# Patient Record
Sex: FEMALE | Race: WHITE | NOT HISPANIC OR LATINO | Employment: OTHER | URBAN - METROPOLITAN AREA
[De-identification: names, ages, dates, MRNs, and addresses within clinical notes are randomized per-mention and may not be internally consistent; named-entity substitution may affect disease eponyms.]

---

## 2017-01-11 ENCOUNTER — COMMUNICATION - HEALTHEAST (OUTPATIENT)
Dept: CARDIOLOGY | Facility: CLINIC | Age: 72
End: 2017-01-11

## 2017-01-11 ENCOUNTER — AMBULATORY - HEALTHEAST (OUTPATIENT)
Dept: CARDIOLOGY | Facility: CLINIC | Age: 72
End: 2017-01-11

## 2017-01-11 DIAGNOSIS — I25.10 CORONARY ARTERY DISEASE INVOLVING NATIVE CORONARY ARTERY OF NATIVE HEART, ANGINA PRESENCE UNSPECIFIED: ICD-10-CM

## 2018-02-05 ENCOUNTER — RECORDS - HEALTHEAST (OUTPATIENT)
Dept: LAB | Facility: CLINIC | Age: 73
End: 2018-02-05

## 2018-02-05 LAB
ANION GAP SERPL CALCULATED.3IONS-SCNC: 13 MMOL/L (ref 5–18)
BUN SERPL-MCNC: 19 MG/DL (ref 8–28)
CALCIUM SERPL-MCNC: 9.4 MG/DL (ref 8.5–10.5)
CHLORIDE BLD-SCNC: 100 MMOL/L (ref 98–107)
CHOLEST SERPL-MCNC: 212 MG/DL
CO2 SERPL-SCNC: 27 MMOL/L (ref 22–31)
CREAT SERPL-MCNC: 0.67 MG/DL (ref 0.6–1.1)
FASTING STATUS PATIENT QL REPORTED: NO
GFR SERPL CREATININE-BSD FRML MDRD: >60 ML/MIN/1.73M2
GLUCOSE BLD-MCNC: 122 MG/DL (ref 70–125)
HDLC SERPL-MCNC: 63 MG/DL
LDLC SERPL CALC-MCNC: 93 MG/DL
LDLC SERPL CALC-MCNC: ABNORMAL MG/DL
POTASSIUM BLD-SCNC: 3.2 MMOL/L (ref 3.5–5)
SODIUM SERPL-SCNC: 140 MMOL/L (ref 136–145)
TRIGL SERPL-MCNC: 412 MG/DL

## 2018-03-28 ENCOUNTER — RECORDS - HEALTHEAST (OUTPATIENT)
Dept: LAB | Facility: HOSPITAL | Age: 73
End: 2018-03-28

## 2018-03-28 LAB
ALBUMIN SERPL-MCNC: 3.7 G/DL (ref 3.5–5)
ALP SERPL-CCNC: 57 U/L (ref 45–120)
ALT SERPL W P-5'-P-CCNC: 26 U/L (ref 0–45)
ANION GAP SERPL CALCULATED.3IONS-SCNC: 13 MMOL/L (ref 5–18)
AST SERPL W P-5'-P-CCNC: 27 U/L (ref 0–40)
BILIRUB SERPL-MCNC: 0.4 MG/DL (ref 0–1)
BUN SERPL-MCNC: 16 MG/DL (ref 8–28)
CALCIUM SERPL-MCNC: 9.1 MG/DL (ref 8.5–10.5)
CHLORIDE BLD-SCNC: 101 MMOL/L (ref 98–107)
CO2 SERPL-SCNC: 26 MMOL/L (ref 22–31)
CREAT SERPL-MCNC: 0.69 MG/DL (ref 0.6–1.1)
GFR SERPL CREATININE-BSD FRML MDRD: >60 ML/MIN/1.73M2
GLUCOSE BLD-MCNC: 121 MG/DL (ref 70–125)
PHENYTOIN SERPL-MCNC: 25.6 UG/ML (ref 10–20)
POTASSIUM BLD-SCNC: 3 MMOL/L (ref 3.5–5)
PROT SERPL-MCNC: 8.1 G/DL (ref 6–8)
SODIUM SERPL-SCNC: 140 MMOL/L (ref 136–145)

## 2018-03-29 LAB
PHENYTOIN (DILATIN) FREE: 2.4 UG/ML (ref 1–2)
TOPIRAMATE SERPL-MCNC: 4.5 UG/ML (ref 5–20)

## 2018-10-16 ENCOUNTER — RECORDS - HEALTHEAST (OUTPATIENT)
Dept: ADMINISTRATIVE | Facility: OTHER | Age: 73
End: 2018-10-16

## 2018-10-29 ENCOUNTER — RECORDS - HEALTHEAST (OUTPATIENT)
Dept: LAB | Facility: CLINIC | Age: 73
End: 2018-10-29

## 2018-10-29 LAB
ALBUMIN SERPL-MCNC: 3.6 G/DL (ref 3.5–5)
ALP SERPL-CCNC: 132 U/L (ref 45–120)
ALT SERPL W P-5'-P-CCNC: 14 U/L (ref 0–45)
ANION GAP SERPL CALCULATED.3IONS-SCNC: 13 MMOL/L (ref 5–18)
AST SERPL W P-5'-P-CCNC: 22 U/L (ref 0–40)
BILIRUB SERPL-MCNC: 0.4 MG/DL (ref 0–1)
BUN SERPL-MCNC: 20 MG/DL (ref 8–28)
CALCIUM SERPL-MCNC: 9.2 MG/DL (ref 8.5–10.5)
CHLORIDE BLD-SCNC: 100 MMOL/L (ref 98–107)
CHOLEST SERPL-MCNC: 181 MG/DL
CO2 SERPL-SCNC: 25 MMOL/L (ref 22–31)
CREAT SERPL-MCNC: 0.67 MG/DL (ref 0.6–1.1)
FASTING STATUS PATIENT QL REPORTED: NO
GFR SERPL CREATININE-BSD FRML MDRD: >60 ML/MIN/1.73M2
GLUCOSE BLD-MCNC: 109 MG/DL (ref 70–125)
HDLC SERPL-MCNC: 58 MG/DL
LDLC SERPL CALC-MCNC: 73 MG/DL
POTASSIUM BLD-SCNC: 2.8 MMOL/L (ref 3.5–5)
PROT SERPL-MCNC: 7.9 G/DL (ref 6–8)
PTH-INTACT SERPL-MCNC: 115 PG/ML (ref 10–86)
SODIUM SERPL-SCNC: 138 MMOL/L (ref 136–145)
TRIGL SERPL-MCNC: 248 MG/DL
TSH SERPL DL<=0.005 MIU/L-ACNC: 2.78 UIU/ML (ref 0.3–5)

## 2018-10-30 LAB — 25(OH)D3 SERPL-MCNC: 28.3 NG/ML (ref 30–80)

## 2018-12-10 ENCOUNTER — RECORDS - HEALTHEAST (OUTPATIENT)
Dept: LAB | Facility: CLINIC | Age: 73
End: 2018-12-10

## 2018-12-10 LAB — POTASSIUM BLD-SCNC: 3.3 MMOL/L (ref 3.5–5)

## 2019-04-01 ENCOUNTER — RECORDS - HEALTHEAST (OUTPATIENT)
Dept: LAB | Facility: CLINIC | Age: 74
End: 2019-04-01

## 2019-04-01 LAB
ANION GAP SERPL CALCULATED.3IONS-SCNC: 12 MMOL/L (ref 5–18)
BUN SERPL-MCNC: 16 MG/DL (ref 8–28)
CALCIUM SERPL-MCNC: 9.4 MG/DL (ref 8.5–10.5)
CHLORIDE BLD-SCNC: 101 MMOL/L (ref 98–107)
CO2 SERPL-SCNC: 24 MMOL/L (ref 22–31)
CREAT SERPL-MCNC: 0.66 MG/DL (ref 0.6–1.1)
GFR SERPL CREATININE-BSD FRML MDRD: >60 ML/MIN/1.73M2
GLUCOSE BLD-MCNC: 103 MG/DL (ref 70–125)
POTASSIUM BLD-SCNC: 3.6 MMOL/L (ref 3.5–5)
SODIUM SERPL-SCNC: 137 MMOL/L (ref 136–145)

## 2019-04-02 LAB — 25(OH)D3 SERPL-MCNC: 35.6 NG/ML (ref 30–80)

## 2019-04-04 ENCOUNTER — HOME CARE/HOSPICE - HEALTHEAST (OUTPATIENT)
Dept: HOME HEALTH SERVICES | Facility: HOME HEALTH | Age: 74
End: 2019-04-04

## 2019-06-24 ENCOUNTER — RECORDS - HEALTHEAST (OUTPATIENT)
Dept: LAB | Facility: CLINIC | Age: 74
End: 2019-06-24

## 2019-06-24 LAB
ANION GAP SERPL CALCULATED.3IONS-SCNC: 10 MMOL/L (ref 5–18)
BUN SERPL-MCNC: 23 MG/DL (ref 8–28)
CALCIUM SERPL-MCNC: 9.3 MG/DL (ref 8.5–10.5)
CHLORIDE BLD-SCNC: 103 MMOL/L (ref 98–107)
CO2 SERPL-SCNC: 24 MMOL/L (ref 22–31)
CREAT SERPL-MCNC: 0.68 MG/DL (ref 0.6–1.1)
GFR SERPL CREATININE-BSD FRML MDRD: >60 ML/MIN/1.73M2
GLUCOSE BLD-MCNC: 94 MG/DL (ref 70–125)
MAGNESIUM SERPL-MCNC: 2.1 MG/DL (ref 1.8–2.6)
POTASSIUM BLD-SCNC: 3.8 MMOL/L (ref 3.5–5)
SODIUM SERPL-SCNC: 137 MMOL/L (ref 136–145)

## 2019-07-08 ENCOUNTER — RECORDS - HEALTHEAST (OUTPATIENT)
Dept: LAB | Facility: CLINIC | Age: 74
End: 2019-07-08

## 2019-07-08 LAB
MAGNESIUM SERPL-MCNC: 1.8 MG/DL (ref 1.8–2.6)
POTASSIUM BLD-SCNC: 3.9 MMOL/L (ref 3.5–5)

## 2019-07-19 ENCOUNTER — HOME CARE/HOSPICE - HEALTHEAST (OUTPATIENT)
Dept: HOME HEALTH SERVICES | Facility: HOME HEALTH | Age: 74
End: 2019-07-19

## 2019-08-01 ENCOUNTER — RECORDS - HEALTHEAST (OUTPATIENT)
Dept: LAB | Facility: CLINIC | Age: 74
End: 2019-08-01

## 2019-08-01 LAB
ALBUMIN SERPL-MCNC: 3 G/DL (ref 3.5–5)
ALP SERPL-CCNC: 82 U/L (ref 45–120)
ALT SERPL W P-5'-P-CCNC: 34 U/L (ref 0–45)
ANION GAP SERPL CALCULATED.3IONS-SCNC: 9 MMOL/L (ref 5–18)
AST SERPL W P-5'-P-CCNC: 30 U/L (ref 0–40)
BILIRUB SERPL-MCNC: 0.2 MG/DL (ref 0–1)
BUN SERPL-MCNC: 16 MG/DL (ref 8–28)
CALCIUM SERPL-MCNC: 8.9 MG/DL (ref 8.5–10.5)
CHLORIDE BLD-SCNC: 101 MMOL/L (ref 98–107)
CO2 SERPL-SCNC: 27 MMOL/L (ref 22–31)
CREAT SERPL-MCNC: 0.66 MG/DL (ref 0.6–1.1)
GFR SERPL CREATININE-BSD FRML MDRD: >60 ML/MIN/1.73M2
GLUCOSE BLD-MCNC: 99 MG/DL (ref 70–125)
PHENYTOIN SERPL-MCNC: 12.8 UG/ML (ref 10–20)
POTASSIUM BLD-SCNC: 3.9 MMOL/L (ref 3.5–5)
PROT SERPL-MCNC: 7.5 G/DL (ref 6–8)
SODIUM SERPL-SCNC: 137 MMOL/L (ref 136–145)

## 2019-08-05 ENCOUNTER — RECORDS - HEALTHEAST (OUTPATIENT)
Dept: LAB | Facility: HOSPITAL | Age: 74
End: 2019-08-05

## 2019-08-06 LAB — TOPIRAMATE SERPL-MCNC: 5 UG/ML (ref 5–20)

## 2019-09-16 ENCOUNTER — RECORDS - HEALTHEAST (OUTPATIENT)
Dept: LAB | Facility: CLINIC | Age: 74
End: 2019-09-16

## 2019-09-16 LAB
ANION GAP SERPL CALCULATED.3IONS-SCNC: 7 MMOL/L (ref 5–18)
BNP SERPL-MCNC: 665 PG/ML (ref 0–133)
BUN SERPL-MCNC: 16 MG/DL (ref 8–28)
CALCIUM SERPL-MCNC: 9.5 MG/DL (ref 8.5–10.5)
CHLORIDE BLD-SCNC: 101 MMOL/L (ref 98–107)
CO2 SERPL-SCNC: 27 MMOL/L (ref 22–31)
CREAT SERPL-MCNC: 0.66 MG/DL (ref 0.6–1.1)
GFR SERPL CREATININE-BSD FRML MDRD: >60 ML/MIN/1.73M2
GLUCOSE BLD-MCNC: 100 MG/DL (ref 70–125)
POTASSIUM BLD-SCNC: 4 MMOL/L (ref 3.5–5)
SODIUM SERPL-SCNC: 135 MMOL/L (ref 136–145)

## 2019-09-17 ENCOUNTER — RECORDS - HEALTHEAST (OUTPATIENT)
Dept: LAB | Facility: CLINIC | Age: 74
End: 2019-09-17

## 2019-09-18 LAB — MAGNESIUM SERPL-MCNC: 1.8 MG/DL (ref 1.8–2.6)

## 2019-10-08 ENCOUNTER — RECORDS - HEALTHEAST (OUTPATIENT)
Dept: ADMINISTRATIVE | Facility: OTHER | Age: 74
End: 2019-10-08

## 2019-10-08 ENCOUNTER — AMBULATORY - HEALTHEAST (OUTPATIENT)
Dept: CARDIOLOGY | Facility: CLINIC | Age: 74
End: 2019-10-08

## 2019-10-11 ENCOUNTER — OFFICE VISIT - HEALTHEAST (OUTPATIENT)
Dept: CARDIOLOGY | Facility: CLINIC | Age: 74
End: 2019-10-11

## 2019-10-11 DIAGNOSIS — I48.0 PAF (PAROXYSMAL ATRIAL FIBRILLATION) (H): ICD-10-CM

## 2019-10-11 DIAGNOSIS — I25.10 CORONARY ARTERY DISEASE INVOLVING NATIVE CORONARY ARTERY OF NATIVE HEART, ANGINA PRESENCE UNSPECIFIED: ICD-10-CM

## 2019-10-11 DIAGNOSIS — I50.32 CHRONIC DIASTOLIC CONGESTIVE HEART FAILURE (H): ICD-10-CM

## 2019-10-11 ASSESSMENT — MIFFLIN-ST. JEOR: SCORE: 1413.95

## 2019-10-16 ENCOUNTER — COMMUNICATION - HEALTHEAST (OUTPATIENT)
Dept: CARDIOLOGY | Facility: CLINIC | Age: 74
End: 2019-10-16

## 2019-10-16 DIAGNOSIS — I48.0 PAF (PAROXYSMAL ATRIAL FIBRILLATION) (H): ICD-10-CM

## 2019-10-16 DIAGNOSIS — I50.32 CHRONIC DIASTOLIC CONGESTIVE HEART FAILURE (H): ICD-10-CM

## 2019-10-31 ENCOUNTER — RECORDS - HEALTHEAST (OUTPATIENT)
Dept: ADMINISTRATIVE | Facility: OTHER | Age: 74
End: 2019-10-31

## 2019-11-08 ENCOUNTER — RECORDS - HEALTHEAST (OUTPATIENT)
Dept: ADMINISTRATIVE | Facility: OTHER | Age: 74
End: 2019-11-08

## 2019-11-08 ENCOUNTER — AMBULATORY - HEALTHEAST (OUTPATIENT)
Dept: CARDIOLOGY | Facility: CLINIC | Age: 74
End: 2019-11-08

## 2019-11-11 ENCOUNTER — AMBULATORY - HEALTHEAST (OUTPATIENT)
Dept: CARDIOLOGY | Facility: CLINIC | Age: 74
End: 2019-11-11

## 2019-11-11 ENCOUNTER — OFFICE VISIT - HEALTHEAST (OUTPATIENT)
Dept: CARDIOLOGY | Facility: CLINIC | Age: 74
End: 2019-11-11

## 2019-11-11 DIAGNOSIS — I25.10 CAD (CORONARY ARTERY DISEASE): ICD-10-CM

## 2019-11-11 DIAGNOSIS — I50.31 ACUTE DIASTOLIC CONGESTIVE HEART FAILURE (H): ICD-10-CM

## 2019-11-11 DIAGNOSIS — I48.0 PAROXYSMAL ATRIAL FIBRILLATION (H): ICD-10-CM

## 2019-11-11 DIAGNOSIS — I50.30 HEART FAILURE WITH PRESERVED EJECTION FRACTION, NYHA CLASS II (H): ICD-10-CM

## 2019-11-11 LAB
ANION GAP SERPL CALCULATED.3IONS-SCNC: 12 MMOL/L (ref 5–18)
BUN SERPL-MCNC: 22 MG/DL (ref 8–28)
CALCIUM SERPL-MCNC: 9.3 MG/DL (ref 8.5–10.5)
CHLORIDE BLD-SCNC: 102 MMOL/L (ref 98–107)
CO2 SERPL-SCNC: 26 MMOL/L (ref 22–31)
CREAT SERPL-MCNC: 0.78 MG/DL (ref 0.6–1.1)
GFR SERPL CREATININE-BSD FRML MDRD: >60 ML/MIN/1.73M2
GLUCOSE BLD-MCNC: 95 MG/DL (ref 70–125)
POTASSIUM BLD-SCNC: 4 MMOL/L (ref 3.5–5)
SODIUM SERPL-SCNC: 140 MMOL/L (ref 136–145)

## 2019-11-11 ASSESSMENT — MIFFLIN-ST. JEOR: SCORE: 1418.48

## 2019-11-12 ENCOUNTER — AMBULATORY - HEALTHEAST (OUTPATIENT)
Dept: CARDIOLOGY | Facility: CLINIC | Age: 74
End: 2019-11-12

## 2019-11-12 DIAGNOSIS — I50.32 CHRONIC DIASTOLIC CONGESTIVE HEART FAILURE (H): ICD-10-CM

## 2019-11-12 DIAGNOSIS — I48.0 PAF (PAROXYSMAL ATRIAL FIBRILLATION) (H): ICD-10-CM

## 2019-11-18 ENCOUNTER — COMMUNICATION - HEALTHEAST (OUTPATIENT)
Dept: CARDIOLOGY | Facility: CLINIC | Age: 74
End: 2019-11-18

## 2019-11-25 ENCOUNTER — RECORDS - HEALTHEAST (OUTPATIENT)
Dept: LAB | Facility: CLINIC | Age: 74
End: 2019-11-25

## 2019-11-25 LAB
ANION GAP SERPL CALCULATED.3IONS-SCNC: 12 MMOL/L (ref 5–18)
BUN SERPL-MCNC: 17 MG/DL (ref 8–28)
CALCIUM SERPL-MCNC: 9.4 MG/DL (ref 8.5–10.5)
CHLORIDE BLD-SCNC: 103 MMOL/L (ref 98–107)
CO2 SERPL-SCNC: 23 MMOL/L (ref 22–31)
CREAT SERPL-MCNC: 0.71 MG/DL (ref 0.6–1.1)
GFR SERPL CREATININE-BSD FRML MDRD: >60 ML/MIN/1.73M2
GLUCOSE BLD-MCNC: 95 MG/DL (ref 70–125)
POTASSIUM BLD-SCNC: 4 MMOL/L (ref 3.5–5)
SODIUM SERPL-SCNC: 138 MMOL/L (ref 136–145)

## 2019-12-13 ENCOUNTER — RECORDS - HEALTHEAST (OUTPATIENT)
Dept: LAB | Facility: CLINIC | Age: 74
End: 2019-12-13

## 2019-12-15 LAB — BACTERIA SPEC CULT: NORMAL

## 2020-01-16 ENCOUNTER — RECORDS - HEALTHEAST (OUTPATIENT)
Dept: ADMINISTRATIVE | Facility: OTHER | Age: 75
End: 2020-01-16

## 2020-01-16 ENCOUNTER — AMBULATORY - HEALTHEAST (OUTPATIENT)
Dept: CARDIOLOGY | Facility: CLINIC | Age: 75
End: 2020-01-16

## 2020-01-20 ENCOUNTER — OFFICE VISIT - HEALTHEAST (OUTPATIENT)
Dept: CARDIOLOGY | Facility: CLINIC | Age: 75
End: 2020-01-20

## 2020-01-20 DIAGNOSIS — I50.32 CHRONIC HEART FAILURE WITH PRESERVED EJECTION FRACTION (H): ICD-10-CM

## 2020-01-20 DIAGNOSIS — I10 ESSENTIAL HYPERTENSION, BENIGN: ICD-10-CM

## 2020-01-20 DIAGNOSIS — I25.10 CORONARY ARTERY DISEASE INVOLVING NATIVE CORONARY ARTERY OF NATIVE HEART WITHOUT ANGINA PECTORIS: ICD-10-CM

## 2020-01-20 LAB
ANION GAP SERPL CALCULATED.3IONS-SCNC: 11 MMOL/L (ref 5–18)
BUN SERPL-MCNC: 17 MG/DL (ref 8–28)
CALCIUM SERPL-MCNC: 8.9 MG/DL (ref 8.5–10.5)
CHLORIDE BLD-SCNC: 100 MMOL/L (ref 98–107)
CO2 SERPL-SCNC: 28 MMOL/L (ref 22–31)
CREAT SERPL-MCNC: 0.74 MG/DL (ref 0.6–1.1)
GFR SERPL CREATININE-BSD FRML MDRD: >60 ML/MIN/1.73M2
GLUCOSE BLD-MCNC: 93 MG/DL (ref 70–125)
POTASSIUM BLD-SCNC: 3.9 MMOL/L (ref 3.5–5)
SODIUM SERPL-SCNC: 139 MMOL/L (ref 136–145)

## 2020-01-20 ASSESSMENT — MIFFLIN-ST. JEOR: SCORE: 1418.48

## 2020-01-27 ENCOUNTER — COMMUNICATION - HEALTHEAST (OUTPATIENT)
Dept: CARDIOLOGY | Facility: CLINIC | Age: 75
End: 2020-01-27

## 2020-01-27 DIAGNOSIS — I25.10 CORONARY ARTERY DISEASE INVOLVING NATIVE CORONARY ARTERY OF NATIVE HEART WITHOUT ANGINA PECTORIS: ICD-10-CM

## 2020-01-27 DIAGNOSIS — I50.32 CHRONIC HEART FAILURE WITH PRESERVED EJECTION FRACTION (H): ICD-10-CM

## 2020-02-17 ENCOUNTER — COMMUNICATION - HEALTHEAST (OUTPATIENT)
Dept: CARDIOLOGY | Facility: CLINIC | Age: 75
End: 2020-02-17

## 2020-02-17 DIAGNOSIS — I50.32 CHRONIC DIASTOLIC CONGESTIVE HEART FAILURE (H): ICD-10-CM

## 2020-02-17 DIAGNOSIS — I25.10 CAD (CORONARY ARTERY DISEASE): ICD-10-CM

## 2020-02-20 ENCOUNTER — RECORDS - HEALTHEAST (OUTPATIENT)
Dept: ADMINISTRATIVE | Facility: OTHER | Age: 75
End: 2020-02-20

## 2020-03-02 ENCOUNTER — RECORDS - HEALTHEAST (OUTPATIENT)
Dept: LAB | Facility: CLINIC | Age: 75
End: 2020-03-02

## 2020-03-02 ENCOUNTER — HOSPITAL ENCOUNTER (OUTPATIENT)
Dept: CT IMAGING | Facility: CLINIC | Age: 75
Discharge: HOME | End: 2020-03-02
Attending: INTERNAL MEDICINE

## 2020-03-02 DIAGNOSIS — K50.00 CROHN'S DISEASE OF SMALL INTESTINE WITHOUT COMPLICATION (H): ICD-10-CM

## 2020-03-02 LAB
BNP SERPL-MCNC: 398 PG/ML (ref 0–133)
CREAT BLD-MCNC: 0.6 MG/DL (ref 0.6–1.1)
GFR SERPL CREATININE-BSD FRML MDRD: >60 ML/MIN/1.73M2

## 2020-03-03 LAB
ANION GAP SERPL CALCULATED.3IONS-SCNC: 13 MMOL/L (ref 5–18)
BUN SERPL-MCNC: 19 MG/DL (ref 8–28)
CALCIUM SERPL-MCNC: 9.2 MG/DL (ref 8.5–10.5)
CHLORIDE BLD-SCNC: 100 MMOL/L (ref 98–107)
CHOLEST SERPL-MCNC: 166 MG/DL
CO2 SERPL-SCNC: 24 MMOL/L (ref 22–31)
CREAT SERPL-MCNC: 0.7 MG/DL (ref 0.6–1.1)
FASTING STATUS PATIENT QL REPORTED: ABNORMAL
GFR SERPL CREATININE-BSD FRML MDRD: >60 ML/MIN/1.73M2
GLUCOSE BLD-MCNC: 94 MG/DL (ref 70–125)
HDLC SERPL-MCNC: 60 MG/DL
LDLC SERPL CALC-MCNC: 69 MG/DL
POTASSIUM BLD-SCNC: 4.3 MMOL/L (ref 3.5–5)
SODIUM SERPL-SCNC: 137 MMOL/L (ref 136–145)
TRIGL SERPL-MCNC: 183 MG/DL

## 2020-03-16 ENCOUNTER — HOSPITAL ENCOUNTER (OUTPATIENT)
Dept: CARDIOLOGY | Facility: CLINIC | Age: 75
Discharge: HOME | End: 2020-03-16
Attending: INTERNAL MEDICINE

## 2020-05-11 ENCOUNTER — RECORDS - HEALTHEAST (OUTPATIENT)
Dept: LAB | Facility: CLINIC | Age: 75
End: 2020-05-11

## 2020-05-11 ENCOUNTER — OFFICE VISIT - HEALTHEAST (OUTPATIENT)
Dept: CARDIOLOGY | Facility: CLINIC | Age: 75
End: 2020-05-11

## 2020-05-11 DIAGNOSIS — I48.0 PAROXYSMAL ATRIAL FIBRILLATION (H): ICD-10-CM

## 2020-05-11 DIAGNOSIS — I25.10 CORONARY ARTERY DISEASE INVOLVING NATIVE CORONARY ARTERY OF NATIVE HEART WITHOUT ANGINA PECTORIS: ICD-10-CM

## 2020-05-11 DIAGNOSIS — I50.33 ACUTE ON CHRONIC DIASTOLIC HEART FAILURE (H): ICD-10-CM

## 2020-05-11 DIAGNOSIS — I10 ESSENTIAL HYPERTENSION: ICD-10-CM

## 2020-05-11 LAB
ANION GAP SERPL CALCULATED.3IONS-SCNC: 9 MMOL/L (ref 5–18)
BNP SERPL-MCNC: 248 PG/ML (ref 0–133)
BUN SERPL-MCNC: 21 MG/DL (ref 8–28)
CALCIUM SERPL-MCNC: 8.9 MG/DL (ref 8.5–10.5)
CHLORIDE BLD-SCNC: 99 MMOL/L (ref 98–107)
CO2 SERPL-SCNC: 29 MMOL/L (ref 22–31)
CREAT SERPL-MCNC: 0.72 MG/DL (ref 0.6–1.1)
GFR SERPL CREATININE-BSD FRML MDRD: >60 ML/MIN/1.73M2
GLUCOSE BLD-MCNC: 92 MG/DL (ref 70–125)
PHENYTOIN SERPL-MCNC: 12.1 UG/ML (ref 10–20)
POTASSIUM BLD-SCNC: 3.9 MMOL/L (ref 3.5–5)
SODIUM SERPL-SCNC: 137 MMOL/L (ref 136–145)

## 2020-05-13 ENCOUNTER — COMMUNICATION - HEALTHEAST (OUTPATIENT)
Dept: CARDIOLOGY | Facility: CLINIC | Age: 75
End: 2020-05-13

## 2020-05-13 ENCOUNTER — AMBULATORY - HEALTHEAST (OUTPATIENT)
Dept: CARDIOLOGY | Facility: CLINIC | Age: 75
End: 2020-05-13

## 2020-05-13 DIAGNOSIS — I50.32 CHRONIC DIASTOLIC CONGESTIVE HEART FAILURE (H): ICD-10-CM

## 2020-05-15 ENCOUNTER — AMBULATORY - HEALTHEAST (OUTPATIENT)
Dept: CARDIOLOGY | Facility: CLINIC | Age: 75
End: 2020-05-15

## 2020-06-04 ENCOUNTER — RECORDS - HEALTHEAST (OUTPATIENT)
Dept: LAB | Facility: CLINIC | Age: 75
End: 2020-06-04

## 2020-06-04 ENCOUNTER — COMMUNICATION - HEALTHEAST (OUTPATIENT)
Dept: CARDIOLOGY | Facility: CLINIC | Age: 75
End: 2020-06-04

## 2020-06-04 LAB
ANION GAP SERPL CALCULATED.3IONS-SCNC: 11 MMOL/L (ref 5–18)
BNP SERPL-MCNC: 313 PG/ML (ref 0–137)
BUN SERPL-MCNC: 14 MG/DL (ref 8–28)
CALCIUM SERPL-MCNC: 8.8 MG/DL (ref 8.5–10.5)
CHLORIDE BLD-SCNC: 100 MMOL/L (ref 98–107)
CO2 SERPL-SCNC: 29 MMOL/L (ref 22–31)
CREAT SERPL-MCNC: 0.74 MG/DL (ref 0.6–1.1)
GFR SERPL CREATININE-BSD FRML MDRD: >60 ML/MIN/1.73M2
GLUCOSE BLD-MCNC: 115 MG/DL (ref 70–125)
POTASSIUM BLD-SCNC: 3.5 MMOL/L (ref 3.5–5)
SODIUM SERPL-SCNC: 140 MMOL/L (ref 136–145)

## 2020-06-05 ENCOUNTER — COMMUNICATION - HEALTHEAST (OUTPATIENT)
Dept: CARDIOLOGY | Facility: CLINIC | Age: 75
End: 2020-06-05

## 2020-06-05 DIAGNOSIS — I50.32 CHRONIC DIASTOLIC CONGESTIVE HEART FAILURE (H): ICD-10-CM

## 2020-06-08 ENCOUNTER — COMMUNICATION - HEALTHEAST (OUTPATIENT)
Dept: CARDIOLOGY | Facility: CLINIC | Age: 75
End: 2020-06-08

## 2020-06-08 ENCOUNTER — HOSPITAL ENCOUNTER (OUTPATIENT)
Dept: NUCLEAR MEDICINE | Facility: CLINIC | Age: 75
Discharge: HOME | End: 2020-06-08
Attending: INTERNAL MEDICINE

## 2020-06-08 ENCOUNTER — HOSPITAL ENCOUNTER (OUTPATIENT)
Dept: CARDIOLOGY | Facility: CLINIC | Age: 75
Discharge: HOME | End: 2020-06-08
Attending: INTERNAL MEDICINE

## 2020-06-08 DIAGNOSIS — I25.10 CORONARY ARTERY DISEASE INVOLVING NATIVE CORONARY ARTERY OF NATIVE HEART WITHOUT ANGINA PECTORIS: ICD-10-CM

## 2020-06-08 DIAGNOSIS — I50.33 ACUTE ON CHRONIC DIASTOLIC HEART FAILURE (H): ICD-10-CM

## 2020-06-08 LAB
CV STRESS CURRENT BP HE: NORMAL
CV STRESS CURRENT HR HE: 100
CV STRESS CURRENT HR HE: 102
CV STRESS CURRENT HR HE: 104
CV STRESS CURRENT HR HE: 105
CV STRESS CURRENT HR HE: 106
CV STRESS CURRENT HR HE: 109
CV STRESS CURRENT HR HE: 110
CV STRESS CURRENT HR HE: 110
CV STRESS CURRENT HR HE: 111
CV STRESS CURRENT HR HE: 112
CV STRESS CURRENT HR HE: 115
CV STRESS CURRENT HR HE: 115
CV STRESS CURRENT HR HE: 89
CV STRESS CURRENT HR HE: 91
CV STRESS CURRENT HR HE: 97
CV STRESS DEVIATION TIME HE: NORMAL
CV STRESS ECHO PERCENT HR HE: NORMAL
CV STRESS EXERCISE STAGE HE: NORMAL
CV STRESS FINAL RESTING BP HE: NORMAL
CV STRESS FINAL RESTING HR HE: 109
CV STRESS MAX HR HE: 118
CV STRESS MAX TREADMILL GRADE HE: 0
CV STRESS MAX TREADMILL SPEED HE: 0
CV STRESS PEAK DIA BP HE: NORMAL
CV STRESS PEAK SYS BP HE: NORMAL
CV STRESS PHASE HE: NORMAL
CV STRESS PROTOCOL HE: NORMAL
CV STRESS RESTING PT POSITION HE: NORMAL
CV STRESS ST DEVIATION AMOUNT HE: NORMAL
CV STRESS ST DEVIATION ELEVATION HE: NORMAL
CV STRESS ST EVELATION AMOUNT HE: NORMAL
CV STRESS TEST TYPE HE: NORMAL
CV STRESS TOTAL STAGE TIME MIN 1 HE: NORMAL
NUC STRESS EJECTION FRACTION: 46 %
RATE PRESSURE PRODUCT: NORMAL
STRESS ECHO BASELINE DIASTOLIC HE: 58
STRESS ECHO BASELINE HR: 91
STRESS ECHO BASELINE SYSTOLIC BP: 114
STRESS ECHO CALCULATED PERCENT HR: 81 %
STRESS ECHO LAST STRESS DIASTOLIC BP: 56
STRESS ECHO LAST STRESS HR: 110
STRESS ECHO LAST STRESS SYSTOLIC BP: 140
STRESS ECHO TARGET HR: 145

## 2020-06-18 ENCOUNTER — COMMUNICATION - HEALTHEAST (OUTPATIENT)
Dept: CARDIOLOGY | Facility: CLINIC | Age: 75
End: 2020-06-18

## 2020-06-18 ENCOUNTER — RECORDS - HEALTHEAST (OUTPATIENT)
Dept: ADMINISTRATIVE | Facility: OTHER | Age: 75
End: 2020-06-18

## 2020-06-22 ENCOUNTER — OFFICE VISIT (OUTPATIENT)
Dept: NEUROLOGY | Facility: CLINIC | Age: 75
End: 2020-06-22
Payer: COMMERCIAL

## 2020-06-22 VITALS — BODY MASS INDEX: 34.49 KG/M2 | WEIGHT: 207 LBS | HEIGHT: 65 IN

## 2020-06-22 DIAGNOSIS — G40.209 PARTIAL SYMPTOMATIC EPILEPSY WITH COMPLEX PARTIAL SEIZURES, NOT INTRACTABLE, WITHOUT STATUS EPILEPTICUS (H): Primary | ICD-10-CM

## 2020-06-22 PROBLEM — I25.10 CORONARY ATHEROSCLEROSIS: Status: ACTIVE | Noted: 2020-06-22

## 2020-06-22 PROBLEM — E78.5 HYPERLIPIDEMIA: Status: ACTIVE | Noted: 2020-06-22

## 2020-06-22 PROBLEM — I10 ESSENTIAL HYPERTENSION: Status: ACTIVE | Noted: 2020-06-22

## 2020-06-22 PROBLEM — F32.A DEPRESSIVE DISORDER: Status: ACTIVE | Noted: 2020-06-22

## 2020-06-22 PROBLEM — E53.8 B12 DEFICIENCY: Status: ACTIVE | Noted: 2020-06-22

## 2020-06-22 PROBLEM — I50.30 (HFPEF) HEART FAILURE WITH PRESERVED EJECTION FRACTION (H): Status: ACTIVE | Noted: 2020-01-20

## 2020-06-22 PROCEDURE — 99214 OFFICE O/P EST MOD 30 MIN: CPT | Performed by: PSYCHIATRY & NEUROLOGY

## 2020-06-22 RX ORDER — PHENYTOIN SODIUM 100 MG/1
500 CAPSULE, EXTENDED RELEASE ORAL AT BEDTIME
Qty: 540 CAPSULE | Refills: 3 | Status: SHIPPED | OUTPATIENT
Start: 2020-06-22 | End: 2021-06-21

## 2020-06-22 RX ORDER — MONTELUKAST SODIUM 4 MG/1
3 TABLET, CHEWABLE ORAL DAILY
COMMUNITY
End: 2021-09-21

## 2020-06-22 RX ORDER — CYANOCOBALAMIN 1000 UG/ML
1000 INJECTION, SOLUTION INTRAMUSCULAR; SUBCUTANEOUS
COMMUNITY

## 2020-06-22 RX ORDER — TOPIRAMATE 100 MG/1
150 TABLET, FILM COATED ORAL 2 TIMES DAILY
Qty: 270 TABLET | Refills: 3 | Status: SHIPPED | OUTPATIENT
Start: 2020-06-22 | End: 2021-06-21

## 2020-06-22 RX ORDER — ACETAMINOPHEN 500 MG
1000 TABLET ORAL 2 TIMES DAILY
COMMUNITY

## 2020-06-22 RX ORDER — FUROSEMIDE 40 MG
60 TABLET ORAL
COMMUNITY
Start: 2019-08-05 | End: 2021-06-21 | Stop reason: ALTCHOICE

## 2020-06-22 RX ORDER — LORATADINE 10 MG/1
10 TABLET ORAL DAILY PRN
COMMUNITY

## 2020-06-22 RX ORDER — ASPIRIN 81 MG/1
81 TABLET ORAL DAILY
COMMUNITY
Start: 2019-06-19

## 2020-06-22 RX ORDER — ALBUTEROL SULFATE 90 UG/1
2 AEROSOL, METERED RESPIRATORY (INHALATION) EVERY 6 HOURS PRN
COMMUNITY

## 2020-06-22 RX ORDER — METOPROLOL TARTRATE 50 MG
50 TABLET ORAL 2 TIMES DAILY
COMMUNITY
Start: 2019-07-22 | End: 2023-09-14

## 2020-06-22 RX ORDER — FLUTICASONE PROPIONATE 110 UG/1
1 AEROSOL, METERED RESPIRATORY (INHALATION) 2 TIMES DAILY
COMMUNITY

## 2020-06-22 RX ORDER — PAROXETINE 40 MG/1
40 TABLET, FILM COATED ORAL DAILY
COMMUNITY
Start: 2019-08-05

## 2020-06-22 RX ORDER — ALENDRONATE SODIUM 70 MG/1
70 TABLET ORAL
COMMUNITY
End: 2024-08-20

## 2020-06-22 RX ORDER — PHENYTOIN SODIUM 100 MG/1
500 CAPSULE, EXTENDED RELEASE ORAL AT BEDTIME
COMMUNITY
Start: 2020-02-18 | End: 2020-06-22

## 2020-06-22 RX ORDER — SIMVASTATIN 40 MG
40 TABLET ORAL AT BEDTIME
COMMUNITY

## 2020-06-22 RX ORDER — TOPIRAMATE 100 MG/1
150 TABLET, FILM COATED ORAL
COMMUNITY
Start: 2020-02-18 | End: 2020-06-22

## 2020-06-22 RX ORDER — NITROGLYCERIN 0.4 MG/1
0.4 TABLET SUBLINGUAL EVERY 5 MIN PRN
COMMUNITY
Start: 2020-02-17 | End: 2023-10-05

## 2020-06-22 RX ORDER — MULTIPLE VITAMINS W/ MINERALS TAB 9MG-400MCG
1 TAB ORAL DAILY
COMMUNITY

## 2020-06-22 RX ORDER — SPIRONOLACTONE 50 MG/1
TABLET, FILM COATED ORAL
COMMUNITY
Start: 2020-06-08 | End: 2021-09-13

## 2020-06-22 SDOH — HEALTH STABILITY: MENTAL HEALTH: HOW OFTEN DO YOU HAVE A DRINK CONTAINING ALCOHOL?: NEVER

## 2020-06-22 ASSESSMENT — MIFFLIN-ST. JEOR: SCORE: 1434.83

## 2020-06-22 NOTE — LETTER
July 10, 2020    Doloresrimadhu Álvaro  [address]  [address]  [facility] [address]      Dear ,    We are writing to inform you of your test results.    Your test results fall within the expected range(s) or remain unchanged from previous results.  Please continue with current treatment plan.    Ref Range & Units  7/6/20 0816 4/26/20 0642       Topiramate  5.0 - 20.0 ug/mL  4.1Low     3.3Low   CM      Ref Range & Units  7/6/20 0816 5/11/20 0812       Phenytoin  10.0 - 20.0 ug/mL  13.9   12.1      If you have any questions or concerns, please call the clinic at the number listed above.       Sincerely,        Guy Marie MD

## 2020-06-22 NOTE — NURSING NOTE
Chief Complaint   Patient presents with     Hospital follow up     Dilantin toxicity- Dilantin level decreased      Video Visit     [facility]- [phone]     Katherine Preston CMA on 6/22/2020 at 12:33 PM

## 2020-06-22 NOTE — LETTER
2020         RE: Tommy Rea  [address]  [address]  [facility] [address]        Dear Colleague,    Thank you for referring your patient, Tommy Rea, to the [facility] [place]. Please see a copy of my visit note below.    NEUROLOGY FOLLOW UP VISIT  NOTE       [facility] [place]  [address] [place] [address]  Tel: [phone]  Fax: [phone]  [email or web address]     Tommy Rea,  1945, MRN [id number]  PCP: Hannah Benton, [phone]  Date: 2020     ASSESSMENT & PLAN     Diagnosis code: Complex partial seizure (Dysrhythmia grade III left)     Complex partial seizures  75-year-old female with history of complex partial seizures, depression with anxiety and paroxysmal atrial fibrillation who was recently admitted to the hospital with increased confusion and was noted to have Dilantin toxicity.  Dilantin dose was adjusted and she seems to be doing well.  Currently she is on Dilantin and Topamax.  I have recommended:    Continue Dilantin 500 mg nightly.  I refilled her prescription, gave her a 90-day supply with 3 refills    Continue Topamax 150 mg twice daily.  I refilled her prescription, gave her a 90-day supply and 3 refills.    Check free Dilantin level and Topamax level    She will continue to take calcium and vitamin D supplements    Follow-up will be in 1 year.    Thank you again for this referral, please feel free to contact me if you have any questions.    Guy Marie MD  [facility]  ([facility], [facility]     HISTORY OF PRESENT ILLNESS     Patient is a 75-year-old female with history of congestive heart failure, depression with anxiety, complex partial seizure, paroxysmal atrial fibrillation and coronary artery disease who returns for follow-up.  She was recently admitted to [facility] with increased confusion.  This was associated with some  speech difficulty.  She was noted to have an elevated Dilantin level.  She had an EEG that showed nonspecific slowing.  MRI of the head and CT were unremarkable.  Subsequently free Dilantin level was monitored and that she was discharged from the level of was 2.0 since then she has not experienced any seizures or any balance issues.    Patient does have history of seizure and was initially evaluated for seizure in March when she presented with a generalized seizure and EEG showed left hemispheric sharp activity. MRI was normal. 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 Patient reports that her seizures are triggered by stress and usually she gets an aura. Currently her symptoms are well controlled on Dilantin and Topamax     PROBLEM LIST   Patient Active Problem List    Diagnosis Date Noted     Hyperlipidemia 06/22/2020     Priority: Medium     Created by Conversion       Depressive disorder 06/22/2020     Priority: Medium     Coronary atherosclerosis 06/22/2020     Priority: Medium     B12 deficiency 06/22/2020     Priority: Medium     (HFpEF) heart failure with preserved ejection fraction (H) 01/20/2020     Priority: Medium     Atrial fibrillation (H) 10/24/2016     Priority: Medium     Regional enteritis (H) 07/19/2016     Priority: Medium     HTN (hypertension) 07/19/2016     Priority: Medium     Created by Conversion       COPD (chronic obstructive pulmonary disease) (H) 07/19/2016     Priority: Medium     Nonintractable epilepsy with complex partial seizures (H) 03/13/2015     Priority: Medium        PAST MEDICAL & SURGICAL HISTORY     Past Medical History:   Patient  has no past medical history on file.    Surgical History:  She  has no past surgical history on file.     SOCIAL HISTORY     Reviewed, and she  reports that she has quit smoking. She has never used smokeless tobacco. She reports that she does not  drink alcohol.     FAMILY HISTORY     Reviewed, and family history includes Arthritis in her father; Colon Cancer in her father; Hypertension in her father and mother.     ALLERGIES     Allergies   Allergen Reactions     Amoxicillin GI Disturbance and Other (See Comments)     COLITIS    Has tolerated cefazolin  GI bleeding, Colitis, has tolerated cefazolin        Sulfa Drugs      Sulfamethoxazole-Trimethoprim Other (See Comments)     Other reaction(s): Vaginal Irritation  VAGINAL BLEEDING  Vaginal irritation, vaginal bleeding            REVIEW OF SYSTEMS     A 12 point review of system was performed and was negative except as outlined in the history of present illness.     HOME MEDICATIONS     Prior to Admission medications    Medication Sig Start Date End Date Taking? Authorizing Provider   acetaminophen (TYLENOL) 500 MG tablet Take 1,000 mg by mouth   Yes Reported, Patient   albuterol (PROAIR HFA/PROVENTIL HFA/VENTOLIN HFA) 108 (90 Base) MCG/ACT inhaler Inhale 2 puffs into the lungs   Yes Reported, Patient   alendronate (FOSAMAX) 70 MG tablet Take 70 mg by mouth   Yes Reported, Patient   apixaban ANTICOAGULANT (ELIQUIS) 5 MG tablet Take 5 mg by mouth 2 times daily  6/18/19  Yes Reported, Patient   aspirin 81 MG EC tablet Take 81 mg by mouth 6/19/19  Yes Reported, Patient   colestipol (COLESTID) 1 g tablet Take 3 g by mouth   Yes Reported, Patient   cyanocobalamin (CYANOCOBALAMIN) 1000 MCG/ML injection Inject 1,000 mcg into the muscle   Yes Reported, Patient   HAYLEY CALCIUM-VITAMIN D PO  8/5/19  Yes Reported, Patient   fluticasone (FLOVENT HFA) 110 MCG/ACT inhaler Inhale 1 puff into the lungs   Yes Reported, Patient   furosemide (LASIX) 40 MG tablet Take 60 mg by mouth 8/5/19  Yes Reported, Patient   loratadine (CLARITIN) 10 MG tablet Take 10 mg by mouth   Yes Reported, Patient   Magnesium Oxide 250 MG TABS Take 250 mg by mouth   Yes Reported, Patient   MAGNESIUM OXIDE PO  8/5/19  Yes Reported, Patient   metoprolol  "tartrate (LOPRESSOR) 50 MG tablet Take 50 mg by mouth 7/22/19  Yes Reported, Patient   multivitamin w/minerals (MULTI-VITAMIN) tablet Take 1 tablet by mouth   Yes Reported, Patient   nitroGLYcerin (NITROSTAT) 0.4 MG sublingual tablet Place 0.4 mg under the tongue 2/17/20  Yes Reported, Patient   PARoxetine (PAXIL) 40 MG tablet Take 40 mg by mouth 8/5/19  Yes Reported, Patient   phenytoin (DILANTIN) 100 MG capsule Take 500 mg by mouth At Bedtime  2/18/20  Yes Reported, Patient   spironolactone (ALDACTONE) 50 MG tablet Take 50mg (1 tab)  in am and 25mg (half tab) in pm. 6/8/20  Yes Reported, Patient   topiramate (TOPAMAX) 100 MG tablet Take 150 mg by mouth 2/18/20  Yes Reported, Patient   simvastatin (ZOCOR) 40 MG tablet Take 40 mg by mouth    Reported, Patient         PHYSICAL EXAM     Vital signs  Ht 1.651 m (5' 5\")   Wt 93.9 kg (207 lb)   BMI 34.45 kg/m      Weight:   207 lbs 0 oz    General Physical Exam: Patient is alert and oriented x 3. Vital signs were reviewed and are documented in EMR. Neck was supple  Neurological Exam:  Patient is alert and oriented x3 speech was normal there was no dysarthria or aphasia.  Cranial nerves are intact.  She does appear hard of hearing.  Face symmetrical tongue midline.  Strength in all extremities 5/5.  Sensation intact.  Gait normal.  No dysmetria noted on finger-nose testing.     DIAGNOSTIC STUDIES     PERTINENT RADIOLOGY  Following imaging studies were reviewed      MRI 4/27/20  HEAD MRI:  1.  No acute intracranial finding. No evidence for recent ischemia, intracranial hemorrhage, or mass.    HEAD MRA:  1.  Negative MR angiogram of the brain. No evidence for aneurysm, proximal vessel occlusion, high-grade intracranial stenosis or high flow vascular lesion.    NECK MRA:  1.  Atherosclerotic narrowing of the proximal left internal carotid artery is not well-characterized due to motion artifact but suspected to be in the 50% range based on NASCET criteria.  2.  No " "hemodynamically significant narrowing within the right carotid artery.   3.  Vertebral arteries are patent. No dissection.    CT HEAD 4/26/20  1.  No CT evidence for acute intracranial process.  2.  Brain atrophy and presumed chronic microvascular ischemic changes as above.  3.  Mucosal thickening is noted involving the left sphenoid sinus. Correlation for sinusitis is recommended.    EEG 4/27/20  This is an abnormal EEG due to diffusely slow background in   theta and delta range that suggests nonspecific generalized cerebral   dysfunction.  EEG background activity is contaminated with muscle   artifact.  Such nonspecific slowing suggests diffuse cerebral dysfunction   but no clear epileptiform activity was noted    CT HEAD 7/17/19  1. No intracranial mass. No hemorrhage or stroke.  2. Mild presumed chronic small vessel ischemic change and age-related change again visualized.  3. Interval development of polypoid soft tissue in the external auditory canals.    MRI of the head done in 2015 was normal. EEG was abnormal suggesting low threshold for seizure  PERTINENT LABS  Following labs were reviewed  Dilantin level 5/11/2020 2012.1  4/28/2020 2016.8  4/26/2020 22.9  Free Dilantin level 426 2022.0   Component Name Value Ref Range   Sodium 140 136 - 145 mmol/L   Potassium 3.5 3.5 - 5 mmol/L   Chloride 100 98 - 107 mmol/L   CO2 29 22 - 31 mmol/L   Anion Gap, Calculation 11 5 - 18 mmol/L   Glucose 115 70 - 125 mg/dL   Calcium 8.8 8.5 - 10.5 mg/dL   BUN 14 8 - 28 mg/dL   Creatinine 0.74 0.6 - 1.1 mg/dL   GFR MDRD Af Amer >60 >60 mL/min/1.73m2   GFR MDRD Non Af Amer >60 >60 mL/min/1.73m2          VIDEO VISIT CONSENT  Patient is being evaluated via a billable video visit. The patient has been notified of following:     \"This video visit will be conducted via a call between you and your physician/provider. We have found that certain health care needs can be provided without the need for an in-person physical exam. This " "service lets us provide the care you need with a video conversation. If a prescription is necessary we can send it directly to your pharmacy. If lab work is needed we can place an order for that and you can then stop by our lab to have the test done at a later time. If during the course of the call the physician/provider feels a video visit is not appropriate, you will not be charged for this service.\"    Physician has received verbal consent for a Video Visit from the patient? YES  Patient would like the video invitation sent by: []E-mail  [x][employer]     VIDEO VISIT DETAILS  Type of service: Video Visit  Video Start Time: 12:49 PM  Video End Time (time video stopped): 12:57 PM  Originating Location (Patient Location): Patient's Home  Distant Location (provider location): [facility] [place]   Mode of Communication: Video Conference via [][employer]I [place] [x]Doximity  Total time spent for face to face visit, reviewing labs/imaging studies, counseling and coordination of care was: 30 Minutes More than 50% of this time was spent on counseling and coordination of care.      This note was dictated using voice recognition software.  Any grammatical or context distortions are unintentional and inherent to the software.               Again, thank you for allowing me to participate in the care of your patient.        Sincerely,        Guy Marie MD    "

## 2020-06-22 NOTE — PATIENT INSTRUCTIONS
Patient Education     Self-Care for Epilepsy  You can do many things to help control your seizures. First, follow your treatment plan. If your healthcare provider has prescribed medicines, take them as directed. Keep your appointments with your primary care nurse, healthcare provider, or neurologist. Also, take the following steps.    Track and stay away from triggers  Triggers are things that seem to cause (provoke) seizures. Keep track of your triggers and try to prevent them or stay away from them. Here are 2 common triggers and ways to cope with them:    Too little sleep. Get enough sleep. If you have trouble sleeping, talk with your healthcare provider.    Alcohol and drugs. Don t drink alcohol. Never take any illegal drugs. If you do have substance abuse issues, talk with your healthcare provider about the safest way to become free of alcohol and drugs.   Keep a healthy lifestyle  A healthy lifestyle can help you feel good and cope better with epilepsy.    Exercise often. Frequent exercise can help keep you healthy. Try to exercise for 30 minutes most days of the week. Yoga is a good choice.    SPAN: Eat well and regularly. Good nutrition can give you energy and make you feel better. Eat lots of fruits, vegetables, and whole grains. Don't skip meals, because seizures are more likely if you have low blood sugar.    SPAN: Control stress. Keeping stress levels low can help you cope better with epilepsy. To manage stress, try an exercise program.    SPAN: Manage illness. Get proper treatment when you re sick. Check with your healthcare provider and pharmacist about the risk of seizures with medicines you take for illnesses. If your healthcare provider has prescribed antiepileptic medicines, take them even when you re ill.  Date Last Reviewed: 11/1/2017 2000-2019 [employer] [address] [place] [address], [place] [address]. All rights reserved. This information is not intended as a substitute for  professional medical care. Always follow your healthcare professional's instructions.

## 2020-06-22 NOTE — PROGRESS NOTES
NEUROLOGY FOLLOW UP VISIT  NOTE       [facility] NEUROLOGY [place]  [address] [place] [address]  Tel: [phone]  Fax: [phone]  [email or web address]     Tommy Rea KIKI 1945, MRN [id number]  PCP: Hannah Benton, [phone]  Date: 2020     ASSESSMENT & PLAN     Diagnosis code: Complex partial seizure (Dysrhythmia grade III left)     Complex partial seizures  75-year-old female with history of complex partial seizures, depression with anxiety and paroxysmal atrial fibrillation who was recently admitted to the hospital with increased confusion and was noted to have Dilantin toxicity.  Dilantin dose was adjusted and she seems to be doing well.  Currently she is on Dilantin and Topamax.  I have recommended:    Continue Dilantin 500 mg nightly.  I refilled her prescription, gave her a 90-day supply with 3 refills    Continue Topamax 150 mg twice daily.  I refilled her prescription, gave her a 90-day supply and 3 refills.    Check free Dilantin level and Topamax level    She will continue to take calcium and vitamin D supplements    Follow-up will be in 1 year.    Thank you again for this referral, please feel free to contact me if you have any questions.    Guy Marie MD  [facility]  (Formerly, [facility]     HISTORY OF PRESENT ILLNESS     Patient is a 75-year-old female with history of congestive heart failure, depression with anxiety, complex partial seizure, paroxysmal atrial fibrillation and coronary artery disease who returns for follow-up.  She was recently admitted to [facility] with increased confusion.  This was associated with some speech difficulty.  She was noted to have an elevated Dilantin level.  She had an EEG that showed nonspecific slowing.  MRI of the head and CT were unremarkable.  Subsequently free Dilantin level was monitored and that she was discharged from the [facility] of was 2.0  since then she has not experienced any seizures or any balance issues.    Patient does have history of seizure and was initially evaluated for seizure in March when she presented with a generalized seizure and EEG showed left hemispheric sharp activity. MRI was normal. 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 Patient reports that her seizures are triggered by stress and usually she gets an aura. Currently her symptoms are well controlled on Dilantin and Topamax     PROBLEM LIST   Patient Active Problem List    Diagnosis Date Noted     Hyperlipidemia 06/22/2020     Priority: Medium     Created by Conversion       Depressive disorder 06/22/2020     Priority: Medium     Coronary atherosclerosis 06/22/2020     Priority: Medium     B12 deficiency 06/22/2020     Priority: Medium     (HFpEF) heart failure with preserved ejection fraction (H) 01/20/2020     Priority: Medium     Atrial fibrillation (H) 10/24/2016     Priority: Medium     Regional enteritis (H) 07/19/2016     Priority: Medium     HTN (hypertension) 07/19/2016     Priority: Medium     Created by Conversion       COPD (chronic obstructive pulmonary disease) (H) 07/19/2016     Priority: Medium     Nonintractable epilepsy with complex partial seizures (H) 03/13/2015     Priority: Medium        PAST MEDICAL & SURGICAL HISTORY     Past Medical History:   Patient  has no past medical history on file.    Surgical History:  She  has no past surgical history on file.     SOCIAL HISTORY     Reviewed, and she  reports that she has quit smoking. She has never used smokeless tobacco. She reports that she does not drink alcohol.     FAMILY HISTORY     Reviewed, and family history includes Arthritis in her father; Colon Cancer in her father; Hypertension in her father and mother.     ALLERGIES     Allergies   Allergen Reactions     Amoxicillin GI Disturbance and Other (See  Comments)     COLITIS    Has tolerated cefazolin  GI bleeding, Colitis, has tolerated cefazolin        Sulfa Drugs      Sulfamethoxazole-Trimethoprim Other (See Comments)     Other reaction(s): Vaginal Irritation  VAGINAL BLEEDING  Vaginal irritation, vaginal bleeding            REVIEW OF SYSTEMS     A 12 point review of system was performed and was negative except as outlined in the history of present illness.     HOME MEDICATIONS     Prior to Admission medications    Medication Sig Start Date End Date Taking? Authorizing Provider   acetaminophen (TYLENOL) 500 MG tablet Take 1,000 mg by mouth   Yes Reported, Patient   albuterol (PROAIR HFA/PROVENTIL HFA/VENTOLIN HFA) 108 (90 Base) MCG/ACT inhaler Inhale 2 puffs into the lungs   Yes Reported, Patient   alendronate (FOSAMAX) 70 MG tablet Take 70 mg by mouth   Yes Reported, Patient   apixaban ANTICOAGULANT (ELIQUIS) 5 MG tablet Take 5 mg by mouth 2 times daily  6/18/19  Yes Reported, Patient   aspirin 81 MG EC tablet Take 81 mg by mouth 6/19/19  Yes Reported, Patient   colestipol (COLESTID) 1 g tablet Take 3 g by mouth   Yes Reported, Patient   cyanocobalamin (CYANOCOBALAMIN) 1000 MCG/ML injection Inject 1,000 mcg into the muscle   Yes Reported, Patient   HAYLEY CALCIUM-VITAMIN D PO  8/5/19  Yes Reported, Patient   fluticasone (FLOVENT HFA) 110 MCG/ACT inhaler Inhale 1 puff into the lungs   Yes Reported, Patient   furosemide (LASIX) 40 MG tablet Take 60 mg by mouth 8/5/19  Yes Reported, Patient   loratadine (CLARITIN) 10 MG tablet Take 10 mg by mouth   Yes Reported, Patient   Magnesium Oxide 250 MG TABS Take 250 mg by mouth   Yes Reported, Patient   MAGNESIUM OXIDE PO  8/5/19  Yes Reported, Patient   metoprolol tartrate (LOPRESSOR) 50 MG tablet Take 50 mg by mouth 7/22/19  Yes Reported, Patient   multivitamin w/minerals (MULTI-VITAMIN) tablet Take 1 tablet by mouth   Yes Reported, Patient   nitroGLYcerin (NITROSTAT) 0.4 MG sublingual tablet Place 0.4 mg under the tongue  "2/17/20  Yes Reported, Patient   PARoxetine (PAXIL) 40 MG tablet Take 40 mg by mouth 8/5/19  Yes Reported, Patient   phenytoin (DILANTIN) 100 MG capsule Take 500 mg by mouth At Bedtime  2/18/20  Yes Reported, Patient   spironolactone (ALDACTONE) 50 MG tablet Take 50mg (1 tab)  in am and 25mg (half tab) in pm. 6/8/20  Yes Reported, Patient   topiramate (TOPAMAX) 100 MG tablet Take 150 mg by mouth 2/18/20  Yes Reported, Patient   simvastatin (ZOCOR) 40 MG tablet Take 40 mg by mouth    Reported, Patient         PHYSICAL EXAM     Vital signs  Ht 1.651 m (5' 5\")   Wt 93.9 kg (207 lb)   BMI 34.45 kg/m      Weight:   207 lbs 0 oz    General Physical Exam: Patient is alert and oriented x 3. Vital signs were reviewed and are documented in EMR. Neck was supple  Neurological Exam:  Patient is alert and oriented x3 speech was normal there was no dysarthria or aphasia.  Cranial nerves are intact.  She does appear hard of hearing.  Face symmetrical tongue midline.  Strength in all extremities 5/5.  Sensation intact.  Gait normal.  No dysmetria noted on finger-nose testing.     DIAGNOSTIC STUDIES     PERTINENT RADIOLOGY  Following imaging studies were reviewed      MRI 4/27/20  HEAD MRI:  1.  No acute intracranial finding. No evidence for recent ischemia, intracranial hemorrhage, or mass.    HEAD MRA:  1.  Negative MR angiogram of the brain. No evidence for aneurysm, proximal vessel occlusion, high-grade intracranial stenosis or high flow vascular lesion.    NECK MRA:  1.  Atherosclerotic narrowing of the proximal left internal carotid artery is not well-characterized due to motion artifact but suspected to be in the 50% range based on NASCET criteria.  2.  No hemodynamically significant narrowing within the right carotid artery.   3.  Vertebral arteries are patent. No dissection.    CT HEAD 4/26/20  1.  No CT evidence for acute intracranial process.  2.  Brain atrophy and presumed chronic microvascular ischemic changes as " "above.  3.  Mucosal thickening is noted involving the left sphenoid sinus. Correlation for sinusitis is recommended.    EEG 4/27/20  This is an abnormal EEG due to diffusely slow background in   theta and delta range that suggests nonspecific generalized cerebral   dysfunction.  EEG background activity is contaminated with muscle   artifact.  Such nonspecific slowing suggests diffuse cerebral dysfunction   but no clear epileptiform activity was noted    CT HEAD 7/17/19  1. No intracranial mass. No hemorrhage or stroke.  2. Mild presumed chronic small vessel ischemic change and age-related change again visualized.  3. Interval development of polypoid soft tissue in the external auditory canals.    MRI of the head done in 2015 was normal. EEG was abnormal suggesting low threshold for seizure  PERTINENT LABS  Following labs were reviewed  Dilantin level 5/11/2020 2012.1  4/28/2020 2016.8  4/26/2020 22.9  Free Dilantin level 426 2022.0   Component Name Value Ref Range   Sodium 140 136 - 145 mmol/L   Potassium 3.5 3.5 - 5 mmol/L   Chloride 100 98 - 107 mmol/L   CO2 29 22 - 31 mmol/L   Anion Gap, Calculation 11 5 - 18 mmol/L   Glucose 115 70 - 125 mg/dL   Calcium 8.8 8.5 - 10.5 mg/dL   BUN 14 8 - 28 mg/dL   Creatinine 0.74 0.6 - 1.1 mg/dL   GFR MDRD Af Amer >60 >60 mL/min/1.73m2   GFR MDRD Non Af Amer >60 >60 mL/min/1.73m2          VIDEO VISIT CONSENT  Patient is being evaluated via a billable video visit. The patient has been notified of following:     \"This video visit will be conducted via a call between you and your physician/provider. We have found that certain health care needs can be provided without the need for an in-person physical exam. This service lets us provide the care you need with a video conversation. If a prescription is necessary we can send it directly to your pharmacy. If lab work is needed we can place an order for that and you can then stop by our lab to have the test done at a later time. If " "during the course of the call the physician/provider feels a video visit is not appropriate, you will not be charged for this service.\"    Physician has received verbal consent for a Video Visit from the patient? YES  Patient would like the video invitation sent by: []E-mail  [x][place]     VIDEO VISIT DETAILS  Type of service: Video Visit  Video Start Time: 12:49 PM  Video End Time (time video stopped): 12:57 PM  Originating Location (Patient Location): Patient's Home  Distant Location (provider location): [facility]   Mode of Communication: Video Conference via []Pin or Peg [x]Doximity  Total time spent for face to face visit, reviewing labs/imaging studies, counseling and coordination of care was: 30 Minutes More than 50% of this time was spent on counseling and coordination of care.      This note was dictated using voice recognition software.  Any grammatical or context distortions are unintentional and inherent to the software.             "

## 2020-07-06 ENCOUNTER — RECORDS - HEALTHEAST (OUTPATIENT)
Dept: LAB | Facility: CLINIC | Age: 75
End: 2020-07-06

## 2020-07-06 LAB
ANION GAP SERPL CALCULATED.3IONS-SCNC: 11 MMOL/L (ref 5–18)
BNP SERPL-MCNC: 448 PG/ML (ref 0–137)
BUN SERPL-MCNC: 19 MG/DL (ref 8–28)
CALCIUM SERPL-MCNC: 8.6 MG/DL (ref 8.5–10.5)
CHLORIDE BLD-SCNC: 101 MMOL/L (ref 98–107)
CO2 SERPL-SCNC: 24 MMOL/L (ref 22–31)
CREAT SERPL-MCNC: 0.71 MG/DL (ref 0.6–1.1)
GFR SERPL CREATININE-BSD FRML MDRD: >60 ML/MIN/1.73M2
GLUCOSE BLD-MCNC: 104 MG/DL (ref 70–125)
PHENYTOIN SERPL-MCNC: 13.9 UG/ML (ref 10–20)
POTASSIUM BLD-SCNC: 4.1 MMOL/L (ref 3.5–5)
SODIUM SERPL-SCNC: 136 MMOL/L (ref 136–145)

## 2020-07-08 LAB — TOPIRAMATE SERPL-MCNC: 4.1 UG/ML (ref 5–20)

## 2020-07-15 ENCOUNTER — AMBULATORY - HEALTHEAST (OUTPATIENT)
Dept: CARDIOLOGY | Facility: CLINIC | Age: 75
End: 2020-07-15

## 2020-07-20 ENCOUNTER — COMMUNICATION - HEALTHEAST (OUTPATIENT)
Dept: CARDIOLOGY | Facility: CLINIC | Age: 75
End: 2020-07-20

## 2020-07-20 ENCOUNTER — OFFICE VISIT - HEALTHEAST (OUTPATIENT)
Dept: CARDIOLOGY | Facility: CLINIC | Age: 75
End: 2020-07-20

## 2020-07-20 DIAGNOSIS — I50.32 CHRONIC DIASTOLIC CONGESTIVE HEART FAILURE (H): ICD-10-CM

## 2020-07-20 DIAGNOSIS — I50.33 ACUTE ON CHRONIC HEART FAILURE WITH PRESERVED EJECTION FRACTION (H): ICD-10-CM

## 2020-07-28 ENCOUNTER — COMMUNICATION - HEALTHEAST (OUTPATIENT)
Dept: CARDIOLOGY | Facility: CLINIC | Age: 75
End: 2020-07-28

## 2020-07-29 ENCOUNTER — COMMUNICATION - HEALTHEAST (OUTPATIENT)
Dept: CARDIOLOGY | Facility: CLINIC | Age: 75
End: 2020-07-29

## 2020-08-04 ENCOUNTER — COMMUNICATION - HEALTHEAST (OUTPATIENT)
Dept: CARDIOLOGY | Facility: CLINIC | Age: 75
End: 2020-08-04

## 2020-08-05 ENCOUNTER — RECORDS - HEALTHEAST (OUTPATIENT)
Dept: ADMINISTRATIVE | Facility: OTHER | Age: 75
End: 2020-08-05

## 2020-08-06 ENCOUNTER — COMMUNICATION - HEALTHEAST (OUTPATIENT)
Dept: CARDIOLOGY | Facility: CLINIC | Age: 75
End: 2020-08-06

## 2020-08-07 ENCOUNTER — OFFICE VISIT - HEALTHEAST (OUTPATIENT)
Dept: CARDIOLOGY | Facility: CLINIC | Age: 75
End: 2020-08-07

## 2020-08-07 ENCOUNTER — COMMUNICATION - HEALTHEAST (OUTPATIENT)
Dept: CARDIOLOGY | Facility: CLINIC | Age: 75
End: 2020-08-07

## 2020-08-07 DIAGNOSIS — I50.33 ACUTE ON CHRONIC HEART FAILURE WITH PRESERVED EJECTION FRACTION (H): ICD-10-CM

## 2020-08-07 DIAGNOSIS — I50.32 CHRONIC DIASTOLIC CONGESTIVE HEART FAILURE (H): ICD-10-CM

## 2020-08-07 LAB
ANION GAP SERPL CALCULATED.3IONS-SCNC: 9 MMOL/L (ref 5–18)
BNP SERPL-MCNC: 730 PG/ML (ref 0–137)
BUN SERPL-MCNC: 23 MG/DL (ref 8–28)
CALCIUM SERPL-MCNC: 9 MG/DL (ref 8.5–10.5)
CHLORIDE BLD-SCNC: 102 MMOL/L (ref 98–107)
CO2 SERPL-SCNC: 28 MMOL/L (ref 22–31)
CREAT SERPL-MCNC: 0.77 MG/DL (ref 0.6–1.1)
GFR SERPL CREATININE-BSD FRML MDRD: >60 ML/MIN/1.73M2
GLUCOSE BLD-MCNC: 112 MG/DL (ref 70–125)
POTASSIUM BLD-SCNC: 3.9 MMOL/L (ref 3.5–5)
SODIUM SERPL-SCNC: 139 MMOL/L (ref 136–145)

## 2020-08-09 ENCOUNTER — COMMUNICATION - HEALTHEAST (OUTPATIENT)
Dept: CARDIOLOGY | Facility: CLINIC | Age: 75
End: 2020-08-09

## 2020-08-09 DIAGNOSIS — I50.32 CHRONIC DIASTOLIC CONGESTIVE HEART FAILURE (H): ICD-10-CM

## 2020-08-13 ENCOUNTER — RECORDS - HEALTHEAST (OUTPATIENT)
Dept: ADMINISTRATIVE | Facility: OTHER | Age: 75
End: 2020-08-13

## 2020-08-13 ENCOUNTER — COMMUNICATION - HEALTHEAST (OUTPATIENT)
Dept: CARDIOLOGY | Facility: CLINIC | Age: 75
End: 2020-08-13

## 2020-08-13 ENCOUNTER — AMBULATORY - HEALTHEAST (OUTPATIENT)
Dept: CARDIOLOGY | Facility: CLINIC | Age: 75
End: 2020-08-13

## 2020-08-14 ENCOUNTER — COMMUNICATION - HEALTHEAST (OUTPATIENT)
Dept: CARDIOLOGY | Facility: CLINIC | Age: 75
End: 2020-08-14

## 2020-08-17 ENCOUNTER — OFFICE VISIT - HEALTHEAST (OUTPATIENT)
Dept: CARDIOLOGY | Facility: CLINIC | Age: 75
End: 2020-08-17

## 2020-08-17 DIAGNOSIS — I10 ESSENTIAL HYPERTENSION, BENIGN: ICD-10-CM

## 2020-08-17 DIAGNOSIS — I50.32 CHRONIC HEART FAILURE WITH PRESERVED EJECTION FRACTION (H): ICD-10-CM

## 2020-08-17 LAB
ANION GAP SERPL CALCULATED.3IONS-SCNC: 12 MMOL/L (ref 5–18)
BUN SERPL-MCNC: 22 MG/DL (ref 8–28)
CALCIUM SERPL-MCNC: 9.3 MG/DL (ref 8.5–10.5)
CHLORIDE BLD-SCNC: 98 MMOL/L (ref 98–107)
CO2 SERPL-SCNC: 30 MMOL/L (ref 22–31)
CREAT SERPL-MCNC: 0.85 MG/DL (ref 0.6–1.1)
GFR SERPL CREATININE-BSD FRML MDRD: >60 ML/MIN/1.73M2
GLUCOSE BLD-MCNC: 118 MG/DL (ref 70–125)
POTASSIUM BLD-SCNC: 3.6 MMOL/L (ref 3.5–5)
SODIUM SERPL-SCNC: 140 MMOL/L (ref 136–145)

## 2020-08-17 ASSESSMENT — MIFFLIN-ST. JEOR: SCORE: 1479.72

## 2020-08-18 LAB — BNP SERPL-MCNC: 247 PG/ML (ref 0–137)

## 2020-08-28 ENCOUNTER — COMMUNICATION - HEALTHEAST (OUTPATIENT)
Dept: CARDIOLOGY | Facility: CLINIC | Age: 75
End: 2020-08-28

## 2020-08-28 DIAGNOSIS — I50.32 CHRONIC DIASTOLIC CONGESTIVE HEART FAILURE (H): ICD-10-CM

## 2020-10-01 ENCOUNTER — TELEPHONE (OUTPATIENT)
Dept: NEUROLOGY | Facility: CLINIC | Age: 75
End: 2020-10-01

## 2020-10-01 DIAGNOSIS — G40.209 PARTIAL SYMPTOMATIC EPILEPSY WITH COMPLEX PARTIAL SEIZURES, NOT INTRACTABLE, WITHOUT STATUS EPILEPTICUS (H): Primary | ICD-10-CM

## 2020-10-01 NOTE — TELEPHONE ENCOUNTER
Check Dilantin and Topamax levels.  See orders.  For the time being, do not further increase the dose and continue on Dilantin 500 mg nightly and Topamax 150 mg twice daily

## 2020-10-01 NOTE — TELEPHONE ENCOUNTER
Daughter lm re issues pt had last gilberto. She stated that she only gave pt a extra 100 mg, as pt has had toxicity problems in the past. Please advise. [phone]

## 2020-10-01 NOTE — TELEPHONE ENCOUNTER
Spoke with Kierra and informed her we will need lab work and to not increase the dose any further. Faxed orders to [facility] per her request  Katherine Preston CMA on 10/1/2020 at 4:19 PM

## 2020-10-01 NOTE — TELEPHONE ENCOUNTER
Pt's daughter, Kierra, had me paged last night.  Tommy had 5 auras yesterday.  Normally on  every day and  bid.  I recommended that she give Merrily an extra 300 mg last night and call clinic in AM.

## 2020-10-02 ENCOUNTER — AMBULATORY - HEALTHEAST (OUTPATIENT)
Dept: LAB | Facility: CLINIC | Age: 75
End: 2020-10-02

## 2020-10-02 DIAGNOSIS — I50.32 CHRONIC DIASTOLIC CONGESTIVE HEART FAILURE (H): ICD-10-CM

## 2020-10-02 LAB
ANION GAP SERPL CALCULATED.3IONS-SCNC: 9 MMOL/L (ref 5–18)
BNP SERPL-MCNC: 564 PG/ML (ref 0–137)
BUN SERPL-MCNC: 17 MG/DL (ref 8–28)
CALCIUM SERPL-MCNC: 8.8 MG/DL (ref 8.5–10.5)
CHLORIDE BLD-SCNC: 101 MMOL/L (ref 98–107)
CO2 SERPL-SCNC: 27 MMOL/L (ref 22–31)
CREAT SERPL-MCNC: 0.82 MG/DL (ref 0.6–1.1)
GFR SERPL CREATININE-BSD FRML MDRD: >60 ML/MIN/1.73M2
GLUCOSE BLD-MCNC: 111 MG/DL (ref 70–125)
POTASSIUM BLD-SCNC: 4.4 MMOL/L (ref 3.5–5)
SODIUM SERPL-SCNC: 137 MMOL/L (ref 136–145)

## 2020-10-06 ENCOUNTER — COMMUNICATION - HEALTHEAST (OUTPATIENT)
Dept: CARDIOLOGY | Facility: CLINIC | Age: 75
End: 2020-10-06

## 2020-10-09 NOTE — TELEPHONE ENCOUNTER
Kierra bush asking for a call back today. She thought that she would have heard back by now on lab results. [phone]

## 2020-10-09 NOTE — TELEPHONE ENCOUNTER
I spoke with Kierra and what happened was she went to [facility] for the labs but they did not draw Dr Vidal labs, only cardiology. I apologized to Kierra and she will take Merrily back to have labs ordered by Dr Frank Preston CMA on 10/9/2020 at 2:21 PM

## 2020-10-09 NOTE — TELEPHONE ENCOUNTER
Resulted here in [facility] result tab dated 10/2/2020. Apologies, not HE   Lisandro Diehl, JACKIE on 10/9/2020 at 1:04 PM

## 2020-10-09 NOTE — TELEPHONE ENCOUNTER
Jonn velez/[facility] lab left message that they have already discarded the specimen, and are not able to add labs on. [phone] opt 5

## 2020-10-12 ENCOUNTER — RECORDS - HEALTHEAST (OUTPATIENT)
Dept: LAB | Facility: HOSPITAL | Age: 75
End: 2020-10-12

## 2020-10-13 LAB — PHENYTOIN (DILATIN) FREE: 0.8 UG/ML (ref 1–2)

## 2020-10-13 NOTE — TELEPHONE ENCOUNTER
We didn't ask the lab to add on. Kierra took Merrily to the lab yesterday and I see the correct labs were drawn  Katherine Preston CMA on 10/13/2020 at 1:40 PM

## 2020-10-14 LAB — TOPIRAMATE SERPL-MCNC: 5.3 UG/ML (ref 5–20)

## 2020-10-16 ENCOUNTER — TELEPHONE (OUTPATIENT)
Dept: NEUROLOGY | Facility: CLINIC | Age: 75
End: 2020-10-16

## 2020-10-16 NOTE — TELEPHONE ENCOUNTER
See lab results from 10/12/20 and advise:    Results  Phenytoin, Free (Dilantin , Free) (Order [id number])  Contains abnormal data Phenytoin, Free (Dilantin , Free)  Order: [id number]  Status:  Final result   Visible to patient:  No (not released) Next appt:  10/26/2020 at 08:30 AM in Cardiology (Maria Elena Belle LaVenture, CNP) Dx:  Localization-related (focal) (partial...    Ref Range & Units 10/12/20 1352 4/26/20 1806 7/20/19 2035    Phenytoin, Free 1.0 - 2.0 ug/mL 0.8Low   2.0  1.4    Resulting Agency  [facility]         Specimen Collected: 10/12/20 13:52 Last Resulted: 10/13/20 12:09 Lab Flowsheet Order Details View Encounter Lab and Collection Details Routing Result History            Other Results from 10/12/2020    Topiramate [Topamax ]  Order: [id number]    Status:  Final result   Visible to patient:  No (not released) Next appt:  10/26/2020 at 08:30 AM in Cardiology (Maria Elena Belle LaVenture, CNP) Dx:  Localization-related (focal) (partial...    Ref Range & Units 10/12/20 1352 7/6/20 0816 4/26/20 0642    Topiramate 5.0 - 20.0 ug/mL 5.3  4.1Low  CM  3.3Low  CM    Comment: INTERPRETIVE INFORMATION: Topiramate     Therapeutic range:  5.0-20.0 ug/mL               Toxic:  Not well established     Pharmacokinetics varies widely, particularly with co-medications,   age, and/or compromised renal function. Adverse effects may   include somnolence, fatigue, and dizziness.   Performed By: [facility]   [address] [place] [id number]   : Karla Gil MD

## 2020-10-16 NOTE — TELEPHONE ENCOUNTER
Relayed results to pt's daughter, Kierra per your result note.    Daughter is wondering if you feel non-alcoholic beer or smells could cause her to have the auras?

## 2020-10-19 NOTE — TELEPHONE ENCOUNTER
Rare types of epilepsies can be triggered by certain smells but is difficult to predict if nonalcoholic beer or smells could be causing patient's auras.

## 2020-10-21 ENCOUNTER — RECORDS - HEALTHEAST (OUTPATIENT)
Dept: ADMINISTRATIVE | Facility: OTHER | Age: 75
End: 2020-10-21

## 2020-10-23 ENCOUNTER — COMMUNICATION - HEALTHEAST (OUTPATIENT)
Dept: CARDIOLOGY | Facility: CLINIC | Age: 75
End: 2020-10-23

## 2020-11-05 ENCOUNTER — RECORDS - HEALTHEAST (OUTPATIENT)
Dept: ADMINISTRATIVE | Facility: OTHER | Age: 75
End: 2020-11-05

## 2020-11-06 ENCOUNTER — COMMUNICATION - HEALTHEAST (OUTPATIENT)
Dept: CARDIOLOGY | Facility: CLINIC | Age: 75
End: 2020-11-06

## 2020-11-09 ENCOUNTER — OFFICE VISIT - HEALTHEAST (OUTPATIENT)
Dept: CARDIOLOGY | Facility: CLINIC | Age: 75
End: 2020-11-09

## 2020-11-09 DIAGNOSIS — I48.0 PAROXYSMAL ATRIAL FIBRILLATION (H): ICD-10-CM

## 2020-11-09 DIAGNOSIS — I50.32 CHRONIC HEART FAILURE WITH PRESERVED EJECTION FRACTION (H): ICD-10-CM

## 2020-11-09 DIAGNOSIS — J44.9 CHRONIC OBSTRUCTIVE PULMONARY DISEASE, UNSPECIFIED COPD TYPE (H): ICD-10-CM

## 2020-11-09 LAB
ANION GAP SERPL CALCULATED.3IONS-SCNC: 11 MMOL/L (ref 5–18)
BNP SERPL-MCNC: 190 PG/ML (ref 0–137)
BUN SERPL-MCNC: 18 MG/DL (ref 8–28)
CALCIUM SERPL-MCNC: 8.4 MG/DL (ref 8.5–10.5)
CHLORIDE BLD-SCNC: 103 MMOL/L (ref 98–107)
CO2 SERPL-SCNC: 22 MMOL/L (ref 22–31)
CREAT SERPL-MCNC: 0.82 MG/DL (ref 0.6–1.1)
GFR SERPL CREATININE-BSD FRML MDRD: >60 ML/MIN/1.73M2
GLUCOSE BLD-MCNC: 115 MG/DL (ref 70–125)
POTASSIUM BLD-SCNC: 3.8 MMOL/L (ref 3.5–5)
SODIUM SERPL-SCNC: 136 MMOL/L (ref 136–145)

## 2020-11-09 ASSESSMENT — MIFFLIN-ST. JEOR: SCORE: 1479.72

## 2020-11-30 ENCOUNTER — COMMUNICATION - HEALTHEAST (OUTPATIENT)
Dept: CARDIOLOGY | Facility: CLINIC | Age: 75
End: 2020-11-30

## 2020-12-01 DIAGNOSIS — Z11.59 ENCOUNTER FOR SCREENING FOR OTHER VIRAL DISEASES: Primary | ICD-10-CM

## 2020-12-09 ENCOUNTER — COMMUNICATION - HEALTHEAST (OUTPATIENT)
Dept: CARDIOLOGY | Facility: CLINIC | Age: 75
End: 2020-12-09

## 2021-01-04 ENCOUNTER — RECORDS - HEALTHEAST (OUTPATIENT)
Dept: LAB | Facility: CLINIC | Age: 76
End: 2021-01-04

## 2021-01-04 LAB
ANION GAP SERPL CALCULATED.3IONS-SCNC: 9 MMOL/L (ref 5–18)
BUN SERPL-MCNC: 16 MG/DL (ref 8–28)
CALCIUM SERPL-MCNC: 8.8 MG/DL (ref 8.5–10.5)
CHLORIDE BLD-SCNC: 99 MMOL/L (ref 98–107)
CO2 SERPL-SCNC: 27 MMOL/L (ref 22–31)
CREAT SERPL-MCNC: 0.85 MG/DL (ref 0.6–1.1)
ERYTHROCYTE [DISTWIDTH] IN BLOOD BY AUTOMATED COUNT: 22.7 % (ref 11–14.5)
GFR SERPL CREATININE-BSD FRML MDRD: >60 ML/MIN/1.73M2
GLUCOSE BLD-MCNC: 100 MG/DL (ref 70–125)
HCT VFR BLD AUTO: 30.7 % (ref 35–47)
HGB BLD-MCNC: 8.4 G/DL (ref 12–16)
MCH RBC QN AUTO: 22.1 PG (ref 27–34)
MCHC RBC AUTO-ENTMCNC: 27.4 G/DL (ref 32–36)
MCV RBC AUTO: 81 FL (ref 80–100)
PLATELET # BLD AUTO: 304 THOU/UL (ref 140–440)
PMV BLD AUTO: 10 FL (ref 8.5–12.5)
POTASSIUM BLD-SCNC: 4.1 MMOL/L (ref 3.5–5)
RBC # BLD AUTO: 3.8 MILL/UL (ref 3.8–5.4)
SODIUM SERPL-SCNC: 135 MMOL/L (ref 136–145)
WBC: 9.3 THOU/UL (ref 4–11)

## 2021-01-14 DIAGNOSIS — Z11.59 ENCOUNTER FOR SCREENING FOR OTHER VIRAL DISEASES: ICD-10-CM

## 2021-01-14 LAB
SARS-COV-2 RNA RESP QL NAA+PROBE: NORMAL
SPECIMEN SOURCE: NORMAL

## 2021-01-14 PROCEDURE — U0005 INFEC AGEN DETEC AMPLI PROBE: HCPCS | Performed by: INTERNAL MEDICINE

## 2021-01-14 PROCEDURE — U0003 INFECTIOUS AGENT DETECTION BY NUCLEIC ACID (DNA OR RNA); SEVERE ACUTE RESPIRATORY SYNDROME CORONAVIRUS 2 (SARS-COV-2) (CORONAVIRUS DISEASE [COVID-19]), AMPLIFIED PROBE TECHNIQUE, MAKING USE OF HIGH THROUGHPUT TECHNOLOGIES AS DESCRIBED BY CMS-2020-01-R: HCPCS | Performed by: INTERNAL MEDICINE

## 2021-01-15 LAB
LABORATORY COMMENT REPORT: NORMAL
SARS-COV-2 RNA RESP QL NAA+PROBE: NEGATIVE
SPECIMEN SOURCE: NORMAL

## 2021-01-17 ENCOUNTER — ANESTHESIA EVENT (OUTPATIENT)
Dept: GASTROENTEROLOGY | Facility: CLINIC | Age: 76
End: 2021-01-17
Payer: COMMERCIAL

## 2021-01-17 ASSESSMENT — LIFESTYLE VARIABLES: TOBACCO_USE: 1

## 2021-01-17 ASSESSMENT — ENCOUNTER SYMPTOMS
DYSRHYTHMIAS: 1
SEIZURES: 1

## 2021-01-17 ASSESSMENT — COPD QUESTIONNAIRES: COPD: 1

## 2021-01-18 ENCOUNTER — HOSPITAL ENCOUNTER (OUTPATIENT)
Facility: CLINIC | Age: 76
Discharge: HOME | End: 2021-01-18
Attending: INTERNAL MEDICINE | Admitting: INTERNAL MEDICINE
Payer: COMMERCIAL

## 2021-01-18 ENCOUNTER — ANESTHESIA (OUTPATIENT)
Dept: GASTROENTEROLOGY | Facility: CLINIC | Age: 76
End: 2021-01-18
Payer: COMMERCIAL

## 2021-01-18 VITALS
OXYGEN SATURATION: 98 % | HEART RATE: 94 BPM | RESPIRATION RATE: 32 BRPM | HEIGHT: 67 IN | TEMPERATURE: 97 F | WEIGHT: 204 LBS | BODY MASS INDEX: 32.02 KG/M2 | DIASTOLIC BLOOD PRESSURE: 60 MMHG | SYSTOLIC BLOOD PRESSURE: 88 MMHG

## 2021-01-18 LAB — COLONOSCOPY: NORMAL

## 2021-01-18 PROCEDURE — 88305 TISSUE EXAM BY PATHOLOGIST: CPT | Mod: 26 | Performed by: PATHOLOGY

## 2021-01-18 PROCEDURE — 999N000010 HC STATISTIC ANES STAT CODE-CRNA PER MINUTE: Performed by: INTERNAL MEDICINE

## 2021-01-18 PROCEDURE — 258N000003 HC RX IP 258 OP 636: Performed by: NURSE ANESTHETIST, CERTIFIED REGISTERED

## 2021-01-18 PROCEDURE — 370N000017 HC ANESTHESIA TECHNICAL FEE, PER MIN: Performed by: INTERNAL MEDICINE

## 2021-01-18 PROCEDURE — 250N000011 HC RX IP 250 OP 636: Performed by: NURSE ANESTHETIST, CERTIFIED REGISTERED

## 2021-01-18 PROCEDURE — 45385 COLONOSCOPY W/LESION REMOVAL: CPT | Performed by: INTERNAL MEDICINE

## 2021-01-18 PROCEDURE — 250N000009 HC RX 250: Performed by: NURSE ANESTHETIST, CERTIFIED REGISTERED

## 2021-01-18 PROCEDURE — 88305 TISSUE EXAM BY PATHOLOGIST: CPT | Mod: TC | Performed by: INTERNAL MEDICINE

## 2021-01-18 RX ORDER — LIDOCAINE 40 MG/G
CREAM TOPICAL
Status: DISCONTINUED | OUTPATIENT
Start: 2021-01-18 | End: 2021-01-18 | Stop reason: HOSPADM

## 2021-01-18 RX ORDER — FLUMAZENIL 0.1 MG/ML
0.2 INJECTION, SOLUTION INTRAVENOUS
Status: DISCONTINUED | OUTPATIENT
Start: 2021-01-18 | End: 2021-01-18 | Stop reason: HOSPADM

## 2021-01-18 RX ORDER — PROPOFOL 10 MG/ML
INJECTION, EMULSION INTRAVENOUS CONTINUOUS PRN
Status: DISCONTINUED | OUTPATIENT
Start: 2021-01-18 | End: 2021-01-18

## 2021-01-18 RX ORDER — LIDOCAINE HYDROCHLORIDE 20 MG/ML
INJECTION, SOLUTION INFILTRATION; PERINEURAL PRN
Status: DISCONTINUED | OUTPATIENT
Start: 2021-01-18 | End: 2021-01-18

## 2021-01-18 RX ORDER — SODIUM CHLORIDE, SODIUM LACTATE, POTASSIUM CHLORIDE, CALCIUM CHLORIDE 600; 310; 30; 20 MG/100ML; MG/100ML; MG/100ML; MG/100ML
INJECTION, SOLUTION INTRAVENOUS CONTINUOUS PRN
Status: DISCONTINUED | OUTPATIENT
Start: 2021-01-18 | End: 2021-01-18

## 2021-01-18 RX ORDER — NALOXONE HYDROCHLORIDE 0.4 MG/ML
0.4 INJECTION, SOLUTION INTRAMUSCULAR; INTRAVENOUS; SUBCUTANEOUS
Status: DISCONTINUED | OUTPATIENT
Start: 2021-01-18 | End: 2021-01-18 | Stop reason: HOSPADM

## 2021-01-18 RX ORDER — NALOXONE HYDROCHLORIDE 0.4 MG/ML
0.2 INJECTION, SOLUTION INTRAMUSCULAR; INTRAVENOUS; SUBCUTANEOUS
Status: DISCONTINUED | OUTPATIENT
Start: 2021-01-18 | End: 2021-01-18 | Stop reason: HOSPADM

## 2021-01-18 RX ORDER — PROCHLORPERAZINE MALEATE 5 MG
5 TABLET ORAL EVERY 6 HOURS PRN
Status: DISCONTINUED | OUTPATIENT
Start: 2021-01-18 | End: 2021-01-18 | Stop reason: HOSPADM

## 2021-01-18 RX ORDER — PROPOFOL 10 MG/ML
INJECTION, EMULSION INTRAVENOUS PRN
Status: DISCONTINUED | OUTPATIENT
Start: 2021-01-18 | End: 2021-01-18

## 2021-01-18 RX ORDER — ONDANSETRON 2 MG/ML
INJECTION INTRAMUSCULAR; INTRAVENOUS PRN
Status: DISCONTINUED | OUTPATIENT
Start: 2021-01-18 | End: 2021-01-18

## 2021-01-18 RX ORDER — ONDANSETRON 4 MG/1
4 TABLET, ORALLY DISINTEGRATING ORAL EVERY 6 HOURS PRN
Status: DISCONTINUED | OUTPATIENT
Start: 2021-01-18 | End: 2021-01-18 | Stop reason: HOSPADM

## 2021-01-18 RX ORDER — ONDANSETRON 2 MG/ML
4 INJECTION INTRAMUSCULAR; INTRAVENOUS EVERY 6 HOURS PRN
Status: DISCONTINUED | OUTPATIENT
Start: 2021-01-18 | End: 2021-01-18 | Stop reason: HOSPADM

## 2021-01-18 RX ORDER — ONDANSETRON 2 MG/ML
4 INJECTION INTRAMUSCULAR; INTRAVENOUS
Status: DISCONTINUED | OUTPATIENT
Start: 2021-01-18 | End: 2021-01-18 | Stop reason: HOSPADM

## 2021-01-18 RX ADMIN — PROPOFOL 30 MG: 10 INJECTION, EMULSION INTRAVENOUS at 08:19

## 2021-01-18 RX ADMIN — PHENYLEPHRINE HYDROCHLORIDE 100 MCG: 10 INJECTION INTRAVENOUS at 08:37

## 2021-01-18 RX ADMIN — SODIUM CHLORIDE, POTASSIUM CHLORIDE, SODIUM LACTATE AND CALCIUM CHLORIDE: 600; 310; 30; 20 INJECTION, SOLUTION INTRAVENOUS at 08:18

## 2021-01-18 RX ADMIN — ONDANSETRON 4 MG: 2 INJECTION INTRAMUSCULAR; INTRAVENOUS at 08:22

## 2021-01-18 RX ADMIN — LIDOCAINE HYDROCHLORIDE 40 MG: 20 INJECTION, SOLUTION INFILTRATION; PERINEURAL at 08:19

## 2021-01-18 RX ADMIN — PHENYLEPHRINE HYDROCHLORIDE 50 MCG: 10 INJECTION INTRAVENOUS at 08:34

## 2021-01-18 RX ADMIN — PROPOFOL 125 MCG/KG/MIN: 10 INJECTION, EMULSION INTRAVENOUS at 08:19

## 2021-01-18 ASSESSMENT — MIFFLIN-ST. JEOR: SCORE: 1452.97

## 2021-01-18 NOTE — ANESTHESIA PREPROCEDURE EVALUATION
Anesthesia Pre-Procedure Evaluation    Patient: Tommy Rea   MRN: [id number] : 1945        Preoperative Diagnosis: Iron deficiency anemia, unspecified [D50.9]  Crohn's disease, small intestine (H) [K50.00]  Vitamin B12 deficiency (non anemic) [E53.8]   Procedure : Procedure(s):  COLONOSCOPY TO EVALUATE THE TERMINAL ILEUM     No past medical history on file.   No past surgical history on file.   Allergies   Allergen Reactions     Amoxicillin GI Disturbance and Other (See Comments)     COLITIS    Has tolerated cefazolin  GI bleeding, Colitis, has tolerated cefazolin        Sulfa Drugs      Sulfamethoxazole-Trimethoprim Other (See Comments)     Other reaction(s): Vaginal Irritation  VAGINAL BLEEDING  Vaginal irritation, vaginal bleeding         Social History     Tobacco Use     Smoking status: Former Smoker     Smokeless tobacco: Never Used   Substance Use Topics     Alcohol use: Never     Frequency: Never      Wt Readings from Last 1 Encounters:   20 93.9 kg (207 lb)        Anesthesia Evaluation   Pt has had prior anesthetic.     No history of anesthetic complications       ROS/MED HX  ENT/Pulmonary:     (+) tobacco use, Past use, COPD,  (-) sleep apnea   Neurologic:     (+) seizures, last seizure: 2 - 3 years back, continues to get seizure aura,     Cardiovascular:     (+) Dyslipidemia hypertension--CAD ---CHF dysrhythmias, a-fib,     METS/Exercise Tolerance:     Hematologic:     (+) anemia,     Musculoskeletal:       GI/Hepatic:     (+) GERD, Asymptomatic on medication, Inflammatory bowel disease,     Renal/Genitourinary:  - neg Renal ROS     Endo: Comment: hyperparathyroidism    (+) thyroid problem, hypothyroidism, Obesity,  (-) Type II DM   Psychiatric/Substance Use:     (+) psychiatric history depression     Infectious Disease:       Malignancy:       Other:            Physical Exam    Airway        Mallampati: III   TM distance: < 3 FB   Neck ROM: limited   Mouth opening: > 3  cm    Respiratory Devices and Support         Dental  no notable dental history         Cardiovascular   cardiovascular exam normal          Pulmonary    Unable to assess               OUTSIDE LABS:  CBC: No results found for: WBC, HGB, HCT, PLT  BMP: No results found for: NA, POTASSIUM, CHLORIDE, CO2, BUN, CR, GLC  COAGS: No results found for: PTT, INR, FIBR  POC: No results found for: BGM, HCG, HCGS  HEPATIC: No results found for: ALBUMIN, PROTTOTAL, ALT, AST, GGT, ALKPHOS, BILITOTAL, BILIDIRECT, BECKY  OTHER: No results found for: PH, LACT, A1C, DIONNA, PHOS, MAG, LIPASE, AMYLASE, TSH, T4, T3, CRP, SED    Anesthesia Plan     History & Physical Review      ASA Status:  3. NPO Status:  NPO Appropriate. .  Plan for MAC Reason for MAC:  Deep or markedly invasive procedure (G8).         PONV prophylaxis:  Ondansetron (or other 5HT-3).       Consents  Anesthesia Plan(s) and associated risks, benefits, and realistic alternatives discussed.    Questions answered and patient/representative(s) expressed understanding.    Discussed with:  Patient.             Postoperative Care  Postoperative pain management: Multi-modal analgesia.           Griffin Adams MD

## 2021-01-18 NOTE — ANESTHESIA CARE TRANSFER NOTE
Patient: Tommy Rea    Procedure(s):  COLONOSCOPY TO EVALUATE THE TERMINAL ILEUM    Diagnosis: Iron deficiency anemia, unspecified [D50.9]  Crohn's disease, small intestine (H) [K50.00]  Vitamin B12 deficiency (non anemic) [E53.8]  Diagnosis Additional Information: No value filed.    Anesthesia Type:   MAC     Note:    Oropharynx: spontaneously breathing  Level of Consciousness: drowsy  Oxygen Supplementation: face mask  Level of Supplemental Oxygen: 6  Independent Airway: airway patency satisfactory and stable  Dentition: dentition unchanged  Vital Signs Stable: post-procedure vital signs reviewed and stable  Report to RN Given: handoff report given  Patient transferred to: Phase II  Comments: After procedure in [facility] [place] under monitored anesthesia care (MAC), patient exhibited spontaneous respirations, oxygen via facemask with oxygen saturation maintained greater than 98%, patient brought to [facility] [place] recovery bay for postprocedure recovery.    Handoff Report: Identifed the Patient, Identified the Reponsible Provider, Reviewed the pertinent medical history, Discussed the surgical course, Reviewed Intra-OP anesthesia mangement and issues during anesthesia, Set expectations for post-procedure period and Allowed opportunity for questions and acknowledgement of understanding      Vitals: (Last set prior to Anesthesia Care Transfer)  CRNA VITALS  1/18/2021 0819 - 1/18/2021 0851      1/18/2021             Resp Rate (set):  10        Electronically Signed By: SERENE Ingram CRNA  January 18, 2021  8:51 AM

## 2021-01-18 NOTE — ANESTHESIA POSTPROCEDURE EVALUATION
Patient: Tommy Rea    Procedure(s):  COLONOSCOPY TO EVALUATE THE TERMINAL ILEUM    Diagnosis:Iron deficiency anemia, unspecified [D50.9]  Crohn's disease, small intestine (H) [K50.00]  Vitamin B12 deficiency (non anemic) [E53.8]  Diagnosis Additional Information: No value filed.    Anesthesia Type:  MAC    Note:  Disposition: Outpatient   Postop Pain Control: Uneventful            Sign Out: Well controlled pain   PONV: No   Neuro/Psych: Uneventful            Sign Out: Acceptable/Baseline neuro status   Airway/Respiratory: Uneventful            Sign Out: Acceptable/Baseline resp. status   CV/Hemodynamics: Uneventful            Sign Out: Acceptable CV status   Other NRE: NONE   DID A NON-ROUTINE EVENT OCCUR? No         Last vitals:  Vitals:    01/18/21 0757   BP: 122/67   Pulse: 91   Resp: 16   Temp: 36.1  C (97  F)   SpO2: 99%       Electronically Signed By: Griffin Adams MD  January 18, 2021  9:33 AM

## 2021-01-19 LAB — COPATH REPORT: NORMAL

## 2021-01-22 NOTE — H&P
Pre-Endoscopy History and Physical     Tommy Rea MRN# [id number]   YOB: 1945 Age: 75 year old     Date of Procedure: 1/18/2021  Primary care provider: Hannah Benton  Type of Endoscopy: colonoscopy  Reason for Procedure: colitis  Type of Anesthesia Anticipated: MAC    HPI:    Tommy is a 75 year old female who will be undergoing the above procedure.      A history and physical has been performed. The patient's medications and allergies have been reviewed. The risks and benefits of the procedure and the sedation options and risks were discussed with the patient.  All questions were answered and informed consent was obtained.      Allergies   Allergen Reactions     Amoxicillin GI Disturbance and Other (See Comments)     COLITIS    Has tolerated cefazolin  GI bleeding, Colitis, has tolerated cefazolin        Sulfa Drugs      Sulfamethoxazole-Trimethoprim Other (See Comments)     Other reaction(s): Vaginal Irritation  VAGINAL BLEEDING  Vaginal irritation, vaginal bleeding           No current facility-administered medications for this encounter.      Current Outpatient Medications   Medication     acetaminophen (TYLENOL) 500 MG tablet     albuterol (PROAIR HFA/PROVENTIL HFA/VENTOLIN HFA) 108 (90 Base) MCG/ACT inhaler     alendronate (FOSAMAX) 70 MG tablet     aspirin 81 MG EC tablet     colestipol (COLESTID) 1 g tablet     cyanocobalamin (CYANOCOBALAMIN) 1000 MCG/ML injection     HAYLEY CALCIUM-VITAMIN D PO     fluticasone (FLOVENT HFA) 110 MCG/ACT inhaler     furosemide (LASIX) 40 MG tablet     loratadine (CLARITIN) 10 MG tablet     Magnesium Oxide 250 MG TABS     MAGNESIUM OXIDE PO     metoprolol tartrate (LOPRESSOR) 50 MG tablet     multivitamin w/minerals (MULTI-VITAMIN) tablet     PARoxetine (PAXIL) 40 MG tablet     phenytoin (DILANTIN) 100 MG capsule     simvastatin (ZOCOR) 40 MG tablet     spironolactone (ALDACTONE) 50 MG tablet     topiramate (TOPAMAX) 100 MG tablet     apixaban  "ANTICOAGULANT (ELIQUIS) 5 MG tablet     nitroGLYcerin (NITROSTAT) 0.4 MG sublingual tablet       Patient Active Problem List   Diagnosis     Regional enteritis (H)     Nonintractable epilepsy with complex partial seizures (H)     Hyperlipidemia     HTN (hypertension)     Depressive disorder     Coronary atherosclerosis     B12 deficiency     Atrial fibrillation (H)     (HFpEF) heart failure with preserved ejection fraction (H)     COPD (chronic obstructive pulmonary disease) (H)        Past Medical History:   Diagnosis Date     Arthritis      COPD (chronic obstructive pulmonary disease) (H)      Crohn's disease (H)      Depressive disorder      Heart disease     a fib     Hypertension      Osteoporosis      Seizures (H)         Past Surgical History:   Procedure Laterality Date     HEMORRHOIDECTOMY       ORTHOPEDIC SURGERY      ORIF       Social History     Tobacco Use     Smoking status: Former Smoker     Smokeless tobacco: Never Used   Substance Use Topics     Alcohol use: Never     Frequency: Never       Family History   Problem Relation Age of Onset     Hypertension Mother      Colon Cancer Father      Arthritis Father      Hypertension Father          PHYSICAL EXAM:   BP (!) 88/60   Pulse 94   Temp 97  F (36.1  C) (Temporal)   Resp (!) 32   Ht 1.702 m (5' 7\")   Wt 92.5 kg (204 lb)   SpO2 98%   BMI 31.95 kg/m   Estimated body mass index is 31.95 kg/m  as calculated from the following:    Height as of this encounter: 1.702 m (5' 7\").    Weight as of this encounter: 92.5 kg (204 lb).   RESP: lungs clear to auscultation - no rales, rhonchi or wheezes  CV: normal S1 S2, no S3 or S4    IMPRESSION   ASA Class 3 - Severe systemic disease, but not incapacitating      Signed Electronically by: Lina Quezada MD  January 22, 2021    .          "

## 2021-02-09 ENCOUNTER — AMBULATORY - HEALTHEAST (OUTPATIENT)
Dept: CARDIOLOGY | Facility: CLINIC | Age: 76
End: 2021-02-09

## 2021-02-09 ENCOUNTER — RECORDS - HEALTHEAST (OUTPATIENT)
Dept: ADMINISTRATIVE | Facility: OTHER | Age: 76
End: 2021-02-09

## 2021-02-12 ENCOUNTER — COMMUNICATION - HEALTHEAST (OUTPATIENT)
Dept: CARDIOLOGY | Facility: CLINIC | Age: 76
End: 2021-02-12

## 2021-03-05 ENCOUNTER — RECORDS - HEALTHEAST (OUTPATIENT)
Dept: ADMINISTRATIVE | Facility: OTHER | Age: 76
End: 2021-03-05

## 2021-03-12 ENCOUNTER — COMMUNICATION - HEALTHEAST (OUTPATIENT)
Dept: CARDIOLOGY | Facility: CLINIC | Age: 76
End: 2021-03-12

## 2021-03-15 ENCOUNTER — OFFICE VISIT - HEALTHEAST (OUTPATIENT)
Dept: CARDIOLOGY | Facility: CLINIC | Age: 76
End: 2021-03-15

## 2021-03-15 DIAGNOSIS — I50.32 CHRONIC HEART FAILURE WITH PRESERVED EJECTION FRACTION (H): ICD-10-CM

## 2021-03-15 ASSESSMENT — MIFFLIN-ST. JEOR: SCORE: 1470.65

## 2021-05-05 ENCOUNTER — COMMUNICATION - HEALTHEAST (OUTPATIENT)
Dept: CARDIOLOGY | Facility: CLINIC | Age: 76
End: 2021-05-05

## 2021-05-05 DIAGNOSIS — I50.32 CHRONIC HEART FAILURE WITH PRESERVED EJECTION FRACTION (H): ICD-10-CM

## 2021-05-06 ENCOUNTER — AMBULATORY - HEALTHEAST (OUTPATIENT)
Dept: LAB | Facility: CLINIC | Age: 76
End: 2021-05-06

## 2021-05-06 DIAGNOSIS — I50.32 CHRONIC HEART FAILURE WITH PRESERVED EJECTION FRACTION (H): ICD-10-CM

## 2021-05-06 LAB
ANION GAP SERPL CALCULATED.3IONS-SCNC: 12 MMOL/L (ref 5–18)
BUN SERPL-MCNC: 17 MG/DL (ref 8–28)
CALCIUM SERPL-MCNC: 9 MG/DL (ref 8.5–10.5)
CHLORIDE BLD-SCNC: 102 MMOL/L (ref 98–107)
CO2 SERPL-SCNC: 26 MMOL/L (ref 22–31)
CREAT SERPL-MCNC: 0.82 MG/DL (ref 0.6–1.1)
GFR SERPL CREATININE-BSD FRML MDRD: >60 ML/MIN/1.73M2
GLUCOSE BLD-MCNC: 111 MG/DL (ref 70–125)
POTASSIUM BLD-SCNC: 3.9 MMOL/L (ref 3.5–5)
SODIUM SERPL-SCNC: 140 MMOL/L (ref 136–145)

## 2021-05-07 ENCOUNTER — COMMUNICATION - HEALTHEAST (OUTPATIENT)
Dept: CARDIOLOGY | Facility: CLINIC | Age: 76
End: 2021-05-07

## 2021-05-10 ENCOUNTER — OFFICE VISIT - HEALTHEAST (OUTPATIENT)
Dept: CARDIOLOGY | Facility: CLINIC | Age: 76
End: 2021-05-10

## 2021-05-10 DIAGNOSIS — I48.0 PAROXYSMAL ATRIAL FIBRILLATION (H): ICD-10-CM

## 2021-05-10 DIAGNOSIS — I25.10 CORONARY ARTERY DISEASE INVOLVING NATIVE CORONARY ARTERY OF NATIVE HEART WITHOUT ANGINA PECTORIS: ICD-10-CM

## 2021-05-10 DIAGNOSIS — I50.32 CHRONIC HEART FAILURE WITH PRESERVED EJECTION FRACTION (H): ICD-10-CM

## 2021-05-10 ASSESSMENT — MIFFLIN-ST. JEOR: SCORE: 1470.65

## 2021-05-27 ENCOUNTER — RECORDS - HEALTHEAST (OUTPATIENT)
Dept: ADMINISTRATIVE | Facility: CLINIC | Age: 76
End: 2021-05-27

## 2021-05-27 ENCOUNTER — COMMUNICATION - HEALTHEAST (OUTPATIENT)
Dept: CARDIOLOGY | Facility: CLINIC | Age: 76
End: 2021-05-27

## 2021-05-27 VITALS
HEART RATE: 67 BPM | BODY MASS INDEX: 32.8 KG/M2 | WEIGHT: 209 LBS | DIASTOLIC BLOOD PRESSURE: 66 MMHG | HEIGHT: 67 IN | RESPIRATION RATE: 16 BRPM | SYSTOLIC BLOOD PRESSURE: 118 MMHG

## 2021-05-27 DIAGNOSIS — I50.32 CHRONIC DIASTOLIC CONGESTIVE HEART FAILURE (H): ICD-10-CM

## 2021-05-28 ENCOUNTER — RECORDS - HEALTHEAST (OUTPATIENT)
Dept: ADMINISTRATIVE | Facility: CLINIC | Age: 76
End: 2021-05-28

## 2021-05-29 ENCOUNTER — RECORDS - HEALTHEAST (OUTPATIENT)
Dept: ADMINISTRATIVE | Facility: CLINIC | Age: 76
End: 2021-05-29

## 2021-05-30 ENCOUNTER — RECORDS - HEALTHEAST (OUTPATIENT)
Dept: ADMINISTRATIVE | Facility: CLINIC | Age: 76
End: 2021-05-30

## 2021-06-01 ENCOUNTER — AMBULATORY - HEALTHEAST (OUTPATIENT)
Dept: CARDIOLOGY | Facility: CLINIC | Age: 76
End: 2021-06-01

## 2021-06-01 ENCOUNTER — RECORDS - HEALTHEAST (OUTPATIENT)
Dept: ADMINISTRATIVE | Facility: CLINIC | Age: 76
End: 2021-06-01

## 2021-06-01 DIAGNOSIS — I50.32 CHRONIC DIASTOLIC CONGESTIVE HEART FAILURE (H): ICD-10-CM

## 2021-06-01 LAB
ANION GAP SERPL CALCULATED.3IONS-SCNC: 12 MMOL/L (ref 5–18)
BUN SERPL-MCNC: 18 MG/DL (ref 8–28)
CALCIUM SERPL-MCNC: 9 MG/DL (ref 8.5–10.5)
CHLORIDE BLD-SCNC: 105 MMOL/L (ref 98–107)
CO2 SERPL-SCNC: 25 MMOL/L (ref 22–31)
CREAT SERPL-MCNC: 0.77 MG/DL (ref 0.6–1.1)
GFR SERPL CREATININE-BSD FRML MDRD: >60 ML/MIN/1.73M2
GLUCOSE BLD-MCNC: 109 MG/DL (ref 70–125)
POTASSIUM BLD-SCNC: 3.7 MMOL/L (ref 3.5–5)
SODIUM SERPL-SCNC: 142 MMOL/L (ref 136–145)

## 2021-06-01 ASSESSMENT — MIFFLIN-ST. JEOR: SCORE: 1470.65

## 2021-06-02 ENCOUNTER — COMMUNICATION - HEALTHEAST (OUTPATIENT)
Dept: CARDIOLOGY | Facility: CLINIC | Age: 76
End: 2021-06-02

## 2021-06-02 NOTE — PATIENT INSTRUCTIONS - HE
1. Continue current medications  2. Add spironolactone 25 mg daily to help with fluid retention  3. Try to maintain around 2000 mg sodium or salt a day  4. Set up nuclear stress test to look for blood flow issues to heart function  5. Follow up with Maria Elena Tovar and nurse educator in 4 weeks

## 2021-06-03 VITALS
WEIGHT: 208 LBS | RESPIRATION RATE: 14 BRPM | BODY MASS INDEX: 35.51 KG/M2 | DIASTOLIC BLOOD PRESSURE: 56 MMHG | HEIGHT: 64 IN | SYSTOLIC BLOOD PRESSURE: 110 MMHG | HEART RATE: 62 BPM

## 2021-06-03 VITALS
RESPIRATION RATE: 16 BRPM | HEIGHT: 64 IN | WEIGHT: 207 LBS | SYSTOLIC BLOOD PRESSURE: 120 MMHG | HEART RATE: 68 BPM | BODY MASS INDEX: 35.34 KG/M2 | DIASTOLIC BLOOD PRESSURE: 58 MMHG

## 2021-06-03 NOTE — PROGRESS NOTES
Patient seen in clinic for HF education s/p recent hospital discharge visit with Dr. Colbert and addition of spironolactone.  Reviewed HF Binder that includes the  HF Sx Awareness & Action plan  handout and  A Stronger Pump  booklet and Weight log booklet highlighting :  __X_patient s type of heart failure _X__Na management in diet  __X_importance of daily wts  _X__Fluid Guidelines, if applicable  __X_medication review and importance of compliance     Instructed patient in signs and sx of heart failure, reiterated when to call clinic - reviewed HF hotline # [phone] and after hours call # [phone].  Majority of time was spent reviewing: low sodium diet. Education material was provided on eating out- she was given a packet on sodium content to popular restaurants. We discussed plan for holidays and staying on track. She will program the HF hotline number into her cell phone.  Patient verbalized understanding of HF discussion.  Plan for f/u with continued HF education reviewed.  Pt will see Maria Elena in 4 weeks. -[facility] [place]

## 2021-06-03 NOTE — PATIENT INSTRUCTIONS - HE
Tommy Rea,    It was a pleasure to see you today at the [facility].     My recommendations after this visit include:  - Continue current medications.    - You will be called with the results of your labs.  - Limit salt in your diet.   - Follow up in 2 months.   - Continue to monitor for signs of retaining fluid (increasing weights, shortness of breath, swelling) and call with any concerns. [phone]      Maria Elena Rivera, CNP

## 2021-06-04 VITALS
BODY MASS INDEX: 35.28 KG/M2 | DIASTOLIC BLOOD PRESSURE: 64 MMHG | WEIGHT: 212 LBS | HEART RATE: 75 BPM | OXYGEN SATURATION: 97 % | SYSTOLIC BLOOD PRESSURE: 100 MMHG

## 2021-06-04 VITALS
WEIGHT: 208 LBS | RESPIRATION RATE: 20 BRPM | BODY MASS INDEX: 35.51 KG/M2 | HEART RATE: 68 BPM | HEIGHT: 64 IN | DIASTOLIC BLOOD PRESSURE: 70 MMHG | SYSTOLIC BLOOD PRESSURE: 106 MMHG

## 2021-06-04 VITALS — BODY MASS INDEX: 32 KG/M2 | SYSTOLIC BLOOD PRESSURE: 122 MMHG | DIASTOLIC BLOOD PRESSURE: 78 MMHG | WEIGHT: 204.3 LBS

## 2021-06-04 VITALS
DIASTOLIC BLOOD PRESSURE: 80 MMHG | SYSTOLIC BLOOD PRESSURE: 128 MMHG | RESPIRATION RATE: 14 BRPM | WEIGHT: 211 LBS | HEIGHT: 67 IN | BODY MASS INDEX: 33.12 KG/M2 | HEART RATE: 77 BPM

## 2021-06-05 VITALS
HEART RATE: 75 BPM | RESPIRATION RATE: 14 BRPM | HEIGHT: 67 IN | BODY MASS INDEX: 33.12 KG/M2 | SYSTOLIC BLOOD PRESSURE: 120 MMHG | WEIGHT: 211 LBS | DIASTOLIC BLOOD PRESSURE: 78 MMHG

## 2021-06-05 VITALS
SYSTOLIC BLOOD PRESSURE: 120 MMHG | WEIGHT: 209 LBS | BODY MASS INDEX: 32.8 KG/M2 | DIASTOLIC BLOOD PRESSURE: 68 MMHG | HEIGHT: 67 IN

## 2021-06-05 NOTE — TELEPHONE ENCOUNTER
"Per Maria Elena HUGHES CNP's 1-20-20 visit note \"Coronary artery disease: Coronary angiogram in 2012 showed moderate to severe disease.  Dr. Colbert previously recommended stress test.  Tommy is not interested in stress test at this time since symptoms have improved.  We discussed getting a stress test if her shortness of breath worsens again\"    Per Dr. Colbert's 10-11-19 visit note \"Arrange outpatient pharmacologic nuclear stress test to evaluate for ischemia\" - appears no order placed at visit.    Order placed per protocol - message sent to sched w/ request to arrange nuc as previously recommended.  mg  "

## 2021-06-05 NOTE — TELEPHONE ENCOUNTER
----- Message from Izzy Torres sent at 2020 12:56 PM CST -----  Caller: Daughter Kierra    Primary cardiologist: Dr. Colbert    Detailed reason for call: Patient's daughter stated that there was an order in there for a stress test and it looks like it . Please advise    Best phone number: [phone]    Best time to contact: today    Ok to leave a detailed message? Yes    Device? No

## 2021-06-05 NOTE — PATIENT INSTRUCTIONS - HE
Tommy Rea,    It was a pleasure to see you today at the [facility].     My recommendations after this visit include:  - No changes to your medications.    - Continue to monitor for signs of retaining fluid (increasing weights, shortness of breath, swelling) and call with any concerns. [phone]  - You will be called with the results of your labs.  - Follow up with Dr. Colbert in April.  Follow up with me in 6 months.     Maria Elena Rivera, CNP

## 2021-06-08 NOTE — TELEPHONE ENCOUNTER
----- Message from Lauren Colbert MD sent at 5/13/2020 10:35 AM CDT -----  Sirisha,    I would like to start Merrily on spironolactone 25 mg, in addition to her current dose of furosemide as her BNP is still elevated although down from last month's level. She will need repeat BMP, BNP in 2 weeks at PMD clinic.    JUDD

## 2021-06-08 NOTE — TELEPHONE ENCOUNTER
"Rec'd return voicemail from patient's daughter Kierra stating patient has reported \"minor\" dizziness with position changes since starting Spironolactone.    Return call to Kierra who stated she forgot to also mention that patient has been drinking plenty of fluids.    Instructed her to have patient's labs drawn this week, if possible and have patient ck BP when symptomatic and call if readings low and/or sx worsen - also informed her that patient should be instructed to change positions cautiously - Kierra confirmed verbalized understanding and agreed with plan.  mg  "

## 2021-06-08 NOTE — TELEPHONE ENCOUNTER
Rec'd voicemail from patient's daughter Kierra questioning if patient's BP meds should be adjusted since Dr. Colbert increased her Spironolactone and patient was experiencing dizziness already.    Left message for Kierra stating that further medication changes could be advised if BP was low during nuc stress test and explained that dizziness could also be associated with HF - reassured her that nuc results would be reviewed by Dr. Colbert once available and she would be contacted with any new recommendations.  mg

## 2021-06-08 NOTE — TELEPHONE ENCOUNTER
Message rec'd 6-7-20 @ 0639:        Can increase spironolactone to 50 mg in the morning and 25 mg in the afternoon/PM. Advise repeat BNP, BMP in 2 weeks.     Lauren Samson MD

## 2021-06-08 NOTE — TELEPHONE ENCOUNTER
Return call to patient's daughter Kierra - informed her of 6-4-20 BNP, BMP results and Dr. Clobert's recommendations - confirmed Lexiscan nuc sched for 0830 this am - instructed Kierra to have patient take increased dose of Spironolactone after nuc completed to help avoid urgency to use BR during testing - reassured her that new lab orders would be faxed to PCP clinic again and requested call back if new Rx needed - Kierra verbalized understanding and agreed with plan to proceed with stress test this am and increase in diuretic.      Orders placed per protocol.  mg

## 2021-06-08 NOTE — TELEPHONE ENCOUNTER
Dr. Colbert,  Please review lab results drawn 6/4 at PCP office.  Patient is taking spironolactone 50 mg daily and furosemide 60 mg twice daily.  Any new orders or recommendations?  Thank you,  Rachel

## 2021-06-08 NOTE — TELEPHONE ENCOUNTER
Rec'd return call from patient who requested new Rx for Spironolactone 50mg daily be sent to [facility] and confirmed understanding that lab appt should be arranged in 2wks at PCP clinic - patient agreed to call back during interim if she has any probs tolerating new dose.  mg

## 2021-06-08 NOTE — TELEPHONE ENCOUNTER
----- Message from Meggan Webb sent at 6/5/2020  9:46 AM CDT -----  General phone call:    Caller: Daughter Kierra  Primary cardiologist: Dr. Colbert  Detailed reason for call:  Patient had labs drawn at PCP in PM.  They are waiting for results and the are needing to know if mom should have her stress test on Monday 6/8   New or active symptoms?   Best phone number: Kierra [phone]  Best time to contact: Anytime  Ok to leave a detailed message? Yes  Device? no    Additional Info:

## 2021-06-08 NOTE — PROGRESS NOTES
Review of Systems - History obtained from daughter  General ROS: negative  Psychological ROS: negative  Ophthalmic ROS: negative  ENT ROS: negative  Allergy and Immunology ROS: negative  Hematological and Lymphatic ROS: negative  Endocrine ROS: negative  Respiratory ROS: positive for - shortness of breath and wheezing  Cardiovascular ROS: positive for - dyspnea on exertion, leg swelling- before the hospital stay   Gastrointestinal ROS: positive for - constipation  Genito-Urinary ROS: negative  Musculoskeletal ROS: negative  Neurological ROS: positive for - confusion- before the hospital the hospital stay  Dermatological ROS: negative  Declines the Video visit today

## 2021-06-08 NOTE — PATIENT INSTRUCTIONS - HE
1.  Continue current medications  2.  Patient and daughter to review foods to see if high amounts of sodium.  Total daily sodium intake should be no more than 2000 mg/day.  3.  Schedule outpatient nuclear stress test in the next several weeks to follow-up on coronary artery disease.

## 2021-06-08 NOTE — TELEPHONE ENCOUNTER
Phone call to patient - informed her of Dr. Colbert's recommendations - patient verbalized understanding that she should increase Spironolactone to 2-25mg tabs daily and agreed to call back with preferred pharmacy for new Rx of 50mg tabs after talking to her daughter.  Patient also agreed to have her daughter arrange lab appt at PCP clinic in 2wks - reassured her that lab orders would be faxed to PCP clinic for appt.    Orders placed per protocol and successfully faxed to Dr. Benton's office ([phone]).   mg

## 2021-06-08 NOTE — TELEPHONE ENCOUNTER
"Return call to patient's daughter Kierra who stated her question was related to whether patient should continue to follow PCP's instructions to increase Lasix if wt up >2-3# in 24hrs since Spironolactone increased by Dr. Colbert.    Informed Kierra that per Dr. Colbert's 5-11-20 visit note patient was to continue \"current dose of furosemide 60 mg twice daily\" - instructed Kierra to contact Dr. Benton to address question and reassured her that phone notes would be forwarded to Dr. Benton with recent med changes.    Kierra verbalized understanding and confirmed f/u labs sched for 6-8-20, same day as nuc.  mg   "

## 2021-06-08 NOTE — TELEPHONE ENCOUNTER
"Per 5-13-20 phone note \"Phone call to patient - informed her of Dr. Colbert's recommendations - patient verbalized understanding that she should increase Spironolactone to 2-25mg tabs daily and agreed to call back with preferred pharmacy for new Rx of 50mg tabs after talking to her daughter.  Patient also agreed to have her daughter arrange lab appt at PCP clinic in 2wks - reassured her that lab orders would be faxed to PCP clinic for appt.     Orders placed per protocol and successfully faxed to Dr. Benton's office ([phone]).   mg\"      "

## 2021-06-08 NOTE — TELEPHONE ENCOUNTER
----- Message from Con Day sent at 6/5/2020  4:53 PM CDT -----  Regarding: JUDD PT / STRESS TEST ON 6/8  General phone call:    Caller: Pt's Kierra small     Primary cardiologist: JUDD     Detailed reason for call: Pt's dtr Kierra states she's calling back because she hasn't heard anything regarding if her mother should still get her stress test done on Monday, 6/8. Pt had labs drawn by PCP yesterday and would like JUDD to review them and let them know what the recommendations are.      Best phone number: [phone]    Best time to contact: Today    Ok to leave a detailed message? Yes     Device? No     Additional Info:

## 2021-06-08 NOTE — TELEPHONE ENCOUNTER
Message recd' 5-13-20 @ 1040:        Antonia, an increase in spironolactone to 50 mg daily.   Lauren Colbert MD

## 2021-06-08 NOTE — TELEPHONE ENCOUNTER
----- Message from Meggan Webb sent at 6/4/2020 11:11 AM CDT -----  General phone call:    Caller:  Lucas Lozada  Primary cardiologist: Dr. Colbert  Detailed reason for call: Daughter called on Thursday 5/21 no one has returned her call.  Dr Colbert increased her Spironolactone.  Are the parameters still same for the Furosemide?    New or active symptoms?   Best phone number: Kierra [phone]  Best time to contact: Anytime  Ok to leave a detailed message? Yes  .  Device? no    Additional Info:

## 2021-06-09 NOTE — TELEPHONE ENCOUNTER
Phone call to home care nurse Hanh - informed her of Dr. Colbert's response/recommendations - understanding verbalized - no further questions/concerns at this time.  mg

## 2021-06-09 NOTE — PATIENT INSTRUCTIONS - HE
Tommy Rea,    My recommendations after this visit include:  - Stop lasix (furosemide)   - Decrease spironolactone to 25 mg daily.   - Start torsemide 20 mg twice daily.   - Continue to monitor for signs of retaining fluid (increasing weights, shortness of breath, swelling) and call with any concerns.   - Please call if you have any concerns.  - Follow up with me in 1-2 weeks in clinic.      Maria Elena Rivera, CNP

## 2021-06-09 NOTE — TELEPHONE ENCOUNTER
----- Message from Meggan Webb sent at 6/18/2020 10:03 AM CDT -----  General phone call:    Caller: [facility]  Primary cardiologist: Dr Colbert  Detailed reason for call: Patient is on Spironolactone was increased last week and she is stating patients blood pressure is very low today   first reading was 85/43   second reading  88/48   with a heart rate of 71  New or active symptoms? Low blood pressure  Best phone number: [phone]  Best time to contact: Anytime  Ok to leave a detailed message? Yes  Device? no    Additional Info:

## 2021-06-09 NOTE — TELEPHONE ENCOUNTER
Return call to HomeCare nurse Hanh who reported low BP today - patient's usual syst BP  - patient denied dizziness, does c/o persistent fatigue - patient reports drinking plenty of po flds - wt was up last wk (209#) and today's wt 205# (dry wt).    Hanh also reported that patient just completed yesterday another 3 day course of increased Lasix (80mg two times a day) per PCP orders - patient's usual Lasix dose 60mg two times a day - Hanh is uncertain as to when patient was last seen by PCP.    Informed Hanh that update would be forwarded to Dr. Colbert after PCP's last visit note obtained for review, then she would be contacted with any new recommendations - understanding verbalized.    Please review patient update and PCP 5-11-20 visit note under media tab - f/u with HF NP sched on 7-20-20 - any new orders?  mg

## 2021-06-09 NOTE — TELEPHONE ENCOUNTER
"I would advise that home care talk to PMD since they advised the three day increase in diuretic. Their \"recent note\" was from just over a month ago.    KML  "

## 2021-06-10 NOTE — TELEPHONE ENCOUNTER
Rec'd call from patient's daughter Kierra requesting Torsemide Rx be refilled through [facility] - confirmed patient was recently seen by HF NP 8-17-20 and current dose is 40mg in am and 20mg in pm.    Informed Kierra that Rx refill request would be addressed - confirmed f/u pending with HF NP 10/2020.  mg

## 2021-06-10 NOTE — TELEPHONE ENCOUNTER
Wellness Screening Tool  Symptom Screening:  Do you have one of the following NEW symptoms:    Fever (subjective or >100.0)?  No    A new cough?  No    Shortness of breath?  No     Chills? No     New loss of taste or smell? No     Generalized body aches? No     New persistent headache? No     New sore throat? No     Nausea, vomiting, or diarrhea?  No    Within the past 3 weeks, have you been exposed to someone with a known positive illness below:    COVID-19 (known or suspected)?  No    Chicken pox?  No    Mealses?  No    Pertussis?  No    Patient notified of visitor policy- They may have one person accompany them to their appointment, but they will need to wear a mask and will be screened upon arrival for symptoms: Yes  Pt informed to wear a mask: Yes  Pt notified if they develop any symptoms listed above, prior to their appointment, they are to call the clinic directly at [phone] for further instructions.  Yes  Patient's appointment status: Patient will be seen in clinic as scheduled on 8/7

## 2021-06-10 NOTE — TELEPHONE ENCOUNTER
"----- Message from Maria Elena Rivera CNP sent at 7/29/2020  2:39 PM CDT -----  Please call patient today or tomorrow. She cancelled her appointment with me today due to \"illness.\"  I adjusted her diuretics last week so please make sure she is not retaining more fluid.  Follow up rescheduled for next week.    Thanks     "

## 2021-06-10 NOTE — TELEPHONE ENCOUNTER
Wellness Screening Tool  Symptom Screening:  Do you have one of the following NEW symptoms:    Fever (subjective or >100.0)?  no        A new cough?  No    Shortness of breath?  No     Chills? No     New loss of taste or smell? No     Generalized body aches? No     New persistent headache? No     New sore throat? No     Nausea, vomiting, or diarrhea?  No    Within the past 3 weeks, have you been exposed to someone with a known positive illness below:    COVID-19 (known or suspected)?  No    Chicken pox?  No    Mealses?  No    Pertussis?  No    Patient notified of visitor policy- They may have one person accompany them to their appointment, but they will need to wear a mask and will be screened upon arrival for symptoms: Yes  Pt informed to wear a mask: Yes  Pt notified if they develop any symptoms listed above, prior to their appointment, they are to call the clinic directly at [phone] for further instructions.  Yes  Patient's appointment status: Patient will be seen in clinic as scheduled on 7/29

## 2021-06-10 NOTE — TELEPHONE ENCOUNTER
Tommy's daughter Kierra is calling today to discuss her concerns regarding her mothers recent b/p readings during a home care visit yesterday.  She notes that her Torsemide was recently increased up to 40mg in the AM and 20mg in the PM, late last week.   Kierra states that the Home Care nurse used a wrist meter to check her b/p and that she obtained 82/58 and 79 HR . This was rechecked and it was consistent.  The patient does NOT have any symptoms of dizziness, lightheadedness or shortness of breath at all.   Concerned that the HC nurse was using a wrist cuff, as they often are not accurate.   Reinforced to Kierra that she should continue medications as currently advised. Monitor for any s/s of dizziness, lightheadedness, palpitations, shortness of breath etc. Make sure she is drinking 50 oz of fluids daily for adequate hydration.     ISELA to Maria Elena Rivera CNP    Any additional recommendations ? sk/RN

## 2021-06-10 NOTE — TELEPHONE ENCOUNTER
I agree with your note.  No further recommendations.  I will see her in person on 8/17 and check manual BP.

## 2021-06-10 NOTE — TELEPHONE ENCOUNTER
Wellness Screening Tool  Symptom Screening:  Do you have one of the following NEW symptoms:    Fever (subjective or >100.0)?  No    A new cough?  No    Shortness of breath?  No     Chills? No     New loss of taste or smell? No     Generalized body aches? No     New persistent headache? No     New sore throat? No     Nausea, vomiting, or diarrhea?  No    Within the past 3 weeks, have you been exposed to someone with a known positive illness below:    COVID-19 (known or suspected)?  No    Chicken pox?  No    Mealses?  No    Pertussis?  No    Patient notified of visitor policy- They may have one person accompany them to their appointment, but they will need to wear a mask and will be screened upon arrival for symptoms: Yes  Pt informed to wear a mask: Yes  Pt notified if they develop any symptoms listed above, prior to their appointment, they are to call the clinic directly at [phone] for further instructions.  Yes  Patient's appointment status: Patient will be seen in clinic as scheduled on 8/17

## 2021-06-10 NOTE — TELEPHONE ENCOUNTER
As long as she is not having any worsening shortness of breath or edema, continue current dose.  She reported at her last visit that normal weights were 207-210 pounds so she is still in this range. I see her in clinic on Friday and can further assess.

## 2021-06-10 NOTE — PATIENT INSTRUCTIONS - HE
Tommy Rea,    It was a pleasure to see you today at the [facility].     My recommendations after this visit include:  - We will call Kierra with lab results and plan follow up at that time.    - If your weight increases by 3 pounds (211 pounds or higher) take extra torsemide 20 mg daily for 3 days.  If symptoms do not improve, please call.  If you do this more than twice/month, please call.   - Please call my nurse Breanne if you have any concerns: [phone]    Maria Elena Rivera, CNP

## 2021-06-10 NOTE — TELEPHONE ENCOUNTER
Rec'd call from patient's daughter Kierra stating Maria Elena HUGHES CNP had made medication changes at recent visit and she forgot to ask what she should do with Torsemide does if patient gains wt.    Informed Kierra that same criteria applies to any diuretic, if patient's wt increases by 2-3#, two consecutive days in a row, a call should be placed to HF NP.    Kierra verbalized understanding and reported patient's wt on 8-2-20 was 206.4#, 8-3-20 - 207.8# and today 209.3#.    Informed Kierra that update would be forwarded to HF NP for recommendations - understanding verbalized.    Please review wt update from patient's daughter - f/u sched on 8-7-20 - any new orders during interim?  mg

## 2021-06-10 NOTE — TELEPHONE ENCOUNTER
----- Message from Maria Elena Rivera CNP sent at 8/7/2020 11:39 AM CDT -----  Please call patient.  BMP stable since changing to torsemide but BNP worse.  Increase torsemide to 40 mg in the morning and 20 mg in the afternoon.  Follow up with me in 2 weeks (in person since I will need to check labs).

## 2021-06-10 NOTE — TELEPHONE ENCOUNTER
Left detailed message for Kierra informing her of Maria Elena's response/recommendations - requested call back with any further questions/concerns.  mg

## 2021-06-10 NOTE — TELEPHONE ENCOUNTER
Reached out to Tommy today and she was doing well. She noted that she has not been retaining excess fluid. Her wt today was 205# she is not noticing swelling in her ankles legs and feet. She is sleeping fine. Her shortness of breath has not progressed to anything she cannot handle, she reports. Dilciamadhu will have a f/u appt with you  Next week. Sk/RN    MEGHANI to JUVENAL Ovalle/ANDREW

## 2021-06-10 NOTE — PATIENT INSTRUCTIONS - HE
Tommy Rea,    It was a pleasure to see you today at the [facility].     My recommendations after this visit include:  - You will be called with the results of your labs later today.  We will discuss if any medication changes need to be made and follow up appointment with me.    - Continue to work on limiting salt in your diet.   - Please call my nurse Breanne if you have any concerns: [phone]    Maria Elena Rivera, CNP

## 2021-06-10 NOTE — TELEPHONE ENCOUNTER
Kierra was contacted to share that there were no additional recommendations as per Maria Elena Rivera CNP today. Reinforced the fluid intake and monitoring of her s/s and that she will be seen on Monday in Clinic by Maria Elena Rivera CNP  At which time she will check into her b/p manually more closely. sk/RN

## 2021-06-10 NOTE — TELEPHONE ENCOUNTER
Tommy was contacted with lab results today and advised to increase her Torsemide to 40mg in AM and 20mg in PM w/ followup appt in two weeks w/ Maria Elena Rivera CNP , transferred to scheduling to arrange. sk/RN

## 2021-06-12 NOTE — PATIENT INSTRUCTIONS - HE
Tommy Rea,    It was a pleasure to see you today at the [facility].     My recommendations after this visit include:  - I will call Kierra tomorrow with lab results and discuss any medication changes and follow up plan.    - Start taking your COPD inhaler twice daily as prescribed.   - Please call my nurse Breanne if you have any concerns: [phone]    Maria Elena Rivera, CNP

## 2021-06-12 NOTE — TELEPHONE ENCOUNTER
----- Message from Maria Elena Mejia CNP sent at 10/5/2020  4:11 PM CDT -----  Regarding: RE: lab results  Please call and assess HF symptoms and if she has made any changes to diuretic on her own.   Thanks   ----- Message -----  From: Sirisha Rider RN  Sent: 10/5/2020   4:04 PM CDT  To: Maria Elena Mejia CNP  Subject: lab results                                      Please address lab results per Dr. Colbert's request.  mg  ----- Message -----  From: Lauren Colbert MD  Sent: 10/5/2020   3:37 PM CDT  To: Sirisha Rider RN    I have not seen Merrily since May so it is not clear how these orders can be under my name.  There appears to be a rise in her BNP from August.  I would forward these results to Maria Elena who has been seeing her over the last several months and has an appointment at the end of October.    L

## 2021-06-12 NOTE — TELEPHONE ENCOUNTER
LM for Kierra the patient's daughter today with the recommendations as per Maria Elena Rivera, CNP to increase the Torsemide to 40mg two times a day, x 2 days then to return to the routine dose of 40mg in AM and 20mg in PM thereafter.   This for the elevated  BNP elevation.up to 564. Encouraged her to call if any questions  Or changes or progression in her symptomssk/RN

## 2021-06-12 NOTE — TELEPHONE ENCOUNTER
Wellness Screening Tool  Symptom Screening:  Do you have one of the following NEW symptoms:    Fever (subjective or >100.0)?  No    A new cough?  No    Shortness of breath?  No     Chills? No     New loss of taste or smell? No     Generalized body aches? No     New persistent headache? No     New sore throat? No     Nausea, vomiting, or diarrhea?  No    Within the past 2 weeks, have you been exposed to someone with a known positive illness below:    COVID-19 (known or suspected)?  No    Chicken pox?  No    Mealses?  No    Pertussis?  No    Patient notified of visitor policy- They may have one person accompany them to their appointment, but they will need to wear a mask and will be screened upon arrival for symptoms: Yes  Pt informed to wear a mask: Yes  Pt notified if they develop any symptoms listed above, prior to their appointment, they are to call the clinic directly at [phone] for further instructions.  Yes  Patient's appointment status: Patient will be seen in clinic as scheduled on 11/9

## 2021-06-12 NOTE — TELEPHONE ENCOUNTER
Wellness Screening Tool  Symptom Screening:  Do you have one of the following NEW symptoms:    Fever (subjective or >100.0)?  No    A new cough?  No    Shortness of breath?  No     Chills? No     New loss of taste or smell? No     Generalized body aches? No     New persistent headache? No     New sore throat? No     Nausea, vomiting, or diarrhea?  No    Within the past 2 weeks, have you been exposed to someone with a known positive illness below:    COVID-19 (known or suspected)?  No    Chicken pox?  No    Mealses?  No    Pertussis?  No    Patient notified of visitor policy- They may have one person accompany them to their appointment, but they will need to wear a mask and will be screened upon arrival for symptoms: Yes  Pt informed to wear a mask: Yes  Pt notified if they develop any symptoms listed above, prior to their appointment, they are to call the clinic directly at [phone] for further instructions.  Yes  Patient's appointment status: Patient will be seen in clinic as scheduled on 10/26

## 2021-06-12 NOTE — TELEPHONE ENCOUNTER
BNP more elevated than previously.  BNP has been helpful in the past.  Symptoms are overall stable.  Increase torsemide to 40 mg twice daily for 2 days and then decrease back to 40 mg in the morning and 20 mg in the afternoon.  She should call with any worsening symptoms.

## 2021-06-13 NOTE — TELEPHONE ENCOUNTER
----- Message from Sirisha Rider RN sent at 11/30/2020  1:56 PM CST -----  This is Maria Elena's pt - KML has not seen her since May.  Thanks  mg  ----- Message -----  From: Izzy Torres  Sent: 11/30/2020   1:39 PM CST  To: Sirisha Rider RN        Caller: Daughter Kierra  Primary cardiologist: Dr. Colbert    Detailed reason for call: Patient's daughter is calling because she stated that she was told to call when she had to give her mom torsemide (DEMADEX) 20 MG tablet. She stated that she had to on 11/25/2020, 11/26/2020, 11/27/2020. Nothing Saturday but then again on 11/29/2020    Best phone number: Kierra- daughter- [phone]  Best time to contact: today  Ok to leave a detailed message? yes  Device? No

## 2021-06-13 NOTE — TELEPHONE ENCOUNTER
Tommy's daughter is calling today to advise us that she has given additional torsemide 20mg doses to her x3 over this past two weeks. She had a wt of 210# at that time. She has been advised to let the clinic know of when she has been giving the extra doses. This is to best track her need for the additional Torsemide on an ongoing basis.     Maria Elena Rivera, CNP any additional recommendations at this time? sk/RN

## 2021-06-13 NOTE — TELEPHONE ENCOUNTER
Tommy's daughter Kierra was calling to share that she gave an additional Torsemide 20mg in the PM on 11/25. 11/26, 11/27, 11/29.  Her normal dose is Torsemide 40mg in AM and 20mg in PM.  It was thought that her wt increases were due to the high sodium content of the Thanksgiving menu. She is down to 208.1# today.       FYI to Maria Elena Rivera, CNP   sk/RN

## 2021-06-13 NOTE — TELEPHONE ENCOUNTER
Thank you for the update.  No further recommendations at this time.  I have given Tmomy and Kierra the ok to increase diuretic on their own with these parameters:  - If your weight increases by 3 pounds (211 pounds or higher) take extra torsemide 20 mg daily for 3 days.  If symptoms do not improve, please call.  If you do this more than twice/month, please call.     They both have a good understanding of when to take extra and when they need to contact the clinic.    I am due to see Tommy again in January and can review parameters again with her.

## 2021-06-15 NOTE — PATIENT INSTRUCTIONS - HE
Tommy Rea,    It was a pleasure to see you today at the [facility].     My recommendations after this visit include:  - No changes to your medications.    - You are due to see Dr. Colbert in May.   - Follow up with me in 6 months.   - If you have any questions or concerns, please call [phone] to talk with my nurse.    Maria Elena Rivera, CNP

## 2021-06-16 ENCOUNTER — RECORDS - HEALTHEAST (OUTPATIENT)
Dept: ADMINISTRATIVE | Facility: OTHER | Age: 76
End: 2021-06-16

## 2021-06-16 ENCOUNTER — RECORDS - HEALTHEAST (OUTPATIENT)
Dept: CARDIOLOGY | Facility: CLINIC | Age: 76
End: 2021-06-16

## 2021-06-17 NOTE — TELEPHONE ENCOUNTER
----- Message from La Nicholas CNP sent at 5/7/2021  8:05 AM CDT -----  With Maria Elena out please inform pt and daughter labs are stable. Looks like Maria Elena stated to go back to original dose of torsemide now that her weight is down. Call if any new or worsening s/s. Thank you

## 2021-06-17 NOTE — PATIENT INSTRUCTIONS - HE
1. Increase spironolactone to twice daily dosing. Keep all other medications the same.  2. Follow up with Maria Elena as planned  3. Begin regular walking program in your hallways

## 2021-06-17 NOTE — TELEPHONE ENCOUNTER
Received a call from Tommy's daughter, Kierra, stating that she had a fall. She did not think she hit her head and denies dizziness/lightheadedness. Tommy is alert and oriented, but is a little sore. She has not eaten today, so Kierra will make sure she eats. Kierra was advised to monitor Tommy and take her to ED for any suspicion of cognitive decline/ headache/ severe pain. She will take her in to get her BMP drawn this afternoon, but understands it may not be resulted until tomorrow.   Tommy reports her weight is back down to 208# and ankle swelling is improved. Will follow-up after BMP resulted. AB/RN   1 pair

## 2021-06-17 NOTE — TELEPHONE ENCOUNTER
Call placed to daughter, Kierra, to review symptoms. Her daughter states Tommy's weight is 210.3# today. Normally she runs about 207#. Her ankles are swollen, but denies shortness of breath, orthopnea, or fatigue. Tommy has been taking an extra dose of torsemide 20 mg daily on top of her 40 mg in the AM, and 20 mg in th PM. Her daughter wonders if she should continue the extra dose, since her weight is not improving. She is scheduled to see Dr. Colbert on 5/10/21. Will follow-up with provider recommendations. AB/RN

## 2021-06-17 NOTE — TELEPHONE ENCOUNTER
Daughter, Kierra, contacted with lab results and recommendation to reduce her torsemide to original dosing of 40 mg in AM and 20 mg in PM given. Tommy has an appt Monday 5/10 with Dr. Colbert.

## 2021-06-17 NOTE — TELEPHONE ENCOUNTER
----- Message from Breanne Toure RN sent at 5/5/2021  9:58 AM CDT -----  Regarding: FW: MANJINDER Patient    ----- Message -----  From: Ozzy Cervantes HUC  Sent: 5/5/2021   9:49 AM CDT  To: Breanne Toure RN  Subject: KLL Patient                                      General phone call:    Caller: Patient   Primary cardiologist: CAROL/MANJINDER  Detailed reason for call: Patient's weight has been up and so patient has been taking more medication. Patient would like to know If she should continue to do so   Best phone number: [phone]  Best time to contact: Any  Ok to leave a detailed message? Yes  Device? No    Additional Info:

## 2021-06-17 NOTE — TELEPHONE ENCOUNTER
She took extra torsemide 4/30-5/3, and then again on 5/5. Will arrange for BMP, ordered and left voicemail for patient to call back. AB/RN

## 2021-06-17 NOTE — TELEPHONE ENCOUNTER
How many days has she been taking the extra diuretic?  If more than 3 days, have her come in for BMP today or tomorrow and I can decide about continued dose.

## 2021-06-19 NOTE — LETTER
Letter by Rachel Diego RN at      Author: Rachel Diego RN Service: -- Author Type: --    Filed:  Encounter Date: 11/18/2019 Status: Signed         Tommy MENDEZ Rea  [address]  [address]  [place] [address]             November 18, 2019         Dear Ms. Rea,    Below are the results from your recent visit:    Resulted Orders   Basic Metabolic Panel   Result Value Ref Range    Sodium 140 136 - 145 mmol/L    Potassium 4.0 3.5 - 5.0 mmol/L    Chloride 102 98 - 107 mmol/L    CO2 26 22 - 31 mmol/L    Anion Gap, Calculation 12 5 - 18 mmol/L    Glucose 95 70 - 125 mg/dL    Calcium 9.3 8.5 - 10.5 mg/dL    BUN 22 8 - 28 mg/dL    Creatinine 0.78 0.60 - 1.10 mg/dL    GFR MDRD Af Amer >60 >60 mL/min/1.73m2    GFR MDRD Non Af Amer >60 >60 mL/min/1.73m2    Narrative    Fasting Glucose reference range is 70-99 mg/dL per  American Diabetes Association (ADA) guidelines.     Merrily,  Your labs are stable.    Please call with questions or contact us using [employer].    Sincerely,        Electronically signed by Rachel Diego RN

## 2021-06-21 ENCOUNTER — AMBULATORY - HEALTHEAST (OUTPATIENT)
Dept: LAB | Facility: HOSPITAL | Age: 76
End: 2021-06-21

## 2021-06-21 ENCOUNTER — RECORDS - HEALTHEAST (OUTPATIENT)
Dept: ADMINISTRATIVE | Facility: OTHER | Age: 76
End: 2021-06-21

## 2021-06-21 ENCOUNTER — COMMUNICATION - HEALTHEAST (OUTPATIENT)
Dept: CARDIOLOGY | Facility: CLINIC | Age: 76
End: 2021-06-21

## 2021-06-21 ENCOUNTER — OFFICE VISIT (OUTPATIENT)
Dept: NEUROLOGY | Facility: CLINIC | Age: 76
End: 2021-06-21
Payer: COMMERCIAL

## 2021-06-21 VITALS
SYSTOLIC BLOOD PRESSURE: 107 MMHG | HEART RATE: 65 BPM | BODY MASS INDEX: 32.96 KG/M2 | DIASTOLIC BLOOD PRESSURE: 62 MMHG | HEIGHT: 67 IN | WEIGHT: 210 LBS

## 2021-06-21 DIAGNOSIS — G40.201 PARTIAL SYMPTOMATIC EPILEPSY WITH COMPLEX PARTIAL SEIZURES, NOT INTRACTABLE, WITH STATUS EPILEPTICUS (H): ICD-10-CM

## 2021-06-21 DIAGNOSIS — G40.209 PARTIAL SYMPTOMATIC EPILEPSY WITH COMPLEX PARTIAL SEIZURES, NOT INTRACTABLE, WITHOUT STATUS EPILEPTICUS (H): Primary | ICD-10-CM

## 2021-06-21 DIAGNOSIS — G40.209 PARTIAL SYMPTOMATIC EPILEPSY WITH COMPLEX PARTIAL SEIZURES, NOT INTRACTABLE, WITHOUT STATUS EPILEPTICUS (H): ICD-10-CM

## 2021-06-21 LAB — PHENYTOIN SERPL-MCNC: 12.9 UG/ML (ref 10–20)

## 2021-06-21 PROCEDURE — 99214 OFFICE O/P EST MOD 30 MIN: CPT | Performed by: PSYCHIATRY & NEUROLOGY

## 2021-06-21 RX ORDER — PHENYTOIN SODIUM 100 MG/1
500 CAPSULE, EXTENDED RELEASE ORAL AT BEDTIME
Qty: 540 CAPSULE | Refills: 3 | Status: SHIPPED | OUTPATIENT
Start: 2021-06-21 | End: 2021-09-01

## 2021-06-21 RX ORDER — TOPIRAMATE 100 MG/1
150 TABLET, FILM COATED ORAL 2 TIMES DAILY
Qty: 270 TABLET | Refills: 3 | Status: SHIPPED | OUTPATIENT
Start: 2021-06-21 | End: 2022-08-23

## 2021-06-21 RX ORDER — SPIRONOLACTONE 25 MG/1
25 TABLET ORAL DAILY
COMMUNITY
End: 2021-09-13

## 2021-06-21 RX ORDER — TORSEMIDE 20 MG/1
TABLET ORAL
COMMUNITY
Start: 2021-05-09 | End: 2021-08-10

## 2021-06-21 ASSESSMENT — MIFFLIN-ST. JEOR: SCORE: 1475.18

## 2021-06-21 NOTE — LETTER
2021         RE: Tommy Rea  [address]  [facility] [address]        Dear Colleague,    Thank you for referring your patient, Tommy Rea, to the [facility] [place]. Please see a copy of my visit note below.    NEUROLOGY FOLLOW UP VISIT  NOTE       [facility] [place]  [address] [place] [address]  Tel: [phone]  Fax: [phone]  [email or web address]     Tommy Rea,  1945, MRN [id number]  PCP: Hannah Benton  Date: 2021      ASSESSMENT & PLAN     Diagnosis code  1.  Partial symptomatic epilepsy with complex partial seizures, not intractable, without status epilepticus (H)     Complex partial seizures  76 years old female with history of complex partial seizures, depression with anxiety, paroxysmal atrial fibrillation who returns for follow-up for seizures.  She continues to have intermittent auras at least once or twice month.  She is on Dilantin and Topamax.  Level was drawn earlier this morning results are still pending.  Comprehensive metabolic panel done recently was normal.  I have recommended checking EEG due to frequent auras and based on her anticonvulsant level I will make adjustment in the dose.  Follow-up will be the day she gets EEG    Thank you again for this referral, please feel free to contact me if you have any questions.    Guy Marie MD  [facility]  ([facility], [facility]     HISTORY OF PRESENT ILLNESS     Patient is 76 years old female with history of CHF, depression with anxiety, complex partial seizures, paroxysmal atrial fibrillation and CAD who returns for yearly follow-up.  She denies any seizures since her last visit.  She is on Dilantin and Topamax and had lab work done earlier today, results are still pending.  Last year she was admitted to the hospital with confusion along with some speech  difficulty and was noted to have Dilantin toxicity.  EEG at that time showed nonspecific slowing.  CT of the head and MRI were unremarkable.  Her dose was adjusted and she was discharge once her free Dilantin level was within therapeutic range.  She continues to have episodes during which she gets an aura and reports that she is getting these episodes at least once a month.  Fortunately these auras have not evolved into full-blown seizures.  Free Dilantin level at that time was 0.8.  Topamax level was low.Daughter had called as she was concerned if patient was having increased auras due to nonalcoholic beer or smells and was told that rare types of epilepsy can be triggered by certain smells    Patient does have history of seizure and was initially evaluated for seizure when she presented with a generalized seizure and EEG showed left hemispheric sharp activity. MRI was normal. 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 Patient reports that her seizures are triggered by stress and usually she gets an aura. Currently her symptoms are well controlled on Dilantin and Topamax     PROBLEM LIST   Patient Active Problem List   Diagnosis Code     Regional enteritis (H) K50.90     Nonintractable epilepsy with complex partial seizures (H) G40.209     Hyperlipidemia E78.5     HTN (hypertension) I10     Depressive disorder F32.9     Coronary atherosclerosis I25.10     B12 deficiency E53.8     Atrial fibrillation (H) I48.91     (HFpEF) heart failure with preserved ejection fraction (H) I50.30     COPD (chronic obstructive pulmonary disease) (H) J44.9         PAST MEDICAL & SURGICAL HISTORY     Past Medical History:   Patient  has a past medical history of Arthritis, COPD (chronic obstructive pulmonary disease) (H), Crohn's disease (H), Depressive disorder, Heart disease, Hypertension, Osteoporosis, and Seizures (H).    Surgical  History:  She  has a past surgical history that includes orthopedic surgery and hemorrhoidectomy.     SOCIAL HISTORY     Reviewed, and she  reports that she has quit smoking. She has never used smokeless tobacco. She reports that she does not drink alcohol or use drugs.     FAMILY HISTORY     Reviewed, and family history includes Arthritis in her father; Colon Cancer in her father; Hypertension in her father and mother.     ALLERGIES     Allergies   Allergen Reactions     Amoxicillin GI Disturbance and Other (See Comments)     COLITIS    Has tolerated cefazolin  GI bleeding, Colitis, has tolerated cefazolin        Sulfa Drugs      Sulfamethoxazole-Trimethoprim Other (See Comments)     Other reaction(s): Vaginal Irritation  VAGINAL BLEEDING  Vaginal irritation, vaginal bleeding            REVIEW OF SYSTEMS     A 12 point review of system was performed and was negative except as outlined in the history of present illness.     HOME MEDICATIONS     Current Outpatient Rx   Medication Sig Dispense Refill     acetaminophen (TYLENOL) 500 MG tablet Take 1,000 mg by mouth       albuterol (PROAIR HFA/PROVENTIL HFA/VENTOLIN HFA) 108 (90 Base) MCG/ACT inhaler Inhale 2 puffs into the lungs       alendronate (FOSAMAX) 70 MG tablet Take 70 mg by mouth       apixaban ANTICOAGULANT (ELIQUIS) 5 MG tablet Take 5 mg by mouth 2 times daily        aspirin 81 MG EC tablet Take 81 mg by mouth       colestipol (COLESTID) 1 g tablet Take 3 g by mouth       cyanocobalamin (CYANOCOBALAMIN) 1000 MCG/ML injection Inject 1,000 mcg into the muscle       HAYLEY CALCIUM-VITAMIN D PO        fluticasone (FLOVENT HFA) 110 MCG/ACT inhaler Inhale 1 puff into the lungs       loratadine (CLARITIN) 10 MG tablet Take 10 mg by mouth       Magnesium Oxide 250 MG TABS Take 250 mg by mouth       metoprolol tartrate (LOPRESSOR) 50 MG tablet Take 50 mg by mouth       multivitamin w/minerals (MULTI-VITAMIN) tablet Take 1 tablet by mouth       nitroGLYcerin (NITROSTAT)  "0.4 MG sublingual tablet Place 0.4 mg under the tongue       PARoxetine (PAXIL) 40 MG tablet Take 40 mg by mouth       phenytoin (DILANTIN) 100 MG capsule Take 5 capsules (500 mg) by mouth At Bedtime 540 capsule 3     simvastatin (ZOCOR) 40 MG tablet Take 40 mg by mouth       spironolactone (ALDACTONE) 25 MG tablet Take 25 mg by mouth daily       spironolactone (ALDACTONE) 50 MG tablet Take 50mg (1 tab)  in am and 25mg (half tab) in pm.       topiramate (TOPAMAX) 100 MG tablet Take 1.5 tablets (150 mg) by mouth 2 times daily 270 tablet 3     torsemide (DEMADEX) 20 MG tablet            PHYSICAL EXAM     Vital signs  /62 (BP Location: Left arm, Patient Position: Sitting)   Pulse 65   Ht 1.702 m (5' 7\")   Wt 95.3 kg (210 lb)   BMI 32.89 kg/m      Weight:   210 lbs 0 oz    General Physical Exam: Patient is alert and oriented x 3. Vital signs were reviewed and are documented in EMR. Neck was supple  Neurological Exam:  Patient is alert and oriented x3 speech was normal there was no dysarthria or aphasia.  Cranial nerves are intact.  She does appear hard of hearing.  Face symmetrical tongue midline.  Strength in all extremities 5/5.  Sensation intact.  Gait normal.  No dysmetria noted on finger-nose testing.     DIAGNOSTIC STUDIES     PERTINENT RADIOLOGY  Following imaging studies were reviewed:     MRI 4/27/20  HEAD MRI:  1.  No acute intracranial finding. No evidence for recent ischemia, intracranial hemorrhage, or mass.    HEAD MRA:  1.  Negative MR angiogram of the brain. No evidence for aneurysm, proximal vessel occlusion, high-grade intracranial stenosis or high flow vascular lesion.    NECK MRA:  1.  Atherosclerotic narrowing of the proximal left internal carotid artery is not well-characterized due to motion artifact but suspected to be in the 50% range based on NASCET criteria.  2.  No hemodynamically significant narrowing within the right carotid artery.   3.  Vertebral arteries are patent. No " dissection.     CT HEAD 4/26/20  1.  No CT evidence for acute intracranial process.  2.  Brain atrophy and presumed chronic microvascular ischemic changes as above.  3.  Mucosal thickening is noted involving the left sphenoid sinus. Correlation for sinusitis is recommended.     EEG 4/27/20  This is an abnormal EEG due to diffusely slow background in   theta and delta range that suggests nonspecific generalized cerebral   dysfunction.  EEG background activity is contaminated with muscle   artifact.  Such nonspecific slowing suggests diffuse cerebral dysfunction   but no clear epileptiform activity was noted     CT HEAD 7/17/19  1. No intracranial mass. No hemorrhage or stroke.  2. Mild presumed chronic small vessel ischemic change and age-related change again visualized.  3. Interval development of polypoid soft tissue in the external auditory canals.    MRI of the head done in 2015 was normal. EEG was abnormal suggesting low threshold for seizure     PERTINENT LABS  Following labs were reviewed:  Ambulatory - [facility] on 06/01/2021   Component Date Value     Sodium 06/01/2021 142      Potassium 06/01/2021 3.7      Chloride 06/01/2021 105      Carbon Dioxide (CO2) 06/01/2021 25      Anion Gap 06/01/2021 12      Glucose 06/01/2021 109      Calcium 06/01/2021 9.0      Urea Nitrogen 06/01/2021 18      Creatinine 06/01/2021 0.77      GFR Estimate If Black 06/01/2021 >60      GFR Estimate 06/01/2021 >60    Ambulatory - [facility] on 05/06/2021   Component Date Value     Sodium 05/06/2021 140      Potassium 05/06/2021 3.9      Chloride 05/06/2021 102      Carbon Dioxide (CO2) 05/06/2021 26      Anion Gap 05/06/2021 12      Glucose 05/06/2021 111      Calcium 05/06/2021 9.0      Urea Nitrogen 05/06/2021 17      Creatinine 05/06/2021 0.82      GFR Estimate If Black 05/06/2021 >60      GFR Estimate 05/06/2021 >60          Total time spent for face to face visit, reviewing labs/imaging studies, counseling and coordination of  care was: 30 Minutes spent on the date of the encounter doing chart review, review of outside records, review of test results, interpretation of tests, patient visit and documentation       This note was dictated using voice recognition software.  Any grammatical or context distortions are unintentional and inherent to the software.    Orders Placed This Encounter   Procedures     Phenytoin level     Topiramate Level     EEG Routine      New Prescriptions    No medications on file     Modified Medications    Modified Medication Previous Medication    PHENYTOIN (DILANTIN) 100 MG CAPSULE phenytoin (DILANTIN) 100 MG capsule       Take 5 capsules (500 mg) by mouth At Bedtime    Take 5 capsules (500 mg) by mouth At Bedtime    TOPIRAMATE (TOPAMAX) 100 MG TABLET topiramate (TOPAMAX) 100 MG tablet       Take 1.5 tablets (150 mg) by mouth 2 times daily    Take 1.5 tablets (150 mg) by mouth 2 times daily                     Again, thank you for allowing me to participate in the care of your patient.        Sincerely,        Guy Marie MD

## 2021-06-21 NOTE — PROGRESS NOTES
NEUROLOGY FOLLOW UP VISIT  NOTE       [facility] NEUROLOGY [place]  [address] [place] [address]  Tel: [phone]  Fax: [phone]  [email or web address]     Tommy Rea,  1945, MRN [id number]  PCP: Hannah Benton  Date: 2021      ASSESSMENT & PLAN     Diagnosis code  1.  Partial symptomatic epilepsy with complex partial seizures, not intractable, without status epilepticus (H)     Complex partial seizures  76 years old female with history of complex partial seizures, depression with anxiety, paroxysmal atrial fibrillation who returns for follow-up for seizures.  She continues to have intermittent auras at least once or twice month.  She is on Dilantin and Topamax.  Level was drawn earlier this morning results are still pending.  Comprehensive metabolic panel done recently was normal.  I have recommended checking EEG due to frequent auras and based on her anticonvulsant level I will make adjustment in the dose.  Follow-up will be the day she gets EEG    Thank you again for this referral, please feel free to contact me if you have any questions.    Guy Marie MD  [facility]  ([facility], [facility]     HISTORY OF PRESENT ILLNESS     Patient is 76 years old female with history of CHF, depression with anxiety, complex partial seizures, paroxysmal atrial fibrillation and CAD who returns for yearly follow-up.  She denies any seizures since her last visit.  She is on Dilantin and Topamax and had lab work done earlier today, results are still pending.  Last year she was admitted to the hospital with confusion along with some speech difficulty and was noted to have Dilantin toxicity.  EEG at that time showed nonspecific slowing.  CT of the head and MRI were unremarkable.  Her dose was adjusted and she was discharge once her free Dilantin level was within therapeutic range.  She continues to have episodes during  which she gets an aura and reports that she is getting these episodes at least once a month.  Fortunately these auras have not evolved into full-blown seizures.  Free Dilantin level at that time was 0.8.  Topamax level was low.Daughter had called as she was concerned if patient was having increased auras due to nonalcoholic beer or smells and was told that rare types of epilepsy can be triggered by certain smells    Patient does have history of seizure and was initially evaluated for seizure when she presented with a generalized seizure and EEG showed left hemispheric sharp activity. MRI was normal. 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 Patient reports that her seizures are triggered by stress and usually she gets an aura. Currently her symptoms are well controlled on Dilantin and Topamax     PROBLEM LIST   Patient Active Problem List   Diagnosis Code     Regional enteritis (H) K50.90     Nonintractable epilepsy with complex partial seizures (H) G40.209     Hyperlipidemia E78.5     HTN (hypertension) I10     Depressive disorder F32.9     Coronary atherosclerosis I25.10     B12 deficiency E53.8     Atrial fibrillation (H) I48.91     (HFpEF) heart failure with preserved ejection fraction (H) I50.30     COPD (chronic obstructive pulmonary disease) (H) J44.9         PAST MEDICAL & SURGICAL HISTORY     Past Medical History:   Patient  has a past medical history of Arthritis, COPD (chronic obstructive pulmonary disease) (H), Crohn's disease (H), Depressive disorder, Heart disease, Hypertension, Osteoporosis, and Seizures (H).    Surgical History:  She  has a past surgical history that includes orthopedic surgery and hemorrhoidectomy.     SOCIAL HISTORY     Reviewed, and she  reports that she has quit smoking. She has never used smokeless tobacco. She reports that she does not drink alcohol or use drugs.     FAMILY  HISTORY     Reviewed, and family history includes Arthritis in her father; Colon Cancer in her father; Hypertension in her father and mother.     ALLERGIES     Allergies   Allergen Reactions     Amoxicillin GI Disturbance and Other (See Comments)     COLITIS    Has tolerated cefazolin  GI bleeding, Colitis, has tolerated cefazolin        Sulfa Drugs      Sulfamethoxazole-Trimethoprim Other (See Comments)     Other reaction(s): Vaginal Irritation  VAGINAL BLEEDING  Vaginal irritation, vaginal bleeding            REVIEW OF SYSTEMS     A 12 point review of system was performed and was negative except as outlined in the history of present illness.     HOME MEDICATIONS     Current Outpatient Rx   Medication Sig Dispense Refill     acetaminophen (TYLENOL) 500 MG tablet Take 1,000 mg by mouth       albuterol (PROAIR HFA/PROVENTIL HFA/VENTOLIN HFA) 108 (90 Base) MCG/ACT inhaler Inhale 2 puffs into the lungs       alendronate (FOSAMAX) 70 MG tablet Take 70 mg by mouth       apixaban ANTICOAGULANT (ELIQUIS) 5 MG tablet Take 5 mg by mouth 2 times daily        aspirin 81 MG EC tablet Take 81 mg by mouth       colestipol (COLESTID) 1 g tablet Take 3 g by mouth       cyanocobalamin (CYANOCOBALAMIN) 1000 MCG/ML injection Inject 1,000 mcg into the muscle       HAYLEY CALCIUM-VITAMIN D PO        fluticasone (FLOVENT HFA) 110 MCG/ACT inhaler Inhale 1 puff into the lungs       loratadine (CLARITIN) 10 MG tablet Take 10 mg by mouth       Magnesium Oxide 250 MG TABS Take 250 mg by mouth       metoprolol tartrate (LOPRESSOR) 50 MG tablet Take 50 mg by mouth       multivitamin w/minerals (MULTI-VITAMIN) tablet Take 1 tablet by mouth       nitroGLYcerin (NITROSTAT) 0.4 MG sublingual tablet Place 0.4 mg under the tongue       PARoxetine (PAXIL) 40 MG tablet Take 40 mg by mouth       phenytoin (DILANTIN) 100 MG capsule Take 5 capsules (500 mg) by mouth At Bedtime 540 capsule 3     simvastatin (ZOCOR) 40 MG tablet Take 40 mg by mouth        "spironolactone (ALDACTONE) 25 MG tablet Take 25 mg by mouth daily       spironolactone (ALDACTONE) 50 MG tablet Take 50mg (1 tab)  in am and 25mg (half tab) in pm.       topiramate (TOPAMAX) 100 MG tablet Take 1.5 tablets (150 mg) by mouth 2 times daily 270 tablet 3     torsemide (DEMADEX) 20 MG tablet            PHYSICAL EXAM     Vital signs  /62 (BP Location: Left arm, Patient Position: Sitting)   Pulse 65   Ht 1.702 m (5' 7\")   Wt 95.3 kg (210 lb)   BMI 32.89 kg/m      Weight:   210 lbs 0 oz    General Physical Exam: Patient is alert and oriented x 3. Vital signs were reviewed and are documented in EMR. Neck was supple  Neurological Exam:  Patient is alert and oriented x3 speech was normal there was no dysarthria or aphasia.  Cranial nerves are intact.  She does appear hard of hearing.  Face symmetrical tongue midline.  Strength in all extremities 5/5.  Sensation intact.  Gait normal.  No dysmetria noted on finger-nose testing.     DIAGNOSTIC STUDIES     PERTINENT RADIOLOGY  Following imaging studies were reviewed:     MRI 4/27/20  HEAD MRI:  1.  No acute intracranial finding. No evidence for recent ischemia, intracranial hemorrhage, or mass.    HEAD MRA:  1.  Negative MR angiogram of the brain. No evidence for aneurysm, proximal vessel occlusion, high-grade intracranial stenosis or high flow vascular lesion.    NECK MRA:  1.  Atherosclerotic narrowing of the proximal left internal carotid artery is not well-characterized due to motion artifact but suspected to be in the 50% range based on NASCET criteria.  2.  No hemodynamically significant narrowing within the right carotid artery.   3.  Vertebral arteries are patent. No dissection.     CT HEAD 4/26/20  1.  No CT evidence for acute intracranial process.  2.  Brain atrophy and presumed chronic microvascular ischemic changes as above.  3.  Mucosal thickening is noted involving the left sphenoid sinus. Correlation for sinusitis is recommended.     EEG " 4/27/20  This is an abnormal EEG due to diffusely slow background in   theta and delta range that suggests nonspecific generalized cerebral   dysfunction.  EEG background activity is contaminated with muscle   artifact.  Such nonspecific slowing suggests diffuse cerebral dysfunction   but no clear epileptiform activity was noted     CT HEAD 7/17/19  1. No intracranial mass. No hemorrhage or stroke.  2. Mild presumed chronic small vessel ischemic change and age-related change again visualized.  3. Interval development of polypoid soft tissue in the external auditory canals.    MRI of the head done in 2015 was normal. EEG was abnormal suggesting low threshold for seizure     PERTINENT LABS  Following labs were reviewed:  [facility]   Component Date Value     Sodium 06/01/2021 142      Potassium 06/01/2021 3.7      Chloride 06/01/2021 105      Carbon Dioxide (CO2) 06/01/2021 25      Anion Gap 06/01/2021 12      Glucose 06/01/2021 109      Calcium 06/01/2021 9.0      Urea Nitrogen 06/01/2021 18      Creatinine 06/01/2021 0.77      GFR Estimate If Black 06/01/2021 >60      GFR Estimate 06/01/2021 >60    Ambulatory - [facility] on 05/06/2021   Component Date Value     Sodium 05/06/2021 140      Potassium 05/06/2021 3.9      Chloride 05/06/2021 102      Carbon Dioxide (CO2) 05/06/2021 26      Anion Gap 05/06/2021 12      Glucose 05/06/2021 111      Calcium 05/06/2021 9.0      Urea Nitrogen 05/06/2021 17      Creatinine 05/06/2021 0.82      GFR Estimate If Black 05/06/2021 >60      GFR Estimate 05/06/2021 >60          Total time spent for face to face visit, reviewing labs/imaging studies, counseling and coordination of care was: 30 Minutes spent on the date of the encounter doing chart review, review of outside records, review of test results, interpretation of tests, patient visit and documentation       This note was dictated using voice recognition software.  Any grammatical or context  distortions are unintentional and inherent to the software.    Orders Placed This Encounter   Procedures     Phenytoin level     Topiramate Level     EEG Routine      New Prescriptions    No medications on file     Modified Medications    Modified Medication Previous Medication    PHENYTOIN (DILANTIN) 100 MG CAPSULE phenytoin (DILANTIN) 100 MG capsule       Take 5 capsules (500 mg) by mouth At Bedtime    Take 5 capsules (500 mg) by mouth At Bedtime    TOPIRAMATE (TOPAMAX) 100 MG TABLET topiramate (TOPAMAX) 100 MG tablet       Take 1.5 tablets (150 mg) by mouth 2 times daily    Take 1.5 tablets (150 mg) by mouth 2 times daily

## 2021-06-22 LAB — TOPIRAMATE SERPL-MCNC: 6.7 UG/ML (ref 5–20)

## 2021-06-25 NOTE — TELEPHONE ENCOUNTER
----- Message from Maria Elena Mejia CNP sent at 6/2/2021  7:33 AM CDT -----  Labs are stable.  Is she having and heart failure symptoms?  Is she back to her normal dose of diuretic?

## 2021-06-25 NOTE — TELEPHONE ENCOUNTER
Results reviewed by Maria Elena Mejia NP and called to patient's daughter, via phone. Kierra will increase Merrily's torsemide to 40 mg two times a day for 3 days and follow a low sodium diet. Transferred to scheduling for 2 week follow-up appt. Will follow-up next week on HF symptoms/weight. Requested her to call if no improvement.  Evelia Hou RN

## 2021-06-25 NOTE — TELEPHONE ENCOUNTER
Weight is back to baseline and edema improving.  If shortness of breath is improving with inhaler, her COPD may be more of the problem.  Does she have a pulmonologist or see her primary for COPD?  She may want to schedule follow up with provider treating COPD.  If weight or edema worsen, she should call.     I can see her sooner if she would prefer.

## 2021-06-25 NOTE — TELEPHONE ENCOUNTER
In clinic yesterday, Tommy still reports her weight is slightly up (209.9#) She has 1-2+ pitting edema in RLE and trace edema in LLE. She is taking he torsemide 40 mg daily and 25 mg spironolactone. She feels like she is still retaining some fluid. AB/RN

## 2021-06-25 NOTE — TELEPHONE ENCOUNTER
Kierra was contacted with recommendation to get BMP/nurse visit  ASAP. She is not able to bring Merrily in until 6/3. She asks if she should increase her torsemide until then, but was advised we need labs first. She was asked to call if further weight gain, as we likely need to get her in sooner. Message sent to scheduling for lab/HF nurse appt and 2 week follow-up for Maria Elena Mejia NP.

## 2021-06-25 NOTE — PROGRESS NOTES
Patient and daughter Kierra seen in clinic for extensive HF education.  Reviewed HF Binder that includes the  HF Sx Awareness & Action plan  handout and  [employer] Pump  booklet and Weight log booklet highlighting :  __X_patient s type of heart failure _X__Na management in diet  __X_importance of daily wts  _X__Fluid Guidelines, if applicable  __X_medication review and importance of compliance     Instructed patient in signs and sx of heart failure, reiterated when to call clinic - reviewed HF hotline # [phone] and after hours call # [phone].  Majority of time was spent reviewing: foods that are high in sodium and what foods to avoid. [employer] meal service application submitted for patient.   Patient verbalized understanding of HF discussion.  Will continue to reinforce HF management education as needed.

## 2021-06-25 NOTE — TELEPHONE ENCOUNTER
Follow-up call made to Kierra to assess Tommy's weight, shortness of breath and edema after 3 days of increased torsemide. Tommy reports her weight has decreased to 208-209# the last few days. Her edema is improved, but she still is having shortness of breath more than her norm, by her daughter's report. She has been using her PRN albuterol inhaler more lately. Kierra will continue to monitor HF sx and follow-up appt made for 6/21. AB/RN

## 2021-06-25 NOTE — TELEPHONE ENCOUNTER
"Kierra (daughter) is calling in today to report that Tommy's weight has not improved. She saw Dr. Colbert on 5/10 and spironolactone was increased to 25 mg two times a day. No change was made to torsemide at that time.   Kierra states that her mom's weight is 210.7# today. She normally runs about 207#. Her clinic weight on 5/10 was 209#. Tommy denies shortness of breath, and has \"a little\" swelling in her ankles. Kierra does admit that Tommy does not eat a low sodium diet. She often eats unsalted pretzels and turkey sausage, and Tommy does not like vegetables or fruit.  Low sodium foods were reviewed and Kierra is interested in [employer] application for her mom. Kierra was advised that even if diuretic is increased, she may continue to retain fluid if Tommy continues to eat too much sodium.   Tommy is due for follow-up in September. Will follow-up with any provider recs.  "

## 2021-06-25 NOTE — TELEPHONE ENCOUNTER
Kierra contacted with recommendation to follow-up with PCP for COPD if albuterol helps with shortness of breath. Earlier appt offered, and Kierra will continue to assess Merrily's symptoms and call back if needed. AB/RN

## 2021-06-25 NOTE — TELEPHONE ENCOUNTER
Increase torsemide to 40 mg twice daily for 3 days and then return to 40 mg in the morning and 20 mg in the afternoon.  I am hoping that she will also improve with following a low sodium diet better. Please have her schedule follow up with me in 2 weeks.

## 2021-06-25 NOTE — TELEPHONE ENCOUNTER
Since her spironolactone was increased by Dr. Colbert, she needs a BMP as soon as possible.  Would you be able to do a nurse visit when she comes in for labs to discuss low sodium diet.  I think she needs to work on diet.  If she continues to retain fluid, then further adjustment of medications may be needed.   Please have her see me in 2 weeks.

## 2021-06-26 NOTE — TELEPHONE ENCOUNTER
----- Message from Maria Elena Mejia CNP sent at 6/21/2021  9:27 AM CDT -----  I put order in for BMP. Please fax to primary care office.  I saw Tommy today and she will call PCP to make an appointment.

## 2021-06-28 NOTE — PROGRESS NOTES
Progress Notes by Lauren Colbert MD at 10/11/2019  4:10 PM     Author: Lauren Colbert MD Service: -- Author Type: Physician    Filed: 10/11/2019  5:38 PM Encounter Date: 10/11/2019 Status: Signed    : Lauren Colbert MD (Physician)           Thank you, Dr. Benton, for asking the [facility] team to see Ms. Tommy Rea to evaluate diastolic congestive heart failure and coronary artery disease.    Assessment/Recommendations   Assessment:    1.  Diastolic congestive heart failure, borderline compensated.  She has been requiring intermittent extra doses of furosemide due to weight gain.  In talking with her, I do not feel she is likely on a sodium restricted diet and we talked about the importance of restricting her sodium to around 2000 mg daily.  In addition, I brought up the option of adding spironolactone to her regimen.  I asked that they continue with daily weights.  2.  Paroxysmal atrial fibrillation, resolved.  Recommend continuing on increased dose of metoprolol which was done during her hospitalization along with Eliquis to minimize risks of thromboembolic events.  3.  Coronary artery disease, moderate to moderately severe based on coronary angiography in 2012.  She does have symptoms of exertional dyspnea which I suspect are multifactorial but suggested a nuclear stress study to rule out any major ischemic component.    Plan:  1.  Continue current medications and add spironolactone 25 mg daily.  Patient scheduled to get labs drawn at her primary physician early next week.  2.  Limit sodium intake to 2000 mg daily  3.  Arrange outpatient pharmacologic nuclear stress test to evaluate for ischemia  4.  Follow-up in 4 weeks in our heart failure clinic with heart failure nurse practitioner and nurse educator       History of Present Illness    Ms. Tommy Rea is a 74 y.o. female with history of moderate to moderately severe two-vessel coronary artery disease documented by angiography  in 2012 and paroxysmal atrial fibrillation who was hospitalized this summer for acute congestive heart failure.  Echocardiogram at that time demonstrated normal LV systolic function without wall motion abnormality suggesting it may have been diastolic in etiology.  She was also noted to be in atrial fibrillation with rapid ventricular response but subsequently converted to sinus rhythm.  Her dose of metoprolol was doubled and she was started on Eliquis to minimize risks of thromboembolic events.  She diuresed well with IV diuretic and was transitioned to a higher dose of diuretic as an outpatient.  Over the last several weeks, she has required intermittent dose increase in her furosemide due to weight gain.  They tell me they have been trying to reduce salt in her diet but they have not been reading labels and order her groceries online.  She currently reports exertional dyspnea but no chest discomfort.  She denies orthopnea or PND.  She does have chronic lymphedema with compression stockings in place.    ECG (personally reviewed): No ECG today    Cardiac Imaging Studies (personally reviewed): No recent imaging     Physical Examination Review of Systems   Vitals:    10/11/19 1624   BP: 120/58   Pulse: 68   Resp: 16     Body mass index is 35.53 kg/m .  Wt Readings from Last 3 Encounters:   10/11/19 207 lb (93.9 kg)   07/22/19 208 lb 6.4 oz (94.5 kg)   06/17/19 218 lb 8 oz (99.1 kg)     General Appearance:   Awake, Alert, No acute distress.   HEENT:  No scleral icterus; the mucous membranes were pink and moist.   Neck: No cervical bruits or jugular venous distention    Chest: The spine was straight. The chest was symmetric.   Lungs:   Respirations unlabored; the lungs are clear to auscultation. No wheezing   Cardiovascular:    Regular rate and rhythm.  S1, S2 normal.  No murmur, gallop or rub   Abdomen:  No organomegaly, masses, bruits, or tenderness. Bowels sounds are present   Extremities:  1+ pitting edema both  ankles.   Skin: No xanthelasma. Warm, Dry.   Musculoskeletal: No tenderness.   Neurologic: Mood and affect are appropriate.    General: WNL  Eyes: WNL  Ears/Nose/Throat: WNL  Lungs: Cough, Shortness of Breath, Wheezing  Heart: Shortness of Breath with activity, Irregular Heartbeat  Stomach: Nausea  Bladder: WNL  Muscle/Joints: WNL  Skin: WNL  Nervous System: Falls  Mental Health: Depression, Anxiety     Blood: WNL     Medical History  Surgical History Family History Social History   Past Medical History:   Diagnosis Date   ? Asthma    ? B12 deficiency    ? CAD (coronary artery disease)    ? COPD (chronic obstructive pulmonary disease) (H)    ? Crohn's disease (H)    ? Depression    ? Esophageal reflux    ? Essential hypertension    ? Fracture of left hip requiring operative repair (H)    ? History of shingles    ? Hyperlipidemia    ? Hyperparathyroidism (H)    ? Pulmonary nodule     benign   ? Seizure (H)     Past Surgical History:   Procedure Laterality Date   ? CVL HEART CATH LEFT     ? HEMMOIROIDECT     ? ORIF HIP FRACTURE  2015   ? [place] REMOVAL DEEP IMPLANT Left 1/25/2015    Procedure: Gamma Nail Left Hip;  Surgeon: Kirk De La Cruz MD;  Location: [facility];  Service: Orthopedics   ? REVISION TOTAL HIP ARTHROPLASTY Left 07/19/2016    removal of gamma nail hardware & conversion to total hip arthroplasty: Dr. Mercado at [place]     Family History   Problem Relation Age of Onset   ? Heart disease Mother    ? Hypertension Mother    ? Heart disease Father    ? Arthritis Father     Social History     Socioeconomic History   ? Marital status:      Spouse name: Not on file   ? Number of children: Not on file   ? Years of education: Not on file   ? Highest education level: Not on file   Occupational History   ? Not on file   Social Needs   ? Financial resource strain: Not on file   ? Food insecurity:     Worry: Not on file     Inability: Not on file   ? Transportation needs:     Medical: Not on file      Non-medical: Not on file   Tobacco Use   ? Smoking status: Former Smoker     Types: Cigarettes     Last attempt to quit: 2011     Years since quittin.2   ? Smokeless tobacco: Never Used   Substance and Sexual Activity   ? Alcohol use: No   ? Drug use: No   ? Sexual activity: Not on file   Lifestyle   ? Physical activity:     Days per week: Not on file     Minutes per session: Not on file   ? Stress: Not on file   Relationships   ? Social connections:     Talks on phone: Not on file     Gets together: Not on file     Attends Confucianist service: Not on file     Active member of club or organization: Not on file     Attends meetings of clubs or organizations: Not on file     Relationship status: Not on file   ? Intimate partner violence:     Fear of current or ex partner: Not on file     Emotionally abused: Not on file     Physically abused: Not on file     Forced sexual activity: Not on file   Other Topics Concern   ? Not on file   Social History Narrative   ? Not on file          Medications  Allergies   Current Outpatient Medications   Medication Sig Dispense Refill   ? acetaminophen (TYLENOL) 500 MG tablet Take 1,000 mg by mouth 2 (two) times a day. Max acetaminophen dose: 4000 mg in 24 hrs.             ? albuterol (PROVENTIL HFA;VENTOLIN HFA) 90 mcg/actuation inhaler Inhale 2 puffs 4 (four) times a day as needed for wheezing or shortness of breath.            ? alendronate (FOSAMAX) 70 MG tablet Take 70 mg by mouth every 7 days. Take in the morning on an empty stomach with a full glass of water 30 minutes before food     ? apixaban (ELIQUIS) 5 mg Tab tablet Take 1 tablet (5 mg total) by mouth 2 (two) times a day. 60 tablet 3   ? aspirin 81 MG EC tablet Take 1 tablet (81 mg total) by mouth daily.  0   ? budesonide (ENTOCORT EC) 3 mg 24 hr capsule Take 3 mg by mouth daily.     ? calcium, as carbonate, (OS-DIONNA) 500 mg calcium (1,250 mg) tablet Take 1 tablet by mouth daily. Take with meal     ?  cholecalciferol, vitamin D3, 2,000 unit cap Take 2,000 Units by mouth daily.      ? colestipol (COLESTID) 1 gram tablet Take 3 g by mouth bedtime.     ? cyanocobalamin 1,000 mcg/mL injection Inject 1,000 mcg into the shoulder, thigh, or buttocks every 30 (thirty) days. On Friday every 4 weeks      ? fluticasone (FLONASE) 50 mcg/actuation nasal spray 1 spray into each nostril daily as needed for rhinitis.            ? fluticasone propionate (FLOVENT HFA) 110 mcg/actuation inhaler Inhale 1 puff 2 (two) times a day.            ? furosemide (LASIX) 40 MG tablet Take 1 tablet (40 mg total) by mouth 2 (two) times a day at 9am and 6pm. 30 tablet 0   ? loratadine (CLARITIN) 10 mg tablet Take 10 mg by mouth daily as needed for allergies.            ? magnesium gluconate (MAGONATE) 27 mg magnesium (500 mg) Tab tablet Take 2 tablets (54 mg total) by mouth at bedtime.  0   ? metoprolol tartrate (LOPRESSOR) 50 MG tablet Take 1 tablet (50 mg total) by mouth 2 (two) times a day. 30 tablet 0   ? multivitamin (MULTIVITAMIN) per tablet Take 1 tablet by mouth daily.     ? nitroglycerin (NITROSTAT) 0.4 MG SL tablet Place 1 tablet (0.4 mg total) under the tongue every 5 (five) minutes as needed for chest pain (for pressure like central chest pain). 90 tablet 12   ? PARoxetine (PAXIL) 40 MG tablet Take 40 mg by mouth at bedtime.            ? phenytoin (DILANTIN) 100 MG ER capsule Take 300 mg by mouth 2 (two) times a day.     ? simvastatin (ZOCOR) 40 MG tablet Take 40 mg by mouth bedtime.     ? sodium chloride (OCEAN) 0.65 % nasal spray 1 spray into each nostril 2 (two) times a day as needed for congestion.            ? topiramate (TOPAMAX) 100 MG tablet Take 150 mg by mouth 2 (two) times a day.      ? UNABLE TO FIND Med Name: Stelara     ? inFLIXimab (REMICADE) 100 mg injection Infuse into a venous catheter every 2 (two) months.            ? spironolactone (ALDACTONE) 25 MG tablet Take 1 tablet (25 mg total) by mouth daily. 30 tablet 6      No current facility-administered medications for this visit.       Allergies   Allergen Reactions   ? Amoxicillin Other (See Comments)     GI bleeding, Colitis, has tolerated cefazolin    ? Septra [Sulfamethoxazole-Trimethoprim] Other (See Comments)     Vaginal irritation, vaginal bleeding    ? Sulfa (Sulfonamide Antibiotics) Other (See Comments)     Vaginal irritation, bleeding         Lab Results    Chemistry/lipid CBC Cardiac Enzymes/BNP/TSH/INR   Lab Results   Component Value Date    CHOL 181 10/29/2018    HDL 58 10/29/2018    LDLCALC 73 10/29/2018    TRIG 248 (H) 10/29/2018    CREATININE 0.66 09/16/2019    BUN 16 09/16/2019    K 4.0 09/16/2019     (L) 09/16/2019     09/16/2019    CO2 27 09/16/2019    Lab Results   Component Value Date    WBC 9.7 07/17/2019    HGB 10.5 (L) 07/17/2019    HCT 33.9 (L) 07/17/2019    MCV 99 07/17/2019     07/17/2019    Lab Results   Component Value Date    CKTOTAL 807 (HH) 10/24/2016    TROPONINI 0.04 07/16/2019     (H) 09/16/2019    TSH 2.90 07/17/2019    INR 1.03 10/24/2016

## 2021-06-28 NOTE — PROGRESS NOTES
Progress Notes by Maria Elena Rivera CNP at 11/11/2019  8:30 AM     Author: Maria Elena Rivera CNP Service: -- Author Type: Nurse Practitioner    Filed: 11/11/2019  9:49 AM Encounter Date: 11/11/2019 Status: Signed    : Maria Elena Rivera CNP (Nurse Practitioner)             Assessment/Recommendations   Assessment:    1.  Heart failure with preserved ejection fraction: Her symptoms of fluid retention have improved since starting spironolactone 1 month ago. We reviewed heart failure diagnosis, medications, treatment plan, low sodium diet, weight monitoring, and symptom monitoring.  She met with a heart failure nurse clinician to further discuss.    2.  Coronary artery disease: Coronary angiogram in 2012 showed moderate to severe disease.  Dr. Colbert recommended stress test but Tommy is unsure if she wants stress test.  We discussed this today.  She is concerned about needing angiogram if stress test is abnormal.  She will talk to her daughter more about this and decide.  She asked for a refill of her nitro.  I discussed use.  She denies any episodes of chest pain.    3.  Paroxysmal atrial fibrillation: Heart rate regular today.  She continues to take metoprolol and Eliquis.    Plan:  1.  Continue current medications  2.  Low-sodium diet  3.  Daily weights  4.  BMP pending    Tommy Rea will follow up with her primary care provider in 2 weeks and in the heart failure clinic in 2 months.  She will call the clinic if she decides to schedule stress test.       History of Present Illness/Subjective    Ms. Tommy Rea is a 74 y.o. female seen at [facility] today for continued follow-up.  She has a history of heart failure with preserved ejection fraction, coronary artery disease, paroxysmal atrial fibrillation, lymphedema, and COPD.  Echocardiogram from June 2019 showed ejection fraction of 64%.  Coronary angiogram in 2012 with moderate to severe  disease.    She was seen by Dr. Colbert on October 11.  Tommy had been increasing furosemide on her own due to weight gain and lower extremity edema.  Dr. Colbert prescribed spironolactone.  Tommy states that her fluid status has been better controlled and she has not increased furosemide in the past month.  She has chronic shortness of breath with activity which is at baseline.  Lower extremity edema is at baseline.  She denies lightheadedness and chest pain.      Her home weight has been stable between 203 to 205 pounds over the past month.  She is working on following a low-sodium diet but does struggle with this.      ECHO:   Results for orders placed during the hospital encounter of 06/10/19   Echo Complete [ECH10] 06/11/2019    Narrative   When compared to the previous study dated 10/25/2016, no significant   change.    Left ventricle ejection fraction is normal. The calculated left   ventricular ejection fraction is 64%.    Normal left ventricular size and systolic function.    Normal right ventricular size and systolic function.    No hemodynamically significant valvular heart abnormalities.           Physical Examination Review of Systems   Vitals:    11/11/19 0902   BP: 110/56   Pulse: 62   Resp: 14     Body mass index is 35.7 kg/m .  Wt Readings from Last 3 Encounters:   11/11/19 208 lb (94.3 kg)   10/11/19 207 lb (93.9 kg)   07/22/19 208 lb 6.4 oz (94.5 kg)       General Appearance:     Alert, cooperative and in no acute distress.   ENT/Mouth: membranes moist, no oral lesions or bleeding gums.      EYES:  no scleral icterus, normal conjunctivae   Chest/Lungs:   lungs are clear to auscultation, no rales or wheezing, respirations unlabored   Cardiovascular:   Regular. Normal first and second heart sounds, 1+ edema bilateral lower extremities, wearing compression stockings   Abdomen:  Soft, nontender, nondistended, bowel sounds present   Extremities: no cyanosis or clubbing   Skin: warm, dry.    Neurologic:  mood and affect are appropriate, alert and oriented x3      General: WNL  Eyes: WNL  Ears/Nose/Throat: WNL  Lungs: Cough, Shortness of Breath  Heart: Shortness of Breath with activity, Leg Swelling  Stomach: WNL  Bladder: WNL  Muscle/Joints: WNL  Skin: WNL  Nervous System: Daytime Sleepiness  Mental Health: Anxiety     Blood: WNL     Medical History  Surgical History Family History Social History   Past Medical History:   Diagnosis Date   ? Asthma    ? B12 deficiency    ? CAD (coronary artery disease)    ? COPD (chronic obstructive pulmonary disease) (H)    ? Crohn's disease (H)    ? Depression    ? Esophageal reflux    ? Essential hypertension    ? Fracture of left hip requiring operative repair (H)    ? History of shingles    ? Hyperlipidemia    ? Hyperparathyroidism (H)    ? Pulmonary nodule     benign   ? Seizure (H)     Past Surgical History:   Procedure Laterality Date   ? CVL HEART CATH LEFT     ? HEMMOIROIDECT     ? ORIF HIP FRACTURE  2015   ? [place] REMOVAL DEEP IMPLANT Left 1/25/2015    Procedure: Gamma Nail Left Hip;  Surgeon: Kirk De La Cruz MD;  Location: [facility] OR;  Service: Orthopedics   ? REVISION TOTAL HIP ARTHROPLASTY Left 07/19/2016    removal of gamma nail hardware & conversion to total hip arthroplasty: Dr. Mercado at [place]     Family History   Problem Relation Age of Onset   ? Heart disease Mother    ? Hypertension Mother    ? Heart disease Father    ? Arthritis Father     Social History     Socioeconomic History   ? Marital status:      Spouse name: Not on file   ? Number of children: Not on file   ? Years of education: Not on file   ? Highest education level: Not on file   Occupational History   ? Not on file   Social Needs   ? Financial resource strain: Not on file   ? Food insecurity:     Worry: Not on file     Inability: Not on file   ? Transportation needs:     Medical: Not on file     Non-medical: Not on file   Tobacco Use   ? Smoking status: Former Smoker     Types:  Cigarettes     Last attempt to quit: 2011     Years since quittin.3   ? Smokeless tobacco: Never Used   Substance and Sexual Activity   ? Alcohol use: No   ? Drug use: No   ? Sexual activity: Not on file   Lifestyle   ? Physical activity:     Days per week: Not on file     Minutes per session: Not on file   ? Stress: Not on file   Relationships   ? Social connections:     Talks on phone: Not on file     Gets together: Not on file     Attends Congregation service: Not on file     Active member of club or organization: Not on file     Attends meetings of clubs or organizations: Not on file     Relationship status: Not on file   ? Intimate partner violence:     Fear of current or ex partner: Not on file     Emotionally abused: Not on file     Physically abused: Not on file     Forced sexual activity: Not on file   Other Topics Concern   ? Not on file   Social History Narrative   ? Not on file          Medications  Allergies   Current Outpatient Medications   Medication Sig Dispense Refill   ? acetaminophen (TYLENOL) 500 MG tablet Take 1,000 mg by mouth 2 (two) times a day. Max acetaminophen dose: 4000 mg in 24 hrs.             ? albuterol (PROVENTIL HFA;VENTOLIN HFA) 90 mcg/actuation inhaler Inhale 2 puffs 4 (four) times a day as needed for wheezing or shortness of breath.            ? alendronate (FOSAMAX) 70 MG tablet Take 70 mg by mouth every 7 days. Take in the morning on an empty stomach with a full glass of water 30 minutes before food     ? apixaban (ELIQUIS) 5 mg Tab tablet Take 1 tablet (5 mg total) by mouth 2 (two) times a day. 60 tablet 3   ? aspirin 81 MG EC tablet Take 1 tablet (81 mg total) by mouth daily.  0   ? calcium, as carbonate, (OS-DIONNA) 500 mg calcium (1,250 mg) tablet Take 1 tablet by mouth daily. Take with meal     ? cholecalciferol, vitamin D3, 2,000 unit cap Take 2,000 Units by mouth daily.      ? colestipol (COLESTID) 1 gram tablet Take 3 g by mouth bedtime.     ? cyanocobalamin 1,000  mcg/mL injection Inject 1,000 mcg into the shoulder, thigh, or buttocks every 30 (thirty) days. On Friday every 4 weeks      ? fluticasone (FLONASE) 50 mcg/actuation nasal spray 1 spray into each nostril daily as needed for rhinitis.            ? fluticasone propionate (FLOVENT HFA) 110 mcg/actuation inhaler Inhale 1 puff 2 (two) times a day.            ? furosemide (LASIX) 40 MG tablet Take 80 mg by mouth daily.     ? loratadine (CLARITIN) 10 mg tablet Take 10 mg by mouth daily as needed for allergies.            ? magnesium gluconate (MAGONATE) 27 mg magnesium (500 mg) Tab tablet Take 2 tablets (54 mg total) by mouth at bedtime.  0   ? metoprolol tartrate (LOPRESSOR) 50 MG tablet Take 1 tablet (50 mg total) by mouth 2 (two) times a day. 30 tablet 0   ? multivitamin (MULTIVITAMIN) per tablet Take 1 tablet by mouth daily.     ? nitroglycerin (NITROSTAT) 0.4 MG SL tablet Place 1 tablet (0.4 mg total) under the tongue every 5 (five) minutes as needed for chest pain. 25 tablet 3   ? PARoxetine (PAXIL) 40 MG tablet Take 40 mg by mouth at bedtime.            ? phenytoin (DILANTIN) 100 MG ER capsule Take 300 mg by mouth 2 (two) times a day.     ? simvastatin (ZOCOR) 40 MG tablet Take 40 mg by mouth bedtime.     ? sodium chloride (OCEAN) 0.65 % nasal spray 1 spray into each nostril 2 (two) times a day as needed for congestion.            ? spironolactone (ALDACTONE) 25 MG tablet Take 1 tablet (25 mg total) by mouth daily. 90 tablet 1   ? topiramate (TOPAMAX) 100 MG tablet Take 150 mg by mouth 2 (two) times a day.      ? UNABLE TO FIND Med Name: Jay       No current facility-administered medications for this visit.     Allergies   Allergen Reactions   ? Amoxicillin Other (See Comments)     GI bleeding, Colitis, has tolerated cefazolin    ? Septra [Sulfamethoxazole-Trimethoprim] Other (See Comments)     Vaginal irritation, vaginal bleeding    ? Sulfa (Sulfonamide Antibiotics) Other (See Comments)     Vaginal irritation,  bleeding         Lab Results    Chemistry/lipid CBC Cardiac Enzymes/BNP/TSH/INR   Lab Results   Component Value Date    CHOL 181 10/29/2018    HDL 58 10/29/2018    LDLCALC 73 10/29/2018    TRIG 248 (H) 10/29/2018    CREATININE 0.66 09/16/2019    BUN 16 09/16/2019    K 4.0 09/16/2019     (L) 09/16/2019     09/16/2019    CO2 27 09/16/2019    Lab Results   Component Value Date    WBC 9.7 07/17/2019    HGB 10.5 (L) 07/17/2019    HCT 33.9 (L) 07/17/2019    MCV 99 07/17/2019     07/17/2019    Lab Results   Component Value Date    CKTOTAL 807 (HH) 10/24/2016    TROPONINI 0.04 07/16/2019     (H) 09/16/2019    TSH 2.90 07/17/2019    INR 1.03 10/24/2016

## 2021-06-28 NOTE — PROGRESS NOTES
Progress Notes by Maria Elena Rivera CNP at 1/20/2020 12:50 PM     Author: Maria Elena Rivera CNP Service: -- Author Type: Nurse Practitioner    Filed: 1/20/2020  1:27 PM Encounter Date: 1/20/2020 Status: Signed    : Maria Elena Rivera CNP (Nurse Practitioner)             Assessment/Recommendations   Assessment:    1.  Heart failure with preserved ejection fraction: She has no symptoms of acute heart failure today.  She has chronic edema which is at baseline.  Her shortness of breath with activity has improved over the past few months.  Every few weeks, she takes extra Lasix for 3 days.  This is usually correlated to an episode of eating high sodium foods.  We reviewed low-sodium diet again today.    2.  Coronary artery disease: Coronary angiogram in 2012 showed moderate to severe disease.  Dr. Colbert previously recommended stress test.  Tommy is not interested in stress test at this time since symptoms have improved.  We discussed getting a stress test if her shortness of breath worsens again.    3.  Paroxysmal atrial fibrillation: Heart rate regular today.  She continues to take metoprolol and Eliquis.    Plan:  1.  Continue current medications  2.  Low-sodium diet  3.  Daily weights  4.  BMP pending    Tommy Rea will follow up with Dr. Colbert in April and in the heart failure clinic in 6 months.       History of Present Illness/Subjective    Ms. Tommy Rea is a 74 y.o. female seen at [facility] today for continued follow-up.  She has a history of heart failure with preserved ejection fraction, coronary artery disease, paroxysmal atrial fibrillation, lymphedema, and COPD.  Echocardiogram from June 2019 showed ejection fraction of 64%.  Coronary angiogram in 2012 with moderate to severe disease.    Tommy continues to do well with no symptoms of acute heart failure.  She has occasional episodes every few weeks of fluid retention (weight gain and  edema) related to increased sodium intake.  She increases her Lasix for 3 days and symptoms resolve.  Today, she is feeling well and denies any shortness of breath.  She denies lightheadedness and chest pain.  She wears compression stockings daily and edema is stable.    Her weight has remained stable.  She is following a low-sodium diet.    ECHO:   Results for orders placed during the hospital encounter of 06/10/19   Echo Complete [ECH10] 06/11/2019    Narrative   When compared to the previous study dated 10/25/2016, no significant   change.    Left ventricle ejection fraction is normal. The calculated left   ventricular ejection fraction is 64%.    Normal left ventricular size and systolic function.    Normal right ventricular size and systolic function.    No hemodynamically significant valvular heart abnormalities.           Physical Examination Review of Systems   Vitals:    01/20/20 1252   BP: 106/70   Pulse: 68   Resp: 20     Body mass index is 35.7 kg/m .  Wt Readings from Last 3 Encounters:   01/20/20 208 lb (94.3 kg)   11/11/19 208 lb (94.3 kg)   10/11/19 207 lb (93.9 kg)       General Appearance:     Alert, cooperative and in no acute distress.   ENT/Mouth: membranes moist, no oral lesions or bleeding gums.      EYES:  no scleral icterus, normal conjunctivae   Chest/Lungs:   lungs are clear to auscultation, no rales or wheezing, respirations unlabored   Cardiovascular:   Regular. Normal first and second heart sounds, 1+ edema bilateral lower extremities, wearing compression stockings   Abdomen:  Soft, nontender, nondistended, bowel sounds present   Extremities: no cyanosis or clubbing   Skin: warm, dry.    Neurologic: mood and affect are appropriate, alert and oriented x3      General: WNL  Eyes: WNL  Ears/Nose/Throat: WNL  Lungs: Cough, Shortness of Breath, Snoring, Wheezing  Heart: Shortness of Breath with activity, Leg Swelling  Stomach: Nausea  Bladder: WNL  Muscle/Joints: Joint Pain, Muscle Weakness,  Muscle Pain  Skin: WNL  Nervous System: Daytime Sleepiness, Dizziness  Mental Health: WNL     Blood: WNL     Medical History  Surgical History Family History Social History   Past Medical History:   Diagnosis Date   ? Asthma    ? B12 deficiency    ? CAD (coronary artery disease)    ? COPD (chronic obstructive pulmonary disease) (H)    ? Crohn's disease (H)    ? Depression    ? Esophageal reflux    ? Essential hypertension    ? Fracture of left hip requiring operative repair (H)    ? History of shingles    ? Hyperlipidemia    ? Hyperparathyroidism (H)    ? Pulmonary nodule     benign   ? Seizure (H)     Past Surgical History:   Procedure Laterality Date   ? CVL HEART CATH LEFT     ? HEMMOIROIDECT     ? ORIF HIP FRACTURE     ? [place] REMOVAL DEEP IMPLANT Left 2015    Procedure: Gamma Nail Left Hip;  Surgeon: Kirk De La Cruz MD;  Location: [facility];  Service: Orthopedics   ? REVISION TOTAL HIP ARTHROPLASTY Left 2016    removal of gamma nail hardware & conversion to total hip arthroplasty: Dr. Mercado at [place]     Family History   Problem Relation Age of Onset   ? Heart disease Mother    ? Hypertension Mother    ? Heart disease Father    ? Arthritis Father     Social History     Socioeconomic History   ? Marital status:      Spouse name: Not on file   ? Number of children: Not on file   ? Years of education: Not on file   ? Highest education level: Not on file   Occupational History   ? Not on file   Social Needs   ? Financial resource strain: Not on file   ? Food insecurity:     Worry: Not on file     Inability: Not on file   ? Transportation needs:     Medical: Not on file     Non-medical: Not on file   Tobacco Use   ? Smoking status: Former Smoker     Types: Cigarettes     Last attempt to quit: 2011     Years since quittin.5   ? Smokeless tobacco: Never Used   Substance and Sexual Activity   ? Alcohol use: No   ? Drug use: No   ? Sexual activity: Not on file   Lifestyle   ?  Physical activity:     Days per week: Not on file     Minutes per session: Not on file   ? Stress: Not on file   Relationships   ? Social connections:     Talks on phone: Not on file     Gets together: Not on file     Attends Buddhism service: Not on file     Active member of club or organization: Not on file     Attends meetings of clubs or organizations: Not on file     Relationship status: Not on file   ? Intimate partner violence:     Fear of current or ex partner: Not on file     Emotionally abused: Not on file     Physically abused: Not on file     Forced sexual activity: Not on file   Other Topics Concern   ? Not on file   Social History Narrative   ? Not on file          Medications  Allergies   Current Outpatient Medications   Medication Sig Dispense Refill   ? acetaminophen (TYLENOL) 500 MG tablet Take 1,000 mg by mouth 2 (two) times a day. Max acetaminophen dose: 4000 mg in 24 hrs.             ? albuterol (PROVENTIL HFA;VENTOLIN HFA) 90 mcg/actuation inhaler Inhale 2 puffs 4 (four) times a day as needed for wheezing or shortness of breath.            ? alendronate (FOSAMAX) 70 MG tablet Take 70 mg by mouth every 7 days. Take in the morning on an empty stomach with a full glass of water 30 minutes before food     ? apixaban (ELIQUIS) 5 mg Tab tablet Take 1 tablet (5 mg total) by mouth 2 (two) times a day. 60 tablet 3   ? aspirin 81 MG EC tablet Take 1 tablet (81 mg total) by mouth daily.  0   ? calcium, as carbonate, (OS-DIONNA) 500 mg calcium (1,250 mg) tablet Take 1 tablet by mouth daily. Take with meal     ? cholecalciferol, vitamin D3, 2,000 unit cap Take 2,000 Units by mouth daily.      ? colestipol (COLESTID) 1 gram tablet Take 3 g by mouth bedtime.     ? cyanocobalamin 1,000 mcg/mL injection Inject 1,000 mcg into the shoulder, thigh, or buttocks every 30 (thirty) days. On Friday every 4 weeks      ? fluticasone (FLONASE) 50 mcg/actuation nasal spray 1 spray into each nostril daily as needed for  rhinitis.            ? fluticasone propionate (FLOVENT HFA) 110 mcg/actuation inhaler Inhale 1 puff 2 (two) times a day.            ? furosemide (LASIX) 40 MG tablet Take 80 mg by mouth daily.     ? loratadine (CLARITIN) 10 mg tablet Take 10 mg by mouth daily as needed for allergies.            ? magnesium gluconate (MAGONATE) 27 mg magnesium (500 mg) Tab tablet Take 2 tablets (54 mg total) by mouth at bedtime. (Patient taking differently: Take 27 mg by mouth at bedtime. )  0   ? metoprolol tartrate (LOPRESSOR) 50 MG tablet Take 1 tablet (50 mg total) by mouth 2 (two) times a day. 30 tablet 0   ? multivitamin (MULTIVITAMIN) per tablet Take 1 tablet by mouth daily.     ? nitroglycerin (NITROSTAT) 0.4 MG SL tablet Place 1 tablet (0.4 mg total) under the tongue every 5 (five) minutes as needed for chest pain. 25 tablet 3   ? PARoxetine (PAXIL) 40 MG tablet Take 40 mg by mouth at bedtime.            ? phenytoin (DILANTIN) 100 MG ER capsule Take 300 mg by mouth 2 (two) times a day.     ? simvastatin (ZOCOR) 40 MG tablet Take 40 mg by mouth bedtime.     ? sodium chloride (OCEAN) 0.65 % nasal spray 1 spray into each nostril 2 (two) times a day as needed for congestion.            ? spironolactone (ALDACTONE) 25 MG tablet Take 1 tablet (25 mg total) by mouth daily. 90 tablet 3   ? topiramate (TOPAMAX) 100 MG tablet Take 150 mg by mouth 2 (two) times a day.      ? UNABLE TO FIND Med Name: Jay       No current facility-administered medications for this visit.     Allergies   Allergen Reactions   ? Amoxicillin Other (See Comments)     GI bleeding, Colitis, has tolerated cefazolin    ? Septra [Sulfamethoxazole-Trimethoprim] Other (See Comments)     Vaginal irritation, vaginal bleeding    ? Sulfa (Sulfonamide Antibiotics) Other (See Comments)     Vaginal irritation, bleeding         Lab Results    Chemistry/lipid CBC Cardiac Enzymes/BNP/TSH/INR   Lab Results   Component Value Date    CHOL 181 10/29/2018    HDL 58 10/29/2018     LDLCALC 73 10/29/2018    TRIG 248 (H) 10/29/2018    CREATININE 0.71 11/25/2019    BUN 17 11/25/2019    K 4.0 11/25/2019     11/25/2019     11/25/2019    CO2 23 11/25/2019    Lab Results   Component Value Date    WBC 9.7 07/17/2019    HGB 10.5 (L) 07/17/2019    HCT 33.9 (L) 07/17/2019    MCV 99 07/17/2019     07/17/2019    Lab Results   Component Value Date    CKTOTAL 807 (HH) 10/24/2016    TROPONINI 0.04 07/16/2019     (H) 09/16/2019    TSH 2.90 07/17/2019    INR 1.03 10/24/2016

## 2021-06-29 NOTE — PROGRESS NOTES
"Progress Notes by Maria Elena Rivera CNP at 7/20/2020  9:50 AM     Author: Maria Elena Rivera CNP Service: -- Author Type: Nurse Practitioner    Filed: 7/20/2020 11:31 AM Encounter Date: 7/20/2020 Status: Signed    : Maria Elena Rivera CNP (Nurse Practitioner)           The patient has been notified of following:     \"This video visit will be conducted via a call between you and your physician/provider. We have found that certain health care needs can be provided without the need for an in-person physical exam.  This service lets us provide the care you need with a video conversation.  If a prescription is necessary we can send it directly to your pharmacy.  If lab work is needed we can place an order for that and you can then stop by our lab to have the test done at a later time.      Patient has given verbal consent to a Video visit? Yes    HEART CARE VIDEO ENCOUNTER        The patient has chosen to have the visit conducted as a video visit, to reduce risk of exposure given the current status of Coronavirus in our community. This video visit is being conducted via a call between the patient and physician/provider. Health care needs are being provided without a physical exam.     Assessment/Recommendations   Assessment:  1.  Heart failure with preserved ejection fraction: Ejection fraction has decreased to 46% according to stress test in June 2020.  Her Lasix was decreased about 1 month ago due to hypotension.  However, her daughter has to give her extra Lasix at least weekly for fluid retention.  Lasix does not seem to be controlling fluid retention at this time.  Tommy notes increased dizziness.  She thinks this may be related to increased dose of spironolactone.  She also has had hypotension.    2.  Paroxysmal atrial fibrillation: Continue metoprolol and Eliquis    3.  Coronary artery disease: Coronary angiogram in 2012 showed moderate to severe disease.  Stress test in June 2020 was " negative for ischemia or infarction.  She denies any chest pain.    Plan:  1.  Stop Lasix.  Start torsemide 20 mg twice daily  2.  Decrease Spironolactone to 25 mg daily  3.  We discussed symptoms of fluid retention to monitor for and when to call  4.  Continue low-sodium diet and daily weights      Follow Up Plan: Follow-up with me in 1 week to check a BMP and see response to torsemide.   I have reviewed the note as documented.  This accurately captures the substance of my conversation with the patient.    Total time of video between patient and provider was 20 minutes   Start Time: 10:00   Stop Time: 10:20    Originating Location (pt. Location): Home    Distant Location (provider location):  [facility]     Mode of Communication:  Video Conference via [employer]       History of Present Illness/Subjective    Tommy Rea is a 75 y.o. female who is being evaluated via a billable video visit and has consented to a video visit. Tommy Rea has a history of heart failure with preserved ejection fraction, coronary artery disease, paroxysmal atrial fibrillation, lymphedema, COPD, hypertension, and seizures.  Stress test on June 8, 2020 was negative for inducible ischemia or infarction ejection fraction of 46%.    She is struggled with fluid retention over the past 1 to 2 months.  Her daughter continues to give as needed Lasix without much change. Tommy has also noted increased lightheadedness over the past month.  She thinks this may be correlated to increasing spironolactone.  She will occasionally have lower extremity edema but she states it is minimal today.  Her weights ranged from 207 to 210 pounds.  Blood pressure 104/54 today.  Her daughter notes that it has been as low as 90/50.  Her primary care provider decreased Lasix about 1 month ago.       I have reviewed and updated the patient's Past Medical History, Social History, Family History and Medication List.     Physical Examination  performed via live video encounter Review of Systems   General Appearance:   no distress, normal body habitus, upright.   ENT/Mouth: membranes moist, no nasal discharge or bleeding gums.  Normal head shape, no evidence of injury or laceration.     EYES:  no scleral icterus, normal conjunctivae   Neck: no evidence of thyromegaly.  Supple   Chest/Lungs:   No audible wheezing equal chest wall expansion. Non labored breathing.  No cough.   Cardiovascular:   No evidence of elevated jugular venous pressure.  No evidence of pitting edema bilaterally    Abdomen:  no evidence of abdominal distention. No observe juandice.     Extremities: no cyanosis or clubbing noted.    Skin: no xanthelasma, normal skin color. No evidence of facial lacerations.      Neurologic: Normal arm motion bilateral, no tremors.  No evidence of focal defect.       Psychiatric: alert and oriented x3, calm                                               Medical History  Surgical History Family History Social History   Past Medical History:   Diagnosis Date   ? Asthma    ? B12 deficiency    ? CAD (coronary artery disease)    ? COPD (chronic obstructive pulmonary disease) (H)    ? Crohn's disease (H)    ? Depression    ? Esophageal reflux    ? Essential hypertension    ? Fracture of left hip requiring operative repair (H)    ? History of shingles    ? Hyperlipidemia    ? Hyperparathyroidism (H)    ? Pulmonary nodule     benign   ? Seizure (H)     Past Surgical History:   Procedure Laterality Date   ? CVL HEART CATH LEFT     ? HEMMOIROIDECT     ? ORIF HIP FRACTURE  2015   ? [place] REMOVAL DEEP IMPLANT Left 1/25/2015    Procedure: Gamma Nail Left Hip;  Surgeon: Kirk De La Cruz MD;  Location: [facility];  Service: Orthopedics   ? REVISION TOTAL HIP ARTHROPLASTY Left 07/19/2016    removal of gamma nail hardware & conversion to total hip arthroplasty: Dr. Mercado at [place]     Family History   Problem Relation Age of Onset   ? Heart disease Mother    ?  Hypertension Mother    ? Heart disease Father    ? Arthritis Father       Social History     Socioeconomic History   ? Marital status:      Spouse name: Not on file   ? Number of children: Not on file   ? Years of education: Not on file   ? Highest education level: Not on file   Occupational History   ? Not on file   Social Needs   ? Financial resource strain: Not on file   ? Food insecurity     Worry: Not on file     Inability: Not on file   ? Transportation needs     Medical: Not on file     Non-medical: Not on file   Tobacco Use   ? Smoking status: Former Smoker     Types: Cigarettes     Last attempt to quit: 2011     Years since quittin.0   ? Smokeless tobacco: Never Used   Substance and Sexual Activity   ? Alcohol use: No   ? Drug use: No   ? Sexual activity: Not on file   Lifestyle   ? Physical activity     Days per week: Not on file     Minutes per session: Not on file   ? Stress: Not on file   Relationships   ? Social connections     Talks on phone: Not on file     Gets together: Not on file     Attends Sabianist service: Not on file     Active member of club or organization: Not on file     Attends meetings of clubs or organizations: Not on file     Relationship status: Not on file   ? Intimate partner violence     Fear of current or ex partner: Not on file     Emotionally abused: Not on file     Physically abused: Not on file     Forced sexual activity: Not on file   Other Topics Concern   ? Not on file   Social History Narrative   ? Not on file          Medications  Allergies   Current Outpatient Medications   Medication Sig Dispense Refill   ? acetaminophen (TYLENOL) 500 MG tablet Take 1,000 mg by mouth 2 (two) times a day. Max acetaminophen dose: 4000 mg in 24 hrs.             ? albuterol (PROVENTIL HFA;VENTOLIN HFA) 90 mcg/actuation inhaler Inhale 2 puffs 4 (four) times a day as needed for wheezing or shortness of breath.            ? alendronate (FOSAMAX) 70 MG tablet Take 70 mg by  mouth every 7 days. Take in the morning on an empty stomach with a full glass of water 30 minutes before food     ? apixaban (ELIQUIS) 5 mg Tab tablet Take 1 tablet (5 mg total) by mouth 2 (two) times a day. 60 tablet 3   ? aspirin 81 MG EC tablet Take 1 tablet (81 mg total) by mouth daily.  0   ? calcium, as carbonate, (OS-DIONNA) 500 mg calcium (1,250 mg) tablet Take 1 tablet by mouth daily. Take with meal     ? cholecalciferol, vitamin D3, 2,000 unit cap Take 2,000 Units by mouth daily.      ? colestipol (COLESTID) 1 gram tablet Take 3 g by mouth bedtime.     ? cyanocobalamin 1,000 mcg/mL injection Inject 1,000 mcg into the shoulder, thigh, or buttocks every 30 (thirty) days. On Friday every 4 weeks      ? fluticasone (FLONASE) 50 mcg/actuation nasal spray 1 spray into each nostril daily as needed for rhinitis.            ? fluticasone propionate (FLOVENT HFA) 110 mcg/actuation inhaler Inhale 1 puff 2 (two) times a day.            ? loratadine (CLARITIN) 10 mg tablet Take 10 mg by mouth daily as needed for allergies.            ? magnesium oxide 250 mg magnesium Tab Take 250 mg by mouth daily.     ? metoprolol tartrate (LOPRESSOR) 50 MG tablet Take 1 tablet (50 mg total) by mouth 2 (two) times a day. 30 tablet 0   ? multivitamin (MULTIVITAMIN) per tablet Take 1 tablet by mouth daily.     ? nitroglycerin (NITROSTAT) 0.4 MG SL tablet Place 1 tablet (0.4 mg total) under the tongue every 5 (five) minutes as needed for chest pain. 3 Bottle 3   ? PARoxetine (PAXIL) 40 MG tablet Take 40 mg by mouth at bedtime.            ? phenytoin (DILANTIN) 100 MG ER capsule Take 5 capsules (500 mg total) by mouth at bedtime. 30 capsule 0   ? simvastatin (ZOCOR) 40 MG tablet Take 40 mg by mouth bedtime.     ? sodium chloride (OCEAN) 0.65 % nasal spray 1 spray into each nostril 2 (two) times a day as needed for congestion.            ? spironolactone (ALDACTONE) 50 MG tablet Take 0.5 tablets (25 mg total) by mouth daily. 90 tablet 3   ?  topiramate (TOPAMAX) 100 MG tablet Take 150 mg by mouth 2 (two) times a day.      ? UNABLE TO FIND every 2 (two) months. Med Name: Stelara injection for Crohn     ? torsemide (DEMADEX) 20 MG tablet Take 1 tablet (20 mg total) by mouth 2 (two) times a day. 60 tablet 11     No current facility-administered medications for this visit.     Allergies   Allergen Reactions   ? Amoxicillin Other (See Comments)     GI bleeding, Colitis, has tolerated cefazolin    ? Septra [Sulfamethoxazole-Trimethoprim] Other (See Comments)     Vaginal irritation, vaginal bleeding    ? Sulfa (Sulfonamide Antibiotics) Other (See Comments)     Vaginal irritation, bleeding         Lab Results    Chemistry/lipid CBC Cardiac Enzymes/BNP/TSH/INR   Lab Results   Component Value Date    CHOL 166 03/02/2020    HDL 60 03/02/2020    LDLCALC 69 03/02/2020    TRIG 183 (H) 03/02/2020    CREATININE 0.71 07/06/2020    BUN 19 07/06/2020    K 4.1 07/06/2020     07/06/2020     07/06/2020    CO2 24 07/06/2020    Lab Results   Component Value Date    WBC 11.2 (H) 04/27/2020    HGB 9.6 (L) 04/27/2020    HCT 31.8 (L) 04/27/2020    MCV 89 04/27/2020     04/27/2020    Lab Results   Component Value Date    CKTOTAL 807 (HH) 10/24/2016    TROPONINI 0.04 04/25/2020     (H) 07/06/2020    TSH 2.90 07/17/2019    INR 1.03 10/24/2016        Maria Elena Rivera

## 2021-06-29 NOTE — PROGRESS NOTES
Progress Notes by Maria Elena Rivera CNP at 8/17/2020  3:30 PM     Author: Maria Elena Rivera CNP Service: -- Author Type: Nurse Practitioner    Filed: 8/17/2020  4:13 PM Encounter Date: 8/17/2020 Status: Signed    : Maria Elena Rivera CNP (Nurse Practitioner)             Assessment/Recommendations   Assessment:    1.  Heart failure with preserved ejection fraction, ejection fraction now 46%: Symptoms are stable since increasing torsemide last week.  She has chronic dyspnea on exertion extremity edema which are overall stable.    2.  Paroxysmal atrial fibrillation: Continue metoprolol and Eliquis.    3. Coronary artery disease: Coronary angiogram in 2012 showed moderate to severe disease.  Stress test in June 2020 was negative for ischemia or infarction.  She denies any chest pain.    Plan:  1.  BMP and BNP pending  2.  Continue to work on low-sodium diet  3.  Daily weights    Follow-up will be scheduled after lab results are reviewed.        History of Present Illness/Subjective    Ms. Tommy Rea is a 75 y.o. female seen at [facility] today for continued follow-up. Tommy Rea has a history of heart failure with preserved ejection fraction, coronary artery disease, paroxysmal atrial fibrillation, lymphedema, COPD, hypertension, and seizures.  Stress test on June 8, 2020 was negative for inducible ischemia or infarction ejection fraction of 46%.     At her last clinic visit on August 7, her BNP was elevated and she had shortness of breath.  Torsemide was increased to 40 mg in the morning and 20 mg in the afternoon.  He has chronic dyspnea on exertion but denies any worsening.  She has mild lower extremity edema today but has had a busy day of appointments/errands.  She denies orthopnea and chest pain.      Blood pressures are stable in clinic today at 128/80 and 114/58.  She had low blood pressures at home when home RN checked.  Lowest blood pressure  was 70/40 but Tommy was asymptomatic.  I question the accuracy of this blood pressure since it was a wrist cuff.      Her home weight today is stable between 206-208 pounds.  She is working on following a low-sodium diet.      ECHO:   Results for orders placed during the hospital encounter of 06/10/19   Echo Complete [ECH10] 06/11/2019    Narrative   When compared to the previous study dated 10/25/2016, no significant   change.    Left ventricle ejection fraction is normal. The calculated left   ventricular ejection fraction is 64%.    Normal left ventricular size and systolic function.    Normal right ventricular size and systolic function.    No hemodynamically significant valvular heart abnormalities.           Physical Examination Review of Systems   Vitals:    08/17/20 1545   BP: 128/80   Pulse: 77   Resp: 14     Body mass index is 33.05 kg/m .  Wt Readings from Last 3 Encounters:   08/17/20 211 lb (95.7 kg)   08/07/20 212 lb (96.2 kg)   05/11/20 204 lb 4.8 oz (92.7 kg)       General Appearance:     Alert, cooperative and in no acute distress.   ENT/Mouth: membranes moist, no oral lesions or bleeding gums.      EYES:  no scleral icterus, normal conjunctivae   Chest/Lungs:   lungs are clear to auscultation, no rales or wheezing, respirations unlabored   Cardiovascular:   Regular. Normal first and second heart sounds, 1+ edema bilateral lower extremities    Abdomen:  Soft, nontender, nondistended, bowel sounds present   Extremities: no cyanosis or clubbing   Skin: warm, dry.    Neurologic: mood and affect are appropriate, alert and oriented x3      General: WNL  Eyes: WNL  Ears/Nose/Throat: WNL  Lungs: Cough, Shortness of Breath, Snoring, Wheezing  Heart: Shortness of Breath with activity  Stomach: Nausea  Bladder: WNL  Muscle/Joints: Joint Pain  Skin: WNL  Nervous System: WNL  Mental Health: WNL     Blood: WNL     Medical History  Surgical History Family History Social History   Past Medical History:    Diagnosis Date   ? Asthma    ? B12 deficiency    ? CAD (coronary artery disease)    ? COPD (chronic obstructive pulmonary disease) (H)    ? Crohn's disease (H)    ? Depression    ? Esophageal reflux    ? Essential hypertension    ? Fracture of left hip requiring operative repair (H)    ? History of shingles    ? Hyperlipidemia    ? Hyperparathyroidism (H)    ? Pulmonary nodule     benign   ? Seizure (H)     Past Surgical History:   Procedure Laterality Date   ? CVL HEART CATH LEFT     ? HEMMOIROIDECT     ? ORIF HIP FRACTURE     ? [place] REMOVAL DEEP IMPLANT Left 2015    Procedure: Gamma Nail Left Hip;  Surgeon: Kirk De La Cruz MD;  Location: [facility] OR;  Service: Orthopedics   ? REVISION TOTAL HIP ARTHROPLASTY Left 2016    removal of gamma nail hardware & conversion to total hip arthroplasty: Dr. Mercado at [place]     Family History   Problem Relation Age of Onset   ? Heart disease Mother    ? Hypertension Mother    ? Heart disease Father    ? Arthritis Father     Social History     Socioeconomic History   ? Marital status:      Spouse name: Not on file   ? Number of children: Not on file   ? Years of education: Not on file   ? Highest education level: Not on file   Occupational History   ? Not on file   Social Needs   ? Financial resource strain: Not on file   ? Food insecurity     Worry: Not on file     Inability: Not on file   ? Transportation needs     Medical: Not on file     Non-medical: Not on file   Tobacco Use   ? Smoking status: Former Smoker     Types: Cigarettes     Last attempt to quit: 2011     Years since quittin.1   ? Smokeless tobacco: Never Used   Substance and Sexual Activity   ? Alcohol use: No   ? Drug use: No   ? Sexual activity: Not on file   Lifestyle   ? Physical activity     Days per week: Not on file     Minutes per session: Not on file   ? Stress: Not on file   Relationships   ? Social connections     Talks on phone: Not on file     Gets together:  Not on file     Attends Gnosticist service: Not on file     Active member of club or organization: Not on file     Attends meetings of clubs or organizations: Not on file     Relationship status: Not on file   ? Intimate partner violence     Fear of current or ex partner: Not on file     Emotionally abused: Not on file     Physically abused: Not on file     Forced sexual activity: Not on file   Other Topics Concern   ? Not on file   Social History Narrative   ? Not on file          Medications  Allergies   Current Outpatient Medications   Medication Sig Dispense Refill   ? acetaminophen (TYLENOL) 500 MG tablet Take 1,000 mg by mouth 2 (two) times a day. Max acetaminophen dose: 4000 mg in 24 hrs.             ? albuterol (PROVENTIL HFA;VENTOLIN HFA) 90 mcg/actuation inhaler Inhale 2 puffs 4 (four) times a day as needed for wheezing or shortness of breath.            ? alendronate (FOSAMAX) 70 MG tablet Take 70 mg by mouth every 7 days. Take in the morning on an empty stomach with a full glass of water 30 minutes before food     ? apixaban (ELIQUIS) 5 mg Tab tablet Take 1 tablet (5 mg total) by mouth 2 (two) times a day. 60 tablet 3   ? aspirin 81 MG EC tablet Take 1 tablet (81 mg total) by mouth daily.  0   ? calcium, as carbonate, (OS-DIONNA) 500 mg calcium (1,250 mg) tablet Take 1 tablet by mouth daily. Take with meal     ? cholecalciferol, vitamin D3, 2,000 unit cap Take 2,000 Units by mouth daily.      ? colestipol (COLESTID) 1 gram tablet Take 3 g by mouth bedtime.     ? cyanocobalamin 1,000 mcg/mL injection Inject 1,000 mcg into the shoulder, thigh, or buttocks every 30 (thirty) days. On Friday every 4 weeks      ? fluticasone (FLONASE) 50 mcg/actuation nasal spray 1 spray into each nostril daily as needed for rhinitis.            ? fluticasone propionate (FLOVENT HFA) 110 mcg/actuation inhaler Inhale 1 puff 2 (two) times a day.            ? loratadine (CLARITIN) 10 mg tablet Take 10 mg by mouth daily as needed for  allergies.            ? magnesium oxide 250 mg magnesium Tab Take 250 mg by mouth daily.     ? metoprolol tartrate (LOPRESSOR) 50 MG tablet Take 1 tablet (50 mg total) by mouth 2 (two) times a day. 30 tablet 0   ? multivitamin (MULTIVITAMIN) per tablet Take 1 tablet by mouth daily.     ? nitroglycerin (NITROSTAT) 0.4 MG SL tablet Place 1 tablet (0.4 mg total) under the tongue every 5 (five) minutes as needed for chest pain. 3 Bottle 3   ? PARoxetine (PAXIL) 40 MG tablet Take 40 mg by mouth at bedtime.            ? phenytoin (DILANTIN) 100 MG ER capsule Take 5 capsules (500 mg total) by mouth at bedtime. 30 capsule 0   ? simvastatin (ZOCOR) 40 MG tablet Take 40 mg by mouth bedtime.     ? sodium chloride (OCEAN) 0.65 % nasal spray 1 spray into each nostril 2 (two) times a day as needed for congestion.            ? spironolactone (ALDACTONE) 50 MG tablet Take 0.5 tablets (25 mg total) by mouth daily. 90 tablet 3   ? topiramate (TOPAMAX) 100 MG tablet Take 150 mg by mouth 2 (two) times a day.      ? torsemide (DEMADEX) 20 MG tablet TAKE 40MG IN THE AM AND 20MG IN  tablet 0   ? UNABLE TO FIND every 2 (two) months. Med Name: Stelara injection for Crohn       No current facility-administered medications for this visit.     Allergies   Allergen Reactions   ? Amoxicillin Other (See Comments)     GI bleeding, Colitis, has tolerated cefazolin    ? Septra [Sulfamethoxazole-Trimethoprim] Other (See Comments)     Vaginal irritation, vaginal bleeding    ? Sulfa (Sulfonamide Antibiotics) Other (See Comments)     Vaginal irritation, bleeding         Lab Results    Chemistry/lipid CBC Cardiac Enzymes/BNP/TSH/INR   Lab Results   Component Value Date    CHOL 166 03/02/2020    HDL 60 03/02/2020    LDLCALC 69 03/02/2020    TRIG 183 (H) 03/02/2020    CREATININE 0.77 08/07/2020    BUN 23 08/07/2020    K 3.9 08/07/2020     08/07/2020     08/07/2020    CO2 28 08/07/2020    Lab Results   Component Value Date    WBC 11.2 (H)  04/27/2020    HGB 9.6 (L) 04/27/2020    HCT 31.8 (L) 04/27/2020    MCV 89 04/27/2020     04/27/2020    Lab Results   Component Value Date    CKTOTAL 807 (HH) 10/24/2016    TROPONINI 0.04 04/25/2020     (H) 08/07/2020    TSH 2.90 07/17/2019    INR 1.03 10/24/2016

## 2021-06-29 NOTE — PROGRESS NOTES
Progress Notes by Maria Elena Rivera CNP at 8/7/2020  8:30 AM     Author: Maria Elena Rivera CNP Service: -- Author Type: Nurse Practitioner    Filed: 8/7/2020  9:16 AM Encounter Date: 8/7/2020 Status: Signed    : Maria Elena Rivera CNP (Nurse Practitioner)             Assessment/Recommendations   Assessment:    1.  Heart failure with preserved ejection fraction, ejection fraction now 46%: Symptoms are stable since changing from Lasix to torsemide.  She has mild dyspnea on exertion and trace edema.  Her weight is overall stable.    2.  Paroxysmal atrial fibrillation: Continue metoprolol and Eliquis.    3. Coronary artery disease: Coronary angiogram in 2012 showed moderate to severe disease.  Stress test in June 2020 was negative for ischemia or infarction.  She denies any chest pain.    Plan:  1.  BMP and BNP pending  2.  Continue to work on low-sodium diet  3.  Daily weights    Follow-up will be scheduled after lab results are reviewed.        History of Present Illness/Subjective    Ms. Tommy Rea is a 75 y.o. female seen at [facility] today for continued follow-up. Tommy Rea has a history of heart failure with preserved ejection fraction, coronary artery disease, paroxysmal atrial fibrillation, lymphedema, COPD, hypertension, and seizures.  Stress test on June 8, 2020 was negative for inducible ischemia or infarction ejection fraction of 46%.     During her video visit on July 20, 2020, her Lasix was discontinued and she was started on torsemide due to continued fluid retention.  Her spironolactone was also decreased to 25 mg daily due to concerns of lightheadedness on a higher dose.  She overall feels stable with medication changes.  She has mild shortness of breath walking into clinic today.  Breathing is about the same and has not worsened since changing to torsemide.  She denies orthopnea and chest pain.  Lower extremity edema is stable.  She  has occasional lightheadedness with position changes but denies any falls.      Her home weight today is 208 pounds.  She is working on following a low-sodium diet.      ECHO:   Results for orders placed during the hospital encounter of 06/10/19   Echo Complete [ECH10] 06/11/2019    Narrative   When compared to the previous study dated 10/25/2016, no significant   change.    Left ventricle ejection fraction is normal. The calculated left   ventricular ejection fraction is 64%.    Normal left ventricular size and systolic function.    Normal right ventricular size and systolic function.    No hemodynamically significant valvular heart abnormalities.           Physical Examination Review of Systems   Vitals:    08/07/20 0844   BP: 100/64   Pulse: 75   SpO2: 97%     Body mass index is 33.2 kg/m .  Wt Readings from Last 3 Encounters:   08/07/20 212 lb (96.2 kg)   05/11/20 204 lb 4.8 oz (92.7 kg)   04/28/20 205 lb 14.4 oz (93.4 kg)       General Appearance:     Alert, cooperative and in no acute distress.   ENT/Mouth: membranes moist, no oral lesions or bleeding gums.      EYES:  no scleral icterus, normal conjunctivae   Chest/Lungs:   lungs are clear to auscultation, no rales or wheezing, respirations unlabored   Cardiovascular:   Regular. Normal first and second heart sounds, trace edema bilateral lower extremities    Abdomen:  Soft, nontender, nondistended, bowel sounds present   Extremities: no cyanosis or clubbing   Skin: warm, dry.    Neurologic: mood and affect are appropriate, alert and oriented x3      General: WNL  Eyes: WNL  Ears/Nose/Throat: WNL  Lungs: Wheezing, Cough  Heart: Leg Swelling, Shortness of Breath with activity  Stomach: WNL  Bladder: WNL  Muscle/Joints: WNL  Skin: WNL  Nervous System: WNL  Mental Health: Anxiety     Blood: WNL     Medical History  Surgical History Family History Social History   Past Medical History:   Diagnosis Date   ? Asthma    ? B12 deficiency    ? CAD (coronary artery  disease)    ? COPD (chronic obstructive pulmonary disease) (H)    ? Crohn's disease (H)    ? Depression    ? Esophageal reflux    ? Essential hypertension    ? Fracture of left hip requiring operative repair (H)    ? History of shingles    ? Hyperlipidemia    ? Hyperparathyroidism (H)    ? Pulmonary nodule     benign   ? Seizure (H)     Past Surgical History:   Procedure Laterality Date   ? CVL HEART CATH LEFT     ? HEMMOIROIDECT     ? ORIF HIP FRACTURE     ? [place] REMOVAL DEEP IMPLANT Left 2015    Procedure: Gamma Nail Left Hip;  Surgeon: Kirk De La Cruz MD;  Location: [facility] OR;  Service: Orthopedics   ? REVISION TOTAL HIP ARTHROPLASTY Left 2016    removal of gamma nail hardware & conversion to total hip arthroplasty: Dr. Mercado at [place]     Family History   Problem Relation Age of Onset   ? Heart disease Mother    ? Hypertension Mother    ? Heart disease Father    ? Arthritis Father     Social History     Socioeconomic History   ? Marital status:      Spouse name: Not on file   ? Number of children: Not on file   ? Years of education: Not on file   ? Highest education level: Not on file   Occupational History   ? Not on file   Social Needs   ? Financial resource strain: Not on file   ? Food insecurity     Worry: Not on file     Inability: Not on file   ? Transportation needs     Medical: Not on file     Non-medical: Not on file   Tobacco Use   ? Smoking status: Former Smoker     Types: Cigarettes     Last attempt to quit: 2011     Years since quittin.1   ? Smokeless tobacco: Never Used   Substance and Sexual Activity   ? Alcohol use: No   ? Drug use: No   ? Sexual activity: Not on file   Lifestyle   ? Physical activity     Days per week: Not on file     Minutes per session: Not on file   ? Stress: Not on file   Relationships   ? Social connections     Talks on phone: Not on file     Gets together: Not on file     Attends Mandaen service: Not on file     Active member of  club or organization: Not on file     Attends meetings of clubs or organizations: Not on file     Relationship status: Not on file   ? Intimate partner violence     Fear of current or ex partner: Not on file     Emotionally abused: Not on file     Physically abused: Not on file     Forced sexual activity: Not on file   Other Topics Concern   ? Not on file   Social History Narrative   ? Not on file          Medications  Allergies   Current Outpatient Medications   Medication Sig Dispense Refill   ? acetaminophen (TYLENOL) 500 MG tablet Take 1,000 mg by mouth 2 (two) times a day. Max acetaminophen dose: 4000 mg in 24 hrs.             ? albuterol (PROVENTIL HFA;VENTOLIN HFA) 90 mcg/actuation inhaler Inhale 2 puffs 4 (four) times a day as needed for wheezing or shortness of breath.            ? alendronate (FOSAMAX) 70 MG tablet Take 70 mg by mouth every 7 days. Take in the morning on an empty stomach with a full glass of water 30 minutes before food     ? apixaban (ELIQUIS) 5 mg Tab tablet Take 1 tablet (5 mg total) by mouth 2 (two) times a day. 60 tablet 3   ? aspirin 81 MG EC tablet Take 1 tablet (81 mg total) by mouth daily.  0   ? calcium, as carbonate, (OS-DIONNA) 500 mg calcium (1,250 mg) tablet Take 1 tablet by mouth daily. Take with meal     ? cholecalciferol, vitamin D3, 2,000 unit cap Take 2,000 Units by mouth daily.      ? colestipol (COLESTID) 1 gram tablet Take 3 g by mouth bedtime.     ? cyanocobalamin 1,000 mcg/mL injection Inject 1,000 mcg into the shoulder, thigh, or buttocks every 30 (thirty) days. On Friday every 4 weeks      ? fluticasone (FLONASE) 50 mcg/actuation nasal spray 1 spray into each nostril daily as needed for rhinitis.            ? fluticasone propionate (FLOVENT HFA) 110 mcg/actuation inhaler Inhale 1 puff 2 (two) times a day.            ? loratadine (CLARITIN) 10 mg tablet Take 10 mg by mouth daily as needed for allergies.            ? magnesium oxide 250 mg magnesium Tab Take 250 mg by  mouth daily.     ? metoprolol tartrate (LOPRESSOR) 50 MG tablet Take 1 tablet (50 mg total) by mouth 2 (two) times a day. 30 tablet 0   ? multivitamin (MULTIVITAMIN) per tablet Take 1 tablet by mouth daily.     ? nitroglycerin (NITROSTAT) 0.4 MG SL tablet Place 1 tablet (0.4 mg total) under the tongue every 5 (five) minutes as needed for chest pain. 3 Bottle 3   ? PARoxetine (PAXIL) 40 MG tablet Take 40 mg by mouth at bedtime.            ? phenytoin (DILANTIN) 100 MG ER capsule Take 5 capsules (500 mg total) by mouth at bedtime. 30 capsule 0   ? simvastatin (ZOCOR) 40 MG tablet Take 40 mg by mouth bedtime.     ? sodium chloride (OCEAN) 0.65 % nasal spray 1 spray into each nostril 2 (two) times a day as needed for congestion.            ? spironolactone (ALDACTONE) 50 MG tablet Take 0.5 tablets (25 mg total) by mouth daily. 90 tablet 3   ? topiramate (TOPAMAX) 100 MG tablet Take 150 mg by mouth 2 (two) times a day.      ? torsemide (DEMADEX) 20 MG tablet TAKE 1 TABLET(20 MG) BY MOUTH TWICE DAILY 180 tablet 1   ? UNABLE TO FIND every 2 (two) months. Med Name: Stelara injection for Crohn       No current facility-administered medications for this visit.     Allergies   Allergen Reactions   ? Amoxicillin Other (See Comments)     GI bleeding, Colitis, has tolerated cefazolin    ? Septra [Sulfamethoxazole-Trimethoprim] Other (See Comments)     Vaginal irritation, vaginal bleeding    ? Sulfa (Sulfonamide Antibiotics) Other (See Comments)     Vaginal irritation, bleeding         Lab Results    Chemistry/lipid CBC Cardiac Enzymes/BNP/TSH/INR   Lab Results   Component Value Date    CHOL 166 03/02/2020    HDL 60 03/02/2020    LDLCALC 69 03/02/2020    TRIG 183 (H) 03/02/2020    CREATININE 0.71 07/06/2020    BUN 19 07/06/2020    K 4.1 07/06/2020     07/06/2020     07/06/2020    CO2 24 07/06/2020    Lab Results   Component Value Date    WBC 11.2 (H) 04/27/2020    HGB 9.6 (L) 04/27/2020    HCT 31.8 (L) 04/27/2020     MCV 89 04/27/2020     04/27/2020    Lab Results   Component Value Date    CKTOTAL 807 (HH) 10/24/2016    TROPONINI 0.04 04/25/2020     (H) 07/06/2020    TSH 2.90 07/17/2019    INR 1.03 10/24/2016

## 2021-06-29 NOTE — PROGRESS NOTES
"Progress Notes by Lauren Colbert MD at 5/11/2020  2:10 PM     Author: Lauren Colbert MD Service: -- Author Type: Physician    Filed: 5/11/2020  2:29 PM Encounter Date: 5/11/2020 Status: Signed    : Lauren Colbert MD (Physician)           The patient has been notified of following:     \"This telephone visit will be conducted via a call between you and your physician/provider. We have found that certain health care needs can be provided without the need for a physical exam.  This service lets us provide the care you need with a phone conversation.  If a prescription is necessary we can send it directly to your pharmacy.  If lab work is needed we can place an order for that and you can then stop by our lab to have the test done at a later time. If during the course of the call the physician/provider feels a telephone visit is not appropriate, you will not be charged for this service.\" Verbal consent has been obtained for this service by care team member:         HEART CARE PHONE ENCOUNTER        The patient has chosen to have the visit conducted as a telephone visit, to reduce risk of exposure given the current status of Coronavirus in our community. This telephone visit is being conducted via a call between the patient and physician/provider. Health care needs are being provided without a physical exam.     Assessment/Recommendations   Assessment:    1.  Acute on chronic diastolic heart failure exacerbation, resolved with IV diuresis in the hospital.  She has noted some fluctuation in her weights at home on current dose of furosemide 60 mg twice daily and has had to take additional furosemide on 4 separate occasions over the last 2 weeks.  Unclear whether it may be related to dietary sodium indiscretion.  She did have laboratory studies drawn this morning at her primary care visit although those results are still pending.  At this point, suggested we continue with her current dose of furosemide along with " spironolactone until the results of her labs are available.  I also talked with her daughter about the importance of looking over her food to see if she may be getting extra sodium.  2.  Coronary artery disease, stable.  She reports no clear anginal symptoms but does admit to symptoms of exertional dyspnea which may be an anginal equivalent.  I did recommend a nuclear stress test back in October 2019 although the patient was reluctant to pursue one.  She was actually scheduled for nuclear stress test, after discussion with her daughter, in early March although this was canceled due to upper respiratory tract infection.  It was not rescheduled due to the coronavirus pandemic.  At this point we will submit another order and hopefully we can pursue it in the next several weeks.  3.  Essential hypertension, well controlled  4.  Paroxysmal atrial fibrillation.  No evidence of recurrence during her recent hospitalization.    Plan:  1.  Continue current medications for now.  I will review patient's laboratory studies to see whether additional diuretic therapy may be needed.  2.  Patient and daughter to review food products at home to see if patient is getting higher amounts of sodium in her diet.  3.  Outpatient nuclear stress test in the next several weeks to follow-up on her coronary artery disease.  The recommendations to follow.    I have reviewed the note as documented.  This accurately captures the substance of my conversation with the patient.    Total time of call between patient and provider was 22 minutes   Start Time: 1358  Stop Time: 1420       History of Present Illness/Subjective    Tommy Rea is a 74 y.o. female who is being evaluated via a billable telephone visit.        I have reviewed and updated the patient's Past Medical History, Social History, Family History and Medication List.     Physical Examination not performed given phone encounter Review of Systems           Review of Systems -  History obtained from daughter  General ROS: negative  Psychological ROS: negative  Ophthalmic ROS: negative  ENT ROS: negative  Allergy and Immunology ROS: negative  Hematological and Lymphatic ROS: negative  Endocrine ROS: negative  Respiratory ROS: positive for - shortness of breath and wheezing  Cardiovascular ROS: positive for - dyspnea on exertion, leg swelling- before the hospital stay   Gastrointestinal ROS: positive for - constipation  Genito-Urinary ROS: negative  Musculoskeletal ROS: negative  Neurological ROS: positive for - confusion- before the hospital the hospital stay  Dermatological ROS: negative  Declines the Video visit today                                       Medical History  Surgical History Family History Social History   Past Medical History:   Diagnosis Date   ? Asthma    ? B12 deficiency    ? CAD (coronary artery disease)    ? COPD (chronic obstructive pulmonary disease) (H)    ? Crohn's disease (H)    ? Depression    ? Esophageal reflux    ? Essential hypertension    ? Fracture of left hip requiring operative repair (H)    ? History of shingles    ? Hyperlipidemia    ? Hyperparathyroidism (H)    ? Pulmonary nodule     benign   ? Seizure (H)     Past Surgical History:   Procedure Laterality Date   ? CVL HEART CATH LEFT     ? HEMMOIROIDECT     ? ORIF HIP FRACTURE  2015   ? [place] REMOVAL DEEP IMPLANT Left 1/25/2015    Procedure: Gamma Nail Left Hip;  Surgeon: Kirk De La Cruz MD;  Location: [facility];  Service: Orthopedics   ? REVISION TOTAL HIP ARTHROPLASTY Left 07/19/2016    removal of gamma nail hardware & conversion to total hip arthroplasty: Dr. Mercado at [place]     Family History   Problem Relation Age of Onset   ? Heart disease Mother    ? Hypertension Mother    ? Heart disease Father    ? Arthritis Father     Social History     Socioeconomic History   ? Marital status:      Spouse name: Not on file   ? Number of children: Not on file   ? Years of education: Not on  file   ? Highest education level: Not on file   Occupational History   ? Not on file   Social Needs   ? Financial resource strain: Not on file   ? Food insecurity     Worry: Not on file     Inability: Not on file   ? Transportation needs     Medical: Not on file     Non-medical: Not on file   Tobacco Use   ? Smoking status: Former Smoker     Types: Cigarettes     Last attempt to quit: 2011     Years since quittin.8   ? Smokeless tobacco: Never Used   Substance and Sexual Activity   ? Alcohol use: No   ? Drug use: No   ? Sexual activity: Not on file   Lifestyle   ? Physical activity     Days per week: Not on file     Minutes per session: Not on file   ? Stress: Not on file   Relationships   ? Social connections     Talks on phone: Not on file     Gets together: Not on file     Attends Bahai service: Not on file     Active member of club or organization: Not on file     Attends meetings of clubs or organizations: Not on file     Relationship status: Not on file   ? Intimate partner violence     Fear of current or ex partner: Not on file     Emotionally abused: Not on file     Physically abused: Not on file     Forced sexual activity: Not on file   Other Topics Concern   ? Not on file   Social History Narrative   ? Not on file          Medications  Allergies   Current Outpatient Medications   Medication Sig Dispense Refill   ? acetaminophen (TYLENOL) 500 MG tablet Take 1,000 mg by mouth 2 (two) times a day. Max acetaminophen dose: 4000 mg in 24 hrs.             ? albuterol (PROVENTIL HFA;VENTOLIN HFA) 90 mcg/actuation inhaler Inhale 2 puffs 4 (four) times a day as needed for wheezing or shortness of breath.            ? alendronate (FOSAMAX) 70 MG tablet Take 70 mg by mouth every 7 days. Take in the morning on an empty stomach with a full glass of water 30 minutes before food     ? apixaban (ELIQUIS) 5 mg Tab tablet Take 1 tablet (5 mg total) by mouth 2 (two) times a day. 60 tablet 3   ? aspirin 81 MG EC  tablet Take 1 tablet (81 mg total) by mouth daily.  0   ? calcium, as carbonate, (OS-DIONNA) 500 mg calcium (1,250 mg) tablet Take 1 tablet by mouth daily. Take with meal     ? cholecalciferol, vitamin D3, 2,000 unit cap Take 2,000 Units by mouth daily.      ? colestipol (COLESTID) 1 gram tablet Take 3 g by mouth bedtime.     ? cyanocobalamin 1,000 mcg/mL injection Inject 1,000 mcg into the shoulder, thigh, or buttocks every 30 (thirty) days. On Friday every 4 weeks      ? fluticasone (FLONASE) 50 mcg/actuation nasal spray 1 spray into each nostril daily as needed for rhinitis.            ? fluticasone propionate (FLOVENT HFA) 110 mcg/actuation inhaler Inhale 1 puff 2 (two) times a day.            ? furosemide (LASIX) 40 MG tablet Take 1.5 tablets (60 mg total) by mouth 2 (two) times a day at 9am and 6pm. 90 tablet 0   ? loratadine (CLARITIN) 10 mg tablet Take 10 mg by mouth daily as needed for allergies.            ? magnesium oxide 250 mg magnesium Tab Take 250 mg by mouth daily.     ? metoprolol tartrate (LOPRESSOR) 50 MG tablet Take 1 tablet (50 mg total) by mouth 2 (two) times a day. 30 tablet 0   ? multivitamin (MULTIVITAMIN) per tablet Take 1 tablet by mouth daily.     ? nitroglycerin (NITROSTAT) 0.4 MG SL tablet Place 1 tablet (0.4 mg total) under the tongue every 5 (five) minutes as needed for chest pain. 3 Bottle 3   ? PARoxetine (PAXIL) 40 MG tablet Take 40 mg by mouth at bedtime.            ? phenytoin (DILANTIN) 100 MG ER capsule Take 5 capsules (500 mg total) by mouth at bedtime. 30 capsule 0   ? simvastatin (ZOCOR) 40 MG tablet Take 40 mg by mouth bedtime.     ? sodium chloride (OCEAN) 0.65 % nasal spray 1 spray into each nostril 2 (two) times a day as needed for congestion.            ? spironolactone (ALDACTONE) 25 MG tablet Take 1 tablet (25 mg total) by mouth daily. 90 tablet 2   ? topiramate (TOPAMAX) 100 MG tablet Take 150 mg by mouth 2 (two) times a day.      ? UNABLE TO FIND Med Name: Stelara  injection for Crohn       No current facility-administered medications for this visit.     Allergies   Allergen Reactions   ? Amoxicillin Other (See Comments)     GI bleeding, Colitis, has tolerated cefazolin    ? Septra [Sulfamethoxazole-Trimethoprim] Other (See Comments)     Vaginal irritation, vaginal bleeding    ? Sulfa (Sulfonamide Antibiotics) Other (See Comments)     Vaginal irritation, bleeding         Lab Results    Chemistry/lipid CBC Cardiac Enzymes/BNP/TSH/INR   Lab Results   Component Value Date    CHOL 166 03/02/2020    HDL 60 03/02/2020    LDLCALC 69 03/02/2020    TRIG 183 (H) 03/02/2020    CREATININE 0.61 04/28/2020    BUN 15 04/28/2020    K 4.0 04/28/2020     (L) 04/28/2020     04/28/2020    CO2 24 04/28/2020    Lab Results   Component Value Date    WBC 11.2 (H) 04/27/2020    HGB 9.6 (L) 04/27/2020    HCT 31.8 (L) 04/27/2020    MCV 89 04/27/2020     04/27/2020    Lab Results   Component Value Date    CKTOTAL 807 (HH) 10/24/2016    TROPONINI 0.04 04/25/2020     (H) 03/02/2020    TSH 2.90 07/17/2019    INR 1.03 10/24/2016        Lauren Colbert

## 2021-06-29 NOTE — PROGRESS NOTES
Progress Notes by Maria Elena Mejia CNP at 11/9/2020  8:30 AM     Author: Maria Elena Mejia CNP Service: -- Author Type: Nurse Practitioner    Filed: 11/9/2020  9:16 AM Encounter Date: 11/9/2020 Status: Signed    : Maria Elena Mejia CNP (Nurse Practitioner)             Assessment/Recommendations   Assessment:    1.  Heart failure with preserved ejection fraction, ejection fraction now 46%: She notes an increase in shortness of breath with activity over the past few months.  Her weight is stable and she has only trace edema.  Lung sounds are clear.  Historically, BNP has been very accurate for Doloresrily in identifying fluid retention.    2.  Paroxysmal atrial fibrillation: Continue metoprolol and Eliquis.    3. Coronary artery disease: Coronary angiogram in 2012 showed moderate to severe disease.  Stress test in June 2020 was negative for ischemia or infarction.  She denies any chest pain.    4.  COPD: She has not been taking her daily inhaler as prescribed and skips it most days.  She uses her albuterol inhaler occasionally.  I encouraged her to use her daily inhaler as prescribed to see if this helps with her breathing.    Plan:  1.  BMP and BNP pending  2.  Continue to work on low-sodium diet  3.  Daily weights    Follow-up will be scheduled after lab results are reviewed.        History of Present Illness/Subjective    Ms. Tommy Rea is a 75 y.o. female seen at [facility] today for continued follow-up. Tommy Rea has a history of heart failure with preserved ejection fraction, coronary artery disease, paroxysmal atrial fibrillation, lymphedema, COPD, hypertension, and seizures.  Stress test on June 8, 2020 was negative for inducible ischemia or infarction ejection fraction of 46%.     Her torsemide was increased for 2 days about 1 month ago due to elevated BNP.  He felt better after this.  She continues to have shortness of breath with activity  which she thinks has worsened over the past few months.  Her weight is stable 206 pounds.  She has no lower extremity edema or abdominal bloating.  She has occasional lightheadedness but denies any worsening.  She denies any falls.  She denies fatigue, orthopnea and chest pain.  She has not been taking her COPD inhaler as prescribed skipping it most days.    Blood pressure stable today at 120/78.  Her home nurse checks blood pressure once weekly and occasionally has systolics in the 90s.  She is asymptomatic.      ECHO:   Results for orders placed during the hospital encounter of 06/10/19   Echo Complete [ECH10] 06/11/2019    Narrative   When compared to the previous study dated 10/25/2016, no significant   change.    Left ventricle ejection fraction is normal. The calculated left   ventricular ejection fraction is 64%.    Normal left ventricular size and systolic function.    Normal right ventricular size and systolic function.    No hemodynamically significant valvular heart abnormalities.           Physical Examination Review of Systems   Vitals:    11/09/20 0842   BP: 120/78   Pulse: 75   Resp: 14     Body mass index is 33.05 kg/m .  Wt Readings from Last 3 Encounters:   11/09/20 211 lb (95.7 kg)   08/17/20 211 lb (95.7 kg)   08/07/20 212 lb (96.2 kg)       General Appearance:     Alert, cooperative and in no acute distress.   ENT/Mouth: membranes moist, no oral lesions or bleeding gums.      EYES:  no scleral icterus, normal conjunctivae   Chest/Lungs:   lungs are clear to auscultation, no rales or wheezing, respirations unlabored   Cardiovascular:   Regular. Normal first and second heart sounds, trace edema bilateral lower extremities    Abdomen:  Soft, nontender, nondistended, bowel sounds present   Extremities: no cyanosis or clubbing   Skin: warm, dry.    Neurologic: mood and affect are appropriate, alert and oriented x3      General: WNL  Eyes: WNL  Ears/Nose/Throat: WNL  Lungs: Shortness of Breath,  Wheezing  Heart: Shortness of Breath with activity  Stomach: Nausea  Bladder: WNL  Muscle/Joints: Joint Pain, Muscle Weakness, Muscle Pain  Skin: WNL  Nervous System: WNL  Mental Health: WNL     Blood: WNL     Medical History  Surgical History Family History Social History   Past Medical History:   Diagnosis Date   ? Asthma    ? B12 deficiency    ? CAD (coronary artery disease)    ? COPD (chronic obstructive pulmonary disease) (H)    ? Crohn's disease (H)    ? Depression    ? Esophageal reflux    ? Essential hypertension    ? Fracture of left hip requiring operative repair (H)    ? History of shingles    ? Hyperlipidemia    ? Hyperparathyroidism (H)    ? Pulmonary nodule     benign   ? Seizure (H)     Past Surgical History:   Procedure Laterality Date   ? CVL HEART CATH LEFT     ? HEMMOIROIDECT     ? ORIF HIP FRACTURE     ? [place] REMOVAL DEEP IMPLANT Left 2015    Procedure: Gamma Nail Left Hip;  Surgeon: Kirk De La Cruz MD;  Location: [facility];  Service: Orthopedics   ? REVISION TOTAL HIP ARTHROPLASTY Left 2016    removal of gamma nail hardware & conversion to total hip arthroplasty: Dr. Mercado at [place]     Family History   Problem Relation Age of Onset   ? Heart disease Mother    ? Hypertension Mother    ? Heart disease Father    ? Arthritis Father     Social History     Socioeconomic History   ? Marital status:      Spouse name: Not on file   ? Number of children: Not on file   ? Years of education: Not on file   ? Highest education level: Not on file   Occupational History   ? Not on file   Social Needs   ? Financial resource strain: Not on file   ? Food insecurity     Worry: Not on file     Inability: Not on file   ? Transportation needs     Medical: Not on file     Non-medical: Not on file   Tobacco Use   ? Smoking status: Former Smoker     Types: Cigarettes     Quit date: 2011     Years since quittin.3   ? Smokeless tobacco: Never Used   Substance and Sexual Activity    ? Alcohol use: No   ? Drug use: No   ? Sexual activity: Not on file   Lifestyle   ? Physical activity     Days per week: Not on file     Minutes per session: Not on file   ? Stress: Not on file   Relationships   ? Social connections     Talks on phone: Not on file     Gets together: Not on file     Attends Mor[employer] service: Not on file     Active member of club or organization: Not on file     Attends meetings of clubs or organizations: Not on file     Relationship status: Not on file   ? Intimate partner violence     Fear of current or ex partner: Not on file     Emotionally abused: Not on file     Physically abused: Not on file     Forced sexual activity: Not on file   Other Topics Concern   ? Not on file   Social History Narrative   ? Not on file          Medications  Allergies   Current Outpatient Medications   Medication Sig Dispense Refill   ? acetaminophen (TYLENOL) 500 MG tablet Take 1,000 mg by mouth 2 (two) times a day. Max acetaminophen dose: 4000 mg in 24 hrs.             ? albuterol (PROVENTIL HFA;VENTOLIN HFA) 90 mcg/actuation inhaler Inhale 2 puffs 4 (four) times a day as needed for wheezing or shortness of breath.            ? alendronate (FOSAMAX) 70 MG tablet Take 70 mg by mouth every 7 days. Take in the morning on an empty stomach with a full glass of water 30 minutes before food     ? apixaban (ELIQUIS) 5 mg Tab tablet Take 1 tablet (5 mg total) by mouth 2 (two) times a day. 60 tablet 3   ? aspirin 81 MG EC tablet Take 1 tablet (81 mg total) by mouth daily.  0   ? calcium, as carbonate, (OS-DIONNA) 500 mg calcium (1,250 mg) tablet Take 1 tablet by mouth daily. Take with meal     ? cholecalciferol, vitamin D3, 2,000 unit cap Take 2,000 Units by mouth daily.      ? colestipol (COLESTID) 1 gram tablet Take 3 g by mouth bedtime.     ? cyanocobalamin 1,000 mcg/mL injection Inject 1,000 mcg into the shoulder, thigh, or buttocks every 30 (thirty) days. On Friday every 4 weeks      ? fluticasone  (FLONASE) 50 mcg/actuation nasal spray 1 spray into each nostril daily as needed for rhinitis.            ? fluticasone propionate (FLOVENT HFA) 110 mcg/actuation inhaler Inhale 1 puff 2 (two) times a day.            ? loratadine (CLARITIN) 10 mg tablet Take 10 mg by mouth daily as needed for allergies.            ? magnesium oxide 250 mg magnesium Tab Take 250 mg by mouth daily.     ? metoprolol tartrate (LOPRESSOR) 50 MG tablet Take 1 tablet (50 mg total) by mouth 2 (two) times a day. 30 tablet 0   ? multivitamin (MULTIVITAMIN) per tablet Take 1 tablet by mouth daily.     ? nitroglycerin (NITROSTAT) 0.4 MG SL tablet Place 1 tablet (0.4 mg total) under the tongue every 5 (five) minutes as needed for chest pain. 3 Bottle 3   ? PARoxetine (PAXIL) 40 MG tablet Take 40 mg by mouth at bedtime.            ? phenytoin (DILANTIN) 100 MG ER capsule Take 5 capsules (500 mg total) by mouth at bedtime. 30 capsule 0   ? simvastatin (ZOCOR) 40 MG tablet Take 40 mg by mouth bedtime.     ? sodium chloride (OCEAN) 0.65 % nasal spray 1 spray into each nostril 2 (two) times a day as needed for congestion.            ? spironolactone (ALDACTONE) 50 MG tablet Take 0.5 tablets (25 mg total) by mouth daily. 90 tablet 3   ? topiramate (TOPAMAX) 100 MG tablet Take 150 mg by mouth 2 (two) times a day.      ? torsemide (DEMADEX) 20 MG tablet TAKE 40MG IN THE AM AND 20MG IN  tablet 3   ? UNABLE TO FIND every 2 (two) months. Med Name: Stelara injection for Crohn       No current facility-administered medications for this visit.     Allergies   Allergen Reactions   ? Amoxicillin Other (See Comments)     GI bleeding, Colitis, has tolerated cefazolin    ? Septra [Sulfamethoxazole-Trimethoprim] Other (See Comments)     Vaginal irritation, vaginal bleeding    ? Sulfa (Sulfonamide Antibiotics) Other (See Comments)     Vaginal irritation, bleeding         Lab Results    Chemistry/lipid CBC Cardiac Enzymes/BNP/TSH/INR   Lab Results   Component  Value Date    CHOL 166 03/02/2020    HDL 60 03/02/2020    LDLCALC 69 03/02/2020    TRIG 183 (H) 03/02/2020    CREATININE 0.82 10/02/2020    BUN 17 10/02/2020    K 4.4 10/02/2020     10/02/2020     10/02/2020    CO2 27 10/02/2020    Lab Results   Component Value Date    WBC 11.2 (H) 04/27/2020    HGB 9.6 (L) 04/27/2020    HCT 31.8 (L) 04/27/2020    MCV 89 04/27/2020     04/27/2020    Lab Results   Component Value Date    CKTOTAL 807 (HH) 10/24/2016    TROPONINI 0.04 04/25/2020     (H) 10/02/2020    TSH 2.90 07/17/2019    INR 1.03 10/24/2016

## 2021-06-30 NOTE — PROGRESS NOTES
Progress Notes by Lauren Colbert MD at 5/10/2021  8:30 AM     Author: Lauren Colbert MD Service: -- Author Type: Physician    Filed: 5/10/2021  9:17 AM Encounter Date: 5/10/2021 Status: Signed    : Lauren Colbert MD (Physician)           Thank you, Dr. Benton, for asking the [facility] team to see Ms. Tommy Rea to follow-up on chronic diastolic heart failure and coronary artery disease.    Assessment/Recommendations   Assessment:    1.  Chronic diastolic heart failure, fairly well compensated although she has required some extra doses of torsemide in the past month.  On one occasion, may have been related to increased dietary sodium intake although more recently, etiology has been unclear.  I did suggest that we try to inch up her spironolactone and have her take 25 mg twice daily in addition to her current torsemide regimen.  We will see if that keeps her weights more stable.  2.  Coronary artery disease status post coronary angiogram in 2012 demonstrating moderate to severe first diagonal and proximal right coronary artery disease.  They did do flow assessment of the diagonal branch which was not significant; however, due to tortuosity of the right coronary artery, this vessel could not be assessed.  She did have a nuclear stress test a year ago which showed no ischemia.  She reports no anginal symptoms currently but admits to significantly limited activity.  Encouraged her to begin walking in her apartment building.  Continue current meds and risk factor modification.  3.  Paroxysmal atrial fibrillation, currently on metoprolol and Eliquis.  Patient appears to be in sinus rhythm at her visit today.      Plan:  1.  Continue current medications with the exception of increasing spironolactone to 25 mg twice daily  2.  Patient to call if increased symptoms of lightheadedness which she had previously with a higher dose of spironolactone  3.  Follow-up with Maria Elena in the heart failure clinic  as previously advised  4.  Patient to contact me if any new exertional symptoms     History of Present Illness    Ms. Tommy Rea is a 75 y.o. female with history of moderate to severe two-vessel coronary artery disease without prior stenting, chronic diastolic heart failure, paroxysmal atrial fibrillation who presents to the office today for a follow-up visit.  Over the past month, she has required several extra doses of torsemide due to weight gain and some lower extremity edema.  In early April, they do associated it with increased sodium intake as they went out to celebrate [facility].  The second more recent episode does not appear to be associated with any clear dietary sodium indiscretion.  She denies any symptoms of exertional dyspnea or chest discomfort but admits to spending most of her day sitting in a chair and watching TV.  Has not been doing any walking in her apartment building hallway.  I encouraged her to do so.  Denies any palpitations.  Has sustained 2 falls in the last month.  The first 1 she believes was due to reaching for something and losing her balance.  With the more recent episode, she is uncertain what caused her to fall.  Not certain whether she had any lightheadedness.    ECG (personally reviewed): No ECG today    Cardiac Imaging Studies (personally reviewed): No new imaging     Physical Examination Review of Systems   Vitals:    05/10/21 0838   BP: 118/66   Pulse: 67   Resp: 16     Body mass index is 32.73 kg/m .  Wt Readings from Last 3 Encounters:   05/10/21 209 lb (94.8 kg)   03/15/21 209 lb (94.8 kg)   11/09/20 211 lb (95.7 kg)     General Appearance:   Awake, Alert, No acute distress.   HEENT:  No scleral icterus; the mucous membranes were pink and moist.   Neck: No cervical bruits or jugular venous distention    Chest: The spine was straight. The chest was symmetric.   Lungs:   Respirations unlabored; the lungs are clear to auscultation. No wheezing   Cardiovascular:     Regular rate and rhythm.  S1, S2 normal.  No murmur or gallop   Abdomen:  No organomegaly, masses, bruits, or tenderness. Bowels sounds are present   Extremities:  Trace ankle edema on the right, no edema on the left.   Skin: No xanthelasma. Warm, Dry.   Musculoskeletal: No tenderness.   Neurologic: Mood and affect are appropriate.    General: WNL  Eyes: WNL  Ears/Nose/Throat: WNL  Lungs: WNL  Heart: WNL  Stomach: WNL  Bladder: WNL  Muscle/Joints: Muscle Pain  Skin: WNL  Nervous System: Falls, Loss of Balance  Mental Health: WNL     Blood: WNL     Medical History  Surgical History Family History Social History   Past Medical History:   Diagnosis Date   ? Asthma    ? B12 deficiency    ? CAD (coronary artery disease)    ? COPD (chronic obstructive pulmonary disease) (H)    ? Crohn's disease (H)    ? Depression    ? Esophageal reflux    ? Essential hypertension    ? Fracture of left hip requiring operative repair (H)    ? History of shingles    ? Hyperlipidemia    ? Hyperparathyroidism (H)    ? Pulmonary nodule     benign   ? Seizure (H)     Past Surgical History:   Procedure Laterality Date   ? CVL HEART CATH LEFT     ? HEMMOIROIDECT     ? ORIF HIP FRACTURE  2015   ? [place] REMOVAL DEEP IMPLANT Left 1/25/2015    Procedure: Gamma Nail Left Hip;  Surgeon: Kirk De La Cruz MD;  Location: [facility];  Service: Orthopedics   ? REVISION TOTAL HIP ARTHROPLASTY Left 07/19/2016    removal of gamma nail hardware & conversion to total hip arthroplasty: Dr. Mercado at [place] [facility]     Family History   Problem Relation Age of Onset   ? Heart disease Mother    ? Hypertension Mother    ? Heart disease Father    ? Arthritis Father     Social History     Socioeconomic History   ? Marital status:      Spouse name: Not on file   ? Number of children: Not on file   ? Years of education: Not on file   ? Highest education level: Not on file   Occupational History   ? Not on file   Social Needs   ? Financial resource strain: Not on  file   ? Food insecurity     Worry: Not on file     Inability: Not on file   ? Transportation needs     Medical: Not on file     Non-medical: Not on file   Tobacco Use   ? Smoking status: Former Smoker     Types: Cigarettes     Quit date: 2011     Years since quittin.8   ? Smokeless tobacco: Never Used   Substance and Sexual Activity   ? Alcohol use: No   ? Drug use: No   ? Sexual activity: Not on file   Lifestyle   ? Physical activity     Days per week: Not on file     Minutes per session: Not on file   ? Stress: Not on file   Relationships   ? Social connections     Talks on phone: Not on file     Gets together: Not on file     Attends Shinto service: Not on file     Active member of club or organization: Not on file     Attends meetings of clubs or organizations: Not on file     Relationship status: Not on file   ? Intimate partner violence     Fear of current or ex partner: Not on file     Emotionally abused: Not on file     Physically abused: Not on file     Forced sexual activity: Not on file   Other Topics Concern   ? Not on file   Social History Narrative   ? Not on file          Medications  Allergies   Current Outpatient Medications   Medication Sig Dispense Refill   ? acetaminophen (TYLENOL) 500 MG tablet Take 1,000 mg by mouth 2 (two) times a day. Max acetaminophen dose: 4000 mg in 24 hrs.             ? albuterol (PROVENTIL HFA;VENTOLIN HFA) 90 mcg/actuation inhaler Inhale 2 puffs 4 (four) times a day as needed for wheezing or shortness of breath.            ? alendronate (FOSAMAX) 70 MG tablet Take 70 mg by mouth every 7 days. Take in the morning on an empty stomach with a full glass of water 30 minutes before food     ? apixaban (ELIQUIS) 5 mg Tab tablet Take 1 tablet (5 mg total) by mouth 2 (two) times a day. 60 tablet 3   ? aspirin 81 MG EC tablet Take 1 tablet (81 mg total) by mouth daily.  0   ? calcium, as carbonate, (OS-DIONNA) 500 mg calcium (1,250 mg) tablet Take 1 tablet by mouth  daily. Take with meal     ? cholecalciferol, vitamin D3, 2,000 unit cap Take 2,000 Units by mouth daily.      ? colesevelam (WELCHOL) 625 mg tablet Take 1 tablet by mouth 3 (three) times a day.     ? colestipol (COLESTID) 1 gram tablet Take 3 g by mouth bedtime.     ? cyanocobalamin 1,000 mcg/mL injection Inject 1,000 mcg into the shoulder, thigh, or buttocks every 30 (thirty) days. On Friday every 4 weeks      ? fluticasone (FLONASE) 50 mcg/actuation nasal spray 1 spray into each nostril daily as needed for rhinitis.            ? fluticasone propionate (FLOVENT HFA) 110 mcg/actuation inhaler Inhale 1 puff 2 (two) times a day.            ? loratadine (CLARITIN) 10 mg tablet Take 10 mg by mouth daily as needed for allergies.            ? magnesium oxide 250 mg magnesium Tab Take 250 mg by mouth daily.     ? metoprolol tartrate (LOPRESSOR) 50 MG tablet Take 1 tablet (50 mg total) by mouth 2 (two) times a day. 30 tablet 0   ? multivitamin (MULTIVITAMIN) per tablet Take 1 tablet by mouth daily.     ? nitroglycerin (NITROSTAT) 0.4 MG SL tablet Place 1 tablet (0.4 mg total) under the tongue every 5 (five) minutes as needed for chest pain. 3 Bottle 3   ? PARoxetine (PAXIL) 40 MG tablet Take 40 mg by mouth at bedtime.            ? phenytoin (DILANTIN) 100 MG ER capsule Take 5 capsules (500 mg total) by mouth at bedtime. 30 capsule 0   ? simvastatin (ZOCOR) 40 MG tablet Take 40 mg by mouth bedtime.     ? sodium chloride (OCEAN) 0.65 % nasal spray 1 spray into each nostril 2 (two) times a day as needed for congestion.            ? spironolactone (ALDACTONE) 50 MG tablet Take 0.5 tablets (25 mg total) by mouth daily. 90 tablet 3   ? topiramate (TOPAMAX) 100 MG tablet Take 150 mg by mouth 2 (two) times a day.      ? torsemide (DEMADEX) 20 MG tablet TAKE 40MG IN THE AM AND 20MG IN  tablet 3   ? UNABLE TO FIND every 2 (two) months. Med Name: Stelara injection for Crohn       No current facility-administered medications for  this visit.       Allergies   Allergen Reactions   ? Amoxicillin Other (See Comments)     GI bleeding, Colitis, has tolerated cefazolin    ? Septra [Sulfamethoxazole-Trimethoprim] Other (See Comments)     Vaginal irritation, vaginal bleeding    ? Sulfa (Sulfonamide Antibiotics) Other (See Comments)     Vaginal irritation, bleeding         Lab Results    Chemistry/lipid CBC Cardiac Enzymes/BNP/TSH/INR   Lab Results   Component Value Date    CHOL 166 03/02/2020    HDL 60 03/02/2020    LDLCALC 69 03/02/2020    TRIG 183 (H) 03/02/2020    CREATININE 0.82 05/06/2021    BUN 17 05/06/2021    K 3.9 05/06/2021     05/06/2021     05/06/2021    CO2 26 05/06/2021    Lab Results   Component Value Date    WBC 9.3 01/04/2021    HGB 8.4 (L) 01/04/2021    HCT 30.7 (L) 01/04/2021    MCV 81 01/04/2021     01/04/2021    Lab Results   Component Value Date    CKTOTAL 807 (HH) 10/24/2016    TROPONINI 0.04 04/25/2020     (H) 11/09/2020    TSH 2.90 07/17/2019    INR 1.03 10/24/2016        A total of 30 minutes was spent reviewing patient's medical records, obtaining history and performing examination, as well as discussing diagnoses/ recommendations with patient and answering all questions.

## 2021-06-30 NOTE — PROGRESS NOTES
Progress Notes by Maria Elena Mejia CNP at 3/15/2021 12:50 PM     Author: Maria Elena Mejia CNP Service: -- Author Type: Nurse Practitioner    Filed: 3/15/2021  1:31 PM Encounter Date: 3/15/2021 Status: Signed    : Maria Elena Mejia CNP (Nurse Practitioner)             Assessment/Recommendations   Assessment:    1.  Heart failure with preserved ejection fraction, ejection fraction now 46%: Her dyspnea on exertion is stable.  Her weight is stable.  She has no symptoms of acute fluid retention.    2.  Paroxysmal atrial fibrillation: Continue metoprolol and Eliquis.    3. Coronary artery disease: Coronary angiogram in 2012 showed moderate to severe disease.  Stress test in June 2020 was negative for ischemia or infarction.  She denies any chest pain.    Plan:  1.  BMP from January reviewed and stable  2.  Continue current medications.  She can use extra torsemide as needed.  Her daughter helps her manage this.  I asked that they call the clinic if they use extra torsemide more than twice monthly  3.  Continue daily weights and low-sodium diet    Follow-up with Dr. Colbert in May and with me in 6 months.       History of Present Illness/Subjective    Ms. Tommy Rea is a 75 y.o. female seen at [facility] today for continued follow-up. Tommy Rea has a history of heart failure with preserved ejection fraction, coronary artery disease, paroxysmal atrial fibrillation, lymphedema, COPD, hypertension, and seizures.  Stress test on June 8, 2020 was negative for inducible ischemia or infarction ejection fraction of 46%.     Her daughter reports that her hemoglobin had decreased to about 8.6 which was felt to be related to her Crohn's.  Her Crohn's medication was increased and she started iron.  Her hemoglobin was checked last week and was around 11.    She has no symptoms of acute heart failure.  She has chronic dyspnea on exertion but is at baseline.  She has  not needed any extra diuretic in the past 3 months.  Home weight is stable around 206 pounds.  She denies fatigue, lightheadedness, orthopnea, chest pain and lower extremity edema.          Physical Examination Review of Systems   Vitals:    03/15/21 1303   BP: 120/68     Body mass index is 32.73 kg/m .  Wt Readings from Last 3 Encounters:   03/15/21 209 lb (94.8 kg)   11/09/20 211 lb (95.7 kg)   08/17/20 211 lb (95.7 kg)       General Appearance:     Alert, cooperative and in no acute distress.   ENT/Mouth: membranes moist, no oral lesions or bleeding gums.      EYES:  no scleral icterus, normal conjunctivae   Chest/Lungs:   lungs are clear to auscultation, no rales or wheezing, respirations unlabored   Cardiovascular:   Regular. Normal first and second heart sounds, no edema bilateral lower extremities    Abdomen:  Soft, nontender, nondistended, bowel sounds present   Extremities: no cyanosis or clubbing   Skin: warm, dry.    Neurologic: mood and affect are appropriate, alert and oriented x3      General: WNL  Eyes: WNL  Ears/Nose/Throat: WNL  Lungs: Shortness of Breath  Heart: WNL  Stomach: WNL  Bladder: WNL  Muscle/Joints: Joint Pain  Skin: WNL  Nervous System: WNL  Mental Health: Depression     Blood: WNL     Medical History  Surgical History Family History Social History   Past Medical History:   Diagnosis Date   ? Asthma    ? B12 deficiency    ? CAD (coronary artery disease)    ? COPD (chronic obstructive pulmonary disease) (H)    ? Crohn's disease (H)    ? Depression    ? Esophageal reflux    ? Essential hypertension    ? Fracture of left hip requiring operative repair (H)    ? History of shingles    ? Hyperlipidemia    ? Hyperparathyroidism (H)    ? Pulmonary nodule     benign   ? Seizure (H)     Past Surgical History:   Procedure Laterality Date   ? CVL HEART CATH LEFT     ? HEMMOIROIDECT     ? ORIF HIP FRACTURE  2015   ? [place] REMOVAL DEEP IMPLANT Left 1/25/2015    Procedure: Gamma Nail Left Hip;  Surgeon:  Kirk De La Cruz MD;  Location: [facility] Main OR;  Service: Orthopedics   ? REVISION TOTAL HIP ARTHROPLASTY Left 2016    removal of gamma nail hardware & conversion to total hip arthroplasty: Dr. Mercado at [place]     Family History   Problem Relation Age of Onset   ? Heart disease Mother    ? Hypertension Mother    ? Heart disease Father    ? Arthritis Father     Social History     Socioeconomic History   ? Marital status:      Spouse name: Not on file   ? Number of children: Not on file   ? Years of education: Not on file   ? Highest education level: Not on file   Occupational History   ? Not on file   Social Needs   ? Financial resource strain: Not on file   ? Food insecurity     Worry: Not on file     Inability: Not on file   ? Transportation needs     Medical: Not on file     Non-medical: Not on file   Tobacco Use   ? Smoking status: Former Smoker     Types: Cigarettes     Quit date: 2011     Years since quittin.7   ? Smokeless tobacco: Never Used   Substance and Sexual Activity   ? Alcohol use: No   ? Drug use: No   ? Sexual activity: Not on file   Lifestyle   ? Physical activity     Days per week: Not on file     Minutes per session: Not on file   ? Stress: Not on file   Relationships   ? Social connections     Talks on phone: Not on file     Gets together: Not on file     Attends Jain service: Not on file     Active member of club or organization: Not on file     Attends meetings of clubs or organizations: Not on file     Relationship status: Not on file   ? Intimate partner violence     Fear of current or ex partner: Not on file     Emotionally abused: Not on file     Physically abused: Not on file     Forced sexual activity: Not on file   Other Topics Concern   ? Not on file   Social History Narrative   ? Not on file          Medications  Allergies   Current Outpatient Medications   Medication Sig Dispense Refill   ? acetaminophen (TYLENOL) 500 MG tablet Take 1,000 mg by mouth  2 (two) times a day. Max acetaminophen dose: 4000 mg in 24 hrs.             ? albuterol (PROVENTIL HFA;VENTOLIN HFA) 90 mcg/actuation inhaler Inhale 2 puffs 4 (four) times a day as needed for wheezing or shortness of breath.            ? alendronate (FOSAMAX) 70 MG tablet Take 70 mg by mouth every 7 days. Take in the morning on an empty stomach with a full glass of water 30 minutes before food     ? apixaban (ELIQUIS) 5 mg Tab tablet Take 1 tablet (5 mg total) by mouth 2 (two) times a day. 60 tablet 3   ? aspirin 81 MG EC tablet Take 1 tablet (81 mg total) by mouth daily.  0   ? calcium, as carbonate, (OS-DIONNA) 500 mg calcium (1,250 mg) tablet Take 1 tablet by mouth daily. Take with meal     ? cholecalciferol, vitamin D3, 2,000 unit cap Take 2,000 Units by mouth daily.      ? colestipol (COLESTID) 1 gram tablet Take 3 g by mouth bedtime.     ? cyanocobalamin 1,000 mcg/mL injection Inject 1,000 mcg into the shoulder, thigh, or buttocks every 30 (thirty) days. On Friday every 4 weeks      ? fluticasone (FLONASE) 50 mcg/actuation nasal spray 1 spray into each nostril daily as needed for rhinitis.            ? fluticasone propionate (FLOVENT HFA) 110 mcg/actuation inhaler Inhale 1 puff 2 (two) times a day.            ? loratadine (CLARITIN) 10 mg tablet Take 10 mg by mouth daily as needed for allergies.            ? magnesium oxide 250 mg magnesium Tab Take 250 mg by mouth daily.     ? metoprolol tartrate (LOPRESSOR) 50 MG tablet Take 1 tablet (50 mg total) by mouth 2 (two) times a day. 30 tablet 0   ? multivitamin (MULTIVITAMIN) per tablet Take 1 tablet by mouth daily.     ? nitroglycerin (NITROSTAT) 0.4 MG SL tablet Place 1 tablet (0.4 mg total) under the tongue every 5 (five) minutes as needed for chest pain. 3 Bottle 3   ? PARoxetine (PAXIL) 40 MG tablet Take 40 mg by mouth at bedtime.            ? phenytoin (DILANTIN) 100 MG ER capsule Take 5 capsules (500 mg total) by mouth at bedtime. 30 capsule 0   ? simvastatin  (ZOCOR) 40 MG tablet Take 40 mg by mouth bedtime.     ? sodium chloride (OCEAN) 0.65 % nasal spray 1 spray into each nostril 2 (two) times a day as needed for congestion.            ? spironolactone (ALDACTONE) 50 MG tablet Take 0.5 tablets (25 mg total) by mouth daily. 90 tablet 3   ? topiramate (TOPAMAX) 100 MG tablet Take 150 mg by mouth 2 (two) times a day.      ? torsemide (DEMADEX) 20 MG tablet TAKE 40MG IN THE AM AND 20MG IN  tablet 3   ? UNABLE TO FIND every 2 (two) months. Med Name: Stelara injection for Crohn       No current facility-administered medications for this visit.     Allergies   Allergen Reactions   ? Amoxicillin Other (See Comments)     GI bleeding, Colitis, has tolerated cefazolin    ? Septra [Sulfamethoxazole-Trimethoprim] Other (See Comments)     Vaginal irritation, vaginal bleeding    ? Sulfa (Sulfonamide Antibiotics) Other (See Comments)     Vaginal irritation, bleeding         Lab Results    Chemistry/lipid CBC Cardiac Enzymes/BNP/TSH/INR   Lab Results   Component Value Date    CHOL 166 03/02/2020    HDL 60 03/02/2020    LDLCALC 69 03/02/2020    TRIG 183 (H) 03/02/2020    CREATININE 0.85 01/04/2021    BUN 16 01/04/2021    K 4.1 01/04/2021     (L) 01/04/2021    CL 99 01/04/2021    CO2 27 01/04/2021    Lab Results   Component Value Date    WBC 9.3 01/04/2021    HGB 8.4 (L) 01/04/2021    HCT 30.7 (L) 01/04/2021    MCV 81 01/04/2021     01/04/2021    Lab Results   Component Value Date    CKTOTAL 807 (HH) 10/24/2016    TROPONINI 0.04 04/25/2020     (H) 11/09/2020    TSH 2.90 07/17/2019    INR 1.03 10/24/2016

## 2021-07-06 VITALS
BODY MASS INDEX: 32.8 KG/M2 | DIASTOLIC BLOOD PRESSURE: 68 MMHG | HEIGHT: 67 IN | WEIGHT: 209 LBS | SYSTOLIC BLOOD PRESSURE: 116 MMHG | HEART RATE: 72 BPM | RESPIRATION RATE: 18 BRPM

## 2021-07-21 ENCOUNTER — LAB REQUISITION (OUTPATIENT)
Dept: LAB | Facility: CLINIC | Age: 76
End: 2021-07-21
Payer: COMMERCIAL

## 2021-07-21 DIAGNOSIS — R82.90 UNSPECIFIED ABNORMAL FINDINGS IN URINE: ICD-10-CM

## 2021-07-21 PROCEDURE — 87086 URINE CULTURE/COLONY COUNT: CPT | Mod: ORL | Performed by: FAMILY MEDICINE

## 2021-07-22 LAB — BACTERIA UR CULT: ABNORMAL

## 2021-08-02 ENCOUNTER — ANCILLARY PROCEDURE (OUTPATIENT)
Dept: NEUROLOGY | Facility: CLINIC | Age: 76
End: 2021-08-02
Attending: PSYCHIATRY & NEUROLOGY
Payer: COMMERCIAL

## 2021-08-02 VITALS
SYSTOLIC BLOOD PRESSURE: 110 MMHG | DIASTOLIC BLOOD PRESSURE: 59 MMHG | HEART RATE: 62 BPM | WEIGHT: 210 LBS | HEIGHT: 67 IN | BODY MASS INDEX: 32.96 KG/M2

## 2021-08-02 DIAGNOSIS — G40.209 PARTIAL SYMPTOMATIC EPILEPSY WITH COMPLEX PARTIAL SEIZURES, NOT INTRACTABLE, WITHOUT STATUS EPILEPTICUS (H): ICD-10-CM

## 2021-08-02 DIAGNOSIS — G40.209 PARTIAL SYMPTOMATIC EPILEPSY WITH COMPLEX PARTIAL SEIZURES, NOT INTRACTABLE, WITHOUT STATUS EPILEPTICUS (H): Primary | ICD-10-CM

## 2021-08-02 PROBLEM — I10 BENIGN ESSENTIAL HYPERTENSION: Status: ACTIVE | Noted: 2021-08-02

## 2021-08-02 PROCEDURE — 99213 OFFICE O/P EST LOW 20 MIN: CPT | Performed by: PSYCHIATRY & NEUROLOGY

## 2021-08-02 PROCEDURE — 95816 EEG AWAKE AND DROWSY: CPT | Performed by: PSYCHIATRY & NEUROLOGY

## 2021-08-02 ASSESSMENT — MIFFLIN-ST. JEOR: SCORE: 1475.18

## 2021-08-02 NOTE — LETTER
2021         RE: Tommy Rea  [address]  [facility] [address]        Dear Colleague,    Thank you for referring your patient, Tommy Rea, to the [facility] [place]. Please see a copy of my visit note below.    NEUROLOGY FOLLOW UP VISIT  NOTE       [facility]UROLOG[facility] [place]  [address] [place] [address]  Tel: [phone]  Fax: [phone]  [email or web address]     Tommy Rea,  1945, MRN [id number]  PCP: Hannah Benton  Date: 2021      ASSESSMENT & PLAN     Diagnosis code  1. Partial symptomatic epilepsy with complex partial seizures, not intractable, without status epilepticus (H)     Complex partial seizures  76 years old female with history of complex partial seizure, depression with anxiety, paroxysmal A. fib who returns for follow-up.  She reported increased auras and had a repeat EEG that fortunately shows no seizure activity.  Background was normal with intermittent slowing in theta range likely due to medication effect.  During her last visit she had Topamax level and Dilantin level checked that were normal.  I have asked her to continue on Dilantin 500 mg at bedtime and the Topamax 150 mg twice daily.  Prescriptions were filled.  I have asked her to contact us if the symptoms increase in frequency as in that case I will schedule her for a 24-hour EEG.  Otherwise follow-up will be in 1 year    Thank you again for this referral, please feel free to contact me if you have any questions.    Guy Marie MD  [facility]  (Former[facility], [facility]     HISTORY OF PRESENT ILLNESS     Patient is a 76 years old female with history of CHF, depression and anxiety, complex partial seizure, paroxysmal atrial fibrillation who had a EEG earlier today as she was having frequent auras at least once or twice a month.  Currently she is on Dilantin and  Topamax and levels drawn during her previous visit were therapeutic.  EEG showed nonspecific slowing in theta range.  She denies any further auras    She was last seen on 6/21/2021 for yearly follow-up when she reported no seizures but given history that in 2020 she was admitted to the hospital with confusion along with some speech difficulty and was noted to have Dilantin toxicity.  EEG was done and that showed nonspecific slowing.  CT of the head and MRI were unremarkable and showed age-related changes.  Her Dilantin dose was adjusted and she was discharged however she continued to have episodes during which she gets an aura at least once a month.  Fortunately none evolve into tonic-clonic seizure.    Patient does have history of seizure and was initially evaluated for seizure when she presented with a generalized seizure and EEG showed left hemispheric sharp activity. MRI was normal. 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 Patient reports that her seizures are triggered by stress and usually she gets an aura. 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) G40.209     Hyperlipidemia E78.5     Depressive disorder F32.9     Coronary atherosclerosis I25.10     B12 deficiency E53.8     Atrial fibrillation (H) I48.91     (HFpEF) heart failure with preserved ejection fraction (H) I50.30     COPD (chronic obstructive pulmonary disease) (H) J44.9     Benign essential hypertension I10     Hyperparathyroidism (H) E21.3         PAST MEDICAL & SURGICAL HISTORY     Past Medical History:   Patient  has a past medical history of Arthritis, Asthma, B12 deficiency, CAD (coronary artery disease), COPD (chronic obstructive pulmonary disease) (H), COPD (chronic obstructive pulmonary  disease) (H), Crohn's disease (H), Crohn's disease (H), Depression, Depressive disorder, Esophageal reflux, Essential hypertension, Fracture of left hip requiring operative repair (H), Heart disease, History of shingles, Hyperlipidemia, Hyperparathyroidism (H), Hypertension, Osteoporosis, Pulmonary nodule, Regional enteritis (H) (7/19/2016), Seizure (H), and Seizures (H).    Surgical History:  She  has a past surgical history that includes orthopedic surgery; hemorrhoidectomy; REMOVAL DEEP IMPLANT (Left, 1/25/2015); Orif Hip Fracture (2015); Arthroplasty revision hip (Left, 07/19/2016); other surgical history; and other surgical history.     SOCIAL HISTORY     Reviewed, and she  reports that she quit smoking about 10 years ago. Her smoking use included cigarettes and cigarettes. She has never used smokeless tobacco. She reports that she does not drink alcohol and does not use drugs.     FAMILY HISTORY     Reviewed, and family history includes Arthritis in her father; Colon Cancer in her father; Heart Disease in her father and mother; Hypertension in her father and mother.     ALLERGIES     Allergies   Allergen Reactions     Amoxicillin GI Disturbance and Other (See Comments)     COLITIS    Has tolerated cefazolin  GI bleeding, Colitis, has tolerated cefazolin        Sulfa Drugs      Sulfamethoxazole-Trimethoprim Other (See Comments)     Other reaction(s): Vaginal Irritation  VAGINAL BLEEDING  Vaginal irritation, vaginal bleeding            REVIEW OF SYSTEMS     A 12 point review of system was performed and was negative except as outlined in the history of present illness.     HOME MEDICATIONS     Current Outpatient Rx   Medication Sig Dispense Refill     alendronate (FOSAMAX) 70 MG tablet Take 70 mg by mouth       apixaban ANTICOAGULANT (ELIQUIS) 5 MG tablet Take 5 mg by mouth 2 times daily        aspirin 81 MG EC tablet Take 81 mg by mouth       colestipol (COLESTID) 1 g tablet Take 3 g by mouth        "cyanocobalamin (CYANOCOBALAMIN) 1000 MCG/ML injection Inject 1,000 mcg into the muscle       HAYLEY CALCIUM-VITAMIN D PO        loratadine (CLARITIN) 10 MG tablet Take 10 mg by mouth       Magnesium Oxide 250 MG TABS Take 250 mg by mouth       metoprolol tartrate (LOPRESSOR) 50 MG tablet Take 50 mg by mouth       multivitamin w/minerals (MULTI-VITAMIN) tablet Take 1 tablet by mouth       PARoxetine (PAXIL) 40 MG tablet Take 40 mg by mouth       phenytoin (DILANTIN) 100 MG capsule Take 5 capsules (500 mg) by mouth At Bedtime 540 capsule 3     simvastatin (ZOCOR) 40 MG tablet Take 40 mg by mouth       spironolactone (ALDACTONE) 25 MG tablet Take 25 mg by mouth daily       spironolactone (ALDACTONE) 50 MG tablet Take 50mg (1 tab)  in am and 25mg (half tab) in pm.       topiramate (TOPAMAX) 100 MG tablet Take 1.5 tablets (150 mg) by mouth 2 times daily 270 tablet 3     torsemide (DEMADEX) 20 MG tablet        acetaminophen (TYLENOL) 500 MG tablet Take 1,000 mg by mouth       albuterol (PROAIR HFA/PROVENTIL HFA/VENTOLIN HFA) 108 (90 Base) MCG/ACT inhaler Inhale 2 puffs into the lungs       fluticasone (FLOVENT HFA) 110 MCG/ACT inhaler Inhale 1 puff into the lungs       nitroGLYcerin (NITROSTAT) 0.4 MG sublingual tablet Place 0.4 mg under the tongue           PHYSICAL EXAM     Vital signs  /59 (BP Location: Right arm, Patient Position: Sitting)   Pulse 62   Ht 1.702 m (5' 7\")   Wt 95.3 kg (210 lb)   BMI 32.89 kg/m      Weight:   210 lbs 0 oz    General Physical Exam: Patient is alert and oriented x 3. Vital signs were reviewed and are documented in EMR. Neck was supple  Neurological Exam:  Patient is alert and oriented x3 speech was normal there was no dysarthria or aphasia.  Cranial nerves are intact.  She does appear hard of hearing.  Face symmetrical tongue midline.  Strength in all extremities 5/5.  Sensation intact.  Gait normal.  No dysmetria noted on finger-nose testing.     DIAGNOSTIC STUDIES     PERTINENT " RADIOLOGY  Following imaging studies were reviewed:     MRI 4/27/20  HEAD MRI:  1.  No acute intracranial finding. No evidence for recent ischemia, intracranial hemorrhage, or mass.    HEAD MRA:  1.  Negative MR angiogram of the brain. No evidence for aneurysm, proximal vessel occlusion, high-grade intracranial stenosis or high flow vascular lesion.    NECK MRA:  1.  Atherosclerotic narrowing of the proximal left internal carotid artery is not well-characterized due to motion artifact but suspected to be in the 50% range based on NASCET criteria.  2.  No hemodynamically significant narrowing within the right carotid artery.   3.  Vertebral arteries are patent. No dissection.     CT HEAD 4/26/20  1.  No CT evidence for acute intracranial process.  2.  Brain atrophy and presumed chronic microvascular ischemic changes as above.  3.  Mucosal thickening is noted involving the left sphenoid sinus. Correlation for sinusitis is recommended.     EEG 8/3/2021  Nonspecific slowing in theta range.  No sharp activity seen to suggest seizures    EEG 4/27/20  This is an abnormal EEG due to diffusely slow background in   theta and delta range that suggests nonspecific generalized cerebral   dysfunction.  EEG background activity is contaminated with muscle   artifact.  Such nonspecific slowing suggests diffuse cerebral dysfunction   but no clear epileptiform activity was noted     CT HEAD 7/17/19  1. No intracranial mass. No hemorrhage or stroke.  2. Mild presumed chronic small vessel ischemic change and age-related change again visualized.  3. Interval development of polypoid soft tissue in the external auditory canals.    MRI OF THE HEAD DONE in 2015 was normal. EEG was abnormal suggesting low threshold for seizure     PERTINENT LABS  Following labs were reviewed:  Lab Requisition on 07/21/2021   Component Date Value     Culture 07/21/2021 10,000-50,000 CFU/mL Enterococcus species*   Ambulatory - [facility] on 06/21/2021    Component Date Value     Topiramate 06/21/2021 6.7      Phenytoin 06/21/2021 12.9    Ambulatory - [facility] on 06/01/2021   Component Date Value     Sodium 06/01/2021 142      Potassium 06/01/2021 3.7      Chloride 06/01/2021 105      Carbon Dioxide (CO2) 06/01/2021 25      Anion Gap 06/01/2021 12      Glucose 06/01/2021 109      Calcium 06/01/2021 9.0      Urea Nitrogen 06/01/2021 18      Creatinine 06/01/2021 0.77      GFR Estimate If Black 06/01/2021 >60      GFR Estimate 06/01/2021 >60    Ambulatory - [facility] on 05/06/2021   Component Date Value     Sodium 05/06/2021 140      Potassium 05/06/2021 3.9      Chloride 05/06/2021 102      Carbon Dioxide (CO2) 05/06/2021 26      Anion Gap 05/06/2021 12      Glucose 05/06/2021 111      Calcium 05/06/2021 9.0      Urea Nitrogen 05/06/2021 17      Creatinine 05/06/2021 0.82      GFR Estimate If Black 05/06/2021 >60      GFR Estimate 05/06/2021 >60          Total time spent for face to face visit, reviewing labs/imaging studies, counseling and coordination of care was: 30 Minutes spent on the date of the encounter doing chart review, review of outside records, review of test results, interpretation of tests, patient visit and documentation       This note was dictated using voice recognition software.  Any grammatical or context distortions are unintentional and inherent to the software.    No orders of the defined types were placed in this encounter.     New Prescriptions    No medications on file     Modified Medications    No medications on file                     Again, thank you for allowing me to participate in the care of your patient.        Sincerely,        Guy Marie MD

## 2021-08-02 NOTE — PROGRESS NOTES
NEUROLOGY FOLLOW UP VISIT  NOTE       [facility] NEUROLOGY [place]  [address] [place] [address]  Tel: [phone]  Fax: [phone]  [email or web address]     Tommy Rea,  1945, MRN [id number]  PCP: Hannah Benton  Date: 2021      ASSESSMENT & PLAN     Diagnosis code  1. Partial symptomatic epilepsy with complex partial seizures, not intractable, without status epilepticus (H)     Complex partial seizures  76 years old female with history of complex partial seizure, depression with anxiety, paroxysmal A. fib who returns for follow-up.  She reported increased auras and had a repeat EEG that fortunately shows no seizure activity.  Background was normal with intermittent slowing in theta range likely due to medication effect.  During her last visit she had Topamax level and Dilantin level checked that were normal.  I have asked her to continue on Dilantin 500 mg at bedtime and the Topamax 150 mg twice daily.  Prescriptions were filled.  I have asked her to contact us if the symptoms increase in frequency as in that case I will schedule her for a 24-hour EEG.  Otherwise follow-up will be in 1 year    Thank you again for this referral, please feel free to contact me if you have any questions.    Guy Marie MD  [facility]  ([facility], [facility]     HISTORY OF PRESENT ILLNESS     Patient is a 76 years old female with history of CHF, depression and anxiety, complex partial seizure, paroxysmal atrial fibrillation who had a EEG earlier today as she was having frequent auras at least once or twice a month.  Currently she is on Dilantin and Topamax and levels drawn during her previous visit were therapeutic.  EEG showed nonspecific slowing in theta range.  She denies any further auras    She was last seen on 2021 for yearly follow-up when she reported no seizures but given history that in  she was  admitted to the hospital with confusion along with some speech difficulty and was noted to have Dilantin toxicity.  EEG was done and that showed nonspecific slowing.  CT of the head and MRI were unremarkable and showed age-related changes.  Her Dilantin dose was adjusted and she was discharged however she continued to have episodes during which she gets an aura at least once a month.  Fortunately none evolve into tonic-clonic seizure.    Patient does have history of seizure and was initially evaluated for seizure when she presented with a generalized seizure and EEG showed left hemispheric sharp activity. MRI was normal. 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 Patient reports that her seizures are triggered by stress and usually she gets an aura. 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) G40.209     Hyperlipidemia E78.5     Depressive disorder F32.9     Coronary atherosclerosis I25.10     B12 deficiency E53.8     Atrial fibrillation (H) I48.91     (HFpEF) heart failure with preserved ejection fraction (H) I50.30     COPD (chronic obstructive pulmonary disease) (H) J44.9     Benign essential hypertension I10     Hyperparathyroidism (H) E21.3         PAST MEDICAL & SURGICAL HISTORY     Past Medical History:   Patient  has a past medical history of Arthritis, Asthma, B12 deficiency, CAD (coronary artery disease), COPD (chronic obstructive pulmonary disease) (H), COPD (chronic obstructive pulmonary disease) (H), Crohn's disease (H), Crohn's disease (H), Depression, Depressive disorder, Esophageal reflux, Essential hypertension, Fracture of left hip requiring operative repair (H), Heart disease, History of shingles, Hyperlipidemia, Hyperparathyroidism (H), Hypertension,  Osteoporosis, Pulmonary nodule, Regional enteritis (H) (7/19/2016), Seizure (H), and Seizures (H).    Surgical History:  She  has a past surgical history that includes orthopedic surgery; hemorrhoidectomy; REMOVAL DEEP IMPLANT (Left, 1/25/2015); Orif Hip Fracture (2015); Arthroplasty revision hip (Left, 07/19/2016); other surgical history; and other surgical history.     SOCIAL HISTORY     Reviewed, and she  reports that she quit smoking about 10 years ago. Her smoking use included cigarettes and cigarettes. She has never used smokeless tobacco. She reports that she does not drink alcohol and does not use drugs.     FAMILY HISTORY     Reviewed, and family history includes Arthritis in her father; Colon Cancer in her father; Heart Disease in her father and mother; Hypertension in her father and mother.     ALLERGIES     Allergies   Allergen Reactions     Amoxicillin GI Disturbance and Other (See Comments)     COLITIS    Has tolerated cefazolin  GI bleeding, Colitis, has tolerated cefazolin        Sulfa Drugs      Sulfamethoxazole-Trimethoprim Other (See Comments)     Other reaction(s): Vaginal Irritation  VAGINAL BLEEDING  Vaginal irritation, vaginal bleeding            REVIEW OF SYSTEMS     A 12 point review of system was performed and was negative except as outlined in the history of present illness.     HOME MEDICATIONS     Current Outpatient Rx   Medication Sig Dispense Refill     alendronate (FOSAMAX) 70 MG tablet Take 70 mg by mouth       apixaban ANTICOAGULANT (ELIQUIS) 5 MG tablet Take 5 mg by mouth 2 times daily        aspirin 81 MG EC tablet Take 81 mg by mouth       colestipol (COLESTID) 1 g tablet Take 3 g by mouth       cyanocobalamin (CYANOCOBALAMIN) 1000 MCG/ML injection Inject 1,000 mcg into the muscle       HAYLEY CALCIUM-VITAMIN D PO        loratadine (CLARITIN) 10 MG tablet Take 10 mg by mouth       Magnesium Oxide 250 MG TABS Take 250 mg by mouth       metoprolol tartrate (LOPRESSOR) 50 MG tablet  "Take 50 mg by mouth       multivitamin w/minerals (MULTI-VITAMIN) tablet Take 1 tablet by mouth       PARoxetine (PAXIL) 40 MG tablet Take 40 mg by mouth       phenytoin (DILANTIN) 100 MG capsule Take 5 capsules (500 mg) by mouth At Bedtime 540 capsule 3     simvastatin (ZOCOR) 40 MG tablet Take 40 mg by mouth       spironolactone (ALDACTONE) 25 MG tablet Take 25 mg by mouth daily       spironolactone (ALDACTONE) 50 MG tablet Take 50mg (1 tab)  in am and 25mg (half tab) in pm.       topiramate (TOPAMAX) 100 MG tablet Take 1.5 tablets (150 mg) by mouth 2 times daily 270 tablet 3     torsemide (DEMADEX) 20 MG tablet        acetaminophen (TYLENOL) 500 MG tablet Take 1,000 mg by mouth       albuterol (PROAIR HFA/PROVENTIL HFA/VENTOLIN HFA) 108 (90 Base) MCG/ACT inhaler Inhale 2 puffs into the lungs       fluticasone (FLOVENT HFA) 110 MCG/ACT inhaler Inhale 1 puff into the lungs       nitroGLYcerin (NITROSTAT) 0.4 MG sublingual tablet Place 0.4 mg under the tongue           PHYSICAL EXAM     Vital signs  /59 (BP Location: Right arm, Patient Position: Sitting)   Pulse 62   Ht 1.702 m (5' 7\")   Wt 95.3 kg (210 lb)   BMI 32.89 kg/m      Weight:   210 lbs 0 oz    General Physical Exam: Patient is alert and oriented x 3. Vital signs were reviewed and are documented in EMR. Neck was supple  Neurological Exam:  Patient is alert and oriented x3 speech was normal there was no dysarthria or aphasia.  Cranial nerves are intact.  She does appear hard of hearing.  Face symmetrical tongue midline.  Strength in all extremities 5/5.  Sensation intact.  Gait normal.  No dysmetria noted on finger-nose testing.     DIAGNOSTIC STUDIES     PERTINENT RADIOLOGY  Following imaging studies were reviewed:     MRI 4/27/20  HEAD MRI:  1.  No acute intracranial finding. No evidence for recent ischemia, intracranial hemorrhage, or mass.    HEAD MRA:  1.  Negative MR angiogram of the brain. No evidence for aneurysm, proximal vessel " occlusion, high-grade intracranial stenosis or high flow vascular lesion.    NECK MRA:  1.  Atherosclerotic narrowing of the proximal left internal carotid artery is not well-characterized due to motion artifact but suspected to be in the 50% range based on NASCET criteria.  2.  No hemodynamically significant narrowing within the right carotid artery.   3.  Vertebral arteries are patent. No dissection.     CT HEAD 4/26/20  1.  No CT evidence for acute intracranial process.  2.  Brain atrophy and presumed chronic microvascular ischemic changes as above.  3.  Mucosal thickening is noted involving the left sphenoid sinus. Correlation for sinusitis is recommended.     EEG 8/3/2021  Nonspecific slowing in theta range.  No sharp activity seen to suggest seizures    EEG 4/27/20  This is an abnormal EEG due to diffusely slow background in   theta and delta range that suggests nonspecific generalized cerebral   dysfunction.  EEG background activity is contaminated with muscle   artifact.  Such nonspecific slowing suggests diffuse cerebral dysfunction   but no clear epileptiform activity was noted     CT HEAD 7/17/19  1. No intracranial mass. No hemorrhage or stroke.  2. Mild presumed chronic small vessel ischemic change and age-related change again visualized.  3. Interval development of polypoid soft tissue in the external auditory canals.    MRI OF THE HEAD DONE in 2015 was normal. EEG was abnormal suggesting low threshold for seizure     PERTINENT LABS  Following labs were reviewed:  Lab Requisition on 07/21/2021   Component Date Value     Culture 07/21/2021 10,000-50,000 CFU/mL Enterococcus species*   Ambulatory - [facility] on 06/21/2021   Component Date Value     Topiramate 06/21/2021 6.7      Phenytoin 06/21/2021 12.9    Ambulatory - [facility] on 06/01/2021   Component Date Value     Sodium 06/01/2021 142      Potassium 06/01/2021 3.7      Chloride 06/01/2021 105      Carbon Dioxide (CO2) 06/01/2021 25      Anion Gap  06/01/2021 12      Glucose 06/01/2021 109      Calcium 06/01/2021 9.0      Urea Nitrogen 06/01/2021 18      Creatinine 06/01/2021 0.77      GFR Estimate If Black 06/01/2021 >60      GFR Estimate 06/01/2021 >60    Ambulatory - [facility] on 05/06/2021   Component Date Value     Sodium 05/06/2021 140      Potassium 05/06/2021 3.9      Chloride 05/06/2021 102      Carbon Dioxide (CO2) 05/06/2021 26      Anion Gap 05/06/2021 12      Glucose 05/06/2021 111      Calcium 05/06/2021 9.0      Urea Nitrogen 05/06/2021 17      Creatinine 05/06/2021 0.82      GFR Estimate If Black 05/06/2021 >60      GFR Estimate 05/06/2021 >60          Total time spent for face to face visit, reviewing labs/imaging studies, counseling and coordination of care was: 30 Minutes spent on the date of the encounter doing chart review, review of outside records, review of test results, interpretation of tests, patient visit and documentation       This note was dictated using voice recognition software.  Any grammatical or context distortions are unintentional and inherent to the software.    No orders of the defined types were placed in this encounter.     New Prescriptions    No medications on file     Modified Medications    No medications on file

## 2021-08-02 NOTE — NURSING NOTE
Chief Complaint   Patient presents with     Complex partial seizures     Discuss EEG and lab results      Katherine Preston CMA on 8/2/2021 at 9:42 AM

## 2021-08-04 ENCOUNTER — TELEPHONE (OUTPATIENT)
Dept: CARDIOLOGY | Facility: CLINIC | Age: 76
End: 2021-08-04

## 2021-08-04 NOTE — TELEPHONE ENCOUNTER
----- Message from Maria Elena Mejia NP sent at 8/2/2021 11:04 AM CDT -----  Tommy was supposed to have BMP in July but I do not see any results.  Can you please call her?

## 2021-08-04 NOTE — TELEPHONE ENCOUNTER
PC to Kierra to inquire about BMP draw. Tommy has a lab appt on 8/16/21. She had to push the appt back due to Tommy getting sick. She states her HF symptoms are stable; and denies wt gain, shortness of breath, or increased edema. She was transferred to [facility] to make September appt with Maria Elena Mejia NP. [facility] paperwork will be filled out and mailed to Kierra per her request. AB/RN

## 2021-08-10 DIAGNOSIS — I50.32 CHRONIC HEART FAILURE WITH PRESERVED EJECTION FRACTION (H): Primary | ICD-10-CM

## 2021-08-10 RX ORDER — TORSEMIDE 20 MG/1
TABLET ORAL
Qty: 270 TABLET | Refills: 1 | Status: SHIPPED | OUTPATIENT
Start: 2021-08-10 | End: 2021-08-23

## 2021-08-13 ENCOUNTER — TELEPHONE (OUTPATIENT)
Dept: CARDIOLOGY | Facility: CLINIC | Age: 76
End: 2021-08-13

## 2021-08-13 NOTE — TELEPHONE ENCOUNTER
No further recommendations.  Daughter increasing diuretic and we will see if that works and review labs next week.

## 2021-08-13 NOTE — TELEPHONE ENCOUNTER
PC received from Tommy's daughter, Kierra, stating that she has not received [facility] paperwork that was mailed to her on 8/4/21. Unfortunately, no extra copy was filed. She will attempt to obtain another copy and will reach out to [facility] once obtained ([facility] nurse line # given).   Kierra mentioned that although Tommy has been receiving [employer], she has had a recent weight gain (up to 212# from 208# over a few days). She plans to give Tommy 1 extra torsemide daily for 3 days and monitor HF symptoms. Tommy is getting a BMP Monday 8/16. [facility] will plan to follow-up after labs resulted. AB/RN

## 2021-08-16 ENCOUNTER — LAB REQUISITION (OUTPATIENT)
Dept: LAB | Facility: CLINIC | Age: 76
End: 2021-08-16
Payer: COMMERCIAL

## 2021-08-16 DIAGNOSIS — I50.32 CHRONIC DIASTOLIC (CONGESTIVE) HEART FAILURE (H): ICD-10-CM

## 2021-08-16 LAB
ANION GAP SERPL CALCULATED.3IONS-SCNC: 14 MMOL/L (ref 5–18)
BUN SERPL-MCNC: 13 MG/DL (ref 8–28)
CALCIUM SERPL-MCNC: 9.2 MG/DL (ref 8.5–10.5)
CHLORIDE BLD-SCNC: 103 MMOL/L (ref 98–107)
CO2 SERPL-SCNC: 22 MMOL/L (ref 22–31)
CREAT SERPL-MCNC: 0.81 MG/DL (ref 0.6–1.1)
GFR SERPL CREATININE-BSD FRML MDRD: 71 ML/MIN/1.73M2
GLUCOSE BLD-MCNC: 116 MG/DL (ref 70–125)
POTASSIUM BLD-SCNC: 3.6 MMOL/L (ref 3.5–5)
SODIUM SERPL-SCNC: 139 MMOL/L (ref 136–145)

## 2021-08-16 PROCEDURE — 80048 BASIC METABOLIC PNL TOTAL CA: CPT | Mod: ORL | Performed by: FAMILY MEDICINE

## 2021-08-18 ENCOUNTER — TELEPHONE (OUTPATIENT)
Dept: CARDIOLOGY | Facility: CLINIC | Age: 76
End: 2021-08-18

## 2021-08-18 DIAGNOSIS — I50.32 CHRONIC HEART FAILURE WITH PRESERVED EJECTION FRACTION (H): Primary | ICD-10-CM

## 2021-08-18 NOTE — TELEPHONE ENCOUNTER
Results reviewed by Maria Elena Mejia NP and called to patient via phone.   Spoke with Josy (Ge's daughter), and she states her mom's weight is still elevated (211-212#). She gave her extra 20 mg torsemide x 5 days total without any change in weight. Back to regular dose (40 mg in AM and 20 mg in PM) last 2 days. Merrily denies shortness of breath, but endorses some LE swelling. BMP resulted 8/16. Will follow-up if any other provider recs. AB/RN

## 2021-08-18 NOTE — TELEPHONE ENCOUNTER
PC to kyle and recommendation to utilize compression socks while awake given. Kyle states she does have compression socks and will encourage her to use them daily. Tommy receives [employer] and eats one of them daily. She does drink a [employer] mocha drink from grocery store that has moderate-high sodium, and we discussed she should keep track of how much sodium she is drinking and eating outside of [employer], and to make sure that it does not exceed daily amount. Kyle was asked to call back NF nurse line on Friday 8/20 with symptom and weight update after these interventions. AB/RN

## 2021-08-23 RX ORDER — TORSEMIDE 20 MG/1
TABLET ORAL
Qty: 270 TABLET | Refills: 1 | Status: SHIPPED | OUTPATIENT
Start: 2021-08-23 | End: 2022-02-07

## 2021-08-23 NOTE — TELEPHONE ENCOUNTER
Kierra is calling with updates for her Mother Tommy and her fluid status. She states that her weights are increasing, despite her extra dosing of the  Torsemide 20mg x 5 days. ( and seeing no improvement)  She reports that her wt today was 213.4# and that her stable wt usually runs 207-208#.  She has lower extremity swelling, wears TEDS during the day. She has some shortness of breath with exertion especially stairs, which is getting more noticable. Has to sit down and rest after the stairs, or carrying anything like laundry.  Is sleeping fine, uses two pillows. She denies cough, no dizziness or lightheadedness noted. No c/o chest pain.    Tommy uses [employer] and only eats one a day. She likes flavored coffee drinks from [employer] and is a snacker in between.  Emphasized that she needed to reduce the sodium in the other items she is eating/drinking to be sure that they are not adding to the fluid retention. She is drinking 55oz of fluids daily estimated amount.    Currently using: Torsemide 40mg in AM and 20mg in PM, ( with additional 20mg in PM x 5 days used)  She has Spironolactone 50mg using 1 tablet in AM and half tab in pm.    La Nicholas CNP in Maria Elena's absence, do you have any additional recommendations at this time?    Breanne Toure RN BSN, CHFN

## 2021-08-23 NOTE — TELEPHONE ENCOUNTER
Tommy's  Daughter Kierra is contacted with recommendations to take the Torsemide 60mg bid x 3 days. Then to return to 40mg bid thereafter. She will begin this increased dose starting this afternoon.   Tommy also may be adding in more fluids daily than previously thought. She might be refilling her 32 oz water bottle three times a day. Kierra was reminded that the fluid restriction is an important part of the plan of care. She will utilize a pitcher in the refridgerator daily with 60 oz in it so that she may see where she is at for the day on her fluid intake measurements.   This may be a place she can make improvements.    Tommy is a snacker, she likes the pretzels (Low sodium ones) and peanut butter/jelly, hummus and veggies for snacks. Some of which may be contributing to her salt intake. She does not eat the [employer] meals every day, only once in a while.     [facility] RN will f/u with her on Thursday for updates.      Breanne Toure RN BSN, CHFN

## 2021-08-23 NOTE — TELEPHONE ENCOUNTER
Please have her increase torsemide 60 mg twice a day for 3 days then continue 40 mg twice a day thereafter.  Please have her increase potassium foods in her diet.  Please call her Thursday for an update.  Thank you

## 2021-08-24 DIAGNOSIS — G40.209 PARTIAL SYMPTOMATIC EPILEPSY WITH COMPLEX PARTIAL SEIZURES, NOT INTRACTABLE, WITHOUT STATUS EPILEPTICUS (H): ICD-10-CM

## 2021-08-24 NOTE — TELEPHONE ENCOUNTER
Deny for refills on file     Outpatient Medication Detail     Disp Refills Start End MEGHAN   phenytoin (DILANTIN) 100 MG capsule 540 capsule 3 6/21/2021  No   Sig - Route: Take 5 capsules (500 mg) by mouth At Bedtime - Oral   Sent to pharmacy as: Phenytoin Sodium Extended 100 MG Oral Capsule (DILANTIN)   Class: E-Prescribe   Order: [id number]   E-Prescribing Status: Receipt confirmed by pharmacy (6/21/2021 11:26 AM CDT)   Printout Tracking    External Result Report   Pharmacy    [facility] - [facility], [place] [address]

## 2021-08-25 RX ORDER — PHENYTOIN SODIUM 100 MG/1
CAPSULE, EXTENDED RELEASE ORAL
Qty: 540 CAPSULE | Refills: 3 | OUTPATIENT
Start: 2021-08-25

## 2021-08-26 NOTE — TELEPHONE ENCOUNTER
"Kierra is contacted to obtain f/u on her mother Tommy after the three days of increased dosing of Torsemide 60mg bid. She has been doing better, wt today is down from 213.4# to 208.7# she is wearing her compression stockings and has noticed improvement in her lower extremity swelling.  Tommy has noticed improved endurance to activity. She is able to climb a flight of stairs without stopping to rest.  \"My breathing is easier, that is for sure\", she states.   Kierra has initiated better controls on the fluid restriction for her mother. She has been filling a pitcher for her every evening, and this is her amount of water for the next day. This visual has improved her ability to comply with the restriction, knowing what her remaining amount is for the day.    She will reduce to Torsemide 40mg bid beginning today.   FYI to La Nicholas, JUVENAL Toure RN BSN, CHFN      "

## 2021-09-01 RX ORDER — PHENYTOIN SODIUM 100 MG/1
500 CAPSULE, EXTENDED RELEASE ORAL AT BEDTIME
Qty: 540 CAPSULE | Refills: 3 | Status: SHIPPED | OUTPATIENT
Start: 2021-09-01 | End: 2022-08-15

## 2021-09-01 NOTE — TELEPHONE ENCOUNTER
[facility] does not have the refills on file   Medication T'd for review and signature  Katherine Preston CMA on 9/1/2021 at 3:48 PM

## 2021-09-13 ENCOUNTER — OFFICE VISIT (OUTPATIENT)
Dept: CARDIOLOGY | Facility: CLINIC | Age: 76
End: 2021-09-13
Payer: COMMERCIAL

## 2021-09-13 ENCOUNTER — TELEPHONE (OUTPATIENT)
Dept: CARDIOLOGY | Facility: CLINIC | Age: 76
End: 2021-09-13

## 2021-09-13 VITALS
WEIGHT: 210 LBS | DIASTOLIC BLOOD PRESSURE: 80 MMHG | SYSTOLIC BLOOD PRESSURE: 128 MMHG | HEIGHT: 66 IN | BODY MASS INDEX: 33.75 KG/M2 | RESPIRATION RATE: 16 BRPM | HEART RATE: 93 BPM

## 2021-09-13 DIAGNOSIS — I50.32 CHRONIC HEART FAILURE WITH PRESERVED EJECTION FRACTION (H): Primary | ICD-10-CM

## 2021-09-13 LAB
ANION GAP SERPL CALCULATED.3IONS-SCNC: 12 MMOL/L (ref 5–18)
BNP SERPL-MCNC: 385 PG/ML (ref 0–140)
BUN SERPL-MCNC: 12 MG/DL (ref 8–28)
CALCIUM SERPL-MCNC: 9.7 MG/DL (ref 8.5–10.5)
CHLORIDE BLD-SCNC: 102 MMOL/L (ref 98–107)
CO2 SERPL-SCNC: 27 MMOL/L (ref 22–31)
CREAT SERPL-MCNC: 0.83 MG/DL (ref 0.6–1.1)
GFR SERPL CREATININE-BSD FRML MDRD: 69 ML/MIN/1.73M2
GLUCOSE BLD-MCNC: 102 MG/DL (ref 70–125)
POTASSIUM BLD-SCNC: 4 MMOL/L (ref 3.5–5)
SODIUM SERPL-SCNC: 141 MMOL/L (ref 136–145)

## 2021-09-13 PROCEDURE — 80048 BASIC METABOLIC PNL TOTAL CA: CPT | Performed by: NURSE PRACTITIONER

## 2021-09-13 PROCEDURE — 83880 ASSAY OF NATRIURETIC PEPTIDE: CPT | Performed by: NURSE PRACTITIONER

## 2021-09-13 PROCEDURE — 36415 COLL VENOUS BLD VENIPUNCTURE: CPT | Performed by: NURSE PRACTITIONER

## 2021-09-13 PROCEDURE — 99214 OFFICE O/P EST MOD 30 MIN: CPT | Performed by: NURSE PRACTITIONER

## 2021-09-13 RX ORDER — METOLAZONE 2.5 MG/1
2.5 TABLET ORAL DAILY PRN
Qty: 4 TABLET | Refills: 1 | Status: SHIPPED | OUTPATIENT
Start: 2021-09-13 | End: 2023-10-05

## 2021-09-13 RX ORDER — SPIRONOLACTONE 50 MG/1
50 TABLET, FILM COATED ORAL DAILY
COMMUNITY
End: 2022-02-23

## 2021-09-13 ASSESSMENT — MIFFLIN-ST. JEOR: SCORE: 1459.3

## 2021-09-13 NOTE — PROGRESS NOTES
Assessment/Recommendations   Assessment:    1. Heart failure with preserved ejection fraction: She continues to struggle with fluid retention.  Her weight is elevated and she notes increased edema and shortness of breath with activity.  She is still responding to increased doses of torsemide and may need a higher daily maintenance dose.  She is now wearing compression stockings.  She is limiting salt in her diet and limiting fluids.     2.  Paroxysmal atrial fibrillation: Continue metoprolol and Eliquis.     3. Coronary artery disease: Coronary angiogram in 2012 showed moderate to severe disease.  Stress test in June 2020 was negative for ischemia or infarction.  She denies any chest pain.    Plan:  1.  BMP and BNP pending  2.  We will determine plan for medications after reviewing medications  3.  Continue low-sodium diet         History of Present Illness/Subjective    Ms. Tommy Rea is a 76 year old female seen at [facility] today for follow-up.  Tommy Rea has a history of heart failure with preserved ejection fraction, coronary artery disease, paroxysmal atrial fibrillation, lymphedema, COPD, hypertension, and seizures.  Stress test on June 8, 2020 was negative for inducible ischemia or infarction ejection fraction of 46%.     She struggled with fluid retention in August. Torsemide dose was increased for a few days. She began wearing compression and monitoring fluid and sodium intake more closely.  Increasing torsemide dose improved symptoms and weights but symptoms returned after decreasing torsemide.  She is currently taking 100 mg of torsemide daily in 3 divided doses.  Her weight yesterday was 213 pounds and today 210 pounds.  She thinks her normal weight is closer to 208 pounds.  She has increased dyspnea on exertion and edema.  She denies lightheadedness and chest pain.      She is following a low-sodium diet.       Physical Examination Review of Systems  "  Vitals: /80 (BP Location: Left arm, Patient Position: Sitting, Cuff Size: Adult Regular)   Pulse 93   Resp 16   Ht 1.676 m (5' 6\")   Wt 95.3 kg (210 lb)   BMI 33.89 kg/m    BMI= Body mass index is 33.89 kg/m .  Wt Readings from Last 3 Encounters:   09/13/21 95.3 kg (210 lb)   08/02/21 95.3 kg (210 lb)   06/21/21 95.3 kg (210 lb)       General Appearance:     Alert, cooperative and in no acute distress.   ENT/Mouth: membranes moist, no oral lesions or bleeding gums.      EYES:  no scleral icterus, normal conjunctivae   Chest/Lungs:   lungs are clear to auscultation, no rales or wheezing, respirations unlabored   Cardiovascular:   Regular. Normal first and second heart sounds, trace edema left foot, 1+ edema right lower leg and foot   Abdomen:   Obese, soft, nontender, nondistended, bowel sounds present   Extremities: no cyanosis or clubbing   Skin: warm, dry.    Neurologic: mood and affect are appropriate, alert and oriented x3         Please refer above for cardiac ROS details.      Medical History  Surgical History Family History Social History   Past Medical History:   Diagnosis Date     Arthritis      Asthma      B12 deficiency      CAD (coronary artery disease)      COPD (chronic obstructive pulmonary disease) (H)      COPD (chronic obstructive pulmonary disease) (H)      Crohn's disease (H)      Crohn's disease (H)      Depression      Depressive disorder      Esophageal reflux      Essential hypertension      Fracture of left hip requiring operative repair (H)      Heart disease     a fib     History of shingles      Hyperlipidemia      Hyperparathyroidism (H)      Hypertension      Osteoporosis      Pulmonary nodule     benign     Regional enteritis (H) 7/19/2016     Seizure (H)      Seizures (H)      Past Surgical History:   Procedure Laterality Date     ARTHROPLASTY REVISION HIP Left 07/19/2016    removal of gamma nail hardware & conversion to total hip arthroplasty: Dr. Mercado at [place]      " HC REMOVAL DEEP IMPLANT Left 1/25/2015    Procedure: Gamma Nail Left Hip;  Surgeon: Kirk De La Cruz MD;  Location: [facility] OR;  Service: Orthopedics     HEMORRHOIDECTOMY       ORIF HIP FRACTURE  2015     ORTHOPEDIC SURGERY      ORIF     OTHER SURGICAL HISTORY      HEMMOIROIDECT     OTHER SURGICAL HISTORY      CVL HEART CATH LEFT     Family History   Problem Relation Age of Onset     Hypertension Mother      Colon Cancer Father      Arthritis Father      Hypertension Father      Heart Disease Mother      Heart Disease Father     Social History     Socioeconomic History     Marital status:      Spouse name: Not on file     Number of children: Not on file     Years of education: Not on file     Highest education level: Not on file   Occupational History     Not on file   Tobacco Use     Smoking status: Former Smoker     Types: Cigarettes, Cigarettes     Quit date: 6/28/2011     Years since quitting: 10.2     Smokeless tobacco: Never Used   Substance and Sexual Activity     Alcohol use: No     Drug use: No     Sexual activity: Not on file   Other Topics Concern     Parent/sibling w/ CABG, MI or angioplasty before 65F 55M? Not Asked   Social History Narrative     Not on file     Social Determinants of Health     Financial Resource Strain:      Difficulty of Paying Living Expenses:    Food Insecurity:      Worried About Running Out of Food in the Last Year:      Ran Out of Food in the Last Year:    Transportation Needs:      Lack of Transportation (Medical):      Lack of Transportation (Non-Medical):    Physical Activity:      Days of Exercise per Week:      Minutes of Exercise per Session:    Stress:      Feeling of Stress :    Social Connections:      Frequency of Communication with Friends and Family:      Frequency of Social Gatherings with Friends and Family:      Attends Anabaptism Services:      Active Member of Clubs or Organizations:      Attends Club or Organization Meetings:      Marital Status:     Intimate Partner Violence:      Fear of Current or Ex-Partner:      Emotionally Abused:      Physically Abused:      Sexually Abused:           Medications  Allergies   Current Outpatient Medications   Medication Sig Dispense Refill     acetaminophen (TYLENOL) 500 MG tablet Take 1,000 mg by mouth       albuterol (PROAIR HFA/PROVENTIL HFA/VENTOLIN HFA) 108 (90 Base) MCG/ACT inhaler Inhale 2 puffs into the lungs       alendronate (FOSAMAX) 70 MG tablet Take 70 mg by mouth       apixaban ANTICOAGULANT (ELIQUIS) 5 MG tablet Take 5 mg by mouth 2 times daily        aspirin 81 MG EC tablet Take 81 mg by mouth       colestipol (COLESTID) 1 g tablet Take 3 g by mouth       cyanocobalamin (CYANOCOBALAMIN) 1000 MCG/ML injection Inject 1,000 mcg into the muscle       HAYLEY CALCIUM-VITAMIN D PO        fluticasone (FLOVENT HFA) 110 MCG/ACT inhaler Inhale 1 puff into the lungs       loratadine (CLARITIN) 10 MG tablet Take 10 mg by mouth       Magnesium Oxide 250 MG TABS Take 250 mg by mouth       metoprolol tartrate (LOPRESSOR) 50 MG tablet Take 50 mg by mouth       multivitamin w/minerals (MULTI-VITAMIN) tablet Take 1 tablet by mouth       nitroGLYcerin (NITROSTAT) 0.4 MG sublingual tablet Place 0.4 mg under the tongue       PARoxetine (PAXIL) 40 MG tablet Take 40 mg by mouth       phenytoin (DILANTIN) 100 MG capsule Take 5 capsules (500 mg) by mouth At Bedtime 540 capsule 3     simvastatin (ZOCOR) 40 MG tablet Take 40 mg by mouth       spironolactone (ALDACTONE) 50 MG tablet Take 50 mg by mouth daily       topiramate (TOPAMAX) 100 MG tablet Take 1.5 tablets (150 mg) by mouth 2 times daily 270 tablet 3     torsemide (DEMADEX) 20 MG tablet Take 2 tablets twice a day 270 tablet 1    Allergies   Allergen Reactions     Amoxicillin GI Disturbance and Other (See Comments)     COLITIS    Has tolerated cefazolin  GI bleeding, Colitis, has tolerated cefazolin        Sulfa Drugs      Sulfamethoxazole-Trimethoprim Other (See Comments)      Other reaction(s): Vaginal Irritation  VAGINAL BLEEDING  Vaginal irritation, vaginal bleeding            Lab Results    Chemistry/lipid CBC Cardiac Enzymes/BNP/TSH/INR   Recent Labs   Lab Test 03/02/20  0928   CHOL 166   HDL 60   LDL 69   TRIG 183*     Recent Labs   Lab Test 03/02/20  0928 10/29/18  1529 02/05/18  1533   LDL 69 73 93     Recent Labs   Lab Test 08/16/21  1056      POTASSIUM 3.6   CHLORIDE 103   CO2 22      BUN 13   CR 0.81   GFRESTIMATED 71   DIONNA 9.2     Recent Labs   Lab Test 08/16/21  1056 06/01/21  1657 05/06/21  1605   CR 0.81 0.77 0.82     No results for input(s): A1C in the last 53173 hours. Recent Labs   Lab Test 01/04/21  1046   WBC 9.3   HGB 8.4*   HCT 30.7*   MCV 81        Recent Labs   Lab Test 01/04/21  1046 04/27/20  0946 04/25/20  2300   HGB 8.4* 9.6* 9.9*    Recent Labs   Lab Test 04/25/20  2300 07/16/19  1934 06/10/19  1618   TROPONINI 0.04 0.04 0.06     Recent Labs   Lab Test 11/09/20  0907 10/02/20  0718 08/17/20  1626   * 564* 247*     Recent Labs   Lab Test 07/17/19  0620   TSH 2.90     No results for input(s): INR in the last 29879 hours.

## 2021-09-13 NOTE — LETTER
9/13/2021    Hannah Benton MD  [place] [address]  [place] [address]    RE: Tommy Rea       Dear Colleague,    I had the pleasure of seeing Tommy Rea in the [facility].          Assessment/Recommendations   Assessment:    1. Heart failure with preserved ejection fraction: She continues to struggle with fluid retention.  Her weight is elevated and she notes increased edema and shortness of breath with activity.  She is still responding to increased doses of torsemide and may need a higher daily maintenance dose.  She is now wearing compression stockings.  She is limiting salt in her diet and limiting fluids.     2.  Paroxysmal atrial fibrillation: Continue metoprolol and Eliquis.     3. Coronary artery disease: Coronary angiogram in 2012 showed moderate to severe disease.  Stress test in June 2020 was negative for ischemia or infarction.  She denies any chest pain.    Plan:  1.  BMP and BNP pending  2.  We will determine plan for medications after reviewing medications  3.  Continue low-sodium diet         History of Present Illness/Subjective    Ms. Tommy Rea is a 76 year old female seen at [facility] today for follow-up.  Tommy Rea has a history of heart failure with preserved ejection fraction, coronary artery disease, paroxysmal atrial fibrillation, lymphedema, COPD, hypertension, and seizures.  Stress test on June 8, 2020 was negative for inducible ischemia or infarction ejection fraction of 46%.     She struggled with fluid retention in August. Torsemide dose was increased for a few days. She began wearing compression and monitoring fluid and sodium intake more closely.  Increasing torsemide dose improved symptoms and weights but symptoms returned after decreasing torsemide.  She is currently taking 100 mg of torsemide daily in 3 divided doses.  Her weight yesterday  "was 213 pounds and today 210 pounds.  She thinks her normal weight is closer to 208 pounds.  She has increased dyspnea on exertion and edema.  She denies lightheadedness and chest pain.      She is following a low-sodium diet.       Physical Examination Review of Systems   Vitals: /80 (BP Location: Left arm, Patient Position: Sitting, Cuff Size: Adult Regular)   Pulse 93   Resp 16   Ht 1.676 m (5' 6\")   Wt 95.3 kg (210 lb)   BMI 33.89 kg/m    BMI= Body mass index is 33.89 kg/m .  Wt Readings from Last 3 Encounters:   09/13/21 95.3 kg (210 lb)   08/02/21 95.3 kg (210 lb)   06/21/21 95.3 kg (210 lb)       General Appearance:     Alert, cooperative and in no acute distress.   ENT/Mouth: membranes moist, no oral lesions or bleeding gums.      EYES:  no scleral icterus, normal conjunctivae   Chest/Lungs:   lungs are clear to auscultation, no rales or wheezing, respirations unlabored   Cardiovascular:   Regular. Normal first and second heart sounds, trace edema left foot, 1+ edema right lower leg and foot   Abdomen:   Obese, soft, nontender, nondistended, bowel sounds present   Extremities: no cyanosis or clubbing   Skin: warm, dry.    Neurologic: mood and affect are appropriate, alert and oriented x3         Please refer above for cardiac ROS details.      Medical History  Surgical History Family History Social History   Past Medical History:   Diagnosis Date     Arthritis      Asthma      B12 deficiency      CAD (coronary artery disease)      COPD (chronic obstructive pulmonary disease) (H)      COPD (chronic obstructive pulmonary disease) (H)      Crohn's disease (H)      Crohn's disease (H)      Depression      Depressive disorder      Esophageal reflux      Essential hypertension      Fracture of left hip requiring operative repair (H)      Heart disease     a fib     History of shingles      Hyperlipidemia      Hyperparathyroidism (H)      Hypertension      Osteoporosis      Pulmonary nodule     benign "     Regional enteritis (H) 7/19/2016     Seizure (H)      Seizures (H)      Past Surgical History:   Procedure Laterality Date     ARTHROPLASTY REVISION HIP Left 07/19/2016    removal of gamma nail hardware & conversion to total hip arthroplasty: Dr. Mercado at [facility] REMOVAL DEEP IMPLANT Left 1/25/2015    Procedure: Gamma Nail Left Hip;  Surgeon: Kirk De La Cruz MD;  Location: [facility];  Service: Orthopedics     HEMORRHOIDECTOMY       ORIF HIP FRACTURE  2015     ORTHOPEDIC SURGERY      ORIF     OTHER SURGICAL HISTORY      HEMMOIROIDECT     OTHER SURGICAL HISTORY      CVL HEART CATH LEFT     Family History   Problem Relation Age of Onset     Hypertension Mother      Colon Cancer Father      Arthritis Father      Hypertension Father      Heart Disease Mother      Heart Disease Father     Social History     Socioeconomic History     Marital status:      Spouse name: Not on file     Number of children: Not on file     Years of education: Not on file     Highest education level: Not on file   Occupational History     Not on file   Tobacco Use     Smoking status: Former Smoker     Types: Cigarettes, Cigarettes     Quit date: 6/28/2011     Years since quitting: 10.2     Smokeless tobacco: Never Used   Substance and Sexual Activity     Alcohol use: No     Drug use: No     Sexual activity: Not on file   Other Topics Concern     Parent/sibling w/ CABG, MI or angioplasty before 65F 55M? Not Asked   Social History Narrative     Not on file     Social Determinants of Health     Financial Resource Strain:      Difficulty of Paying Living Expenses:    Food Insecurity:      Worried About Running Out of Food in the Last Year:      Ran Out of Food in the Last Year:    Transportation Needs:      Lack of Transportation (Medical):      Lack of Transportation (Non-Medical):    Physical Activity:      Days of Exercise per Week:      Minutes of Exercise per Session:    Stress:      Feeling of Stress :    Social  Connections:      Frequency of Communication with Friends and Family:      Frequency of Social Gatherings with Friends and Family:      Attends Jehovah's witness Services:      Active Member of Clubs or Organizations:      Attends Club or Organization Meetings:      Marital Status:    Intimate Partner Violence:      Fear of Current or Ex-Partner:      Emotionally Abused:      Physically Abused:      Sexually Abused:           Medications  Allergies   Current Outpatient Medications   Medication Sig Dispense Refill     acetaminophen (TYLENOL) 500 MG tablet Take 1,000 mg by mouth       albuterol (PROAIR HFA/PROVENTIL HFA/VENTOLIN HFA) 108 (90 Base) MCG/ACT inhaler Inhale 2 puffs into the lungs       alendronate (FOSAMAX) 70 MG tablet Take 70 mg by mouth       apixaban ANTICOAGULANT (ELIQUIS) 5 MG tablet Take 5 mg by mouth 2 times daily        aspirin 81 MG EC tablet Take 81 mg by mouth       colestipol (COLESTID) 1 g tablet Take 3 g by mouth       cyanocobalamin (CYANOCOBALAMIN) 1000 MCG/ML injection Inject 1,000 mcg into the muscle       HAYLEY CALCIUM-VITAMIN D PO        fluticasone (FLOVENT HFA) 110 MCG/ACT inhaler Inhale 1 puff into the lungs       loratadine (CLARITIN) 10 MG tablet Take 10 mg by mouth       Magnesium Oxide 250 MG TABS Take 250 mg by mouth       metoprolol tartrate (LOPRESSOR) 50 MG tablet Take 50 mg by mouth       multivitamin w/minerals (MULTI-VITAMIN) tablet Take 1 tablet by mouth       nitroGLYcerin (NITROSTAT) 0.4 MG sublingual tablet Place 0.4 mg under the tongue       PARoxetine (PAXIL) 40 MG tablet Take 40 mg by mouth       phenytoin (DILANTIN) 100 MG capsule Take 5 capsules (500 mg) by mouth At Bedtime 540 capsule 3     simvastatin (ZOCOR) 40 MG tablet Take 40 mg by mouth       spironolactone (ALDACTONE) 50 MG tablet Take 50 mg by mouth daily       topiramate (TOPAMAX) 100 MG tablet Take 1.5 tablets (150 mg) by mouth 2 times daily 270 tablet 3     torsemide (DEMADEX) 20 MG tablet Take 2 tablets twice  a day 270 tablet 1    Allergies   Allergen Reactions     Amoxicillin GI Disturbance and Other (See Comments)     COLITIS    Has tolerated cefazolin  GI bleeding, Colitis, has tolerated cefazolin        Sulfa Drugs      Sulfamethoxazole-Trimethoprim Other (See Comments)     Other reaction(s): Vaginal Irritation  VAGINAL BLEEDING  Vaginal irritation, vaginal bleeding            Lab Results    Chemistry/lipid CBC Cardiac Enzymes/BNP/TSH/INR   Recent Labs   Lab Test 03/02/20  0928   CHOL 166   HDL 60   LDL 69   TRIG 183*     Recent Labs   Lab Test 03/02/20  0928 10/29/18  1529 02/05/18  1533   LDL 69 73 93     Recent Labs   Lab Test 08/16/21  1056      POTASSIUM 3.6   CHLORIDE 103   CO2 22      BUN 13   CR 0.81   GFRESTIMATED 71   DIONNA 9.2     Recent Labs   Lab Test 08/16/21  1056 06/01/21  1657 05/06/21  1605   CR 0.81 0.77 0.82     No results for input(s): A1C in the last 58502 hours. Recent Labs   Lab Test 01/04/21  1046   WBC 9.3   HGB 8.4*   HCT 30.7*   MCV 81        Recent Labs   Lab Test 01/04/21  1046 04/27/20  0946 04/25/20  2300   HGB 8.4* 9.6* 9.9*    Recent Labs   Lab Test 04/25/20  2300 07/16/19  1934 06/10/19  1618   TROPONINI 0.04 0.04 0.06     Recent Labs   Lab Test 11/09/20  0907 10/02/20  0718 08/17/20  1626   * 564* 247*     Recent Labs   Lab Test 07/17/19  0620   TSH 2.90     No results for input(s): INR in the last 09036 hours.                                              Thank you for allowing me to participate in the care of your patient.      Sincerely,     Maria Elena Mejia NP     [facility]  cc:   No referring provider defined for this encounter.

## 2021-09-13 NOTE — PATIENT INSTRUCTIONS
Tommy Rea,    It was a pleasure to see you today at the [facility].     My recommendations after this visit include:  - Kierra will be called with the results of your labs today or tomorrow.  We will discuss medication changes and follow up plan.    - If you have any questions or concerns, please call [phone] to talk with my nurse.    Maria Elena Rivera, CNP

## 2021-09-13 NOTE — TELEPHONE ENCOUNTER
From: Maria Elena Mejia NP  Sent: 9/13/2021  12:01 PM CDT  To: Pippa Rojas, ANDREW  Please call daughter Kierra. BNP elevated with symptoms of fluid retention when I saw her this morning.  Continue current dose of torsemide.  Start metolazone 2.5 mg once.  She should take 30 minutes before morning dose of torsemide.  She needs BMP the day after taking metolazone.  Please have her schedule follow up with me next week and I will determine how often she will take metolazone going forward.    Spoke with Kierra. Agreeable to one time dose of Metolazone. Tommy will take metolazone tomorrow with labs and follow-up call on Wednesday. Kierra would like to have labs drawn at Dr. Benton's office. Lab orders faxed - advised Kierra to call PCP office and schedule lab appointment.-kcl

## 2021-09-15 NOTE — TELEPHONE ENCOUNTER
Called and LM with pt's daughter, Kierra asking for a call back to discuss how pt is doing following dose of metolazone. -kcl

## 2021-09-17 ENCOUNTER — LAB REQUISITION (OUTPATIENT)
Dept: LAB | Facility: CLINIC | Age: 76
End: 2021-09-17
Payer: COMMERCIAL

## 2021-09-17 DIAGNOSIS — I50.32 CHRONIC DIASTOLIC (CONGESTIVE) HEART FAILURE (H): ICD-10-CM

## 2021-09-17 LAB
ANION GAP SERPL CALCULATED.3IONS-SCNC: 21 MMOL/L (ref 5–18)
BUN SERPL-MCNC: 20 MG/DL (ref 8–28)
CALCIUM SERPL-MCNC: 10.6 MG/DL (ref 8.5–10.5)
CHLORIDE BLD-SCNC: 90 MMOL/L (ref 98–107)
CO2 SERPL-SCNC: 30 MMOL/L (ref 22–31)
CREAT SERPL-MCNC: 0.94 MG/DL (ref 0.6–1.1)
GFR SERPL CREATININE-BSD FRML MDRD: 59 ML/MIN/1.73M2
GLUCOSE BLD-MCNC: 127 MG/DL (ref 70–125)
POTASSIUM BLD-SCNC: 2.9 MMOL/L (ref 3.5–5)
SODIUM SERPL-SCNC: 141 MMOL/L (ref 136–145)

## 2021-09-17 PROCEDURE — 36415 COLL VENOUS BLD VENIPUNCTURE: CPT | Mod: ORL | Performed by: FAMILY MEDICINE

## 2021-09-17 PROCEDURE — 80048 BASIC METABOLIC PNL TOTAL CA: CPT | Mod: ORL | Performed by: FAMILY MEDICINE

## 2021-09-17 NOTE — TELEPHONE ENCOUNTER
"Rec'd return VM on HF line from daughter Kierra with patient update - Kierra reported that \"patient took dose of Metolazone yesterday and having labs today - 9-16-21 wt 210.6#, 9-17-21 wt 204.5# - patient did not experience any dizziness and had good urinary output.\"    Please review patient update from daughter - no f/u sched or pending - any new orders at this time?  mg      "

## 2021-09-17 NOTE — TELEPHONE ENCOUNTER
No more metolazone at this time.  Continue dose of torsemide.  Please have her schedule follow up with me next week.     Thanks

## 2021-09-17 NOTE — TELEPHONE ENCOUNTER
Return call to Kierra - informed her of Maria Elena's response/recommendations - Kierra verbalized understanding after questions addressed and agreed to sched follow-up - call transf to sched.  mg

## 2021-09-21 ENCOUNTER — TELEPHONE (OUTPATIENT)
Dept: CARDIOLOGY | Facility: CLINIC | Age: 76
End: 2021-09-21

## 2021-09-21 ENCOUNTER — OFFICE VISIT (OUTPATIENT)
Dept: CARDIOLOGY | Facility: CLINIC | Age: 76
End: 2021-09-21
Payer: COMMERCIAL

## 2021-09-21 VITALS
SYSTOLIC BLOOD PRESSURE: 100 MMHG | HEIGHT: 65 IN | RESPIRATION RATE: 16 BRPM | WEIGHT: 208 LBS | DIASTOLIC BLOOD PRESSURE: 78 MMHG | HEART RATE: 84 BPM | BODY MASS INDEX: 34.66 KG/M2

## 2021-09-21 DIAGNOSIS — I50.32 CHRONIC HEART FAILURE WITH PRESERVED EJECTION FRACTION (H): Primary | ICD-10-CM

## 2021-09-21 LAB — POTASSIUM BLD-SCNC: 3 MMOL/L (ref 3.5–5)

## 2021-09-21 PROCEDURE — 36415 COLL VENOUS BLD VENIPUNCTURE: CPT | Performed by: NURSE PRACTITIONER

## 2021-09-21 PROCEDURE — 99214 OFFICE O/P EST MOD 30 MIN: CPT | Performed by: NURSE PRACTITIONER

## 2021-09-21 PROCEDURE — 84132 ASSAY OF SERUM POTASSIUM: CPT | Performed by: NURSE PRACTITIONER

## 2021-09-21 RX ORDER — POTASSIUM CHLORIDE 1500 MG/1
40 TABLET, EXTENDED RELEASE ORAL DAILY
Qty: 180 TABLET | Refills: 3 | Status: SHIPPED | OUTPATIENT
Start: 2021-09-21 | End: 2022-05-20

## 2021-09-21 RX ORDER — COLESEVELAM 180 1/1
3 TABLET ORAL AT BEDTIME
COMMUNITY
Start: 2021-09-15

## 2021-09-21 ASSESSMENT — MIFFLIN-ST. JEOR: SCORE: 1434.36

## 2021-09-21 NOTE — LETTER
9/21/2021    Hannah Benton MD  [place] [address]  [place] [address]    RE: Tomym Rea       Dear Colleague,    I had the pleasure of seeing Tommy Rea in the [facility].          Assessment/Recommendations   Assessment:    1. Heart failure with preserved ejection fraction: She had a good response from metolazone last week.  Weight has remained around 203 to 204 pounds which is target weight.  She has chronic dyspnea on exertion which is stable.  Edema has resolved.     2.  Paroxysmal atrial fibrillation: Continue metoprolol and Eliquis.     3. Coronary artery disease: Coronary angiogram in 2012 showed moderate to severe disease.  Stress test in June 2020 was negative for ischemia or infarction.  She denies any chest pain.    Plan:  1.  Potassium level pending  2.  If weight is up 2 pounds in a day or 5 pounds in a week, can take an extra dose of torsemide. If no response to an increased dose of torsemide, she was asked to call the cardiology office to give approval for metolazone.  She will need potassium supplement with metolazone and BMP checked the following day  3.  Continue low-sodium diet    Follow-up in the heart failure clinic in 1 month and with Dr. Colbert in December or January.     History of Present Illness/Subjective    Ms. Tommy Rea is a 76 year old female seen at [facility] today for follow-up.  Tommy Rea has a history of heart failure with preserved ejection fraction, coronary artery disease, paroxysmal atrial fibrillation, lymphedema, COPD, hypertension, and seizures.  Stress test on June 8, 2020 was negative for inducible ischemia or infarction ejection fraction of 46%.     She struggled with fluid retention in August and September.  She had moderate response to increased dose of torsemide but continued to struggle with fluid retention.  Last week, she was  "given a dose of metolazone.  She lost 6 pounds after taking metolazone.  Her weight decreased to 204 pounds and she has maintained this weight.  Home weight today was 203.4 pounds.  He describes feeling \"off\" the day she took metolazone.  Her daughter assessed her and was not overly concerned.  Potassium level decreased to 2.9 following metolazone; primary care provider did not make any changes.  Renal function stable.  She has chronic dyspnea on exertion which is stable.  She has no lower extremity edema.  She denies lightheadedness and chest pain.      She is following a low-sodium diet.       Physical Examination Review of Systems   Vitals: /78 (BP Location: Left arm, Patient Position: Sitting, Cuff Size: Adult Large)   Pulse 84   Resp 16   Ht 1.651 m (5' 5\")   Wt 94.3 kg (208 lb)   BMI 34.61 kg/m    BMI= Body mass index is 34.61 kg/m .  Wt Readings from Last 3 Encounters:   09/21/21 94.3 kg (208 lb)   09/13/21 95.3 kg (210 lb)   08/02/21 95.3 kg (210 lb)       General Appearance:     Alert, cooperative and in no acute distress.   ENT/Mouth: membranes moist, no oral lesions or bleeding gums.      EYES:  no scleral icterus, normal conjunctivae   Chest/Lungs:   lungs are clear to auscultation, no rales or wheezing, respirations unlabored   Cardiovascular:   Regular. Normal first and second heart sounds, no edema   Abdomen:   Obese, soft, nontender, nondistended, bowel sounds present   Extremities: no cyanosis or clubbing   Skin: warm, dry.    Neurologic: mood and affect are appropriate, alert and oriented x3         Please refer above for cardiac ROS details.      Medical History  Surgical History Family History Social History   Past Medical History:   Diagnosis Date     Arthritis      Asthma      B12 deficiency      CAD (coronary artery disease)      COPD (chronic obstructive pulmonary disease) (H)      COPD (chronic obstructive pulmonary disease) (H)      Crohn's disease (H)      Crohn's disease (H)  "     Depression      Depressive disorder      Esophageal reflux      Essential hypertension      Fracture of left hip requiring operative repair (H)      Heart disease     a fib     History of shingles      Hyperlipidemia      Hyperparathyroidism (H)      Hypertension      Osteoporosis      Pulmonary nodule     benign     Regional enteritis (H) 7/19/2016     Seizure (H)      Seizures (H)      Past Surgical History:   Procedure Laterality Date     ARTHROPLASTY REVISION HIP Left 07/19/2016    removal of gamma nail hardware & conversion to total hip arthroplasty: Dr. Mercado at [facility] REMOVAL DEEP IMPLANT Left 1/25/2015    Procedure: Gamma Nail Left Hip;  Surgeon: Kirk De La Cruz MD;  Location: [facility] OR;  Service: Orthopedics     HEMORRHOIDECTOMY       ORIF HIP FRACTURE  2015     ORTHOPEDIC SURGERY      ORIF     OTHER SURGICAL HISTORY      HEMMOIROIDECT     OTHER SURGICAL HISTORY      CVL HEART CATH LEFT     Family History   Problem Relation Age of Onset     Hypertension Mother      Colon Cancer Father      Arthritis Father      Hypertension Father      Heart Disease Mother      Heart Disease Father     Social History     Socioeconomic History     Marital status:      Spouse name: Not on file     Number of children: Not on file     Years of education: Not on file     Highest education level: Not on file   Occupational History     Not on file   Tobacco Use     Smoking status: Former Smoker     Types: Cigarettes, Cigarettes     Quit date: 6/28/2011     Years since quitting: 10.2     Smokeless tobacco: Never Used   Substance and Sexual Activity     Alcohol use: No     Drug use: No     Sexual activity: Not on file   Other Topics Concern     Parent/sibling w/ CABG, MI or angioplasty before 65F 55M? Not Asked   Social History Narrative     Not on file     Social Determinants of Health     Financial Resource Strain:      Difficulty of Paying Living Expenses:    Food Insecurity:      Worried About  Running Out of Food in the Last Year:      Ran Out of Food in the Last Year:    Transportation Needs:      Lack of Transportation (Medical):      Lack of Transportation (Non-Medical):    Physical Activity:      Days of Exercise per Week:      Minutes of Exercise per Session:    Stress:      Feeling of Stress :    Social Connections:      Frequency of Communication with Friends and Family:      Frequency of Social Gatherings with Friends and Family:      Attends Jainism Services:      Active Member of Clubs or Organizations:      Attends Club or Organization Meetings:      Marital Status:    Intimate Partner Violence:      Fear of Current or Ex-Partner:      Emotionally Abused:      Physically Abused:      Sexually Abused:           Medications  Allergies   Current Outpatient Medications   Medication Sig Dispense Refill     acetaminophen (TYLENOL) 500 MG tablet Take 1,000 mg by mouth 2 times daily        albuterol (PROAIR HFA/PROVENTIL HFA/VENTOLIN HFA) 108 (90 Base) MCG/ACT inhaler Inhale 2 puffs into the lungs every 4 hours as needed        alendronate (FOSAMAX) 70 MG tablet Take 70 mg by mouth every 7 days        apixaban ANTICOAGULANT (ELIQUIS) 5 MG tablet Take 5 mg by mouth 2 times daily        aspirin 81 MG EC tablet Take 81 mg by mouth daily        colesevelam (WELCHOL) 625 MG tablet Take 3 tablets by mouth At Bedtime       cyanocobalamin (CYANOCOBALAMIN) 1000 MCG/ML injection Inject 1,000 mcg into the muscle every 30 days        HAYLEY CALCIUM-VITAMIN D PO Take by mouth daily        fluticasone (FLOVENT HFA) 110 MCG/ACT inhaler Inhale 1 puff into the lungs 2 times daily        loratadine (CLARITIN) 10 MG tablet Take 10 mg by mouth daily as needed        Magnesium Oxide 250 MG TABS Take 250 mg by mouth daily        metolazone (ZAROXOLYN) 2.5 MG tablet Take 1 tablet (2.5 mg) by mouth daily as needed (As instructed by your provider for heart failure symptoms) Take 30 minutes prior to AM dose of torsemide 4 tablet  1     metoprolol tartrate (LOPRESSOR) 50 MG tablet Take 50 mg by mouth 2 times daily        multivitamin w/minerals (MULTI-VITAMIN) tablet Take 1 tablet by mouth daily        nitroGLYcerin (NITROSTAT) 0.4 MG sublingual tablet Place 0.4 mg under the tongue every 5 minutes as needed        PARoxetine (PAXIL) 40 MG tablet Take 40 mg by mouth daily        phenytoin (DILANTIN) 100 MG capsule Take 5 capsules (500 mg) by mouth At Bedtime 540 capsule 3     simvastatin (ZOCOR) 40 MG tablet Take 40 mg by mouth At Bedtime        spironolactone (ALDACTONE) 50 MG tablet Take 50 mg by mouth daily       topiramate (TOPAMAX) 100 MG tablet Take 1.5 tablets (150 mg) by mouth 2 times daily 270 tablet 3     torsemide (DEMADEX) 20 MG tablet Take 2 tablets twice a day 270 tablet 1    Allergies   Allergen Reactions     Amoxicillin GI Disturbance and Other (See Comments)     COLITIS    Has tolerated cefazolin  GI bleeding, Colitis, has tolerated cefazolin        Sulfa Drugs      Sulfamethoxazole-Trimethoprim Other (See Comments)     Other reaction(s): Vaginal Irritation  VAGINAL BLEEDING  Vaginal irritation, vaginal bleeding            Lab Results    Chemistry/lipid CBC Cardiac Enzymes/BNP/TSH/INR   Recent Labs   Lab Test 03/02/20  0928   CHOL 166   HDL 60   LDL 69   TRIG 183*     Recent Labs   Lab Test 03/02/20  0928 10/29/18  1529 02/05/18  1533   LDL 69 73 93     Recent Labs   Lab Test 08/16/21  1056      POTASSIUM 3.6   CHLORIDE 103   CO2 22      BUN 13   CR 0.81   GFRESTIMATED 71   DIONNA 9.2     Recent Labs   Lab Test 08/16/21  1056 06/01/21  1657 05/06/21  1605   CR 0.81 0.77 0.82     No results for input(s): A1C in the last 44207 hours. Recent Labs   Lab Test 01/04/21  1046   WBC 9.3   HGB 8.4*   HCT 30.7*   MCV 81        Recent Labs   Lab Test 01/04/21  1046 04/27/20  0946 04/25/20  2300   HGB 8.4* 9.6* 9.9*    Recent Labs   Lab Test 04/25/20  2300 07/16/19  1934 06/10/19  1618   TROPONINI 0.04 0.04 0.06      Recent Labs   Lab Test 11/09/20  0907 10/02/20  0718 08/17/20  1626   * 564* 247*     Recent Labs   Lab Test 07/17/19  0620   TSH 2.90     No results for input(s): INR in the last 94416 hours.                                            Thank you for allowing me to participate in the care of your patient.      Sincerely,     Maria Elena Mejia NP     [facility]  cc:   No referring provider defined for this encounter.

## 2021-09-21 NOTE — PROGRESS NOTES
"      Assessment/Recommendations   Assessment:    1. Heart failure with preserved ejection fraction: She had a good response from metolazone last week.  Weight has remained around 203 to 204 pounds which is target weight.  She has chronic dyspnea on exertion which is stable.  Edema has resolved.     2.  Paroxysmal atrial fibrillation: Continue metoprolol and Eliquis.     3. Coronary artery disease: Coronary angiogram in 2012 showed moderate to severe disease.  Stress test in June 2020 was negative for ischemia or infarction.  She denies any chest pain.    Plan:  1.  Potassium level pending  2.  If weight is up 2 pounds in a day or 5 pounds in a week, can take an extra dose of torsemide. If no response to an increased dose of torsemide, she was asked to call the cardiology office to give approval for metolazone.  She will need potassium supplement with metolazone and BMP checked the following day  3.  Continue low-sodium diet    Follow-up in the heart failure clinic in 1 month and with Dr. Colbert in December or January.     History of Present Illness/Subjective    Ms. Tommy Rea is a 76 year old female seen at [facility] today for follow-up.  Tommy Rea has a history of heart failure with preserved ejection fraction, coronary artery disease, paroxysmal atrial fibrillation, lymphedema, COPD, hypertension, and seizures.  Stress test on June 8, 2020 was negative for inducible ischemia or infarction ejection fraction of 46%.     She struggled with fluid retention in August and September.  She had moderate response to increased dose of torsemide but continued to struggle with fluid retention.  Last week, she was given a dose of metolazone.  She lost 6 pounds after taking metolazone.  Her weight decreased to 204 pounds and she has maintained this weight.  Home weight today was 203.4 pounds.  He describes feeling \"off\" the day she took metolazone.  Her daughter assessed her and was " "not overly concerned.  Potassium level decreased to 2.9 following metolazone; primary care provider did not make any changes.  Renal function stable.  She has chronic dyspnea on exertion which is stable.  She has no lower extremity edema.  She denies lightheadedness and chest pain.      She is following a low-sodium diet.       Physical Examination Review of Systems   Vitals: /78 (BP Location: Left arm, Patient Position: Sitting, Cuff Size: Adult Large)   Pulse 84   Resp 16   Ht 1.651 m (5' 5\")   Wt 94.3 kg (208 lb)   BMI 34.61 kg/m    BMI= Body mass index is 34.61 kg/m .  Wt Readings from Last 3 Encounters:   09/21/21 94.3 kg (208 lb)   09/13/21 95.3 kg (210 lb)   08/02/21 95.3 kg (210 lb)       General Appearance:     Alert, cooperative and in no acute distress.   ENT/Mouth: membranes moist, no oral lesions or bleeding gums.      EYES:  no scleral icterus, normal conjunctivae   Chest/Lungs:   lungs are clear to auscultation, no rales or wheezing, respirations unlabored   Cardiovascular:   Regular. Normal first and second heart sounds, no edema   Abdomen:   Obese, soft, nontender, nondistended, bowel sounds present   Extremities: no cyanosis or clubbing   Skin: warm, dry.    Neurologic: mood and affect are appropriate, alert and oriented x3         Please refer above for cardiac ROS details.      Medical History  Surgical History Family History Social History   Past Medical History:   Diagnosis Date     Arthritis      Asthma      B12 deficiency      CAD (coronary artery disease)      COPD (chronic obstructive pulmonary disease) (H)      COPD (chronic obstructive pulmonary disease) (H)      Crohn's disease (H)      Crohn's disease (H)      Depression      Depressive disorder      Esophageal reflux      Essential hypertension      Fracture of left hip requiring operative repair (H)      Heart disease     a fib     History of shingles      Hyperlipidemia      Hyperparathyroidism (H)      Hypertension      " Osteoporosis      Pulmonary nodule     benign     Regional enteritis (H) 7/19/2016     Seizure (H)      Seizures (H)      Past Surgical History:   Procedure Laterality Date     ARTHROPLASTY REVISION HIP Left 07/19/2016    removal of gamma nail hardware & conversion to total hip arthroplasty: Dr. Mercado at [facility] REMOVAL DEEP IMPLANT Left 1/25/2015    Procedure: Gamma Nail Left Hip;  Surgeon: Kirk De La Cruz MD;  Location: [facility];  Service: Orthopedics     HEMORRHOIDECTOMY       ORIF HIP FRACTURE  2015     ORTHOPEDIC SURGERY      ORIF     OTHER SURGICAL HISTORY      HEMMOIROIDECT     OTHER SURGICAL HISTORY      CVL HEART CATH LEFT     Family History   Problem Relation Age of Onset     Hypertension Mother      Colon Cancer Father      Arthritis Father      Hypertension Father      Heart Disease Mother      Heart Disease Father     Social History     Socioeconomic History     Marital status:      Spouse name: Not on file     Number of children: Not on file     Years of education: Not on file     Highest education level: Not on file   Occupational History     Not on file   Tobacco Use     Smoking status: Former Smoker     Types: Cigarettes, Cigarettes     Quit date: 6/28/2011     Years since quitting: 10.2     Smokeless tobacco: Never Used   Substance and Sexual Activity     Alcohol use: No     Drug use: No     Sexual activity: Not on file   Other Topics Concern     Parent/sibling w/ CABG, MI or angioplasty before 65F 55M? Not Asked   Social History Narrative     Not on file     Social Determinants of Health     Financial Resource Strain:      Difficulty of Paying Living Expenses:    Food Insecurity:      Worried About Running Out of Food in the Last Year:      Ran Out of Food in the Last Year:    Transportation Needs:      Lack of Transportation (Medical):      Lack of Transportation (Non-Medical):    Physical Activity:      Days of Exercise per Week:      Minutes of Exercise per Session:     Stress:      Feeling of Stress :    Social Connections:      Frequency of Communication with Friends and Family:      Frequency of Social Gatherings with Friends and Family:      Attends Evangelical Services:      Active Member of Clubs or Organizations:      Attends Club or Organization Meetings:      Marital Status:    Intimate Partner Violence:      Fear of Current or Ex-Partner:      Emotionally Abused:      Physically Abused:      Sexually Abused:           Medications  Allergies   Current Outpatient Medications   Medication Sig Dispense Refill     acetaminophen (TYLENOL) 500 MG tablet Take 1,000 mg by mouth 2 times daily        albuterol (PROAIR HFA/PROVENTIL HFA/VENTOLIN HFA) 108 (90 Base) MCG/ACT inhaler Inhale 2 puffs into the lungs every 4 hours as needed        alendronate (FOSAMAX) 70 MG tablet Take 70 mg by mouth every 7 days        apixaban ANTICOAGULANT (ELIQUIS) 5 MG tablet Take 5 mg by mouth 2 times daily        aspirin 81 MG EC tablet Take 81 mg by mouth daily        colesevelam (WELCHOL) 625 MG tablet Take 3 tablets by mouth At Bedtime       cyanocobalamin (CYANOCOBALAMIN) 1000 MCG/ML injection Inject 1,000 mcg into the muscle every 30 days        HAYLEY CALCIUM-VITAMIN D PO Take by mouth daily        fluticasone (FLOVENT HFA) 110 MCG/ACT inhaler Inhale 1 puff into the lungs 2 times daily        loratadine (CLARITIN) 10 MG tablet Take 10 mg by mouth daily as needed        Magnesium Oxide 250 MG TABS Take 250 mg by mouth daily        metolazone (ZAROXOLYN) 2.5 MG tablet Take 1 tablet (2.5 mg) by mouth daily as needed (As instructed by your provider for heart failure symptoms) Take 30 minutes prior to AM dose of torsemide 4 tablet 1     metoprolol tartrate (LOPRESSOR) 50 MG tablet Take 50 mg by mouth 2 times daily        multivitamin w/minerals (MULTI-VITAMIN) tablet Take 1 tablet by mouth daily        nitroGLYcerin (NITROSTAT) 0.4 MG sublingual tablet Place 0.4 mg under the tongue every 5 minutes as  needed        PARoxetine (PAXIL) 40 MG tablet Take 40 mg by mouth daily        phenytoin (DILANTIN) 100 MG capsule Take 5 capsules (500 mg) by mouth At Bedtime 540 capsule 3     simvastatin (ZOCOR) 40 MG tablet Take 40 mg by mouth At Bedtime        spironolactone (ALDACTONE) 50 MG tablet Take 50 mg by mouth daily       topiramate (TOPAMAX) 100 MG tablet Take 1.5 tablets (150 mg) by mouth 2 times daily 270 tablet 3     torsemide (DEMADEX) 20 MG tablet Take 2 tablets twice a day 270 tablet 1    Allergies   Allergen Reactions     Amoxicillin GI Disturbance and Other (See Comments)     COLITIS    Has tolerated cefazolin  GI bleeding, Colitis, has tolerated cefazolin        Sulfa Drugs      Sulfamethoxazole-Trimethoprim Other (See Comments)     Other reaction(s): Vaginal Irritation  VAGINAL BLEEDING  Vaginal irritation, vaginal bleeding            Lab Results    Chemistry/lipid CBC Cardiac Enzymes/BNP/TSH/INR   Recent Labs   Lab Test 03/02/20  0928   CHOL 166   HDL 60   LDL 69   TRIG 183*     Recent Labs   Lab Test 03/02/20  0928 10/29/18  1529 02/05/18  1533   LDL 69 73 93     Recent Labs   Lab Test 08/16/21  1056      POTASSIUM 3.6   CHLORIDE 103   CO2 22      BUN 13   CR 0.81   GFRESTIMATED 71   DIONNA 9.2     Recent Labs   Lab Test 08/16/21  1056 06/01/21  1657 05/06/21  1605   CR 0.81 0.77 0.82     No results for input(s): A1C in the last 52163 hours. Recent Labs   Lab Test 01/04/21  1046   WBC 9.3   HGB 8.4*   HCT 30.7*   MCV 81        Recent Labs   Lab Test 01/04/21  1046 04/27/20  0946 04/25/20  2300   HGB 8.4* 9.6* 9.9*    Recent Labs   Lab Test 04/25/20  2300 07/16/19  1934 06/10/19  1618   TROPONINI 0.04 0.04 0.06     Recent Labs   Lab Test 11/09/20  0907 10/02/20  0718 08/17/20  1626   * 564* 247*     Recent Labs   Lab Test 07/17/19  0620   TSH 2.90     No results for input(s): INR in the last 60436 hours.

## 2021-09-21 NOTE — PATIENT INSTRUCTIONS
Tommy Rea,    It was a pleasure to see you today at the [facility].     My recommendations after this visit include:  - If weight is up 2 pounds in a day or 5 pounds in a week, you can take an extra dose of torsemide. If you do not respond to an increased dose of torsemide, please call the clinic and I can assess if you need a dose of metolazone. Do not take metolazone without talking with cardiology office first.    - You will be called with the results of your potassium level today or tomorrow.  - Follow up with Maria Elena Rivera in 1 month  - If you have any questions or concerns, please call [phone] to talk with my nurse.    Maria Elena Rivera, CNP

## 2021-09-21 NOTE — TELEPHONE ENCOUNTER
Maria Elena Mejia, Pippa Conner, RN  Please call daughter Kierra. Potassium is low.  Please have her start potassium 40 meq daily. Check K early next week.       --------------------------------------------------------------  Spoke with pt's daughter, Kierra. She will have pt start taking potassium 40 meq daily and recheck K next week. They prefer to have labs drawn at PCP office. Labs ordered and faxed to [facility]. She will call PCP and schedule lab appointment for next week. -kcl

## 2021-10-01 ENCOUNTER — TELEPHONE (OUTPATIENT)
Dept: CARDIOLOGY | Facility: CLINIC | Age: 76
End: 2021-10-01

## 2021-10-01 ENCOUNTER — LAB REQUISITION (OUTPATIENT)
Dept: LAB | Facility: CLINIC | Age: 76
End: 2021-10-01
Payer: COMMERCIAL

## 2021-10-01 DIAGNOSIS — I50.32 CHRONIC DIASTOLIC (CONGESTIVE) HEART FAILURE (H): ICD-10-CM

## 2021-10-01 LAB
ANION GAP SERPL CALCULATED.3IONS-SCNC: 13 MMOL/L (ref 5–18)
BUN SERPL-MCNC: 12 MG/DL (ref 8–28)
CALCIUM SERPL-MCNC: 9.4 MG/DL (ref 8.5–10.5)
CHLORIDE BLD-SCNC: 101 MMOL/L (ref 98–107)
CO2 SERPL-SCNC: 26 MMOL/L (ref 22–31)
CREAT SERPL-MCNC: 0.8 MG/DL (ref 0.6–1.1)
GFR SERPL CREATININE-BSD FRML MDRD: 72 ML/MIN/1.73M2
GLUCOSE BLD-MCNC: 103 MG/DL (ref 70–125)
POTASSIUM BLD-SCNC: 3.9 MMOL/L (ref 3.5–5)
SODIUM SERPL-SCNC: 140 MMOL/L (ref 136–145)

## 2021-10-01 PROCEDURE — 80048 BASIC METABOLIC PNL TOTAL CA: CPT | Mod: ORL | Performed by: FAMILY MEDICINE

## 2021-10-01 NOTE — TELEPHONE ENCOUNTER
----- Message from Maria Elena Mejia NP sent at 10/1/2021  2:46 PM CDT -----  Please call daughter Kierra and let her know that I reviewed labs from today.  Renal function stable and potassium now normal.  Continue current medications.

## 2021-10-01 NOTE — TELEPHONE ENCOUNTER
Called and reviewed labs with patients daughter Kierra; verbalized understanding.     Zulema Anderson RN

## 2021-10-11 ENCOUNTER — OFFICE VISIT (OUTPATIENT)
Dept: CARDIOLOGY | Facility: CLINIC | Age: 76
End: 2021-10-11
Payer: COMMERCIAL

## 2021-10-11 VITALS
RESPIRATION RATE: 16 BRPM | SYSTOLIC BLOOD PRESSURE: 98 MMHG | BODY MASS INDEX: 33.53 KG/M2 | HEIGHT: 67 IN | WEIGHT: 213.6 LBS | DIASTOLIC BLOOD PRESSURE: 60 MMHG | HEART RATE: 60 BPM

## 2021-10-11 DIAGNOSIS — I50.32 CHRONIC HEART FAILURE WITH PRESERVED EJECTION FRACTION (H): ICD-10-CM

## 2021-10-11 PROCEDURE — 99214 OFFICE O/P EST MOD 30 MIN: CPT | Performed by: NURSE PRACTITIONER

## 2021-10-11 ASSESSMENT — MIFFLIN-ST. JEOR: SCORE: 1491.51

## 2021-10-11 NOTE — PROGRESS NOTES
Assessment/Recommendations   Assessment:    1. Heart failure with preserved ejection fraction: She has no symptoms of acute heart failure.     2.  Paroxysmal atrial fibrillation: Continue metoprolol and Eliquis.     3. Coronary artery disease: Coronary angiogram in 2012 showed moderate to severe disease.  Stress test in June 2020 was negative for ischemia or infarction.  She denies any chest pain.    Plan:  1.  If weight is up 2 pounds in a day or 5 pounds in a week, she can take an extra dose of torsemide (up to 3 days in a row). If no response to an increased dose of torsemide, she was asked to call the cardiology office to give approval for metolazone.  She will need potassium supplement with metolazone and BMP checked the following day.  Her daughter helps manage this plan.  2.  Continue low-sodium diet    Follow-up in heart failure clinic in 3 months.     History of Present Illness/Subjective    Ms. Tommy Rea is a 76 year old female seen at [facility] today for follow-up.  Tommy Rea has a history of heart failure with preserved ejection fraction, coronary artery disease, paroxysmal atrial fibrillation, lymphedema, COPD, hypertension, and seizures.  Stress test on June 8, 2020 was negative for inducible ischemia or infarction ejection fraction of 46%.     She has no symptoms of acute heart failure today.  She denies lower extremity edema.  She has chronic shortness of breath with activity but states that she felt well walking in the clinic today.  At the end of September, she used extra torsemide for 3 days with improvement of symptoms.  She denies lightheadedness, orthopnea and chest pain.      She is following a low-sodium diet.  Her weight was 207 pounds today.  Her goal weight is 208 pounds or less.       Physical Examination Review of Systems   Vitals: BP 98/60 (BP Location: Left arm, Patient Position: Sitting, Cuff Size: Adult Large)   Pulse 60   Resp  "16   Ht 1.702 m (5' 7\")   Wt 96.9 kg (213 lb 9.6 oz)   BMI 33.45 kg/m    BMI= Body mass index is 33.45 kg/m .  Wt Readings from Last 3 Encounters:   10/11/21 96.9 kg (213 lb 9.6 oz)   09/21/21 94.3 kg (208 lb)   09/13/21 95.3 kg (210 lb)       General Appearance:     Alert, cooperative and in no acute distress.   ENT/Mouth: membranes moist, no oral lesions or bleeding gums.      EYES:  no scleral icterus, normal conjunctivae   Chest/Lungs:   lungs are clear to auscultation, no rales or wheezing, respirations unlabored   Cardiovascular:   Regular. Normal first and second heart sounds, no edema   Abdomen:   Obese, soft, nontender, nondistended, bowel sounds present   Extremities: no cyanosis or clubbing   Skin: warm, dry.    Neurologic: mood and affect are appropriate, alert and oriented x3         Please refer above for cardiac ROS details.      Medical History  Surgical History Family History Social History   Past Medical History:   Diagnosis Date     Arthritis      Asthma      B12 deficiency      CAD (coronary artery disease)      COPD (chronic obstructive pulmonary disease) (H)      COPD (chronic obstructive pulmonary disease) (H)      Crohn's disease (H)      Crohn's disease (H)      Depression      Depressive disorder      Esophageal reflux      Essential hypertension      Fracture of left hip requiring operative repair (H)      Heart disease     a fib     History of shingles      Hyperlipidemia      Hyperparathyroidism (H)      Hypertension      Osteoporosis      Pulmonary nodule     benign     Regional enteritis (H) 7/19/2016     Seizure (H)      Seizures (H)      Past Surgical History:   Procedure Laterality Date     ARTHROPLASTY REVISION HIP Left 07/19/2016    removal of gamma nail hardware & conversion to total hip arthroplasty: Dr. Mercado at [facility] REMOVAL DEEP IMPLANT Left 1/25/2015    Procedure: Gamma Nail Left Hip;  Surgeon: Kirk De La Cruz MD;  Location: [facility] OR;  Service: " Orthopedics     HEMORRHOIDECTOMY       ORIF HIP FRACTURE  2015     ORTHOPEDIC SURGERY      ORIF     OTHER SURGICAL HISTORY      HEMMOIROIDECT     OTHER SURGICAL HISTORY      CVL HEART CATH LEFT     Family History   Problem Relation Age of Onset     Hypertension Mother      Colon Cancer Father      Arthritis Father      Hypertension Father      Heart Disease Mother      Heart Disease Father     Social History     Socioeconomic History     Marital status:      Spouse name: Not on file     Number of children: Not on file     Years of education: Not on file     Highest education level: Not on file   Occupational History     Not on file   Tobacco Use     Smoking status: Former Smoker     Types: Cigarettes, Cigarettes     Quit date: 6/28/2011     Years since quitting: 10.2     Smokeless tobacco: Never Used   Substance and Sexual Activity     Alcohol use: No     Drug use: No     Sexual activity: Not on file   Other Topics Concern     Parent/sibling w/ CABG, MI or angioplasty before 65F 55M? Not Asked   Social History Narrative     Not on file     Social Determinants of Health     Financial Resource Strain:      Difficulty of Paying Living Expenses:    Food Insecurity:      Worried About Running Out of Food in the Last Year:      Ran Out of Food in the Last Year:    Transportation Needs:      Lack of Transportation (Medical):      Lack of Transportation (Non-Medical):    Physical Activity:      Days of Exercise per Week:      Minutes of Exercise per Session:    Stress:      Feeling of Stress :    Social Connections:      Frequency of Communication with Friends and Family:      Frequency of Social Gatherings with Friends and Family:      Attends Voodoo Services:      Active Member of Clubs or Organizations:      Attends Club or Organization Meetings:      Marital Status:    Intimate Partner Violence:      Fear of Current or Ex-Partner:      Emotionally Abused:      Physically Abused:      Sexually Abused:            Medications  Allergies   Current Outpatient Medications   Medication Sig Dispense Refill     acetaminophen (TYLENOL) 500 MG tablet Take 1,000 mg by mouth 2 times daily        albuterol (PROAIR HFA/PROVENTIL HFA/VENTOLIN HFA) 108 (90 Base) MCG/ACT inhaler Inhale 2 puffs into the lungs every 4 hours as needed        alendronate (FOSAMAX) 70 MG tablet Take 70 mg by mouth every 7 days        apixaban ANTICOAGULANT (ELIQUIS) 5 MG tablet Take 5 mg by mouth 2 times daily        aspirin 81 MG EC tablet Take 81 mg by mouth daily        colesevelam (WELCHOL) 625 MG tablet Take 3 tablets by mouth At Bedtime       cyanocobalamin (CYANOCOBALAMIN) 1000 MCG/ML injection Inject 1,000 mcg into the muscle every 30 days        HAYLEY CALCIUM-VITAMIN D PO Take by mouth daily        fluticasone (FLOVENT HFA) 110 MCG/ACT inhaler Inhale 1 puff into the lungs 2 times daily        loratadine (CLARITIN) 10 MG tablet Take 10 mg by mouth daily as needed        Magnesium Oxide 250 MG TABS Take 250 mg by mouth daily        metolazone (ZAROXOLYN) 2.5 MG tablet Take 1 tablet (2.5 mg) by mouth daily as needed (As instructed by your provider for heart failure symptoms) Take 30 minutes prior to AM dose of torsemide 4 tablet 1     metoprolol tartrate (LOPRESSOR) 50 MG tablet Take 50 mg by mouth 2 times daily        multivitamin w/minerals (MULTI-VITAMIN) tablet Take 1 tablet by mouth daily        nitroGLYcerin (NITROSTAT) 0.4 MG sublingual tablet Place 0.4 mg under the tongue every 5 minutes as needed        PARoxetine (PAXIL) 40 MG tablet Take 40 mg by mouth daily        phenytoin (DILANTIN) 100 MG capsule Take 5 capsules (500 mg) by mouth At Bedtime 540 capsule 3     potassium chloride ER (KLOR-CON M) 20 MEQ CR tablet Take 2 tablets (40 mEq) by mouth daily 180 tablet 3     simvastatin (ZOCOR) 40 MG tablet Take 40 mg by mouth At Bedtime        spironolactone (ALDACTONE) 50 MG tablet Take 50 mg by mouth daily       topiramate (TOPAMAX) 100 MG  tablet Take 1.5 tablets (150 mg) by mouth 2 times daily 270 tablet 3     torsemide (DEMADEX) 20 MG tablet Take 2 tablets twice a day 270 tablet 1    Allergies   Allergen Reactions     Amoxicillin GI Disturbance and Other (See Comments)     COLITIS    Has tolerated cefazolin  GI bleeding, Colitis, has tolerated cefazolin        Sulfa Drugs      Sulfamethoxazole-Trimethoprim Other (See Comments)     Other reaction(s): Vaginal Irritation  VAGINAL BLEEDING  Vaginal irritation, vaginal bleeding            Lab Results    Chemistry/lipid CBC Cardiac Enzymes/BNP/TSH/INR   Recent Labs   Lab Test 03/02/20  0928   CHOL 166   HDL 60   LDL 69   TRIG 183*     Recent Labs   Lab Test 03/02/20  0928 10/29/18  1529 02/05/18  1533   LDL 69 73 93     Recent Labs   Lab Test 08/16/21  1056      POTASSIUM 3.6   CHLORIDE 103   CO2 22      BUN 13   CR 0.81   GFRESTIMATED 71   DIONNA 9.2     Recent Labs   Lab Test 08/16/21  1056 06/01/21  1657 05/06/21  1605   CR 0.81 0.77 0.82     No results for input(s): A1C in the last 54954 hours. Recent Labs   Lab Test 01/04/21  1046   WBC 9.3   HGB 8.4*   HCT 30.7*   MCV 81        Recent Labs   Lab Test 01/04/21  1046 04/27/20  0946 04/25/20  2300   HGB 8.4* 9.6* 9.9*    Recent Labs   Lab Test 04/25/20  2300 07/16/19  1934 06/10/19  1618   TROPONINI 0.04 0.04 0.06     Recent Labs   Lab Test 11/09/20  0907 10/02/20  0718 08/17/20  1626   * 564* 247*     Recent Labs   Lab Test 07/17/19  0620   TSH 2.90     No results for input(s): INR in the last 79482 hours.

## 2021-10-11 NOTE — PATIENT INSTRUCTIONS
Tommy Rea,    It was a pleasure to see you today at the [facility].     My recommendations after this visit include:  - No changes to your medications.    - See La Nicholas in 3 months.   - See Maria Elena Rivera in April.   - If you have any questions or concerns, please call [phone] to talk with my nurse.    Maria Elena Rivera, CNP

## 2021-10-11 NOTE — LETTER
10/11/2021    Hannah Benton MD  [place] [address]  [place] [address]    RE: Tommy Rea       Dear Colleague,    I had the pleasure of seeing Tommy Rea in the [facility].          Assessment/Recommendations   Assessment:    1. Heart failure with preserved ejection fraction: She has no symptoms of acute heart failure.     2.  Paroxysmal atrial fibrillation: Continue metoprolol and Eliquis.     3. Coronary artery disease: Coronary angiogram in 2012 showed moderate to severe disease.  Stress test in June 2020 was negative for ischemia or infarction.  She denies any chest pain.    Plan:  1.  If weight is up 2 pounds in a day or 5 pounds in a week, she can take an extra dose of torsemide (up to 3 days in a row). If no response to an increased dose of torsemide, she was asked to call the cardiology office to give approval for metolazone.  She will need potassium supplement with metolazone and BMP checked the following day.  Her daughter helps manage this plan.  2.  Continue low-sodium diet    Follow-up in heart failure clinic in 3 months.     History of Present Illness/Subjective    Ms. Tommy Rea is a 76 year old female seen at [facility] today for follow-up.  Tommy Rea has a history of heart failure with preserved ejection fraction, coronary artery disease, paroxysmal atrial fibrillation, lymphedema, COPD, hypertension, and seizures.  Stress test on June 8, 2020 was negative for inducible ischemia or infarction ejection fraction of 46%.     She has no symptoms of acute heart failure today.  She denies lower extremity edema.  She has chronic shortness of breath with activity but states that she felt well walking in the clinic today.  At the end of September, she used extra torsemide for 3 days with improvement of symptoms.  She denies lightheadedness, orthopnea and chest pain.   "    She is following a low-sodium diet.  Her weight was 207 pounds today.  Her goal weight is 208 pounds or less.       Physical Examination Review of Systems   Vitals: BP 98/60 (BP Location: Left arm, Patient Position: Sitting, Cuff Size: Adult Large)   Pulse 60   Resp 16   Ht 1.702 m (5' 7\")   Wt 96.9 kg (213 lb 9.6 oz)   BMI 33.45 kg/m    BMI= Body mass index is 33.45 kg/m .  Wt Readings from Last 3 Encounters:   10/11/21 96.9 kg (213 lb 9.6 oz)   09/21/21 94.3 kg (208 lb)   09/13/21 95.3 kg (210 lb)       General Appearance:     Alert, cooperative and in no acute distress.   ENT/Mouth: membranes moist, no oral lesions or bleeding gums.      EYES:  no scleral icterus, normal conjunctivae   Chest/Lungs:   lungs are clear to auscultation, no rales or wheezing, respirations unlabored   Cardiovascular:   Regular. Normal first and second heart sounds, no edema   Abdomen:   Obese, soft, nontender, nondistended, bowel sounds present   Extremities: no cyanosis or clubbing   Skin: warm, dry.    Neurologic: mood and affect are appropriate, alert and oriented x3         Please refer above for cardiac ROS details.      Medical History  Surgical History Family History Social History   Past Medical History:   Diagnosis Date     Arthritis      Asthma      B12 deficiency      CAD (coronary artery disease)      COPD (chronic obstructive pulmonary disease) (H)      COPD (chronic obstructive pulmonary disease) (H)      Crohn's disease (H)      Crohn's disease (H)      Depression      Depressive disorder      Esophageal reflux      Essential hypertension      Fracture of left hip requiring operative repair (H)      Heart disease     a fib     History of shingles      Hyperlipidemia      Hyperparathyroidism (H)      Hypertension      Osteoporosis      Pulmonary nodule     benign     Regional enteritis (H) 7/19/2016     Seizure (H)      Seizures (H)      Past Surgical History:   Procedure Laterality Date     ARTHROPLASTY REVISION " HIP Left 07/19/2016    removal of gamma nail hardware & conversion to total hip arthroplasty: Dr. Mercado at [facility] REMOVAL DEEP IMPLANT Left 1/25/2015    Procedure: Gamma Nail Left Hip;  Surgeon: Kirk De La Cruz MD;  Location: [facility] OR;  Service: Orthopedics     HEMORRHOIDECTOMY       ORIF HIP FRACTURE  2015     ORTHOPEDIC SURGERY      ORIF     OTHER SURGICAL HISTORY      HEMMOIROIDECT     OTHER SURGICAL HISTORY      CVL HEART CATH LEFT     Family History   Problem Relation Age of Onset     Hypertension Mother      Colon Cancer Father      Arthritis Father      Hypertension Father      Heart Disease Mother      Heart Disease Father     Social History     Socioeconomic History     Marital status:      Spouse name: Not on file     Number of children: Not on file     Years of education: Not on file     Highest education level: Not on file   Occupational History     Not on file   Tobacco Use     Smoking status: Former Smoker     Types: Cigarettes, Cigarettes     Quit date: 6/28/2011     Years since quitting: 10.2     Smokeless tobacco: Never Used   Substance and Sexual Activity     Alcohol use: No     Drug use: No     Sexual activity: Not on file   Other Topics Concern     Parent/sibling w/ CABG, MI or angioplasty before 65F 55M? Not Asked   Social History Narrative     Not on file     Social Determinants of Health     Financial Resource Strain:      Difficulty of Paying Living Expenses:    Food Insecurity:      Worried About Running Out of Food in the Last Year:      Ran Out of Food in the Last Year:    Transportation Needs:      Lack of Transportation (Medical):      Lack of Transportation (Non-Medical):    Physical Activity:      Days of Exercise per Week:      Minutes of Exercise per Session:    Stress:      Feeling of Stress :    Social Connections:      Frequency of Communication with Friends and Family:      Frequency of Social Gatherings with Friends and Family:      Attends Alev[employer]  Services:      Active Member of Clubs or Organizations:      Attends Club or Organization Meetings:      Marital Status:    Intimate Partner Violence:      Fear of Current or Ex-Partner:      Emotionally Abused:      Physically Abused:      Sexually Abused:           Medications  Allergies   Current Outpatient Medications   Medication Sig Dispense Refill     acetaminophen (TYLENOL) 500 MG tablet Take 1,000 mg by mouth 2 times daily        albuterol (PROAIR HFA/PROVENTIL HFA/VENTOLIN HFA) 108 (90 Base) MCG/ACT inhaler Inhale 2 puffs into the lungs every 4 hours as needed        alendronate (FOSAMAX) 70 MG tablet Take 70 mg by mouth every 7 days        apixaban ANTICOAGULANT (ELIQUIS) 5 MG tablet Take 5 mg by mouth 2 times daily        aspirin 81 MG EC tablet Take 81 mg by mouth daily        colesevelam (WELCHOL) 625 MG tablet Take 3 tablets by mouth At Bedtime       cyanocobalamin (CYANOCOBALAMIN) 1000 MCG/ML injection Inject 1,000 mcg into the muscle every 30 days        HAYLEY CALCIUM-VITAMIN D PO Take by mouth daily        fluticasone (FLOVENT HFA) 110 MCG/ACT inhaler Inhale 1 puff into the lungs 2 times daily        loratadine (CLARITIN) 10 MG tablet Take 10 mg by mouth daily as needed        Magnesium Oxide 250 MG TABS Take 250 mg by mouth daily        metolazone (ZAROXOLYN) 2.5 MG tablet Take 1 tablet (2.5 mg) by mouth daily as needed (As instructed by your provider for heart failure symptoms) Take 30 minutes prior to AM dose of torsemide 4 tablet 1     metoprolol tartrate (LOPRESSOR) 50 MG tablet Take 50 mg by mouth 2 times daily        multivitamin w/minerals (MULTI-VITAMIN) tablet Take 1 tablet by mouth daily        nitroGLYcerin (NITROSTAT) 0.4 MG sublingual tablet Place 0.4 mg under the tongue every 5 minutes as needed        PARoxetine (PAXIL) 40 MG tablet Take 40 mg by mouth daily        phenytoin (DILANTIN) 100 MG capsule Take 5 capsules (500 mg) by mouth At Bedtime 540 capsule 3     potassium chloride ER  (KLOR-CON M) 20 MEQ CR tablet Take 2 tablets (40 mEq) by mouth daily 180 tablet 3     simvastatin (ZOCOR) 40 MG tablet Take 40 mg by mouth At Bedtime        spironolactone (ALDACTONE) 50 MG tablet Take 50 mg by mouth daily       topiramate (TOPAMAX) 100 MG tablet Take 1.5 tablets (150 mg) by mouth 2 times daily 270 tablet 3     torsemide (DEMADEX) 20 MG tablet Take 2 tablets twice a day 270 tablet 1    Allergies   Allergen Reactions     Amoxicillin GI Disturbance and Other (See Comments)     COLITIS    Has tolerated cefazolin  GI bleeding, Colitis, has tolerated cefazolin        Sulfa Drugs      Sulfamethoxazole-Trimethoprim Other (See Comments)     Other reaction(s): Vaginal Irritation  VAGINAL BLEEDING  Vaginal irritation, vaginal bleeding            Lab Results    Chemistry/lipid CBC Cardiac Enzymes/BNP/TSH/INR   Recent Labs   Lab Test 03/02/20  0928   CHOL 166   HDL 60   LDL 69   TRIG 183*     Recent Labs   Lab Test 03/02/20  0928 10/29/18  1529 02/05/18  1533   LDL 69 73 93     Recent Labs   Lab Test 08/16/21  1056      POTASSIUM 3.6   CHLORIDE 103   CO2 22      BUN 13   CR 0.81   GFRESTIMATED 71   DIONNA 9.2     Recent Labs   Lab Test 08/16/21  1056 06/01/21  1657 05/06/21  1605   CR 0.81 0.77 0.82     No results for input(s): A1C in the last 64712 hours. Recent Labs   Lab Test 01/04/21  1046   WBC 9.3   HGB 8.4*   HCT 30.7*   MCV 81        Recent Labs   Lab Test 01/04/21  1046 04/27/20  0946 04/25/20  2300   HGB 8.4* 9.6* 9.9*    Recent Labs   Lab Test 04/25/20  2300 07/16/19  1934 06/10/19  1618   TROPONINI 0.04 0.04 0.06     Recent Labs   Lab Test 11/09/20  0907 10/02/20  0718 08/17/20  1626   * 564* 247*     Recent Labs   Lab Test 07/17/19  0620   TSH 2.90     No results for input(s): INR in the last 00869 hours.                                          Thank you for allowing me to participate in the care of your patient.      Sincerely,     Maria Elena Mejia NP     [facility]  [facility]  cc:   Maria Elena Mejia NP  [address] [place] [id number]

## 2021-11-08 ENCOUNTER — LAB REQUISITION (OUTPATIENT)
Dept: LAB | Facility: CLINIC | Age: 76
End: 2021-11-08
Payer: COMMERCIAL

## 2021-11-08 DIAGNOSIS — E78.2 MIXED HYPERLIPIDEMIA: ICD-10-CM

## 2021-11-08 LAB
CHOLEST SERPL-MCNC: 172 MG/DL
FASTING STATUS PATIENT QL REPORTED: ABNORMAL
HDLC SERPL-MCNC: 52 MG/DL
LDLC SERPL CALC-MCNC: 53 MG/DL
TRIGL SERPL-MCNC: 333 MG/DL

## 2021-11-08 PROCEDURE — 80061 LIPID PANEL: CPT | Mod: ORL | Performed by: FAMILY MEDICINE

## 2021-11-15 ENCOUNTER — LAB REQUISITION (OUTPATIENT)
Dept: LAB | Facility: CLINIC | Age: 76
End: 2021-11-15
Payer: COMMERCIAL

## 2021-11-15 DIAGNOSIS — R30.0 DYSURIA: ICD-10-CM

## 2021-11-15 PROCEDURE — 87086 URINE CULTURE/COLONY COUNT: CPT | Mod: ORL | Performed by: FAMILY MEDICINE

## 2021-11-16 LAB — BACTERIA UR CULT: NORMAL

## 2022-02-14 ENCOUNTER — OFFICE VISIT (OUTPATIENT)
Dept: CARDIOLOGY | Facility: CLINIC | Age: 77
End: 2022-02-14
Attending: NURSE PRACTITIONER
Payer: COMMERCIAL

## 2022-02-14 VITALS
SYSTOLIC BLOOD PRESSURE: 95 MMHG | HEART RATE: 56 BPM | DIASTOLIC BLOOD PRESSURE: 64 MMHG | WEIGHT: 197 LBS | BODY MASS INDEX: 30.85 KG/M2 | RESPIRATION RATE: 16 BRPM

## 2022-02-14 DIAGNOSIS — I48.91 ATRIAL FIBRILLATION, UNSPECIFIED TYPE (H): ICD-10-CM

## 2022-02-14 DIAGNOSIS — I50.32 CHRONIC HEART FAILURE WITH PRESERVED EJECTION FRACTION (H): ICD-10-CM

## 2022-02-14 DIAGNOSIS — I10 BENIGN ESSENTIAL HYPERTENSION: Primary | ICD-10-CM

## 2022-02-14 LAB
ANION GAP SERPL CALCULATED.3IONS-SCNC: 10 MMOL/L (ref 5–18)
BUN SERPL-MCNC: 16 MG/DL (ref 8–28)
CALCIUM SERPL-MCNC: 9.4 MG/DL (ref 8.5–10.5)
CHLORIDE BLD-SCNC: 108 MMOL/L (ref 98–107)
CO2 SERPL-SCNC: 19 MMOL/L (ref 22–31)
CREAT SERPL-MCNC: 0.96 MG/DL (ref 0.6–1.1)
GFR SERPL CREATININE-BSD FRML MDRD: 61 ML/MIN/1.73M2
GLUCOSE BLD-MCNC: 87 MG/DL (ref 70–125)
POTASSIUM BLD-SCNC: 4 MMOL/L (ref 3.5–5)
SODIUM SERPL-SCNC: 137 MMOL/L (ref 136–145)

## 2022-02-14 PROCEDURE — 80048 BASIC METABOLIC PNL TOTAL CA: CPT | Performed by: NURSE PRACTITIONER

## 2022-02-14 PROCEDURE — 36415 COLL VENOUS BLD VENIPUNCTURE: CPT | Performed by: NURSE PRACTITIONER

## 2022-02-14 PROCEDURE — 99214 OFFICE O/P EST MOD 30 MIN: CPT | Performed by: NURSE PRACTITIONER

## 2022-02-14 RX ORDER — USTEKINUMAB 45 MG/.5ML
45 INJECTION, SOLUTION SUBCUTANEOUS
COMMUNITY
Start: 2022-01-18

## 2022-02-14 RX ORDER — TRIAMCINOLONE ACETONIDE 1 MG/G
OINTMENT TOPICAL
COMMUNITY
Start: 2021-11-22 | End: 2022-05-20

## 2022-02-14 RX ORDER — ESTRADIOL 0.1 MG/G
CREAM VAGINAL PRN
COMMUNITY
Start: 2021-12-15 | End: 2023-09-14

## 2022-02-14 NOTE — LETTER
2/14/2022    Hannah Benton MD  [place] [address]  [place] [address]    RE: Tommy Rea       Dear Colleague,     I had the pleasure of seeing Tommy Rea in the [facility].        Assessment/Recommendations   Assessment:    1.  Heart failure with preserved ejection fraction, NYHA class III: Compensated.  She continues to have fatigue and dyspnea on exertion.  She has trace edema.  She has been working on weight loss and has cut out sugar in her diet.  Her weight has been stable around 196 pounds.  She denies orthopnea or PND  2.  Hypertension: Controlled.  Blood pressure 95/64  3.  Atrial fibrillation: Rate controlled.  She continues Eliquis for anticoagulation.  She is on Lopressor 50 mg twice a day for rate control    Plan:  1.  BMP pending  2.  Continue daily weights and low-sodium diet  3.  Continue current medications    Tommy Rea will follow up with Dr. Colbert in May and in the heart failure clinic in August.     History of Present Illness/Subjective    Ms. Tommy Rea is a 76 year old female seen at [facility] heart failure clinic today for continued follow-up.  Her daughter Kierra accompanies her today.  She follows up for heart failure with preserved ejection fraction.  She had a nuclear stress test June 2020 which showed ejection fraction of 46% and was negative for inducible myocardial ischemia or infarction.  She has a past medical history significant for hypertension, hyperlipidemia, atrial fibrillation, COPD.    Today, she continues to have dyspnea on exertion and mild fatigue.  She has trace edema.  She denies orthopnea or PND.  She denies chest pain.  She denies lightheadedness, orthopnea, PND, palpitations, chest pain and abdominal fullness/bloating.      She is monitoring home weights which are stable around 196 pounds.  She is following a low sodium diet.       Physical Examination Review of Systems   BP 95/64 (BP  Location: Left arm, Patient Position: Sitting, Cuff Size: Adult Large)   Pulse 56   Resp 16   Wt 89.4 kg (197 lb)   BMI 30.85 kg/m    Body mass index is 30.85 kg/m .  Wt Readings from Last 3 Encounters:   02/14/22 89.4 kg (197 lb)   10/11/21 96.9 kg (213 lb 9.6 oz)   09/21/21 94.3 kg (208 lb)       General Appearance:   no acute distress   ENT/Mouth: membranes moist, no oral lesions or bleeding gums.      EYES:  no scleral icterus, normal conjunctivae   Neck: no carotid bruits or thyromegaly   Chest/Lungs:   lungs are clear to auscultation, no rales or wheezing, equal chest wall expansion    Cardiovascular:    Irregularly irregular. Normal first and second heart sounds with no murmurs, rubs, or gallops; Jugular venous pressure normal, trace edema bilaterally    Abdomen:  no organomegaly, masses, bruits, or tenderness; bowel sounds are present   Extremities: no cyanosis or clubbing   Skin: no xanthelasma, warm.    Neurologic: normal  bilateral, no tremors     Psychiatric: alert and oriented x3    Enc Vitals  BP: 95/64  Pulse: 56  Resp: 16  Weight: 89.4 kg (197 lb)                                         Medical History  Surgical History Family History Social History   Past Medical History:   Diagnosis Date     Arthritis      Asthma      B12 deficiency      CAD (coronary artery disease)      COPD (chronic obstructive pulmonary disease) (H)      COPD (chronic obstructive pulmonary disease) (H)      Crohn's disease (H)      Crohn's disease (H)      Depression      Depressive disorder      Esophageal reflux      Essential hypertension      Fracture of left hip requiring operative repair (H)      Heart disease     a fib     History of shingles      Hyperlipidemia      Hyperparathyroidism (H)      Hypertension      Osteoporosis      Pulmonary nodule     benign     Regional enteritis (H) 7/19/2016     Seizure (H)      Seizures (H)     Past Surgical History:   Procedure Laterality Date     ARTHROPLASTY REVISION HIP  Left 07/19/2016    removal of gamma nail hardware & conversion to total hip arthroplasty: Dr. Mercado at [facility] REMOVAL DEEP IMPLANT Left 1/25/2015    Procedure: Gamma Nail Left Hip;  Surgeon: Kirk D eLa Cruz MD;  Location: [facility];  Service: Orthopedics     HEMORRHOIDECTOMY       ORIF HIP FRACTURE  2015     ORTHOPEDIC SURGERY      ORIF     OTHER SURGICAL HISTORY      HEMMOIROIDECT     OTHER SURGICAL HISTORY      CVL HEART CATH LEFT    Family History   Problem Relation Age of Onset     Hypertension Mother      Colon Cancer Father      Arthritis Father      Hypertension Father      Heart Disease Mother      Heart Disease Father     Social History     Socioeconomic History     Marital status:      Spouse name: Not on file     Number of children: Not on file     Years of education: Not on file     Highest education level: Not on file   Occupational History     Not on file   Tobacco Use     Smoking status: Former Smoker     Types: Cigarettes, Cigarettes     Quit date: 6/28/2011     Years since quitting: 10.6     Smokeless tobacco: Never Used   Substance and Sexual Activity     Alcohol use: No     Drug use: No     Sexual activity: Not on file   Other Topics Concern     Parent/sibling w/ CABG, MI or angioplasty before 65F 55M? Not Asked   Social History Narrative     Not on file     Social Determinants of Health     Financial Resource Strain: Not on file   Food Insecurity: Not on file   Transportation Needs: Not on file   Physical Activity: Not on file   Stress: Not on file   Social Connections: Not on file   Intimate Partner Violence: Not on file   Housing Stability: Not on file          Medications  Allergies   Current Outpatient Medications   Medication Sig Dispense Refill     acetaminophen (TYLENOL) 500 MG tablet Take 1,000 mg by mouth 2 times daily        albuterol (PROAIR HFA/PROVENTIL HFA/VENTOLIN HFA) 108 (90 Base) MCG/ACT inhaler Inhale 2 puffs into the lungs every 4 hours as needed         alendronate (FOSAMAX) 70 MG tablet Take 70 mg by mouth every 7 days        apixaban ANTICOAGULANT (ELIQUIS) 5 MG tablet Take 5 mg by mouth 2 times daily        aspirin 81 MG EC tablet Take 81 mg by mouth daily        colesevelam (WELCHOL) 625 MG tablet Take 3 tablets by mouth At Bedtime       cyanocobalamin (CYANOCOBALAMIN) 1000 MCG/ML injection Inject 1,000 mcg into the muscle every 30 days        HAYLEY CALCIUM-VITAMIN D PO Take by mouth daily        estradiol (ESTRACE) 0.1 MG/GM vaginal cream APPLY PEA SIZED AMOUNT TOPICALLY 3 TIMES WEEKLY       fluticasone (FLOVENT HFA) 110 MCG/ACT inhaler Inhale 1 puff into the lungs 2 times daily        loratadine (CLARITIN) 10 MG tablet Take 10 mg by mouth daily as needed        Magnesium Oxide 250 MG TABS Take 250 mg by mouth daily        metolazone (ZAROXOLYN) 2.5 MG tablet Take 1 tablet (2.5 mg) by mouth daily as needed (As instructed by your provider for heart failure symptoms) Take 30 minutes prior to AM dose of torsemide 4 tablet 1     metoprolol tartrate (LOPRESSOR) 50 MG tablet Take 50 mg by mouth 2 times daily        multivitamin w/minerals (MULTI-VITAMIN) tablet Take 1 tablet by mouth daily        nitroGLYcerin (NITROSTAT) 0.4 MG sublingual tablet Place 0.4 mg under the tongue every 5 minutes as needed        PARoxetine (PAXIL) 40 MG tablet Take 40 mg by mouth daily        phenytoin (DILANTIN) 100 MG capsule Take 5 capsules (500 mg) by mouth At Bedtime 540 capsule 3     potassium chloride ER (KLOR-CON M) 20 MEQ CR tablet Take 2 tablets (40 mEq) by mouth daily 180 tablet 3     simvastatin (ZOCOR) 40 MG tablet Take 40 mg by mouth At Bedtime        spironolactone (ALDACTONE) 50 MG tablet Take 50 mg by mouth daily       STELARA 45 MG/0.5ML SOLN        topiramate (TOPAMAX) 100 MG tablet Take 1.5 tablets (150 mg) by mouth 2 times daily 270 tablet 3     torsemide (DEMADEX) 20 MG tablet TAKE 2 TABLETS TWICE A DAY (DOSE CHANGE ONLY) 360 tablet 1     triamcinolone (KENALOG)  0.1 % external ointment APPLY TOPICALLY TO LEGS AND ARMS TWICE DAILY FOR UP TO 14 DAYS AS NEEDED FOR FLARES.      Allergies   Allergen Reactions     Amoxicillin GI Disturbance and Other (See Comments)     COLITIS    Has tolerated cefazolin  GI bleeding, Colitis, has tolerated cefazolin        Sulfa Drugs      Sulfamethoxazole-Trimethoprim Other (See Comments)     Other reaction(s): Vaginal Irritation  VAGINAL BLEEDING  Vaginal irritation, vaginal bleeding            Lab Results    Chemistry/lipid CBC Cardiac Enzymes/BNP/TSH/INR   Lab Results   Component Value Date    CHOL 172 11/08/2021    HDL 52 11/08/2021    TRIG 333 (H) 11/08/2021    BUN 12 10/01/2021     10/01/2021    CO2 26 10/01/2021    Lab Results   Component Value Date    WBC 9.3 01/04/2021    HGB 8.4 (L) 01/04/2021    HCT 30.7 (L) 01/04/2021    MCV 81 01/04/2021     01/04/2021    Lab Results   Component Value Date    TROPONINI 0.04 04/25/2020     (H) 09/13/2021    TSH 2.90 07/17/2019        This note has been dictated using voice recognition software. Any grammatical, typographical, or context distortions are unintentional and inherent to the software    30 minutes spent on the date of encounter doing chart review, review of outside records, review of test results, patient visit, documentation and discussion with family.              cc:   Maria Elena Mejia NP  [address] [place] [address]

## 2022-02-14 NOTE — PROGRESS NOTES
Assessment/Recommendations   Assessment:    1.  Heart failure with preserved ejection fraction, NYHA class III: Compensated.  She continues to have fatigue and dyspnea on exertion.  She has trace edema.  She has been working on weight loss and has cut out sugar in her diet.  Her weight has been stable around 196 pounds.  She denies orthopnea or PND  2.  Hypertension: Controlled.  Blood pressure 95/64  3.  Atrial fibrillation: Rate controlled.  She continues Eliquis for anticoagulation.  She is on Lopressor 50 mg twice a day for rate control    Plan:  1.  BMP pending  2.  Continue daily weights and low-sodium diet  3.  Continue current medications    Tommy Rea will follow up with Dr. Colbert in May and in the heart failure clinic in August.     History of Present Illness/Subjective    Ms. Tommy Rea is a 76 year old female seen at [facility] heart failure clinic today for continued follow-up.  Her daughter Kierra accompanies her today.  She follows up for heart failure with preserved ejection fraction.  She had a nuclear stress test June 2020 which showed ejection fraction of 46% and was negative for inducible myocardial ischemia or infarction.  She has a past medical history significant for hypertension, hyperlipidemia, atrial fibrillation, COPD.    Today, she continues to have dyspnea on exertion and mild fatigue.  She has trace edema.  She denies orthopnea or PND.  She denies chest pain.  She denies lightheadedness, orthopnea, PND, palpitations, chest pain and abdominal fullness/bloating.      She is monitoring home weights which are stable around 196 pounds.  She is following a low sodium diet.       Physical Examination Review of Systems   BP 95/64 (BP Location: Left arm, Patient Position: Sitting, Cuff Size: Adult Large)   Pulse 56   Resp 16   Wt 89.4 kg (197 lb)   BMI 30.85 kg/m    Body mass index is 30.85 kg/m .  Wt Readings from Last 3 Encounters:   02/14/22 89.4 kg (197 lb)    10/11/21 96.9 kg (213 lb 9.6 oz)   09/21/21 94.3 kg (208 lb)       General Appearance:   no acute distress   ENT/Mouth: membranes moist, no oral lesions or bleeding gums.      EYES:  no scleral icterus, normal conjunctivae   Neck: no carotid bruits or thyromegaly   Chest/Lungs:   lungs are clear to auscultation, no rales or wheezing, equal chest wall expansion    Cardiovascular:    Irregularly irregular. Normal first and second heart sounds with no murmurs, rubs, or gallops; Jugular venous pressure normal, trace edema bilaterally    Abdomen:  no organomegaly, masses, bruits, or tenderness; bowel sounds are present   Extremities: no cyanosis or clubbing   Skin: no xanthelasma, warm.    Neurologic: normal  bilateral, no tremors     Psychiatric: alert and oriented x3    Enc Vitals  BP: 95/64  Pulse: 56  Resp: 16  Weight: 89.4 kg (197 lb)                                         Medical History  Surgical History Family History Social History   Past Medical History:   Diagnosis Date     Arthritis      Asthma      B12 deficiency      CAD (coronary artery disease)      COPD (chronic obstructive pulmonary disease) (H)      COPD (chronic obstructive pulmonary disease) (H)      Crohn's disease (H)      Crohn's disease (H)      Depression      Depressive disorder      Esophageal reflux      Essential hypertension      Fracture of left hip requiring operative repair (H)      Heart disease     a fib     History of shingles      Hyperlipidemia      Hyperparathyroidism (H)      Hypertension      Osteoporosis      Pulmonary nodule     benign     Regional enteritis (H) 7/19/2016     Seizure (H)      Seizures (H)     Past Surgical History:   Procedure Laterality Date     ARTHROPLASTY REVISION HIP Left 07/19/2016    removal of gamma nail hardware & conversion to total hip arthroplasty: Dr. Mercado at [facility] REMOVAL DEEP IMPLANT Left 1/25/2015    Procedure: Gamma Nail Left Hip;  Surgeon: Kirk De La Cruz MD;  Location: [facility];  Service: Orthopedics     HEMORRHOIDECTOMY       ORIF HIP FRACTURE  2015     ORTHOPEDIC SURGERY      ORIF     OTHER SURGICAL HISTORY      HEMMOIROIDECT     OTHER SURGICAL HISTORY      CVL HEART CATH LEFT    Family History   Problem Relation Age of Onset     Hypertension Mother      Colon Cancer Father      Arthritis Father      Hypertension Father      Heart Disease Mother      Heart Disease Father     Social History     Socioeconomic History     Marital status:      Spouse name: Not on file     Number of children: Not on file     Years of education: Not on file     Highest education level: Not on file   Occupational History     Not on file   Tobacco Use     Smoking status: Former Smoker     Types: Cigarettes, Cigarettes     Quit date: 6/28/2011     Years since quitting: 10.6     Smokeless tobacco: Never Used   Substance and Sexual Activity     Alcohol use: No     Drug use: No     Sexual activity: Not on file   Other Topics Concern     Parent/sibling w/ CABG, MI or angioplasty before 65F 55M? Not Asked   Social History Narrative     Not on file     Social Determinants of Health     Financial Resource Strain: Not on file   Food Insecurity: Not on file   Transportation Needs: Not on file   Physical Activity: Not on file   Stress: Not on file   Social Connections: Not on file   Intimate Partner Violence: Not on file   Housing Stability: Not on file          Medications  Allergies   Current Outpatient Medications   Medication Sig Dispense Refill     acetaminophen (TYLENOL) 500 MG tablet Take 1,000 mg by mouth 2 times daily        albuterol (PROAIR HFA/PROVENTIL HFA/VENTOLIN HFA) 108 (90 Base) MCG/ACT inhaler Inhale 2 puffs into the lungs every 4 hours as needed        alendronate (FOSAMAX) 70 MG tablet Take 70 mg by mouth every 7 days        apixaban ANTICOAGULANT (ELIQUIS) 5 MG tablet Take 5 mg by mouth 2 times daily        aspirin 81 MG EC tablet Take 81 mg by mouth daily        colesevelam  (WELCHOL) 625 MG tablet Take 3 tablets by mouth At Bedtime       cyanocobalamin (CYANOCOBALAMIN) 1000 MCG/ML injection Inject 1,000 mcg into the muscle every 30 days        HAYLEY CALCIUM-VITAMIN D PO Take by mouth daily        estradiol (ESTRACE) 0.1 MG/GM vaginal cream APPLY PEA SIZED AMOUNT TOPICALLY 3 TIMES WEEKLY       fluticasone (FLOVENT HFA) 110 MCG/ACT inhaler Inhale 1 puff into the lungs 2 times daily        loratadine (CLARITIN) 10 MG tablet Take 10 mg by mouth daily as needed        Magnesium Oxide 250 MG TABS Take 250 mg by mouth daily        metolazone (ZAROXOLYN) 2.5 MG tablet Take 1 tablet (2.5 mg) by mouth daily as needed (As instructed by your provider for heart failure symptoms) Take 30 minutes prior to AM dose of torsemide 4 tablet 1     metoprolol tartrate (LOPRESSOR) 50 MG tablet Take 50 mg by mouth 2 times daily        multivitamin w/minerals (MULTI-VITAMIN) tablet Take 1 tablet by mouth daily        nitroGLYcerin (NITROSTAT) 0.4 MG sublingual tablet Place 0.4 mg under the tongue every 5 minutes as needed        PARoxetine (PAXIL) 40 MG tablet Take 40 mg by mouth daily        phenytoin (DILANTIN) 100 MG capsule Take 5 capsules (500 mg) by mouth At Bedtime 540 capsule 3     potassium chloride ER (KLOR-CON M) 20 MEQ CR tablet Take 2 tablets (40 mEq) by mouth daily 180 tablet 3     simvastatin (ZOCOR) 40 MG tablet Take 40 mg by mouth At Bedtime        spironolactone (ALDACTONE) 50 MG tablet Take 50 mg by mouth daily       STELARA 45 MG/0.5ML SOLN        topiramate (TOPAMAX) 100 MG tablet Take 1.5 tablets (150 mg) by mouth 2 times daily 270 tablet 3     torsemide (DEMADEX) 20 MG tablet TAKE 2 TABLETS TWICE A DAY (DOSE CHANGE ONLY) 360 tablet 1     triamcinolone (KENALOG) 0.1 % external ointment APPLY TOPICALLY TO LEGS AND ARMS TWICE DAILY FOR UP TO 14 DAYS AS NEEDED FOR FLARES.      Allergies   Allergen Reactions     Amoxicillin GI Disturbance and Other (See Comments)     COLITIS    Has tolerated  cefazolin  GI bleeding, Colitis, has tolerated cefazolin        Sulfa Drugs      Sulfamethoxazole-Trimethoprim Other (See Comments)     Other reaction(s): Vaginal Irritation  VAGINAL BLEEDING  Vaginal irritation, vaginal bleeding            Lab Results    Chemistry/lipid CBC Cardiac Enzymes/BNP/TSH/INR   Lab Results   Component Value Date    CHOL 172 11/08/2021    HDL 52 11/08/2021    TRIG 333 (H) 11/08/2021    BUN 12 10/01/2021     10/01/2021    CO2 26 10/01/2021    Lab Results   Component Value Date    WBC 9.3 01/04/2021    HGB 8.4 (L) 01/04/2021    HCT 30.7 (L) 01/04/2021    MCV 81 01/04/2021     01/04/2021    Lab Results   Component Value Date    TROPONINI 0.04 04/25/2020     (H) 09/13/2021    TSH 2.90 07/17/2019             This note has been dictated using voice recognition software. Any grammatical, typographical, or context distortions are unintentional and inherent to the software    30 minutes spent on the date of encounter doing chart review, review of outside records, review of test results, patient visit, documentation and discussion with family.

## 2022-02-14 NOTE — PATIENT INSTRUCTIONS
Tommy Rea,    It was a pleasure to see you today at [facility].     My recommendations after this visit include:  - Please follow up with Dr Colbert in May  - Please follow up with Maria Elena Rivera in August  - I have checked labs and will contact you with results  - Continue current medications    La Nicholas, CNP

## 2022-03-29 ENCOUNTER — LAB REQUISITION (OUTPATIENT)
Dept: LAB | Facility: CLINIC | Age: 77
End: 2022-03-29
Payer: COMMERCIAL

## 2022-03-29 DIAGNOSIS — K50.10 CROHN'S DISEASE OF LARGE INTESTINE WITHOUT COMPLICATIONS (H): ICD-10-CM

## 2022-03-29 PROCEDURE — 87493 C DIFF AMPLIFIED PROBE: CPT | Mod: ORL | Performed by: FAMILY MEDICINE

## 2022-03-29 PROCEDURE — 87506 IADNA-DNA/RNA PROBE TQ 6-11: CPT | Mod: ORL | Performed by: FAMILY MEDICINE

## 2022-03-30 LAB
C COLI+JEJUNI+LARI FUSA STL QL NAA+PROBE: NOT DETECTED
C DIFF TOX B STL QL: NEGATIVE
EC STX1 GENE STL QL NAA+PROBE: NOT DETECTED
EC STX2 GENE STL QL NAA+PROBE: NOT DETECTED
NOROV GI+II ORF1-ORF2 JNC STL QL NAA+PR: NOT DETECTED
RVA NSP5 STL QL NAA+PROBE: NOT DETECTED
SALMONELLA SP RPOD STL QL NAA+PROBE: NOT DETECTED
SHIGELLA SP+EIEC IPAH STL QL NAA+PROBE: NOT DETECTED
V CHOL+PARA RFBL+TRKH+TNAA STL QL NAA+PR: NOT DETECTED
Y ENTERO RECN STL QL NAA+PROBE: NOT DETECTED

## 2022-05-20 ENCOUNTER — OFFICE VISIT (OUTPATIENT)
Dept: CARDIOLOGY | Facility: CLINIC | Age: 77
End: 2022-05-20
Attending: NURSE PRACTITIONER
Payer: COMMERCIAL

## 2022-05-20 VITALS
HEART RATE: 58 BPM | DIASTOLIC BLOOD PRESSURE: 58 MMHG | RESPIRATION RATE: 16 BRPM | BODY MASS INDEX: 30.54 KG/M2 | SYSTOLIC BLOOD PRESSURE: 94 MMHG | WEIGHT: 195 LBS

## 2022-05-20 DIAGNOSIS — I25.10 CORONARY ARTERY DISEASE INVOLVING NATIVE CORONARY ARTERY OF NATIVE HEART, UNSPECIFIED WHETHER ANGINA PRESENT: Primary | ICD-10-CM

## 2022-05-20 DIAGNOSIS — I50.32 CHRONIC HEART FAILURE WITH PRESERVED EJECTION FRACTION (H): ICD-10-CM

## 2022-05-20 DIAGNOSIS — I48.0 PAROXYSMAL ATRIAL FIBRILLATION (H): ICD-10-CM

## 2022-05-20 PROCEDURE — 99214 OFFICE O/P EST MOD 30 MIN: CPT | Performed by: INTERNAL MEDICINE

## 2022-05-20 RX ORDER — POTASSIUM CHLORIDE 1500 MG/1
40 TABLET, EXTENDED RELEASE ORAL DAILY
Qty: 180 TABLET | Refills: 3 | Status: SHIPPED | OUTPATIENT
Start: 2022-05-20 | End: 2022-09-21

## 2022-05-20 NOTE — PROGRESS NOTES
Thank you, Dr. Hannah Benton, for asking the [facility] team to see Ms. Tommy Rea to follow-up on coronary artery disease, chronic HFpEF.    Assessment/Recommendations   Assessment:    1.  Coronary artery disease, with moderate stenosis in the proximal right coronary artery and diagonal branch on angiography in 2012.  Last nuclear stress test 2 years ago was negative for ischemia.  She reports no complaints of exertional chest discomfort.  She does have chronic exertional dyspnea although I think a large part of this is due to deconditioning.  Encouraged her to begin walking.  If her symptoms do not improve, would pursue ischemic evaluation  2.  Chronic HFpEF, currently well compensated.  She reports no symptoms of orthopnea, PND or lower extremity edema.  She has actually lost about 18 pounds since the fall 2021 due to cutting out sugar and continuing to limit salt.  At this point would continue with current medical therapy and diet restrictions.  3.  Paroxysmal atrial fibrillation, no clinical recurrence.  Continue metoprolol and Eliquis      Plan:  1.  Continue current medications  2.  Follow-up in the heart failure clinic in 6 months and with me in 12 months       History of Present Illness    Ms. Tommy Rea is a 77 year old female with history of two-vessel coronary artery disease based on coronary angiography in 2012 with negative nuclear stress test in June 2020, chronic HFpEF, paroxysmal atrial fibrillation who presents to the office today for a follow-up visit.  Since that her visit a year ago, she states she has been doing well.  Denies any exertional chest discomfort.  Does have exertional dyspnea with minimal activities but tells me she spends the day sitting in the chair and really does not do any walking.  Again encouraged her to try to walk in the hallway of her apartment building as I suspect a fair amount of the exertional dyspnea may be cardiovascular  deconditioning.  Did tell her if her symptoms do not improve that we should pursue repeat stress testing.  She also denies palpitations, orthopnea, PND or lower extremity edema.  Has lost about 18 pounds over the last 6 months due to cutting out sugar in her diet.    ECG (personally reviewed): No ECG today    Cardiac Imaging Studies (personally reviewed): No new imaging     Physical Examination Review of Systems   BP 94/58 (BP Location: Right arm, Patient Position: Sitting, Cuff Size: Adult Regular)   Pulse 58   Resp 16   Wt 88.5 kg (195 lb)   BMI 30.54 kg/m    Body mass index is 30.54 kg/m .  Wt Readings from Last 3 Encounters:   05/20/22 88.5 kg (195 lb)   02/14/22 89.4 kg (197 lb)   10/11/21 96.9 kg (213 lb 9.6 oz)     General Appearance:   Awake, Alert, No acute distress.   HEENT:  No scleral icterus; the mucous membranes were pink and moist.   Neck: No cervical bruits or jugular venous distention    Chest: The spine was straight. The chest was symmetric.   Lungs:   Respirations unlabored; the lungs are clear to auscultation. No wheezing   Cardiovascular:    Regular rate and rhythm.  S1, S2 normal.  No murmur or gallop   Abdomen:  No organomegaly, masses, bruits, or tenderness. Bowels sounds are present   Extremities:  No peripheral edema bilaterally   Skin: No xanthelasma. Warm, Dry.   Musculoskeletal: No tenderness.   Neurologic: Mood and affect are appropriate.    Enc Vitals  BP: 94/58  Pulse: 58  Resp: 16  Weight: 88.5 kg (195 lb) (With Shoes)  Height:  (Not Taken)                                         Medical History  Surgical History Family History Social History   Past Medical History:   Diagnosis Date     Arthritis      Asthma      B12 deficiency      CAD (coronary artery disease)      COPD (chronic obstructive pulmonary disease) (H)      COPD (chronic obstructive pulmonary disease) (H)      Crohn's disease (H)      Crohn's disease (H)      Depression      Depressive disorder      Esophageal reflux       Essential hypertension      Fracture of left hip requiring operative repair (H)      Heart disease     a fib     History of shingles      Hyperlipidemia      Hyperparathyroidism (H)      Hypertension      Osteoporosis      Pulmonary nodule     benign     Regional enteritis (H) 7/19/2016     Seizure (H)      Seizures (H)     Past Surgical History:   Procedure Laterality Date     ARTHROPLASTY REVISION HIP Left 07/19/2016    removal of gamma nail hardware & conversion to total hip arthroplasty: Dr. Mercado at [facility] REMOVAL DEEP IMPLANT Left 1/25/2015    Procedure: Gamma Nail Left Hip;  Surgeon: Kirk De La Cruz MD;  Location: [facility];  Service: Orthopedics     HEMORRHOIDECTOMY       ORIF HIP FRACTURE  2015     ORTHOPEDIC SURGERY      ORIF     OTHER SURGICAL HISTORY      HEMMOIROIDECT     OTHER SURGICAL HISTORY      CVL HEART CATH LEFT    Family History   Problem Relation Age of Onset     Hypertension Mother      Colon Cancer Father      Arthritis Father      Hypertension Father      Heart Disease Mother      Heart Disease Father     Social History     Socioeconomic History     Marital status:      Spouse name: Not on file     Number of children: Not on file     Years of education: Not on file     Highest education level: Not on file   Occupational History     Not on file   Tobacco Use     Smoking status: Former Smoker     Types: Cigarettes, Cigarettes     Quit date: 6/28/2011     Years since quitting: 10.9     Smokeless tobacco: Never Used   Substance and Sexual Activity     Alcohol use: No     Drug use: No     Sexual activity: Not on file   Other Topics Concern     Parent/sibling w/ CABG, MI or angioplasty before 65F 55M? Not Asked   Social History Narrative     Not on file     Social Determinants of Health     Financial Resource Strain: Not on file   Food Insecurity: Not on file   Transportation Needs: Not on file   Physical Activity: Not on file   Stress: Not on file   Social  Connections: Not on file   Intimate Partner Violence: Not on file   Housing Stability: Not on file          Medications  Allergies   Current Outpatient Medications   Medication Sig Dispense Refill     acetaminophen (TYLENOL) 500 MG tablet Take 1,000 mg by mouth 2 times daily        albuterol (PROAIR HFA/PROVENTIL HFA/VENTOLIN HFA) 108 (90 Base) MCG/ACT inhaler Inhale 2 puffs into the lungs every 4 hours as needed        alendronate (FOSAMAX) 70 MG tablet Take 70 mg by mouth every 7 days        apixaban ANTICOAGULANT (ELIQUIS) 5 MG tablet Take 5 mg by mouth 2 times daily        aspirin 81 MG EC tablet Take 81 mg by mouth daily        colesevelam (WELCHOL) 625 MG tablet Take 3 tablets by mouth At Bedtime       cyanocobalamin (CYANOCOBALAMIN) 1000 MCG/ML injection Inject 1,000 mcg into the muscle every 30 days        HAYLEY CALCIUM-VITAMIN D PO Take by mouth daily        estradiol (ESTRACE) 0.1 MG/GM vaginal cream as needed       fluticasone (FLOVENT HFA) 110 MCG/ACT inhaler Inhale 1 puff into the lungs 2 times daily        loratadine (CLARITIN) 10 MG tablet Take 10 mg by mouth daily as needed        Magnesium Oxide 250 MG TABS Take 250 mg by mouth daily        metolazone (ZAROXOLYN) 2.5 MG tablet Take 1 tablet (2.5 mg) by mouth daily as needed (As instructed by your provider for heart failure symptoms) Take 30 minutes prior to AM dose of torsemide 4 tablet 1     metoprolol tartrate (LOPRESSOR) 50 MG tablet Take 50 mg by mouth 2 times daily        multivitamin w/minerals (THERA-VIT-M) tablet Take 1 tablet by mouth daily        nitroGLYcerin (NITROSTAT) 0.4 MG sublingual tablet Place 0.4 mg under the tongue every 5 minutes as needed        PARoxetine (PAXIL) 40 MG tablet Take 40 mg by mouth daily        phenytoin (DILANTIN) 100 MG capsule Take 5 capsules (500 mg) by mouth At Bedtime 540 capsule 3     potassium chloride ER (KLOR-CON M) 20 MEQ CR tablet Take 2 tablets (40 mEq) by mouth daily 180 tablet 3     simvastatin  (ZOCOR) 40 MG tablet Take 40 mg by mouth At Bedtime        spironolactone (ALDACTONE) 50 MG tablet TAKE 1 TABLET DAILY 90 tablet 3     STELARA 45 MG/0.5ML SOLN every 30 days       topiramate (TOPAMAX) 100 MG tablet Take 1.5 tablets (150 mg) by mouth 2 times daily 270 tablet 3     torsemide 40 MG TABS Take 40 mg by mouth daily TAKE 2 TABLETS TWICE A DAY (DOSE CHANGE ONLY) 180 tablet 3      Allergies   Allergen Reactions     Amoxicillin GI Disturbance and Other (See Comments)     COLITIS    Has tolerated cefazolin  GI bleeding, Colitis, has tolerated cefazolin        Sulfa Drugs      Sulfamethoxazole-Trimethoprim Other (See Comments)     Other reaction(s): Vaginal Irritation  VAGINAL BLEEDING  Vaginal irritation, vaginal bleeding            Lab Results    Chemistry/lipid CBC Cardiac Enzymes/BNP/TSH/INR   Recent Labs   Lab Test 02/14/22  1054 11/08/21  1458   TRIG  --  333*   LDL  --  53   BUN 16  --      --    CO2 19*  --     Recent Labs   Lab Test 01/04/21  1046   WBC 9.3   HGB 8.4*   HCT 30.7*   MCV 81       Recent Labs   Lab Test 09/13/21  0925 05/11/20  0812 04/25/20  2300 07/21/19  0545 07/17/19  0620   TROPONINI  --   --  0.04  --   --    *   < >  --    < >  --    TSH  --   --   --   --  2.90    < > = values in this interval not displayed.        A total of 39 minutes was spent reviewing patient's medical records, obtaining history and performing examination, as well as discussing diagnoses/ recommendations with patient and answering all questions.

## 2022-05-20 NOTE — LETTER
5/20/2022    Hannah Benton MD  [place] [address]  [place] [address]    RE: Tommy Rea       Dear Colleague,     I had the pleasure of seeing Tommy Rea in the [facility].      Thank you, Dr. Hannah Benton, for asking the [facility] team to see Ms. Tommy Rea to follow-up on coronary artery disease, chronic HFpEF.    Assessment/Recommendations   Assessment:    1.  Coronary artery disease, with moderate stenosis in the proximal right coronary artery and diagonal branch on angiography in 2012.  Last nuclear stress test 2 years ago was negative for ischemia.  She reports no complaints of exertional chest discomfort.  She does have chronic exertional dyspnea although I think a large part of this is due to deconditioning.  Encouraged her to begin walking.  If her symptoms do not improve, would pursue ischemic evaluation  2.  Chronic HFpEF, currently well compensated.  She reports no symptoms of orthopnea, PND or lower extremity edema.  She has actually lost about 18 pounds since the fall 2021 due to cutting out sugar and continuing to limit salt.  At this point would continue with current medical therapy and diet restrictions.  3.  Paroxysmal atrial fibrillation, no clinical recurrence.  Continue metoprolol and Eliquis      Plan:  1.  Continue current medications  2.  Follow-up in the heart failure clinic in 6 months and with me in 12 months       History of Present Illness    Ms. Tommy Rea is a 77 year old female with history of two-vessel coronary artery disease based on coronary angiography in 2012 with negative nuclear stress test in June 2020, chronic HFpEF, paroxysmal atrial fibrillation who presents to the office today for a follow-up visit.  Since that her visit a year ago, she states she has been doing well.  Denies any exertional chest discomfort.  Does have exertional dyspnea with minimal activities but tells me she  spends the day sitting in the chair and really does not do any walking.  Again encouraged her to try to walk in the hallway of her apartment building as I suspect a fair amount of the exertional dyspnea may be cardiovascular deconditioning.  Did tell her if her symptoms do not improve that we should pursue repeat stress testing.  She also denies palpitations, orthopnea, PND or lower extremity edema.  Has lost about 18 pounds over the last 6 months due to cutting out sugar in her diet.    ECG (personally reviewed): No ECG today    Cardiac Imaging Studies (personally reviewed): No new imaging     Physical Examination Review of Systems   BP 94/58 (BP Location: Right arm, Patient Position: Sitting, Cuff Size: Adult Regular)   Pulse 58   Resp 16   Wt 88.5 kg (195 lb)   BMI 30.54 kg/m    Body mass index is 30.54 kg/m .  Wt Readings from Last 3 Encounters:   05/20/22 88.5 kg (195 lb)   02/14/22 89.4 kg (197 lb)   10/11/21 96.9 kg (213 lb 9.6 oz)     General Appearance:   Awake, Alert, No acute distress.   HEENT:  No scleral icterus; the mucous membranes were pink and moist.   Neck: No cervical bruits or jugular venous distention    Chest: The spine was straight. The chest was symmetric.   Lungs:   Respirations unlabored; the lungs are clear to auscultation. No wheezing   Cardiovascular:    Regular rate and rhythm.  S1, S2 normal.  No murmur or gallop   Abdomen:  No organomegaly, masses, bruits, or tenderness. Bowels sounds are present   Extremities:  No peripheral edema bilaterally   Skin: No xanthelasma. Warm, Dry.   Musculoskeletal: No tenderness.   Neurologic: Mood and affect are appropriate.    Enc Vitals  BP: 94/58  Pulse: 58  Resp: 16  Weight: 88.5 kg (195 lb) (With Shoes)  Height:  (Not Taken)                                         Medical History  Surgical History Family History Social History   Past Medical History:   Diagnosis Date     Arthritis      Asthma      B12 deficiency      CAD (coronary artery  disease)      COPD (chronic obstructive pulmonary disease) (H)      COPD (chronic obstructive pulmonary disease) (H)      Crohn's disease (H)      Crohn's disease (H)      Depression      Depressive disorder      Esophageal reflux      Essential hypertension      Fracture of left hip requiring operative repair (H)      Heart disease     a fib     History of shingles      Hyperlipidemia      Hyperparathyroidism (H)      Hypertension      Osteoporosis      Pulmonary nodule     benign     Regional enteritis (H) 7/19/2016     Seizure (H)      Seizures (H)     Past Surgical History:   Procedure Laterality Date     ARTHROPLASTY REVISION HIP Left 07/19/2016    removal of gamma nail hardware & conversion to total hip arthroplasty: Dr. Mercado at [facility] REMOVAL DEEP IMPLANT Left 1/25/2015    Procedure: Gamma Nail Left Hip;  Surgeon: Kirk De La Cruz MD;  Location: [facility];  Service: Orthopedics     HEMORRHOIDECTOMY       ORIF HIP FRACTURE  2015     ORTHOPEDIC SURGERY      ORIF     OTHER SURGICAL HISTORY      HEMMOIROIDECT     OTHER SURGICAL HISTORY      CVL HEART CATH LEFT    Family History   Problem Relation Age of Onset     Hypertension Mother      Colon Cancer Father      Arthritis Father      Hypertension Father      Heart Disease Mother      Heart Disease Father     Social History     Socioeconomic History     Marital status:      Spouse name: Not on file     Number of children: Not on file     Years of education: Not on file     Highest education level: Not on file   Occupational History     Not on file   Tobacco Use     Smoking status: Former Smoker     Types: Cigarettes, Cigarettes     Quit date: 6/28/2011     Years since quitting: 10.9     Smokeless tobacco: Never Used   Substance and Sexual Activity     Alcohol use: No     Drug use: No     Sexual activity: Not on file   Other Topics Concern     Parent/sibling w/ CABG, MI or angioplasty before 65F 55M? Not Asked   Social History Narrative      Not on file     Social Determinants of Health     Financial Resource Strain: Not on file   Food Insecurity: Not on file   Transportation Needs: Not on file   Physical Activity: Not on file   Stress: Not on file   Social Connections: Not on file   Intimate Partner Violence: Not on file   Housing Stability: Not on file          Medications  Allergies   Current Outpatient Medications   Medication Sig Dispense Refill     acetaminophen (TYLENOL) 500 MG tablet Take 1,000 mg by mouth 2 times daily        albuterol (PROAIR HFA/PROVENTIL HFA/VENTOLIN HFA) 108 (90 Base) MCG/ACT inhaler Inhale 2 puffs into the lungs every 4 hours as needed        alendronate (FOSAMAX) 70 MG tablet Take 70 mg by mouth every 7 days        apixaban ANTICOAGULANT (ELIQUIS) 5 MG tablet Take 5 mg by mouth 2 times daily        aspirin 81 MG EC tablet Take 81 mg by mouth daily        colesevelam (WELCHOL) 625 MG tablet Take 3 tablets by mouth At Bedtime       cyanocobalamin (CYANOCOBALAMIN) 1000 MCG/ML injection Inject 1,000 mcg into the muscle every 30 days        HAYLEY CALCIUM-VITAMIN D PO Take by mouth daily        estradiol (ESTRACE) 0.1 MG/GM vaginal cream as needed       fluticasone (FLOVENT HFA) 110 MCG/ACT inhaler Inhale 1 puff into the lungs 2 times daily        loratadine (CLARITIN) 10 MG tablet Take 10 mg by mouth daily as needed        Magnesium Oxide 250 MG TABS Take 250 mg by mouth daily        metolazone (ZAROXOLYN) 2.5 MG tablet Take 1 tablet (2.5 mg) by mouth daily as needed (As instructed by your provider for heart failure symptoms) Take 30 minutes prior to AM dose of torsemide 4 tablet 1     metoprolol tartrate (LOPRESSOR) 50 MG tablet Take 50 mg by mouth 2 times daily        multivitamin w/minerals (THERA-VIT-M) tablet Take 1 tablet by mouth daily        nitroGLYcerin (NITROSTAT) 0.4 MG sublingual tablet Place 0.4 mg under the tongue every 5 minutes as needed        PARoxetine (PAXIL) 40 MG tablet Take 40 mg by mouth daily         phenytoin (DILANTIN) 100 MG capsule Take 5 capsules (500 mg) by mouth At Bedtime 540 capsule 3     potassium chloride ER (KLOR-CON M) 20 MEQ CR tablet Take 2 tablets (40 mEq) by mouth daily 180 tablet 3     simvastatin (ZOCOR) 40 MG tablet Take 40 mg by mouth At Bedtime        spironolactone (ALDACTONE) 50 MG tablet TAKE 1 TABLET DAILY 90 tablet 3     STELARA 45 MG/0.5ML SOLN every 30 days       topiramate (TOPAMAX) 100 MG tablet Take 1.5 tablets (150 mg) by mouth 2 times daily 270 tablet 3     torsemide 40 MG TABS Take 40 mg by mouth daily TAKE 2 TABLETS TWICE A DAY (DOSE CHANGE ONLY) 180 tablet 3      Allergies   Allergen Reactions     Amoxicillin GI Disturbance and Other (See Comments)     COLITIS    Has tolerated cefazolin  GI bleeding, Colitis, has tolerated cefazolin        Sulfa Drugs      Sulfamethoxazole-Trimethoprim Other (See Comments)     Other reaction(s): Vaginal Irritation  VAGINAL BLEEDING  Vaginal irritation, vaginal bleeding            Lab Results    Chemistry/lipid CBC Cardiac Enzymes/BNP/TSH/INR   Recent Labs   Lab Test 02/14/22  1054 11/08/21  1458   TRIG  --  333*   LDL  --  53   BUN 16  --      --    CO2 19*  --     Recent Labs   Lab Test 01/04/21  1046   WBC 9.3   HGB 8.4*   HCT 30.7*   MCV 81       Recent Labs   Lab Test 09/13/21  0925 05/11/20  0812 04/25/20  2300 07/21/19  0545 07/17/19  0620   TROPONINI  --   --  0.04  --   --    *   < >  --    < >  --    TSH  --   --   --   --  2.90    < > = values in this interval not displayed.        A total of 39 minutes was spent reviewing patient's medical records, obtaining history and performing examination, as well as discussing diagnoses/ recommendations with patient and answering all questions.                          Thank you for allowing me to participate in the care of your patient.      Sincerely,     Lauren Colbert MD     [facility]  cc:   La Stevenson  Cristopher, APRN CNP  [address] [place] [address]

## 2022-05-24 ENCOUNTER — TELEPHONE (OUTPATIENT)
Dept: CARDIOLOGY | Facility: CLINIC | Age: 77
End: 2022-05-24
Payer: COMMERCIAL

## 2022-05-24 DIAGNOSIS — I50.32 CHRONIC HEART FAILURE WITH PRESERVED EJECTION FRACTION (H): ICD-10-CM

## 2022-05-24 NOTE — TELEPHONE ENCOUNTER
Phone call to patient - informed her of call rec'd from pharmacy with request to clarify Torsemide dose - patient stated Dr. Colbert had recommended she decrease dose to 40mg daily at recent visit because she had been taking it BID in past.    Reassured patient that new Rx would be sent to [facility] reflecting new dose and update would be forwarded to Dr. Colbert - understanding verbalized.  mg

## 2022-05-24 NOTE — TELEPHONE ENCOUNTER
Noted per Dr. Colbert's 05/20/22 visit note:  torsemide 40 MG TABS 180 tablet 3 5/20/2022  No   Sig - Route: Take 40 mg by mouth daily TAKE 2 TABLETS TWICE A DAY (DOSE CHANGE ONLY) - Oral   Sent to pharmacy as: Torsemide 40 MG Oral Tablet   Class: E-Prescribe   Order: [id number]   E-Prescribing Status: Receipt confirmed by pharmacy (5/20/2022  8:34 AM CDT)

## 2022-05-24 NOTE — TELEPHONE ENCOUNTER
[facility] Call Center    Phone Message    May a detailed message be left on voicemail: yes     Reason for Call: Medication Question or concern regarding medication   Prescription Clarification  Name of Medication: torsemide 40 MG TABS  Prescribing Provider: Lauren Colbert MD   Pharmacy: [facility] - [address]   What on the order needs clarification? Torsemide does not come with 40 mg tabs. Also the directions do not match the dosage. Please send new script, thank you.          Action Taken: Message routed to:  Other: Cardiology    Travel Screening: Not Applicable

## 2022-05-25 NOTE — TELEPHONE ENCOUNTER
My note does not say anything about decreasing her dose of torsemide.  Would recommend continuing her current dose as is.    JUDD

## 2022-05-26 NOTE — TELEPHONE ENCOUNTER
Grupo from [employer] called to inform us that the pt's insurance does not cover 40 MG tablet just 20 MG tablets, Grupo requested a call back   Grupo  # [phone]  Ref # [id number]

## 2022-05-27 ENCOUNTER — TELEPHONE (OUTPATIENT)
Dept: CARDIOLOGY | Facility: CLINIC | Age: 77
End: 2022-05-27
Payer: COMMERCIAL

## 2022-05-27 NOTE — TELEPHONE ENCOUNTER
2nd attempt to return call to [facility] unsuccessful - no answer by live person only hold music heard.  mg

## 2022-05-27 NOTE — TELEPHONE ENCOUNTER
Called to confirm torsemide 40 mg daily. [facility] will dispense torsemide 20 mg tablets and patient will take 2 20 mg tablets in the morning for 40 mg dosage.  Encouraged to call with any further questions or concerns.    Message received 5/27/2022 @ 2:49    Christy Duarte Maureen, RN  General phone call:     Caller: MARICEL HAYS   Primary cardiologist: JUDD   Detailed reason for call: MARICEL IS RETURNING CALL   Best phone number: [phone]   Best time to contact: ANYTIME   Ok to leave a detailedmessage? OK   Device? NO       THANKS CHRISTY     Additional Info:

## 2022-05-27 NOTE — TELEPHONE ENCOUNTER
[facility] Call Center    Phone Message    May a detailed message be left on voicemail: yes     Reason for Call: Medication Question or concern regarding medication   Prescription Clarification  Name of Medication: Torsemide 40 MG TABS Prescribing Provider:Lauren Colbert MD   Pharmacy: [facility] - [address]   What on the order needs clarification? Medication brand.          Action Taken: Message routed to:  Other: Cardiology    Travel Screening: Not Applicable

## 2022-07-11 ENCOUNTER — HOSPITAL ENCOUNTER (EMERGENCY)
Facility: HOSPITAL | Age: 77
Discharge: HOME | End: 2022-07-11
Attending: EMERGENCY MEDICINE | Admitting: EMERGENCY MEDICINE
Payer: COMMERCIAL

## 2022-07-11 ENCOUNTER — APPOINTMENT (OUTPATIENT)
Dept: CT IMAGING | Facility: HOSPITAL | Age: 77
End: 2022-07-11
Attending: EMERGENCY MEDICINE
Payer: COMMERCIAL

## 2022-07-11 VITALS
DIASTOLIC BLOOD PRESSURE: 56 MMHG | BODY MASS INDEX: 30.23 KG/M2 | OXYGEN SATURATION: 97 % | HEART RATE: 66 BPM | TEMPERATURE: 98.1 F | RESPIRATION RATE: 22 BRPM | WEIGHT: 193 LBS | SYSTOLIC BLOOD PRESSURE: 98 MMHG

## 2022-07-11 DIAGNOSIS — M54.50 ACUTE RIGHT-SIDED LOW BACK PAIN WITHOUT SCIATICA: ICD-10-CM
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
## 2022-07-11 LAB
ANION GAP SERPL CALCULATED.3IONS-SCNC: 7 MMOL/L (ref 5–18)
BASOPHILS # BLD AUTO: 0 10E3/UL (ref 0–0.2)
BASOPHILS NFR BLD AUTO: 0 %
BUN SERPL-MCNC: 17 MG/DL (ref 8–28)
CALCIUM SERPL-MCNC: 9.1 MG/DL (ref 8.5–10.5)
CHLORIDE BLD-SCNC: 105 MMOL/L (ref 98–107)
CO2 SERPL-SCNC: 28 MMOL/L (ref 22–31)
CREAT SERPL-MCNC: 0.89 MG/DL (ref 0.6–1.1)
EOSINOPHIL # BLD AUTO: 0.2 10E3/UL (ref 0–0.7)
EOSINOPHIL NFR BLD AUTO: 2 %
ERYTHROCYTE [DISTWIDTH] IN BLOOD BY AUTOMATED COUNT: 12.6 % (ref 10–15)
GFR SERPL CREATININE-BSD FRML MDRD: 66 ML/MIN/1.73M2
GLUCOSE BLD-MCNC: 104 MG/DL (ref 70–125)
HCT VFR BLD AUTO: 46.6 % (ref 35–47)
HGB BLD-MCNC: 15.3 G/DL (ref 11.7–15.7)
IMM GRANULOCYTES # BLD: 0.1 10E3/UL
IMM GRANULOCYTES NFR BLD: 1 %
LYMPHOCYTES # BLD AUTO: 1.2 10E3/UL (ref 0.8–5.3)
LYMPHOCYTES NFR BLD AUTO: 11 %
MCH RBC QN AUTO: 33.9 PG (ref 26.5–33)
MCHC RBC AUTO-ENTMCNC: 32.8 G/DL (ref 31.5–36.5)
MCV RBC AUTO: 103 FL (ref 78–100)
MONOCYTES # BLD AUTO: 1 10E3/UL (ref 0–1.3)
MONOCYTES NFR BLD AUTO: 9 %
NEUTROPHILS # BLD AUTO: 8.9 10E3/UL (ref 1.6–8.3)
NEUTROPHILS NFR BLD AUTO: 77 %
NRBC # BLD AUTO: 0 10E3/UL
NRBC BLD AUTO-RTO: 0 /100
PLATELET # BLD AUTO: 248 10E3/UL (ref 150–450)
POTASSIUM BLD-SCNC: 4.4 MMOL/L (ref 3.5–5)
RBC # BLD AUTO: 4.51 10E6/UL (ref 3.8–5.2)
SODIUM SERPL-SCNC: 140 MMOL/L (ref 136–145)
WBC # BLD AUTO: 11.4 10E3/UL (ref 4–11)

## 2022-07-11 PROCEDURE — 72125 CT NECK SPINE W/O DYE: CPT

## 2022-07-11 PROCEDURE — 72131 CT LUMBAR SPINE W/O DYE: CPT

## 2022-07-11 PROCEDURE — 250N000013 HC RX MED GY IP 250 OP 250 PS 637: Performed by: EMERGENCY MEDICINE

## 2022-07-11 PROCEDURE — 93005 ELECTROCARDIOGRAM TRACING: CPT | Performed by: EMERGENCY MEDICINE

## 2022-07-11 PROCEDURE — 82310 ASSAY OF CALCIUM: CPT | Performed by: EMERGENCY MEDICINE

## 2022-07-11 PROCEDURE — 99285 EMERGENCY DEPT VISIT HI MDM: CPT | Mod: 25

## 2022-07-11 PROCEDURE — 72128 CT CHEST SPINE W/O DYE: CPT

## 2022-07-11 PROCEDURE — 36415 COLL VENOUS BLD VENIPUNCTURE: CPT | Performed by: EMERGENCY MEDICINE

## 2022-07-11 PROCEDURE — 85025 COMPLETE CBC W/AUTO DIFF WBC: CPT | Performed by: EMERGENCY MEDICINE

## 2022-07-11 PROCEDURE — 70450 CT HEAD/BRAIN W/O DYE: CPT

## 2022-07-11 RX ORDER — OXYCODONE HYDROCHLORIDE 5 MG/1
5 TABLET ORAL ONCE
Status: COMPLETED | OUTPATIENT
Start: 2022-07-11 | End: 2022-07-11

## 2022-07-11 RX ADMIN — OXYCODONE HYDROCHLORIDE 5 MG: 5 TABLET ORAL at 14:30

## 2022-07-11 NOTE — ED NOTES
EMERGENCY DEPARTMENT SIGN OUT NOTE        ED COURSE AND MEDICAL DECISION MAKING  77-year-old female who experienced a fall earlier this morning was checked out to me by Dr. See with a CT scan of the thoracic spine pending.   The CT scan of the thoracic spine was ordered after the patient was found to have an osteophyte fracture of T11 on the lumbar spine and CT scan.    CT scan of the thoracic spine shows an age-indeterminate inferior endplate compression fracture of T10.  There is no mention of the previous T11 osteophyte fracture.      The patient's case was discussed with the on-call neurosurgery midlevel provider who also reviewed the CT scans.  No further neurosurgery evaluation is needed at this time.  The patient can wear the TLSO brace as needed for comfort.    I reevaluated the patient and informed her of the CT scan results.  On my exam the patient had no reproducible tenderness palpation located over her thoracic or lumbar spine.  Patient stated that the pain in her low back was located in the right lateral lower back.  Patient states that she was able to ambulate with the assistance of her walker which she uses at baseline.  After educating and reassuring the patient and her daughter they both felt comfortable returning home.  The patient was instructed to follow-up with her primary care provider for reevaluation or to return back to ED sooner for any worsening pain or any other new or concerning symptoms.      Patient was signed out to me by Dr Joon See at 15:25    In brief, Tommy Rea is a 77 year old female who initially presented this morning with a chief of a fall, which took place at her home at around 0400 when she got up to use the restroom. Patient fell onto her back and right side. She endorses pain to her right side, mostly to her back. She reports the bruises that are found on her legs were not there prior to her fall. She denies having hit her head or losing consciousness.      At time of sign out, disposition was pending CT thoracic.     4:12 PM Spoke with Patricia Tobias PA-C, neurosurgery.    4:16 PM Rechecked and updated the patient. We discussed the plan for discharge and the patient is agreeable. Reviewed supportive cares, symptomatic treatment, outpatient follow up, and reasons to return to the Emergency Department. Patient to be discharged by ED RN.     FINAL IMPRESSION    1. Fall, initial encounter    2. Acute right-sided low back pain without sciatica        ED MEDS  Medications   oxyCODONE (ROXICODONE) tablet 5 mg (5 mg Oral Given 7/11/22 1430)       LAB  Labs Ordered and Resulted from Time of ED Arrival to Time of ED Departure   CBC WITH PLATELETS AND DIFFERENTIAL - Abnormal       Result Value    WBC Count 11.4 (*)     RBC Count 4.51      Hemoglobin 15.3      Hematocrit 46.6       (*)     MCH 33.9 (*)     MCHC 32.8      RDW 12.6      Platelet Count 248      % Neutrophils 77      % Lymphocytes 11      % Monocytes 9      % Eosinophils 2      % Basophils 0      % Immature Granulocytes 1      NRBCs per 100 WBC 0      Absolute Neutrophils 8.9 (*)     Absolute Lymphocytes 1.2      Absolute Monocytes 1.0      Absolute Eosinophils 0.2      Absolute Basophils 0.0      Absolute Immature Granulocytes 0.1      Absolute NRBCs 0.0     BASIC METABOLIC PANEL - Normal    Sodium 140      Potassium 4.4      Chloride 105      Carbon Dioxide (CO2) 28      Anion Gap 7      Urea Nitrogen 17      Creatinine 0.89      Calcium 9.1      Glucose 104      GFR Estimate 66         EKG  None.     RADIOLOGY    CT Thoracic Spine w/o Contrast   Final Result   IMPRESSION:   1.  Age indeterminate mild inferior endplate compression deformity of T10 although new compared to previous chest CT 07/17/2019.   2.  Stable subtle superior endplate vertebral body height loss of T3. Mild chronic superior endplate vertebral body height loss of T4.    3.  Mild to moderate chronic compression fracture of T12. Stable  mild to moderate superior endplate chronic compression fracture of L1.    4.  Bridging anterior osteophytes from T4 to T10.          Lumbar spine CT w/o contrast   Final Result   IMPRESSION:   1.  Horizontal lucency extending through a bridging osteophyte ventrally at the T11 level, concerning for acute fracture. No vertebral body height loss or propagation through the T11 vertebral body.   2.  Unchanged chronic severe compression fracture deformity of the L3 vertebral body with associated moderate spinal canal stenosis.   3.  Stable chronic height loss of the T12 vertebral body.   4.  No high-grade spinal canal stenosis.   5.  Severe degenerative neuroforaminal stenosis on the left at L5-S1.   6.  Sigmoid diverticulosis.   7.  Cholelithiasis.      Cervical spine CT w/o contrast   Final Result   IMPRESSION:   HEAD CT:   1.  No acute intracranial abnormality.   2.  No acute calvarial fracture or scalp hematoma.   3.  Stable mild chronic age-related changes.      CERVICAL SPINE CT:   1.  No CT evidence for acute fracture or post traumatic subluxation.   2.  Unchanged mild chronic posttraumatic deformity of the superior endplates of T3 and T4.   3.  No high-grade spinal canal stenosis.   4.  Severe degenerative neuroforaminal stenosis on the left at C4-C5 and C5-C6      Head CT w/o contrast   Final Result   IMPRESSION:   HEAD CT:   1.  No acute intracranial abnormality.   2.  No acute calvarial fracture or scalp hematoma.   3.  Stable mild chronic age-related changes.      CERVICAL SPINE CT:   1.  No CT evidence for acute fracture or post traumatic subluxation.   2.  Unchanged mild chronic posttraumatic deformity of the superior endplates of T3 and T4.   3.  No high-grade spinal canal stenosis.   4.  Severe degenerative neuroforaminal stenosis on the left at C4-C5 and C5-C6          DISCHARGE MEDS  New Prescriptions    No medications on file         Mariely Monsivais DO  Emergency Medicine  [facility]  [facility] EMERGENCY DEPARTMENT  [address] [place] [id number]  [phone]       Mariely Monsivais DO  07/11/22 1911

## 2022-07-11 NOTE — ED NOTES
Updated patient and daughter Kierra on CT status and that we are still waiting for the results. Communicated to patient and daughter that the doctor would come to the room to discuss the results when the results are available.

## 2022-07-11 NOTE — ED NOTES
ED Provider In Triage Note  [facility]  Encounter Date: Jul 11, 2022    No chief complaint on file.      Brief HPI:   Tommy Rea is a 77 year old female presenting to the Emergency Department with a chief complaint of fall. She fell today while working around in her kitchen at 0400. Did hit her head without LOC.  Does report some pain in both her neck and the right side of her low back.  She reports a previous compression fracture.  Denies any numbness or weakness in her arms or legs.  Denies any preceding lightheadedness recalls the entire episode.    Brief Physical Exam:  There were no vitals taken for this visit.  General: Non-toxic appearing  HEENT: Atraumatic, no midline c-spine tenderness  Resp: No respiratory distress  Abdomen: Non-peritoneal  Neuro: Alert, oriented, answers questions appropriately  Psych: Behavior appropriate    Plan Initiated in Triage:  Imaging  Trauma alert    PIT Dispo:   Trauma alert initiated from triage.  Patient will proceed directly to CT for head, cervical spine, and lumbar spine imaging.  Will place in ED bed was available.    Tyron Alberto MD on 7/11/2022 at 11:16 AM    Patient was evaluated by the Physician in Triage due to a limitation of available rooms in the Emergency Department. A plan of care was discussed based on the information obtained on the initial evaluation and patient was consuled to return back to the Emergency Department lobby after this initial evalutaiton until results were obtained or a room became available in the Emergency Department. Patient was counseled not to leave prior to receiving the results of their workup.     Tyron Alberto MD  [facility] EMERGENCY DEPARTMENT  [address] [place] [id number]  [phone]       Tyron Alberto MD  07/11/22 1129

## 2022-07-11 NOTE — ED TRIAGE NOTES
Pt lives in own apt. Got up at 0400 when got dizzy and fell and unable to get up. C/o head pain and back pain. Ambulance had to get pt up. Seen by Dr. Alberto in triage. Is on Eloquis and ASA. No LOC. Trauma Alert called in triage. Able to walk with walker.     Triage Assessment     Row Name 07/11/22 1118       Triage Assessment (Adult)    Airway WDL WDL

## 2022-07-11 NOTE — ED PROVIDER NOTES
EMERGENCY DEPARTMENT ENCOUNTER      NAME: Tommy Rea  AGE: 77 year old female  YOB: 1945  MRN: [id number]  EVALUATION DATE & TIME: 7/11/2022  1:22 PM    PCP: Hannah Benton    ED PROVIDER: Joon See M.D.      Chief Complaint   Patient presents with     Fall         FINAL IMPRESSION:  1. Fall, initial encounter          ED COURSE & MEDICAL DECISION MAKING:    Pertinent Labs & Imaging studies reviewed. (See chart for details)  77 year old female presents to the Emergency Department for evaluation of fall. Patient appears non toxic with stable vitals signs, patient is afebrile no tachycardia or hypoxia. Overall exam is benign.  Lungs are clear and abdomen benign, back exam is benign she has no midline tenderness.  She denies any true loss of consciousness, room spinning dizziness, chest pain, shortness of breath, no true syncopal event.  Suspect mechanical fall.  CT imaging obtained from triage, CT imaging the head and C-spine reported no acute concerning findings but CT imaging of the lumbar spine reported likely acute fracture of bridging osteophyte at the T11 level.  We will discuss patient case and findings with neurosurgery.  We will also obtain screening labs and ECG.  On my exam again she has a GCS of 15 with no focal neurodeficits.  Low suspicion for anemia, electrode abnormality or dysrhythmia.  Certainly nothing suggest CVA, intracranial bleed or mass, dissection, sepsis, GI bleed, or other more malicious etiology of symptoms.    Reassessment: Labs, ECG showed no acute concerning findings.  Again spoke with neurosurgery regarding findings of the lumbar spine at this time they recommended obtaining CT imaging of the thoracic spine.  At time of this dictation CT imaging of thoracic spine is pending.  Final disposition will be dependent upon the pending studies and the patient's clinical trajectory.  Patient was signed out to the [facility] afternoon physician.    At the conclusion  "of the encounter I discussed the results of all of the tests and the disposition. The questions were answered and return precautions provided. The patient or family acknowledged understanding and was agreeable with the care plan.     1:41 PM I introduced myself to the patient, obtained patient history, performed a physical exam, and discussed plan for ED workup including potential diagnostic laboratory/imaging studies and interventions.     2:05 PM Spoke with leslye Gomez. Updated patient on need for additional imaging.        MEDICATIONS GIVEN IN THE EMERGENCY:  Medications   oxyCODONE (ROXICODONE) tablet 5 mg (5 mg Oral Given 7/11/22 1430)       NEW PRESCRIPTIONS STARTED AT TODAY'S ER VISIT  New Prescriptions    No medications on file            =================================================================    HPI    Patient information was obtained from: patient    Use of Intrepreter: N/A        Tommy Rea is a 77 year old female with a pertinent past medical history of epilepsy, hyperlipidemia, left hip fracture, tobacco use, osteoporosis, CAD, Afib, CHFpEF, COPD, HTN, and hyperparathyroidism who presents by private auto escorted by family member for evaluation of a fall. Patient got up to use the bathroom at 0400 this morning. She was in the kitchen and turned to her walker when she lost her balance. She fell on her back and right side of her body. She endorses pain on her ride side, but the pain is mostly in her back. She says she did hit her head but denies loss of consciousness. Patient reports that the bruises on her legs were not there prior to her fall. She denies chest pain, abdominal pain, fever, nausea, dizziness, or any \"room spinning\" sensation.    Patient is anticoagulated on Eliquis for atrial fibrillation and congestive heart failure.      REVIEW OF SYSTEMS   Constitutional:  Denies fever, chills  Respiratory:  Denies productive cough or increased work of " breathing  Cardiovascular:  Denies chest pain, palpitations  GI:  Denies abdominal pain, nausea, vomiting, or change in bowel or bladder habits   Musculoskeletal:  Denies any new muscle/joint swelling. Positive for arthralgia (right-sided, right back).  Skin:  Denies rash   Neurologic:  Denies focal weakness, dizziness.  All systems negative except as marked.     PAST MEDICAL HISTORY:  Past Medical History:   Diagnosis Date     Arthritis      Asthma      B12 deficiency      CAD (coronary artery disease)      COPD (chronic obstructive pulmonary disease) (H)      COPD (chronic obstructive pulmonary disease) (H)      Crohn's disease (H)      Crohn's disease (H)      Depression      Depressive disorder      Esophageal reflux      Essential hypertension      Fracture of left hip requiring operative repair (H)      Heart disease     a fib     History of shingles      Hyperlipidemia      Hyperparathyroidism (H)      Hypertension      Osteoporosis      Pulmonary nodule     benign     Regional enteritis (H) 7/19/2016     Seizure (H)      Seizures (H)        PAST SURGICAL HISTORY:  Past Surgical History:   Procedure Laterality Date     ARTHROPLASTY REVISION HIP Left 07/19/2016    removal of gamma nail hardware & conversion to total hip arthroplasty: Dr. Mercado at [facility] REMOVAL DEEP IMPLANT Left 1/25/2015    Procedure: Gamma Nail Left Hip;  Surgeon: Kirk De La Cruz MD;  Location: [facility];  Service: Orthopedics     HEMORRHOIDECTOMY       ORIF HIP FRACTURE  2015     ORTHOPEDIC SURGERY      ORIF     OTHER SURGICAL HISTORY      HEMMOIROIDECT     OTHER SURGICAL HISTORY      CVL HEART CATH LEFT         CURRENT MEDICATIONS:    Prior to Admission medications    Medication Sig Start Date End Date Taking? Authorizing Provider   acetaminophen (TYLENOL) 500 MG tablet Take 1,000 mg by mouth 2 times daily     Reported, Patient   albuterol (PROAIR HFA/PROVENTIL HFA/VENTOLIN HFA) 108 (90 Base) MCG/ACT inhaler Inhale 2  puffs into the lungs every 4 hours as needed     Reported, Patient   alendronate (FOSAMAX) 70 MG tablet Take 70 mg by mouth every 7 days     Reported, Patient   apixaban ANTICOAGULANT (ELIQUIS) 5 MG tablet Take 5 mg by mouth 2 times daily  6/18/19   Reported, Patient   aspirin 81 MG EC tablet Take 81 mg by mouth daily  6/19/19   Reported, Patient   colesevelam (WELCHOL) 625 MG tablet Take 3 tablets by mouth At Bedtime 9/15/21   Reported, Patient   cyanocobalamin (CYANOCOBALAMIN) 1000 MCG/ML injection Inject 1,000 mcg into the muscle every 30 days     Reported, Patient   HAYLEY CALCIUM-VITAMIN D PO Take by mouth daily  8/5/19   Reported, Patient   estradiol (ESTRACE) 0.1 MG/GM vaginal cream as needed 12/15/21   Reported, Patient   fluticasone (FLOVENT HFA) 110 MCG/ACT inhaler Inhale 1 puff into the lungs 2 times daily     Reported, Patient   loratadine (CLARITIN) 10 MG tablet Take 10 mg by mouth daily as needed     Reported, Patient   Magnesium Oxide 250 MG TABS Take 250 mg by mouth daily     Reported, Patient   metolazone (ZAROXOLYN) 2.5 MG tablet Take 1 tablet (2.5 mg) by mouth daily as needed (As instructed by your provider for heart failure symptoms) Take 30 minutes prior to AM dose of torsemide 9/13/21   Maria Elena Mejia, NP   metoprolol tartrate (LOPRESSOR) 50 MG tablet Take 50 mg by mouth 2 times daily  7/22/19   Reported, Patient   multivitamin w/minerals (THERA-VIT-M) tablet Take 1 tablet by mouth daily     Reported, Patient   nitroGLYcerin (NITROSTAT) 0.4 MG sublingual tablet Place 0.4 mg under the tongue every 5 minutes as needed  2/17/20   Reported, Patient   PARoxetine (PAXIL) 40 MG tablet Take 40 mg by mouth daily  8/5/19   Reported, Patient   phenytoin (DILANTIN) 100 MG capsule Take 5 capsules (500 mg) by mouth At Bedtime 9/1/21   Guy Marie MD   potassium chloride ER (KLOR-CON M) 20 MEQ CR tablet Take 2 tablets (40 mEq) by mouth daily 5/20/22   Lauren Colbert MD   simvastatin (ZOCOR) 40 MG tablet Take  40 mg by mouth At Bedtime     Reported, Patient   spironolactone (ALDACTONE) 50 MG tablet TAKE 1 TABLET DAILY 22   Lauren Colbert MD   STELARA 45 MG/0.5ML SOLN every 30 days 22   Reported, Patient   topiramate (TOPAMAX) 100 MG tablet Take 1.5 tablets (150 mg) by mouth 2 times daily 21  Guy Marie MD   Torsemide 40 MG TABS Take 40 mg by mouth daily 22   Lauren Colbert MD        ALLERGIES:  Allergies   Allergen Reactions     Amoxicillin GI Disturbance and Other (See Comments)     COLITIS    Has tolerated cefazolin  GI bleeding, Colitis, has tolerated cefazolin        Sulfa Drugs      Sulfamethoxazole-Trimethoprim Other (See Comments)     Other reaction(s): Vaginal Irritation  VAGINAL BLEEDING  Vaginal irritation, vaginal bleeding          FAMILY HISTORY:  Family History   Problem Relation Age of Onset     Hypertension Mother      Colon Cancer Father      Arthritis Father      Hypertension Father      Heart Disease Mother      Heart Disease Father        SOCIAL HISTORY:   Social History     Socioeconomic History     Marital status:    Tobacco Use     Smoking status: Former Smoker     Types: Cigarettes, Cigarettes     Quit date: 2011     Years since quittin.0     Smokeless tobacco: Never Used   Substance and Sexual Activity     Alcohol use: No     Drug use: No       VITALS:  Patient Vitals for the past 24 hrs:   BP Temp Temp src Pulse Resp SpO2 Weight   22 1415 121/57 -- -- 67 21 99 % --   22 1400 118/58 -- -- 67 20 98 % --   22 1345 115/57 -- -- 67 -- 98 % --   22 1120 103/55 98  F (36.7  C) Oral 62 18 98 % 87.5 kg (193 lb)     TRAUMA PHYSICAL EXAM  Constitutional:  Awake, alert, in no apparent distress  HENT:  Normocephalic, Atraumatic with no scalp hematomas or skull depressions, no signs of basilar skull injury, Bilateral external ears normal with no blood behind the TMs, Oropharynx moist with no signs of acute dental trauma, Nose normal with no  septal hematoma. Neck- Normal range of motion with no guarding, No midline cervical tenderness, Supple, No stridor.   Eyes:  PERRL, EOMI with no signs of entrapment, Conjunctiva normal, No discharge.   Respiratory:  Normal breath sounds, No respiratory distress, No wheezing.  No signs of flail chest  Cardiovascular:  Normal heart rate, Normal rhythm, No appreciable rubs or gallops.   GI:  Soft, No tenderness, No distension, No palpable masses  Musculoskeletal:  Intact distal pulses, No edema. Good range of motion in all major joints. No tenderness to palpation or major deformities noted.  Back-nontender along midline cervical, thoracic and lumbar spine with no step-offs or signs of trauma.  Pelvis is stable.  Integument:  Warm, Dry, No erythema, No rash.   Neurologic:  Alert & oriented, Normal motor function, Normal sensory function, No focal deficits noted.   Psychiatric:  Affect normal, Judgment normal, Mood normal.     LAB:  All pertinent labs reviewed and interpreted.  Results for orders placed or performed during the hospital encounter of 07/11/22   Head CT w/o contrast    Impression    IMPRESSION:  HEAD CT:  1.  No acute intracranial abnormality.  2.  No acute calvarial fracture or scalp hematoma.  3.  Stable mild chronic age-related changes.    CERVICAL SPINE CT:  1.  No CT evidence for acute fracture or post traumatic subluxation.  2.  Unchanged mild chronic posttraumatic deformity of the superior endplates of T3 and T4.  3.  No high-grade spinal canal stenosis.  4.  Severe degenerative neuroforaminal stenosis on the left at C4-C5 and C5-C6   Cervical spine CT w/o contrast    Impression    IMPRESSION:  HEAD CT:  1.  No acute intracranial abnormality.  2.  No acute calvarial fracture or scalp hematoma.  3.  Stable mild chronic age-related changes.    CERVICAL SPINE CT:  1.  No CT evidence for acute fracture or post traumatic subluxation.  2.  Unchanged mild chronic posttraumatic deformity of the superior  endplates of T3 and T4.  3.  No high-grade spinal canal stenosis.  4.  Severe degenerative neuroforaminal stenosis on the left at C4-C5 and C5-C6   Lumbar spine CT w/o contrast    Impression    IMPRESSION:  1.  Horizontal lucency extending through a bridging osteophyte ventrally at the T11 level, concerning for acute fracture. No vertebral body height loss or propagation through the T11 vertebral body.  2.  Unchanged chronic severe compression fracture deformity of the L3 vertebral body with associated moderate spinal canal stenosis.  3.  Stable chronic height loss of the T12 vertebral body.  4.  No high-grade spinal canal stenosis.  5.  Severe degenerative neuroforaminal stenosis on the left at L5-S1.  6.  Sigmoid diverticulosis.  7.  Cholelithiasis.   Basic metabolic panel   Result Value Ref Range    Sodium 140 136 - 145 mmol/L    Potassium 4.4 3.5 - 5.0 mmol/L    Chloride 105 98 - 107 mmol/L    Carbon Dioxide (CO2) 28 22 - 31 mmol/L    Anion Gap 7 5 - 18 mmol/L    Urea Nitrogen 17 8 - 28 mg/dL    Creatinine 0.89 0.60 - 1.10 mg/dL    Calcium 9.1 8.5 - 10.5 mg/dL    Glucose 104 70 - 125 mg/dL    GFR Estimate 66 >60 mL/min/1.73m2   CBC with platelets and differential   Result Value Ref Range    WBC Count 11.4 (H) 4.0 - 11.0 10e3/uL    RBC Count 4.51 3.80 - 5.20 10e6/uL    Hemoglobin 15.3 11.7 - 15.7 g/dL    Hematocrit 46.6 35.0 - 47.0 %     (H) 78 - 100 fL    MCH 33.9 (H) 26.5 - 33.0 pg    MCHC 32.8 31.5 - 36.5 g/dL    RDW 12.6 10.0 - 15.0 %    Platelet Count 248 150 - 450 10e3/uL    % Neutrophils 77 %    % Lymphocytes 11 %    % Monocytes 9 %    % Eosinophils 2 %    % Basophils 0 %    % Immature Granulocytes 1 %    NRBCs per 100 WBC 0 <1 /100    Absolute Neutrophils 8.9 (H) 1.6 - 8.3 10e3/uL    Absolute Lymphocytes 1.2 0.8 - 5.3 10e3/uL    Absolute Monocytes 1.0 0.0 - 1.3 10e3/uL    Absolute Eosinophils 0.2 0.0 - 0.7 10e3/uL    Absolute Basophils 0.0 0.0 - 0.2 10e3/uL    Absolute Immature Granulocytes 0.1  <=0.4 10e3/uL    Absolute NRBCs 0.0 10e3/uL       RADIOLOGY:  Lumbar spine CT w/o contrast   Final Result   IMPRESSION:   1.  Horizontal lucency extending through a bridging osteophyte ventrally at the T11 level, concerning for acute fracture. No vertebral body height loss or propagation through the T11 vertebral body.   2.  Unchanged chronic severe compression fracture deformity of the L3 vertebral body with associated moderate spinal canal stenosis.   3.  Stable chronic height loss of the T12 vertebral body.   4.  No high-grade spinal canal stenosis.   5.  Severe degenerative neuroforaminal stenosis on the left at L5-S1.   6.  Sigmoid diverticulosis.   7.  Cholelithiasis.      Cervical spine CT w/o contrast   Final Result   IMPRESSION:   HEAD CT:   1.  No acute intracranial abnormality.   2.  No acute calvarial fracture or scalp hematoma.   3.  Stable mild chronic age-related changes.      CERVICAL SPINE CT:   1.  No CT evidence for acute fracture or post traumatic subluxation.   2.  Unchanged mild chronic posttraumatic deformity of the superior endplates of T3 and T4.   3.  No high-grade spinal canal stenosis.   4.  Severe degenerative neuroforaminal stenosis on the left at C4-C5 and C5-C6      Head CT w/o contrast   Final Result   IMPRESSION:   HEAD CT:   1.  No acute intracranial abnormality.   2.  No acute calvarial fracture or scalp hematoma.   3.  Stable mild chronic age-related changes.      CERVICAL SPINE CT:   1.  No CT evidence for acute fracture or post traumatic subluxation.   2.  Unchanged mild chronic posttraumatic deformity of the superior endplates of T3 and T4.   3.  No high-grade spinal canal stenosis.   4.  Severe degenerative neuroforaminal stenosis on the left at C4-C5 and C5-C6      CT Thoracic Spine w/o Contrast    (Results Pending)          EKG:    Sinus rhythm, no specific ST acute ischemic changes, no concerning dysrhythmias or interval prolongation, when compared to ECG of April 26,  2020, no specific ST acute ischemic change appreciated  I have independently reviewed and interpreted the EKG(s) documented above.    PROCEDURES:       I, Alejandra Cano, am serving as a scribe to document services personally performed by Joon See MD, based on my observation and the provider's statements to me. I, Joon See MD attest that Alejandra Cano is acting in a scribe capacity, has observed my performance of the services and has documented them in accordance with my direction.    Joon See M.D.  Emergency Medicine  [facility] EMERGENCY DEPARTMENT  [address] [place] [id number]  [phone]  Dept: [phone]     Joon See MD  07/11/22 9069

## 2022-07-11 NOTE — DISCHARGE INSTRUCTIONS
CT scans of your thoracic spine show age-indeterminate fractures.  However, there is no pain on exam that correlates with the location of the fractures.  Your right low back pain is likely related to a muscle or ligamentous sprain/strain.  Continue using over-the-counter pain medications as needed for any further back pain.  Follow-up with your primary care provider for reevaluation or direct ED sooner for any worsening pain or any other new or concerning symptoms.

## 2022-07-12 LAB
ATRIAL RATE - MUSE: 67 BPM
DIASTOLIC BLOOD PRESSURE - MUSE: NORMAL MMHG
INTERPRETATION ECG - MUSE: NORMAL
P AXIS - MUSE: 18 DEGREES
PR INTERVAL - MUSE: 164 MS
QRS DURATION - MUSE: 74 MS
QT - MUSE: 424 MS
QTC - MUSE: 448 MS
R AXIS - MUSE: -11 DEGREES
SYSTOLIC BLOOD PRESSURE - MUSE: NORMAL MMHG
T AXIS - MUSE: -15 DEGREES
VENTRICULAR RATE- MUSE: 67 BPM

## 2022-08-15 DIAGNOSIS — G40.209 PARTIAL SYMPTOMATIC EPILEPSY WITH COMPLEX PARTIAL SEIZURES, NOT INTRACTABLE, WITHOUT STATUS EPILEPTICUS (H): ICD-10-CM

## 2022-08-15 RX ORDER — PHENYTOIN SODIUM 100 MG/1
CAPSULE, EXTENDED RELEASE ORAL
Qty: 150 CAPSULE | Refills: 0 | Status: SHIPPED | OUTPATIENT
Start: 2022-08-15 | End: 2022-08-23

## 2022-08-15 NOTE — TELEPHONE ENCOUNTER
Refill request for phenytoin.  Due for follow up appt. Letter mailed to pt to remind to schedule.  30 day supply of Medication T'd for review and signature      Jerri Cano LPN on 8/15/2022 at 2:55 PM

## 2022-08-15 NOTE — LETTER
8/15/2022        RE: Tommy Rea  [address]  [facility] [address]                Dear Tommy,         We recently provided you with medication refills.  Many medications require routine follow-up with your doctor.    Your prescription(s) have been refilled for 30 days so you may have time for the above noted follow-up. Please call to schedule soon so we can assure you have an appointment before your next refills are needed. If you have already made a follow up appointment, please disregard this letter.           Sincerely,        [facility]     (Formerly known as [facility])  Dr. Marie's Care Team

## 2022-08-23 ENCOUNTER — MYC REFILL (OUTPATIENT)
Dept: NEUROLOGY | Facility: CLINIC | Age: 77
End: 2022-08-23

## 2022-08-23 DIAGNOSIS — G40.209 PARTIAL SYMPTOMATIC EPILEPSY WITH COMPLEX PARTIAL SEIZURES, NOT INTRACTABLE, WITHOUT STATUS EPILEPTICUS (H): ICD-10-CM

## 2022-08-24 RX ORDER — TOPIRAMATE 100 MG/1
150 TABLET, FILM COATED ORAL 2 TIMES DAILY
Qty: 90 TABLET | Refills: 3 | Status: SHIPPED | OUTPATIENT
Start: 2022-08-24 | End: 2022-12-28

## 2022-08-24 RX ORDER — PHENYTOIN SODIUM 100 MG/1
CAPSULE, EXTENDED RELEASE ORAL
Qty: 150 CAPSULE | Refills: 1 | Status: SHIPPED | OUTPATIENT
Start: 2022-08-24 | End: 2022-12-28

## 2022-08-24 NOTE — TELEPHONE ENCOUNTER
Medication refill request for phenytoin (DILANTIN) 100 MG capsule. Last refill was on 8/15/2022.    Patient was last seen on August 02, 2021.  Patient's next appointment is scheduled with Dr. Marie on January 24, 2023.    Medication T'd for review and signature    DAVID Mckinnon on 8/24/2022 at 10:11 AM

## 2022-08-24 NOTE — TELEPHONE ENCOUNTER
Medication refill request for topiramate (TOPAMAX) 100 MG tablet. Last refill was on 6/21/2021.    Patient was last seen on August 02, 2021.  Patient's next appointment is scheduled with Dr. Marie on January 24, 2023.     Medication T'd for review and signature      DAVID Mckinnon on 8/24/2022 at 10:14 AM

## 2022-08-26 ENCOUNTER — TELEPHONE (OUTPATIENT)
Dept: NEUROLOGY | Facility: CLINIC | Age: 77
End: 2022-08-26

## 2022-08-26 NOTE — TELEPHONE ENCOUNTER
[facility] Call Center    Phone Message    May a detailed message be left on voicemail: yes     Reason for Call: Other: Patient is requesting a call back regarding her medications she is wanting refilled. For the phenytoin (DILANTIN) 100 MG capsule & topiramate (TOPAMAX) 100 MG tablet. Please call patient back to clarify if these have been refilled or not with [facility] at # [phone]    Action Taken: Message routed to:  Other: [facility]     Travel Screening: Not Applicable

## 2022-08-26 NOTE — TELEPHONE ENCOUNTER
Left detailed message for pt that both refills for topiramate and phenytoin were sent to [facility] pharmacy on 8/24/22.    Jerri Cano LPN on 8/26/2022 at 3:11 PM

## 2022-08-28 ENCOUNTER — HEALTH MAINTENANCE LETTER (OUTPATIENT)
Age: 77
End: 2022-08-28

## 2022-09-21 DIAGNOSIS — I50.32 CHRONIC HEART FAILURE WITH PRESERVED EJECTION FRACTION (H): ICD-10-CM

## 2022-09-21 RX ORDER — POTASSIUM CHLORIDE 1500 MG/1
TABLET, EXTENDED RELEASE ORAL
Qty: 180 TABLET | Refills: 3 | Status: SHIPPED | OUTPATIENT
Start: 2022-09-21 | End: 2023-02-01

## 2022-11-07 ENCOUNTER — ANCILLARY PROCEDURE (OUTPATIENT)
Dept: MAMMOGRAPHY | Facility: HOSPITAL | Age: 77
End: 2022-11-07
Attending: FAMILY MEDICINE
Payer: COMMERCIAL

## 2022-11-07 DIAGNOSIS — Z12.31 VISIT FOR SCREENING MAMMOGRAM: ICD-10-CM

## 2022-11-07 PROCEDURE — 77067 SCR MAMMO BI INCL CAD: CPT

## 2022-12-07 ENCOUNTER — TRANSFERRED RECORDS (OUTPATIENT)
Dept: HEALTH INFORMATION MANAGEMENT | Facility: CLINIC | Age: 77
End: 2022-12-07

## 2022-12-07 ENCOUNTER — LAB REQUISITION (OUTPATIENT)
Dept: LAB | Facility: CLINIC | Age: 77
End: 2022-12-07
Payer: COMMERCIAL

## 2022-12-07 DIAGNOSIS — I25.10 ATHEROSCLEROTIC HEART DISEASE OF NATIVE CORONARY ARTERY WITHOUT ANGINA PECTORIS: ICD-10-CM

## 2022-12-07 DIAGNOSIS — E21.3 HYPERPARATHYROIDISM, UNSPECIFIED (H): ICD-10-CM

## 2022-12-07 DIAGNOSIS — E53.8 DEFICIENCY OF OTHER SPECIFIED B GROUP VITAMINS: ICD-10-CM

## 2022-12-07 DIAGNOSIS — I48.0 PAROXYSMAL ATRIAL FIBRILLATION (H): ICD-10-CM

## 2022-12-07 LAB
ALBUMIN SERPL BCG-MCNC: 4 G/DL (ref 3.5–5.2)
ALP SERPL-CCNC: 100 U/L (ref 35–104)
ALT SERPL W P-5'-P-CCNC: 25 U/L (ref 10–35)
ANION GAP SERPL CALCULATED.3IONS-SCNC: 13 MMOL/L (ref 7–15)
AST SERPL W P-5'-P-CCNC: 22 U/L (ref 10–35)
BILIRUB SERPL-MCNC: 0.2 MG/DL
BUN SERPL-MCNC: 22.6 MG/DL (ref 8–23)
CALCIUM SERPL-MCNC: 9.1 MG/DL (ref 8.8–10.2)
CHLORIDE SERPL-SCNC: 103 MMOL/L (ref 98–107)
CHOLEST SERPL-MCNC: 185 MG/DL
CREAT SERPL-MCNC: 0.81 MG/DL (ref 0.51–0.95)
DEPRECATED HCO3 PLAS-SCNC: 24 MMOL/L (ref 22–29)
GFR SERPL CREATININE-BSD FRML MDRD: 74 ML/MIN/1.73M2
GLUCOSE SERPL-MCNC: 89 MG/DL (ref 70–99)
HDLC SERPL-MCNC: 56 MG/DL
LDLC SERPL CALC-MCNC: 71 MG/DL
NONHDLC SERPL-MCNC: 129 MG/DL
POTASSIUM SERPL-SCNC: 4.6 MMOL/L (ref 3.4–5.3)
PROT SERPL-MCNC: 7.3 G/DL (ref 6.4–8.3)
PTH-INTACT SERPL-MCNC: 62 PG/ML (ref 15–65)
SODIUM SERPL-SCNC: 140 MMOL/L (ref 136–145)
TRIGL SERPL-MCNC: 292 MG/DL
VIT B12 SERPL-MCNC: 911 PG/ML (ref 232–1245)

## 2022-12-07 PROCEDURE — 83970 ASSAY OF PARATHORMONE: CPT | Mod: ORL | Performed by: FAMILY MEDICINE

## 2022-12-07 PROCEDURE — 80061 LIPID PANEL: CPT | Mod: ORL | Performed by: FAMILY MEDICINE

## 2022-12-07 PROCEDURE — 82306 VITAMIN D 25 HYDROXY: CPT | Mod: ORL | Performed by: FAMILY MEDICINE

## 2022-12-07 PROCEDURE — 80053 COMPREHEN METABOLIC PANEL: CPT | Mod: ORL | Performed by: FAMILY MEDICINE

## 2022-12-07 PROCEDURE — 82607 VITAMIN B-12: CPT | Mod: ORL | Performed by: FAMILY MEDICINE

## 2022-12-08 LAB — DEPRECATED CALCIDIOL+CALCIFEROL SERPL-MC: 40 UG/L (ref 20–75)

## 2022-12-27 DIAGNOSIS — G40.209 PARTIAL SYMPTOMATIC EPILEPSY WITH COMPLEX PARTIAL SEIZURES, NOT INTRACTABLE, WITHOUT STATUS EPILEPTICUS (H): ICD-10-CM

## 2022-12-28 RX ORDER — TOPIRAMATE 100 MG/1
TABLET, FILM COATED ORAL
Qty: 90 TABLET | Refills: 1 | Status: SHIPPED | OUTPATIENT
Start: 2022-12-28 | End: 2023-01-24

## 2022-12-28 RX ORDER — PHENYTOIN SODIUM 100 MG/1
CAPSULE, EXTENDED RELEASE ORAL
Qty: 150 CAPSULE | Refills: 1 | Status: SHIPPED | OUTPATIENT
Start: 2022-12-28 | End: 2023-01-24

## 2022-12-28 NOTE — TELEPHONE ENCOUNTER
Refill request for:   topiramate (TOPAMAX) 100 MG tablet- Take 1.5 tablets (150 mg) by mouth 2 times daily  phenytoin (DILANTIN) 100 MG capsule- Take 5 capsules at bedtime.    LOV: 8/2/21  NOV: 1/24/23    30 day supply with 1 refills Medication T'd for review and signature  Katherine Preston CMA on 12/28/2022 at 11:28 AM

## 2023-01-23 NOTE — PROGRESS NOTES
NEUROLOGY FOLLOW UP VISIT  NOTE       [facility] [place]  [address] [place] [address]  Tel: [phone]  Fax: [phone]  [email or web address]     Tommy Rea,  1945, MRN [id number]  PCP: Hannah Benton  Date: 2023      ASSESSMENT & PLAN     Visit Diagnosis  1. Partial symptomatic epilepsy with complex partial seizures, not intractable, without status epilepticus (H)     Complex partial seizure  77-year-old female with history of complex partial seizure, depression with anxiety who returns for yearly follow-up.  She is having frequent auras and although she has history of auras she feels that the frequency has increased.  She claims she is taking medication regularly.  I have recommended:    1.  Schedule sleep deprived EEG  2.  Continue Dilantin 500 mg at bedtime and Topamax 150 mg twice daily.  90-day prescriptions for both medicines were sent to [facility]  3.  Check free, bound Dilantin level, Topamax level and comprehensive metabolic panel  4.  Follow-up after 1 year    Thank you again for this referral, please feel free to contact me if you have any questions.    Guy Marie MD  [facility]  (Formerly, [facility]     HISTORY OF PRESENT ILLNESS     Patient is a 77-year-old female with history of complex partial seizure, depression and anxiety, paroxysmal atrial fibrillation who returns for yearly follow-up.  She was last seen on 2021 when she was continued on Dilantin and Topamax.  She had reported increased auras and EEG was repeated at that time that showed showed some theta slowing.  Patient reports that she had few auras within the last few weeks and claims she is taking the medication regularly.    Briefly patient is a female with CHF, depression and anxiety, paroxysmal A. fib and complex partial seizure who was  initially evaluated for seizure when she presented with a  generalized seizure and EEG showed left hemispheric sharp activity. MRI was normal. 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 Patient reports that her seizures are triggered by stress and usually she gets an aura. 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) G40.209     Hyperlipidemia E78.5     Depressive disorder F32.A     Coronary atherosclerosis I25.10     B12 deficiency E53.8     Atrial fibrillation (H) I48.91     (HFpEF) heart failure with preserved ejection fraction (H) I50.30     COPD (chronic obstructive pulmonary disease) (H) J44.9     Benign essential hypertension I10     Hyperparathyroidism (H) E21.3         PAST MEDICAL & SURGICAL HISTORY     Past Medical History:   Patient  has a past medical history of Arthritis, Asthma, B12 deficiency, CAD (coronary artery disease), COPD (chronic obstructive pulmonary disease) (H), COPD (chronic obstructive pulmonary disease) (H), Crohn's disease (H), Crohn's disease (H), Depression, Depressive disorder, Esophageal reflux, Essential hypertension, Fracture of left hip requiring operative repair (H), Heart disease, History of shingles, Hyperlipidemia, Hyperparathyroidism (H), Hypertension, Osteoporosis, Pulmonary nodule, Regional enteritis (H) (7/19/2016), Seizure (H), and Seizures (H).    Surgical History:  She  has a past surgical history that includes orthopedic surgery; hemorrhoidectomy; REMOVAL DEEP IMPLANT (Left, 1/25/2015); Orif Hip Fracture (2015); Arthroplasty revision hip (Left, 07/19/2016); other surgical history; and other surgical history.     SOCIAL HISTORY     Reviewed, and she  reports that she quit smoking about 11 years ago. Her smoking use included cigarettes. She has never used smokeless tobacco. She  reports that she does not drink alcohol and does not use drugs.     FAMILY HISTORY     Reviewed, and family history includes Arthritis in her father; Colon Cancer in her father; Heart Disease in her father and mother; Hypertension in her father and mother.     ALLERGIES     Allergies   Allergen Reactions     Amoxicillin GI Disturbance and Other (See Comments)     COLITIS    Has tolerated cefazolin  GI bleeding, Colitis, has tolerated cefazolin        Sulfa Drugs      Sulfamethoxazole-Trimethoprim Other (See Comments)     Other reaction(s): Vaginal Irritation  VAGINAL BLEEDING  Vaginal irritation, vaginal bleeding            REVIEW OF SYSTEMS     A 12 point review of system was performed and was negative except as outlined in the history of present illness.     HOME MEDICATIONS     Current Outpatient Rx   Medication Sig Dispense Refill     albuterol (PROAIR HFA/PROVENTIL HFA/VENTOLIN HFA) 108 (90 Base) MCG/ACT inhaler Inhale 2 puffs into the lungs every 4 hours as needed        alendronate (FOSAMAX) 70 MG tablet Take 70 mg by mouth every 7 days        apixaban ANTICOAGULANT (ELIQUIS) 5 MG tablet Take 5 mg by mouth 2 times daily        cyanocobalamin (CYANOCOBALAMIN) 1000 MCG/ML injection Inject 1,000 mcg into the muscle every 30 days        HAYLEY CALCIUM-VITAMIN D PO Take by mouth daily        estradiol (ESTRACE) 0.1 MG/GM vaginal cream as needed       fluticasone (FLOVENT HFA) 110 MCG/ACT inhaler Inhale 1 puff into the lungs 2 times daily        loratadine (CLARITIN) 10 MG tablet Take 10 mg by mouth daily as needed        Magnesium Oxide 250 MG TABS Take 250 mg by mouth daily        metolazone (ZAROXOLYN) 2.5 MG tablet Take 1 tablet (2.5 mg) by mouth daily as needed (As instructed by your provider for heart failure symptoms) Take 30 minutes prior to AM dose of torsemide 4 tablet 1     metoprolol tartrate (LOPRESSOR) 50 MG tablet Take 50 mg by mouth 2 times daily        multivitamin w/minerals (THERA-VIT-M) tablet Take  "1 tablet by mouth daily        nitroGLYcerin (NITROSTAT) 0.4 MG sublingual tablet Place 0.4 mg under the tongue every 5 minutes as needed        PARoxetine (PAXIL) 40 MG tablet Take 40 mg by mouth daily        phenytoin (DILANTIN) 100 MG ER capsule Take 5 tablets at bedtime 450 capsule 3     potassium chloride ER (KLOR-CON M) 20 MEQ CR tablet TAKE 2 TABLETS(40 MEQ) BY MOUTH DAILY 180 tablet 3     simvastatin (ZOCOR) 40 MG tablet Take 40 mg by mouth At Bedtime        spironolactone (ALDACTONE) 50 MG tablet TAKE 1 TABLET DAILY 90 tablet 3     STELARA 45 MG/0.5ML SOLN every 30 days       topiramate (TOPAMAX) 100 MG tablet Take 1.5 tablets (150 mg) by mouth 2 times daily 270 tablet 3     torsemide (DEMADEX) 20 MG tablet TAKE 2 TABLETS BY MOUTH EVERY MORNING AND 1 TABLET EVERY EVENING AS DIRECTED. 270 tablet 1     Torsemide 40 MG TABS Take 40 mg by mouth daily (Patient taking differently: Take 40 mg by mouth daily 40mg at noon) 90 tablet 3     acetaminophen (TYLENOL) 500 MG tablet Take 1,000 mg by mouth 2 times daily        aspirin 81 MG EC tablet Take 81 mg by mouth daily        colesevelam (WELCHOL) 625 MG tablet Take 3 tablets by mouth At Bedtime       torsemide (DEMADEX) 20 MG tablet TAKE 2 TABLETS BY MOUTH EVERY MORNING AND 1 TABLET EVERY EVENING AS DIRECTED. 270 tablet 1         PHYSICAL EXAM     Vital signs  Ht 1.676 m (5' 6\")   Wt 84.4 kg (186 lb)   BMI 30.02 kg/m      Weight:   186 lbs 0 oz    Patient is alert and oriented x3 no acute distress vital signs are reviewed and documented in electronic medical record.  Neck is supple.  Neurologically speech was normal cranial nerves II through XII intact moves all 4 extremity rest of exam not possible on video visit     PERTINENT DIAGNOSTIC STUDIES     Following studies were reviewed:     CT HEAD 4/26/20  1.  No CT evidence for acute intracranial process.  2.  Brain atrophy and presumed chronic microvascular ischemic changes as above.  3.  Mucosal thickening is " noted involving the left sphenoid sinus. Correlation for sinusitis is recommended.    CT HEAD 7/17/19  1. No intracranial mass. No hemorrhage or stroke.  2. Mild presumed chronic small vessel ischemic change and age-related change again visualized.  3. Interval development of polypoid soft tissue in the external auditory canals.    MRI 4/27/20  HEAD MRI:  1.  No acute intracranial finding. No evidence for recent ischemia, intracranial hemorrhage, or mass.    HEAD MRA:  1.  Negative MR angiogram of the brain. No evidence for aneurysm, proximal vessel occlusion, high-grade intracranial stenosis or high flow vascular lesion.    NECK MRA:  1.  Atherosclerotic narrowing of the proximal left internal carotid artery is not well-characterized due to motion artifact but suspected to be in the 50% range based on NASCET criteria.  2.  No hemodynamically significant narrowing within the right carotid artery.   3.  Vertebral arteries are patent. No dissection.     EEG 8/3/2021  Nonspecific slowing in theta range.  No sharp activity seen to suggest seizures     EEG 4/27/20  This is an abnormal EEG due to diffusely slow background in   theta and delta range that suggests nonspecific generalized cerebral   dysfunction.  EEG background activity is contaminated with muscle   artifact.  Such nonspecific slowing suggests diffuse cerebral dysfunction   but no clear epileptiform activity was noted     MRI OF THE HEAD DONE in 2015 was normal. EEG was abnormal suggesting low threshold for seizure     PERTINENT LABS  Following labs were reviewed:  Lab Requisition on 12/07/2022   Component Date Value     Cholesterol 12/07/2022 185      Triglycerides 12/07/2022 292 (H)      Direct Measure HDL 12/07/2022 56      LDL Cholesterol Calculat* 12/07/2022 71      Non HDL Cholesterol 12/07/2022 129      Parathyroid Hormone Inta* 12/07/2022 62      Vitamin D, Total (25-Hyd* 12/07/2022 40      Vitamin B12 12/07/2022 911      Sodium 12/07/2022 140       Potassium 12/07/2022 4.6      Chloride 12/07/2022 103      Carbon Dioxide (CO2) 12/07/2022 24      Anion Gap 12/07/2022 13      Urea Nitrogen 12/07/2022 22.6      Creatinine 12/07/2022 0.81      Calcium 12/07/2022 9.1      Glucose 12/07/2022 89      Alkaline Phosphatase 12/07/2022 100      AST 12/07/2022 22      ALT 12/07/2022 25      Protein Total 12/07/2022 7.3      Albumin 12/07/2022 4.0      Bilirubin Total 12/07/2022 0.2      GFR Estimate 12/07/2022 74       VIDEO VISIT DETAILS  Patient is being evaluated via a billable video visit.   Patient would like the video invitation sent by: []E-mail  [x][employer]   Type of service: Video Visit  Video Start Time: 10:52 AM  Video End Time (time video stopped): 11:07 AM  Originating Location (Patient Location): Patient's Home  Distant Location (provider location): [facility]   Mode of Communication: Video Conference via [][employer] [x][employer]     Total time spent for face to face visit, reviewing labs/imaging studies, counseling and coordination of care was: 30 Minutes spent on the date of the encounter doing chart review, review of outside records, review of test results, interpretation of tests, patient visit and documentation       This note was dictated using voice recognition software.  Any grammatical or context distortions are unintentional and inherent to the software.    Orders Placed This Encounter   Procedures     Phenytoin level     Phenytoin free     Topiramate Level     Comprehensive metabolic panel     EEG Sleep Deprived      New Prescriptions    No medications on file     Modified Medications    Modified Medication Previous Medication    PHENYTOIN (DILANTIN) 100 MG ER CAPSULE phenytoin (DILANTIN) 100 MG ER capsule       Take 5 tablets at bedtime    TAKE 5 CAPSULES AT BEDTIME    TOPIRAMATE (TOPAMAX) 100 MG TABLET topiramate (TOPAMAX) 100 MG tablet       Take 1.5 tablets (150 mg) by mouth 2 times daily    TAKE ONE AND ONE-HALF  TABLETS TWICE A DAY

## 2023-01-24 ENCOUNTER — VIRTUAL VISIT (OUTPATIENT)
Dept: NEUROLOGY | Facility: CLINIC | Age: 78
End: 2023-01-24
Payer: COMMERCIAL

## 2023-01-24 VITALS — WEIGHT: 186 LBS | BODY MASS INDEX: 29.89 KG/M2 | HEIGHT: 66 IN

## 2023-01-24 DIAGNOSIS — G40.209 PARTIAL SYMPTOMATIC EPILEPSY WITH COMPLEX PARTIAL SEIZURES, NOT INTRACTABLE, WITHOUT STATUS EPILEPTICUS (H): Primary | ICD-10-CM

## 2023-01-24 PROCEDURE — 99214 OFFICE O/P EST MOD 30 MIN: CPT | Mod: 95 | Performed by: PSYCHIATRY & NEUROLOGY

## 2023-01-24 RX ORDER — TOPIRAMATE 100 MG/1
150 TABLET, FILM COATED ORAL 2 TIMES DAILY
Qty: 270 TABLET | Refills: 3 | Status: SHIPPED | OUTPATIENT
Start: 2023-01-24 | End: 2024-01-29

## 2023-01-24 RX ORDER — PHENYTOIN SODIUM 100 MG/1
CAPSULE, EXTENDED RELEASE ORAL
Qty: 450 CAPSULE | Refills: 3 | Status: SHIPPED | OUTPATIENT
Start: 2023-01-24 | End: 2024-01-29

## 2023-01-24 NOTE — LETTER
2023         RE: Tommy Rea  [address]  [place] [address]        Dear Colleague,    Thank you for referring your patient, Tommy Rea, to the [facility] [place]. Please see a copy of my visit note below.    NEUROLOGY FOLLOW UP VISIT  NOTE       [facility] [place]  [address] [place] [address]  Tel: [phone]  Fax: [phone]  [email or web address]     Tommy Rea,  1945, MRN [id number]  PCP: Hannah Benton  Date: 2023      ASSESSMENT & PLAN     Visit Diagnosis  1. Partial symptomatic epilepsy with complex partial seizures, not intractable, without status epilepticus (H)     Complex partial seizure  77-year-old female with history of complex partial seizure, depression with anxiety who returns for yearly follow-up.  She is having frequent auras and although she has history of auras she feels that the frequency has increased.  She claims she is taking medication regularly.  I have recommended:    1.  Schedule sleep deprived EEG  2.  Continue Dilantin 500 mg at bedtime and Topamax 150 mg twice daily.  90-day prescriptions for both medicines were sent to [facility]  3.  Check free, bound Dilantin level, Topamax level and comprehensive metabolic panel  4.  Follow-up after 1 year    Thank you again for this referral, please feel free to contact me if you have any questions.    Guy Marie MD  [facility]  ([facility], [facility]     HISTORY OF PRESENT ILLNESS     Patient is a 77-year-old female with history of complex partial seizure, depression and anxiety, paroxysmal atrial fibrillation who returns for yearly follow-up.  She was last seen on 2021 when she was continued on Dilantin and Topamax.  She had reported increased auras and EEG was repeated at that time that showed showed some theta slowing.  Patient reports that  she had few auras within the last few weeks and claims she is taking the medication regularly.    Briefly patient is a female with CHF, depression and anxiety, paroxysmal A. fib and complex partial seizure who was  initially evaluated for seizure when she presented with a generalized seizure and EEG showed left hemispheric sharp activity. MRI was normal. 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 Patient reports that her seizures are triggered by stress and usually she gets an aura. 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) G40.209     Hyperlipidemia E78.5     Depressive disorder F32.A     Coronary atherosclerosis I25.10     B12 deficiency E53.8     Atrial fibrillation (H) I48.91     (HFpEF) heart failure with preserved ejection fraction (H) I50.30     COPD (chronic obstructive pulmonary disease) (H) J44.9     Benign essential hypertension I10     Hyperparathyroidism (H) E21.3         PAST MEDICAL & SURGICAL HISTORY     Past Medical History:   Patient  has a past medical history of Arthritis, Asthma, B12 deficiency, CAD (coronary artery disease), COPD (chronic obstructive pulmonary disease) (H), COPD (chronic obstructive pulmonary disease) (H), Crohn's disease (H), Crohn's disease (H), Depression, Depressive disorder, Esophageal reflux, Essential hypertension, Fracture of left hip requiring operative repair (H), Heart disease, History of shingles, Hyperlipidemia, Hyperparathyroidism (H), Hypertension, Osteoporosis, Pulmonary nodule, Regional enteritis (H) (7/19/2016), Seizure (H), and Seizures (H).    Surgical History:  She  has a past surgical history that includes orthopedic surgery; hemorrhoidectomy; REMOVAL DEEP IMPLANT (Left, 1/25/2015); Orif Hip Fracture (2015);  Arthroplasty revision hip (Left, 07/19/2016); other surgical history; and other surgical history.     SOCIAL HISTORY     Reviewed, and she  reports that she quit smoking about 11 years ago. Her smoking use included cigarettes. She has never used smokeless tobacco. She reports that she does not drink alcohol and does not use drugs.     FAMILY HISTORY     Reviewed, and family history includes Arthritis in her father; Colon Cancer in her father; Heart Disease in her father and mother; Hypertension in her father and mother.     ALLERGIES     Allergies   Allergen Reactions     Amoxicillin GI Disturbance and Other (See Comments)     COLITIS    Has tolerated cefazolin  GI bleeding, Colitis, has tolerated cefazolin        Sulfa Drugs      Sulfamethoxazole-Trimethoprim Other (See Comments)     Other reaction(s): Vaginal Irritation  VAGINAL BLEEDING  Vaginal irritation, vaginal bleeding            REVIEW OF SYSTEMS     A 12 point review of system was performed and was negative except as outlined in the history of present illness.     HOME MEDICATIONS     Current Outpatient Rx   Medication Sig Dispense Refill     albuterol (PROAIR HFA/PROVENTIL HFA/VENTOLIN HFA) 108 (90 Base) MCG/ACT inhaler Inhale 2 puffs into the lungs every 4 hours as needed        alendronate (FOSAMAX) 70 MG tablet Take 70 mg by mouth every 7 days        apixaban ANTICOAGULANT (ELIQUIS) 5 MG tablet Take 5 mg by mouth 2 times daily        cyanocobalamin (CYANOCOBALAMIN) 1000 MCG/ML injection Inject 1,000 mcg into the muscle every 30 days        HAYLEY CALCIUM-VITAMIN D PO Take by mouth daily        estradiol (ESTRACE) 0.1 MG/GM vaginal cream as needed       fluticasone (FLOVENT HFA) 110 MCG/ACT inhaler Inhale 1 puff into the lungs 2 times daily        loratadine (CLARITIN) 10 MG tablet Take 10 mg by mouth daily as needed        Magnesium Oxide 250 MG TABS Take 250 mg by mouth daily        metolazone (ZAROXOLYN) 2.5 MG tablet Take 1 tablet (2.5 mg) by mouth  "daily as needed (As instructed by your provider for heart failure symptoms) Take 30 minutes prior to AM dose of torsemide 4 tablet 1     metoprolol tartrate (LOPRESSOR) 50 MG tablet Take 50 mg by mouth 2 times daily        multivitamin w/minerals (THERA-VIT-M) tablet Take 1 tablet by mouth daily        nitroGLYcerin (NITROSTAT) 0.4 MG sublingual tablet Place 0.4 mg under the tongue every 5 minutes as needed        PARoxetine (PAXIL) 40 MG tablet Take 40 mg by mouth daily        phenytoin (DILANTIN) 100 MG ER capsule Take 5 tablets at bedtime 450 capsule 3     potassium chloride ER (KLOR-CON M) 20 MEQ CR tablet TAKE 2 TABLETS(40 MEQ) BY MOUTH DAILY 180 tablet 3     simvastatin (ZOCOR) 40 MG tablet Take 40 mg by mouth At Bedtime        spironolactone (ALDACTONE) 50 MG tablet TAKE 1 TABLET DAILY 90 tablet 3     STELARA 45 MG/0.5ML SOLN every 30 days       topiramate (TOPAMAX) 100 MG tablet Take 1.5 tablets (150 mg) by mouth 2 times daily 270 tablet 3     torsemide (DEMADEX) 20 MG tablet TAKE 2 TABLETS BY MOUTH EVERY MORNING AND 1 TABLET EVERY EVENING AS DIRECTED. 270 tablet 1     Torsemide 40 MG TABS Take 40 mg by mouth daily (Patient taking differently: Take 40 mg by mouth daily 40mg at noon) 90 tablet 3     acetaminophen (TYLENOL) 500 MG tablet Take 1,000 mg by mouth 2 times daily        aspirin 81 MG EC tablet Take 81 mg by mouth daily        colesevelam (WELCHOL) 625 MG tablet Take 3 tablets by mouth At Bedtime       torsemide (DEMADEX) 20 MG tablet TAKE 2 TABLETS BY MOUTH EVERY MORNING AND 1 TABLET EVERY EVENING AS DIRECTED. 270 tablet 1         PHYSICAL EXAM     Vital signs  Ht 1.676 m (5' 6\")   Wt 84.4 kg (186 lb)   BMI 30.02 kg/m      Weight:   186 lbs 0 oz    Patient is alert and oriented x3 no acute distress vital signs are reviewed and documented in electronic medical record.  Neck is supple.  Neurologically speech was normal cranial nerves II through XII intact moves all 4 extremity rest of exam not " possible on video visit     PERTINENT DIAGNOSTIC STUDIES     Following studies were reviewed:     CT HEAD 4/26/20  1.  No CT evidence for acute intracranial process.  2.  Brain atrophy and presumed chronic microvascular ischemic changes as above.  3.  Mucosal thickening is noted involving the left sphenoid sinus. Correlation for sinusitis is recommended.    CT HEAD 7/17/19  1. No intracranial mass. No hemorrhage or stroke.  2. Mild presumed chronic small vessel ischemic change and age-related change again visualized.  3. Interval development of polypoid soft tissue in the external auditory canals.    MRI 4/27/20  HEAD MRI:  1.  No acute intracranial finding. No evidence for recent ischemia, intracranial hemorrhage, or mass.    HEAD MRA:  1.  Negative MR angiogram of the brain. No evidence for aneurysm, proximal vessel occlusion, high-grade intracranial stenosis or high flow vascular lesion.    NECK MRA:  1.  Atherosclerotic narrowing of the proximal left internal carotid artery is not well-characterized due to motion artifact but suspected to be in the 50% range based on NASCET criteria.  2.  No hemodynamically significant narrowing within the right carotid artery.   3.  Vertebral arteries are patent. No dissection.     EEG 8/3/2021  Nonspecific slowing in theta range.  No sharp activity seen to suggest seizures     EEG 4/27/20  This is an abnormal EEG due to diffusely slow background in   theta and delta range that suggests nonspecific generalized cerebral   dysfunction.  EEG background activity is contaminated with muscle   artifact.  Such nonspecific slowing suggests diffuse cerebral dysfunction   but no clear epileptiform activity was noted     MRI OF THE HEAD DONE in 2015 was normal. EEG was abnormal suggesting low threshold for seizure     PERTINENT LABS  Following labs were reviewed:  Lab Requisition on 12/07/2022   Component Date Value     Cholesterol 12/07/2022 185      Triglycerides 12/07/2022 292 (H)       Direct Measure HDL 12/07/2022 56      LDL Cholesterol Calculat* 12/07/2022 71      Non HDL Cholesterol 12/07/2022 129      Parathyroid Hormone Inta* 12/07/2022 62      Vitamin D, Total (25-Hyd* 12/07/2022 40      Vitamin B12 12/07/2022 911      Sodium 12/07/2022 140      Potassium 12/07/2022 4.6      Chloride 12/07/2022 103      Carbon Dioxide (CO2) 12/07/2022 24      Anion Gap 12/07/2022 13      Urea Nitrogen 12/07/2022 22.6      Creatinine 12/07/2022 0.81      Calcium 12/07/2022 9.1      Glucose 12/07/2022 89      Alkaline Phosphatase 12/07/2022 100      AST 12/07/2022 22      ALT 12/07/2022 25      Protein Total 12/07/2022 7.3      Albumin 12/07/2022 4.0      Bilirubin Total 12/07/2022 0.2      GFR Estimate 12/07/2022 74       VIDEO VISIT DETAILS  Patient is being evaluated via a billable video visit.   Patient would like the video invitation sent by: []E-mail  [x][employer]   Type of service: Video Visit  Video Start Time: 10:52 AM  Video End Time (time video stopped): 11:07 AM  Originating Location (Patient Location): Patient's Home  Distant Location (provider location): [facility] [place]   Mode of Communication: Video Conference via [][employer] [x][employer]     Total time spent for face to face visit, reviewing labs/imaging studies, counseling and coordination of care was: 30 Minutes spent on the date of the encounter doing chart review, review of outside records, review of test results, interpretation of tests, patient visit and documentation       This note was dictated using voice recognition software.  Any grammatical or context distortions are unintentional and inherent to the software.    Orders Placed This Encounter   Procedures     Phenytoin level     Phenytoin free     Topiramate Level     Comprehensive metabolic panel     EEG Sleep Deprived      New Prescriptions    No medications on file     Modified Medications    Modified Medication Previous Medication    PHENYTOIN (DILANTIN) 100 MG  ER CAPSULE phenytoin (DILANTIN) 100 MG ER capsule       Take 5 tablets at bedtime    TAKE 5 CAPSULES AT BEDTIME    TOPIRAMATE (TOPAMAX) 100 MG TABLET topiramate (TOPAMAX) 100 MG tablet       Take 1.5 tablets (150 mg) by mouth 2 times daily    TAKE ONE AND ONE-HALF TABLETS TWICE A DAY                     Again, thank you for allowing me to participate in the care of your patient.        Sincerely,        Guy Marie MD

## 2023-01-24 NOTE — NURSING NOTE
Chief Complaint   Patient presents with     Seizures     Annual follow up- Patient reports she has had a few auras within the last few weeks. Usually has an aura every 2 months      Video Visit     Text direct link to [phone] (Not MyChart)     Katherine Preston CMA on 1/24/2023 at 10:33 AM

## 2023-02-01 ENCOUNTER — OFFICE VISIT (OUTPATIENT)
Dept: CARDIOLOGY | Facility: CLINIC | Age: 78
End: 2023-02-01
Payer: COMMERCIAL

## 2023-02-01 VITALS
HEART RATE: 63 BPM | RESPIRATION RATE: 20 BRPM | DIASTOLIC BLOOD PRESSURE: 54 MMHG | BODY MASS INDEX: 29.86 KG/M2 | WEIGHT: 185 LBS | SYSTOLIC BLOOD PRESSURE: 110 MMHG

## 2023-02-01 DIAGNOSIS — G40.209 PARTIAL SYMPTOMATIC EPILEPSY WITH COMPLEX PARTIAL SEIZURES, NOT INTRACTABLE, WITHOUT STATUS EPILEPTICUS (H): ICD-10-CM

## 2023-02-01 DIAGNOSIS — I50.32 CHRONIC HEART FAILURE WITH PRESERVED EJECTION FRACTION (H): Primary | ICD-10-CM

## 2023-02-01 DIAGNOSIS — I25.10 CORONARY ARTERY DISEASE INVOLVING NATIVE CORONARY ARTERY OF NATIVE HEART, UNSPECIFIED WHETHER ANGINA PRESENT: ICD-10-CM

## 2023-02-01 DIAGNOSIS — I48.0 PAROXYSMAL ATRIAL FIBRILLATION (H): ICD-10-CM

## 2023-02-01 LAB
ALBUMIN SERPL BCG-MCNC: 4.1 G/DL (ref 3.5–5.2)
ALP SERPL-CCNC: 99 U/L (ref 35–104)
ALT SERPL W P-5'-P-CCNC: 50 U/L (ref 10–35)
ANION GAP SERPL CALCULATED.3IONS-SCNC: 10 MMOL/L (ref 7–15)
AST SERPL W P-5'-P-CCNC: 51 U/L (ref 10–35)
BILIRUB SERPL-MCNC: 0.2 MG/DL
BUN SERPL-MCNC: 23.9 MG/DL (ref 8–23)
CALCIUM SERPL-MCNC: 9.2 MG/DL (ref 8.8–10.2)
CHLORIDE SERPL-SCNC: 100 MMOL/L (ref 98–107)
CREAT SERPL-MCNC: 0.88 MG/DL (ref 0.51–0.95)
DEPRECATED HCO3 PLAS-SCNC: 29 MMOL/L (ref 22–29)
GFR SERPL CREATININE-BSD FRML MDRD: 67 ML/MIN/1.73M2
GLUCOSE SERPL-MCNC: 100 MG/DL (ref 70–99)
PHENYTOIN SERPL-MCNC: 16.7 UG/ML
POTASSIUM SERPL-SCNC: 4 MMOL/L (ref 3.4–5.3)
PROT SERPL-MCNC: 7.5 G/DL (ref 6.4–8.3)
SODIUM SERPL-SCNC: 139 MMOL/L (ref 136–145)

## 2023-02-01 PROCEDURE — 80053 COMPREHEN METABOLIC PANEL: CPT | Performed by: INTERNAL MEDICINE

## 2023-02-01 PROCEDURE — 80201 ASSAY OF TOPIRAMATE: CPT | Mod: 90 | Performed by: INTERNAL MEDICINE

## 2023-02-01 PROCEDURE — 36415 COLL VENOUS BLD VENIPUNCTURE: CPT | Performed by: INTERNAL MEDICINE

## 2023-02-01 PROCEDURE — 80186 ASSAY OF PHENYTOIN FREE: CPT | Mod: 90 | Performed by: INTERNAL MEDICINE

## 2023-02-01 PROCEDURE — 80185 ASSAY OF PHENYTOIN TOTAL: CPT | Performed by: INTERNAL MEDICINE

## 2023-02-01 PROCEDURE — 99214 OFFICE O/P EST MOD 30 MIN: CPT | Performed by: INTERNAL MEDICINE

## 2023-02-01 PROCEDURE — 99000 SPECIMEN HANDLING OFFICE-LAB: CPT | Performed by: INTERNAL MEDICINE

## 2023-02-01 RX ORDER — SPIRONOLACTONE 50 MG/1
50 TABLET, FILM COATED ORAL DAILY
Qty: 90 TABLET | Refills: 3 | Status: SHIPPED | OUTPATIENT
Start: 2023-02-01 | End: 2024-01-24

## 2023-02-01 RX ORDER — POTASSIUM CHLORIDE 1500 MG/1
TABLET, EXTENDED RELEASE ORAL
Qty: 180 TABLET | Refills: 3 | Status: SHIPPED | OUTPATIENT
Start: 2023-02-01 | End: 2023-09-06 | Stop reason: DRUGHIGH

## 2023-02-01 NOTE — LETTER
2/1/2023    Hannah Benton MD  [address]  [place] [address]    RE: Tommy Rea       Dear Colleague,     I had the pleasure of seeing Tommy Rea in the [facility].      Thank you, Dr. Hannah Benton, for asking the [facility] team to see Ms. Tommy Rea to follow-up on chronic HFpEF, coronary artery disease.    Assessment/Recommendations   Assessment:    1.  Coronary artery disease with moderate stenosis documented in the proximal right coronary artery and diagonal branch on angiography in 2012.  She reports no complaints of exertional chest discomfort.  Her last nuclear stress test was in October 2022 and showed no evidence of ischemia or infarction.  She does continue to report exertional dyspnea although states this is that worsened since I last saw her in May 2022.  Denies any exertional chest discomfort.  At this point we will hold off on ischemic evaluation.  2.  Chronic HFpEF, currently well compensated.  She reports no symptoms of orthopnea, PND or lower extremity edema.  Her weight is down approximately 10 pounds since her visit last year.  3.  Paroxysmal atrial fibrillation, no clinical recurrence.  Continue metoprolol and Eliquis anticoagulation to minimize risks of thromboembolic events.    Plan:  1.  Continue current medications  2.  Follow-up in the heart failure clinic in 4 months and with me in 1 year.       History of Present Illness    Ms. Tommy Rea is a 77 year old female with history of coronary artery disease involving moderate stenoses in both right coronary artery and diagonal branch in 2012 with last nuclear stress test in 2020 demonstrating no ischemia, chronic HFpEF and paroxysmal atrial fibrillation who returns to the office today for her yearly visit.  Tells me she has been feeling well with no complaints of exertional chest discomfort.  Does continue to have exertional dyspnea which she states has not worsened  over the past year.  Admits to continued sedentary state despite my encouragement last year to begin some regular walking.  No orthopnea, PND or lower extremity edema.  Her weight is actually down 10 pounds from her visit last year.    ECG (personally reviewed): No ECG today    Cardiac Imaging Studies (personally reviewed): No new imaging     Physical Examination Review of Systems   /54 (BP Location: Right arm, Patient Position: Sitting, Cuff Size: Adult Large)   Pulse 63   Resp 20   Wt 83.9 kg (185 lb)   BMI 29.86 kg/m    Body mass index is 29.86 kg/m .  Wt Readings from Last 3 Encounters:   02/01/23 83.9 kg (185 lb)   01/24/23 84.4 kg (186 lb)   07/11/22 87.5 kg (193 lb)     General Appearance:   Awake, Alert, No acute distress.   HEENT:  No scleral icterus; the mucous membranes were pink and moist.   Neck: No cervical bruits or jugular venous distention    Chest: The spine was straight. The chest was symmetric.   Lungs:   Respirations unlabored; the lungs are clear to auscultation. No wheezing   Cardiovascular:    Regular rate and rhythm.  S1, S2 normal.  No murmur or gallop   Abdomen:  No organomegaly, masses, bruits, or tenderness. Bowels sounds are present   Extremities:  No peripheral edema bilaterally   Skin: No xanthelasma. Warm, Dry.   Musculoskeletal: No tenderness.   Neurologic: Mood and affect are appropriate.    Enc Vitals  BP: 110/54  Pulse: 63  Resp: 20  Weight: 83.9 kg (185 lb) (pt. weight at home)                                         Medical History  Surgical History Family History Social History   Past Medical History:   Diagnosis Date     Arthritis      Asthma      B12 deficiency      CAD (coronary artery disease)      COPD (chronic obstructive pulmonary disease) (H)      COPD (chronic obstructive pulmonary disease) (H)      Crohn's disease (H)      Crohn's disease (H)      Depression      Depressive disorder      Esophageal reflux      Essential hypertension      Fracture of left  hip requiring operative repair (H)      Heart disease     a fib     History of shingles      Hyperlipidemia      Hyperparathyroidism (H)      Hypertension      Osteoporosis      Pulmonary nodule     benign     Regional enteritis (H) 2016     Seizure (H)      Seizures (H)     Past Surgical History:   Procedure Laterality Date     ARTHROPLASTY REVISION HIP Left 2016    removal of gamma nail hardware & conversion to total hip arthroplasty: Dr. Mercado at [facility] REMOVAL DEEP IMPLANT Left 2015    Procedure: Gamma Nail Left Hip;  Surgeon: Kirk De La Cruz MD;  Location: [facility] OR;  Service: Orthopedics     HEMORRHOIDECTOMY       ORIF HIP FRACTURE       ORTHOPEDIC SURGERY      ORIF     OTHER SURGICAL HISTORY      HEMMOIROIDECT     OTHER SURGICAL HISTORY      CVL HEART CATH LEFT    Family History   Problem Relation Age of Onset     Hypertension Mother      Colon Cancer Father      Arthritis Father      Hypertension Father      Heart Disease Mother      Heart Disease Father     Social History     Socioeconomic History     Marital status:      Spouse name: Not on file     Number of children: Not on file     Years of education: Not on file     Highest education level: Not on file   Occupational History     Not on file   Tobacco Use     Smoking status: Former     Types: Cigarettes     Quit date: 2011     Years since quittin.6     Smokeless tobacco: Never   Substance and Sexual Activity     Alcohol use: No     Drug use: No     Sexual activity: Not on file   Other Topics Concern     Parent/sibling w/ CABG, MI or angioplasty before 65F 55M? Not Asked   Social History Narrative     Not on file     Social Determinants of Health     Financial Resource Strain: Not on file   Food Insecurity: Not on file   Transportation Needs: Not on file   Physical Activity: Not on file   Stress: Not on file   Social Connections: Not on file   Intimate Partner Violence: Not on file   Housing  Stability: Not on file          Medications  Allergies   Current Outpatient Medications   Medication Sig Dispense Refill     acetaminophen (TYLENOL) 500 MG tablet Take 1,000 mg by mouth 2 times daily        albuterol (PROAIR HFA/PROVENTIL HFA/VENTOLIN HFA) 108 (90 Base) MCG/ACT inhaler Inhale 2 puffs into the lungs every 4 hours as needed        alendronate (FOSAMAX) 70 MG tablet Take 70 mg by mouth every 7 days        apixaban ANTICOAGULANT (ELIQUIS) 5 MG tablet Take 5 mg by mouth 2 times daily        aspirin 81 MG EC tablet Take 81 mg by mouth daily        colesevelam (WELCHOL) 625 MG tablet Take 3 tablets by mouth At Bedtime       cyanocobalamin (CYANOCOBALAMIN) 1000 MCG/ML injection Inject 1,000 mcg into the muscle every 30 days        HAYLEY CALCIUM-VITAMIN D PO Take by mouth daily        estradiol (ESTRACE) 0.1 MG/GM vaginal cream as needed       fluticasone (FLOVENT HFA) 110 MCG/ACT inhaler Inhale 1 puff into the lungs 2 times daily        loratadine (CLARITIN) 10 MG tablet Take 10 mg by mouth daily as needed        Magnesium Oxide 250 MG TABS Take 250 mg by mouth daily        metolazone (ZAROXOLYN) 2.5 MG tablet Take 1 tablet (2.5 mg) by mouth daily as needed (As instructed by your provider for heart failure symptoms) Take 30 minutes prior to AM dose of torsemide 4 tablet 1     metoprolol tartrate (LOPRESSOR) 50 MG tablet Take 50 mg by mouth 2 times daily        multivitamin w/minerals (THERA-VIT-M) tablet Take 1 tablet by mouth daily        nitroGLYcerin (NITROSTAT) 0.4 MG sublingual tablet Place 0.4 mg under the tongue every 5 minutes as needed        PARoxetine (PAXIL) 40 MG tablet Take 40 mg by mouth daily        phenytoin (DILANTIN) 100 MG ER capsule Take 5 tablets at bedtime 450 capsule 3     potassium chloride ER (KLOR-CON M) 20 MEQ CR tablet TAKE 2 TABLETS BY MOUTH DAILY 180 tablet 3     simvastatin (ZOCOR) 40 MG tablet Take 40 mg by mouth At Bedtime        spironolactone (ALDACTONE) 50 MG tablet Take 1  tablet (50 mg) by mouth daily 90 tablet 3     STELARA 45 MG/0.5ML SOLN every 30 days       topiramate (TOPAMAX) 100 MG tablet Take 1.5 tablets (150 mg) by mouth 2 times daily 270 tablet 3     torsemide (DEMADEX) 20 MG tablet TAKE 2 TABLETS BY MOUTH EVERY MORNING AND 1 TABLET EVERY EVENING AS DIRECTED. 270 tablet 1     torsemide (DEMADEX) 20 MG tablet TAKE 2 TABLETS BY MOUTH EVERY MORNING AND 1 TABLET EVERY EVENING AS DIRECTED. 270 tablet 1     Torsemide 40 MG TABS Take 40 mg by mouth 2 times daily (before meals) 200 tablet 3      Allergies   Allergen Reactions     Amoxicillin GI Disturbance and Other (See Comments)     COLITIS    Has tolerated cefazolin  GI bleeding, Colitis, has tolerated cefazolin        Sulfa Drugs      Sulfamethoxazole-Trimethoprim Other (See Comments)     Other reaction(s): Vaginal Irritation  VAGINAL BLEEDING  Vaginal irritation, vaginal bleeding            Lab Results    Chemistry/lipid CBC Cardiac Enzymes/BNP/TSH/INR   Recent Labs   Lab Test 12/07/22  1630   TRIG 292*   LDL 71   BUN 22.6      CO2 24    Recent Labs   Lab Test 07/11/22  1355   WBC 11.4*   HGB 15.3   HCT 46.6   *       Recent Labs   Lab Test 09/13/21  0925 05/11/20  0812 04/25/20  2300 07/21/19  0545 07/17/19  0620   TROPONINI  --   --  0.04  --   --    *   < >  --    < >  --    TSH  --   --   --   --  2.90    < > = values in this interval not displayed.        A total of 43 minutes was spent reviewing patient's medical records, obtaining history and performing examination, as well as discussing diagnoses/ recommendations with patient and answering all questions.                  Thank you for allowing me to participate in the care of your patient.      Sincerely,     Lauren Colbert MD     [facility]  cc:   Referred Self

## 2023-02-01 NOTE — PROGRESS NOTES
Thank you, Dr. Hannah Benton, for asking the [facility] team to see Ms. Tommy Rea to follow-up on chronic HFpEF, coronary artery disease.    Assessment/Recommendations   Assessment:    1.  Coronary artery disease with moderate stenosis documented in the proximal right coronary artery and diagonal branch on angiography in 2012.  She reports no complaints of exertional chest discomfort.  Her last nuclear stress test was in October 2022 and showed no evidence of ischemia or infarction.  She does continue to report exertional dyspnea although states this is that worsened since I last saw her in May 2022.  Denies any exertional chest discomfort.  At this point we will hold off on ischemic evaluation.  2.  Chronic HFpEF, currently well compensated.  She reports no symptoms of orthopnea, PND or lower extremity edema.  Her weight is down approximately 10 pounds since her visit last year.  3.  Paroxysmal atrial fibrillation, no clinical recurrence.  Continue metoprolol and Eliquis anticoagulation to minimize risks of thromboembolic events.    Plan:  1.  Continue current medications  2.  Follow-up in the heart failure clinic in 4 months and with me in 1 year.       History of Present Illness    Ms. Tommy Rea is a 77 year old female with history of coronary artery disease involving moderate stenoses in both right coronary artery and diagonal branch in 2012 with last nuclear stress test in 2020 demonstrating no ischemia, chronic HFpEF and paroxysmal atrial fibrillation who returns to the office today for her yearly visit.  Tells me she has been feeling well with no complaints of exertional chest discomfort.  Does continue to have exertional dyspnea which she states has not worsened over the past year.  Admits to continued sedentary state despite my encouragement last year to begin some regular walking.  No orthopnea, PND or lower extremity edema.  Her weight is actually down 10 pounds from  her visit last year.    ECG (personally reviewed): No ECG today    Cardiac Imaging Studies (personally reviewed): No new imaging     Physical Examination Review of Systems   /54 (BP Location: Right arm, Patient Position: Sitting, Cuff Size: Adult Large)   Pulse 63   Resp 20   Wt 83.9 kg (185 lb)   BMI 29.86 kg/m    Body mass index is 29.86 kg/m .  Wt Readings from Last 3 Encounters:   02/01/23 83.9 kg (185 lb)   01/24/23 84.4 kg (186 lb)   07/11/22 87.5 kg (193 lb)     General Appearance:   Awake, Alert, No acute distress.   HEENT:  No scleral icterus; the mucous membranes were pink and moist.   Neck: No cervical bruits or jugular venous distention    Chest: The spine was straight. The chest was symmetric.   Lungs:   Respirations unlabored; the lungs are clear to auscultation. No wheezing   Cardiovascular:    Regular rate and rhythm.  S1, S2 normal.  No murmur or gallop   Abdomen:  No organomegaly, masses, bruits, or tenderness. Bowels sounds are present   Extremities:  No peripheral edema bilaterally   Skin: No xanthelasma. Warm, Dry.   Musculoskeletal: No tenderness.   Neurologic: Mood and affect are appropriate.    Enc Vitals  BP: 110/54  Pulse: 63  Resp: 20  Weight: 83.9 kg (185 lb) (pt. weight at home)                                         Medical History  Surgical History Family History Social History   Past Medical History:   Diagnosis Date     Arthritis      Asthma      B12 deficiency      CAD (coronary artery disease)      COPD (chronic obstructive pulmonary disease) (H)      COPD (chronic obstructive pulmonary disease) (H)      Crohn's disease (H)      Crohn's disease (H)      Depression      Depressive disorder      Esophageal reflux      Essential hypertension      Fracture of left hip requiring operative repair (H)      Heart disease     a fib     History of shingles      Hyperlipidemia      Hyperparathyroidism (H)      Hypertension      Osteoporosis      Pulmonary nodule     benign      Regional enteritis (H) 2016     Seizure (H)      Seizures (H)     Past Surgical History:   Procedure Laterality Date     ARTHROPLASTY REVISION HIP Left 2016    removal of gamma nail hardware & conversion to total hip arthroplasty: Dr. Mercado at [facility] REMOVAL DEEP IMPLANT Left 2015    Procedure: Gamma Nail Left Hip;  Surgeon: Kirk De La Cruz MD;  Location: [facility];  Service: Orthopedics     HEMORRHOIDECTOMY       ORIF HIP FRACTURE       ORTHOPEDIC SURGERY      ORIF     OTHER SURGICAL HISTORY      HEMMOIROIDECT     OTHER SURGICAL HISTORY      CVL HEART CATH LEFT    Family History   Problem Relation Age of Onset     Hypertension Mother      Colon Cancer Father      Arthritis Father      Hypertension Father      Heart Disease Mother      Heart Disease Father     Social History     Socioeconomic History     Marital status:      Spouse name: Not on file     Number of children: Not on file     Years of education: Not on file     Highest education level: Not on file   Occupational History     Not on file   Tobacco Use     Smoking status: Former     Types: Cigarettes     Quit date: 2011     Years since quittin.6     Smokeless tobacco: Never   Substance and Sexual Activity     Alcohol use: No     Drug use: No     Sexual activity: Not on file   Other Topics Concern     Parent/sibling w/ CABG, MI or angioplasty before 65F 55M? Not Asked   Social History Narrative     Not on file     Social Determinants of Health     Financial Resource Strain: Not on file   Food Insecurity: Not on file   Transportation Needs: Not on file   Physical Activity: Not on file   Stress: Not on file   Social Connections: Not on file   Intimate Partner Violence: Not on file   Housing Stability: Not on file          Medications  Allergies   Current Outpatient Medications   Medication Sig Dispense Refill     acetaminophen (TYLENOL) 500 MG tablet Take 1,000 mg by mouth 2 times daily         albuterol (PROAIR HFA/PROVENTIL HFA/VENTOLIN HFA) 108 (90 Base) MCG/ACT inhaler Inhale 2 puffs into the lungs every 4 hours as needed        alendronate (FOSAMAX) 70 MG tablet Take 70 mg by mouth every 7 days        apixaban ANTICOAGULANT (ELIQUIS) 5 MG tablet Take 5 mg by mouth 2 times daily        aspirin 81 MG EC tablet Take 81 mg by mouth daily        colesevelam (WELCHOL) 625 MG tablet Take 3 tablets by mouth At Bedtime       cyanocobalamin (CYANOCOBALAMIN) 1000 MCG/ML injection Inject 1,000 mcg into the muscle every 30 days        HAYLEY CALCIUM-VITAMIN D PO Take by mouth daily        estradiol (ESTRACE) 0.1 MG/GM vaginal cream as needed       fluticasone (FLOVENT HFA) 110 MCG/ACT inhaler Inhale 1 puff into the lungs 2 times daily        loratadine (CLARITIN) 10 MG tablet Take 10 mg by mouth daily as needed        Magnesium Oxide 250 MG TABS Take 250 mg by mouth daily        metolazone (ZAROXOLYN) 2.5 MG tablet Take 1 tablet (2.5 mg) by mouth daily as needed (As instructed by your provider for heart failure symptoms) Take 30 minutes prior to AM dose of torsemide 4 tablet 1     metoprolol tartrate (LOPRESSOR) 50 MG tablet Take 50 mg by mouth 2 times daily        multivitamin w/minerals (THERA-VIT-M) tablet Take 1 tablet by mouth daily        nitroGLYcerin (NITROSTAT) 0.4 MG sublingual tablet Place 0.4 mg under the tongue every 5 minutes as needed        PARoxetine (PAXIL) 40 MG tablet Take 40 mg by mouth daily        phenytoin (DILANTIN) 100 MG ER capsule Take 5 tablets at bedtime 450 capsule 3     potassium chloride ER (KLOR-CON M) 20 MEQ CR tablet TAKE 2 TABLETS BY MOUTH DAILY 180 tablet 3     simvastatin (ZOCOR) 40 MG tablet Take 40 mg by mouth At Bedtime        spironolactone (ALDACTONE) 50 MG tablet Take 1 tablet (50 mg) by mouth daily 90 tablet 3     STELARA 45 MG/0.5ML SOLN every 30 days       topiramate (TOPAMAX) 100 MG tablet Take 1.5 tablets (150 mg) by mouth 2 times daily 270 tablet 3     torsemide  (DEMADEX) 20 MG tablet TAKE 2 TABLETS BY MOUTH EVERY MORNING AND 1 TABLET EVERY EVENING AS DIRECTED. 270 tablet 1     torsemide (DEMADEX) 20 MG tablet TAKE 2 TABLETS BY MOUTH EVERY MORNING AND 1 TABLET EVERY EVENING AS DIRECTED. 270 tablet 1     Torsemide 40 MG TABS Take 40 mg by mouth 2 times daily (before meals) 200 tablet 3      Allergies   Allergen Reactions     Amoxicillin GI Disturbance and Other (See Comments)     COLITIS    Has tolerated cefazolin  GI bleeding, Colitis, has tolerated cefazolin        Sulfa Drugs      Sulfamethoxazole-Trimethoprim Other (See Comments)     Other reaction(s): Vaginal Irritation  VAGINAL BLEEDING  Vaginal irritation, vaginal bleeding            Lab Results    Chemistry/lipid CBC Cardiac Enzymes/BNP/TSH/INR   Recent Labs   Lab Test 12/07/22  1630   TRIG 292*   LDL 71   BUN 22.6      CO2 24    Recent Labs   Lab Test 07/11/22  1355   WBC 11.4*   HGB 15.3   HCT 46.6   *       Recent Labs   Lab Test 09/13/21  0925 05/11/20  0812 04/25/20  2300 07/21/19  0545 07/17/19  0620   TROPONINI  --   --  0.04  --   --    *   < >  --    < >  --    TSH  --   --   --   --  2.90    < > = values in this interval not displayed.        A total of 43 minutes was spent reviewing patient's medical records, obtaining history and performing examination, as well as discussing diagnoses/ recommendations with patient and answering all questions.

## 2023-02-02 LAB — TOPIRAMATE SERPL-MCNC: 8.8 UG/ML

## 2023-02-03 LAB — PHENYTOIN FREE SERPL-MCNC: 1.7 UG/ML

## 2023-02-07 ENCOUNTER — TELEPHONE (OUTPATIENT)
Dept: CARDIOLOGY | Facility: CLINIC | Age: 78
End: 2023-02-07

## 2023-02-07 NOTE — TELEPHONE ENCOUNTER
Health Call Center    Phone Message    May a detailed message be left on voicemail: yes     Reason for Call: Medication Question or concern regarding medication   Prescription Clarification  Name of Medication: Torsemide 40 MG TABS  Prescribing Provider: Sayra   Pharmacy: [facility] - [address]   What on the order needs clarification? Lindy he [facility] called requesting to speak with a member of the patients care team. Would like clarification on the medication directions. Please call Lindy back to further discuss and clarify, thank you.    Action Taken: Message routed to:  Other: Cardiology    Travel Screening: Not Applicable     Thank you!  [facility]

## 2023-02-07 NOTE — TELEPHONE ENCOUNTER
Msg rec'd 2-7-23 @ 1537:  Lauren Colbert MD Gorshe, Maureen, RN  Can you ask her what she has been taking the prior note from last spring suggest she is taking 80 mg twice daily     [employer]

## 2023-02-07 NOTE — TELEPHONE ENCOUNTER
Please clarify which Torsemide dose patient should be taking since recent visit note lists two separate Rx's.  mg

## 2023-02-07 NOTE — TELEPHONE ENCOUNTER
Phone call to patient to confirm Torsemide dose - patient located empty bottle that indicated 20mg tabs that was last filled 12-13-22 but unable to read full label so she located her med list which stated dose of 2-20mg tabs at 0800 and 1200.    Patient explained that she sometimes takes extra 20mg when she has wt gain usually associated with high Na meal the day prior.    Informed patient that dose update would be forwarded to Dr. Colbert with request to address when and if she should be taking prn dose also - reassured her that update would also be called to PCP clinic once determined - understanding verbalized.  mg

## 2023-02-07 NOTE — TELEPHONE ENCOUNTER
Per Dr. Colbert's 2-1-23 visit note, med list includes two different dosing instructions:  torsemide (DEMADEX) 20 MG tablet TAKE 2 TABLETS BY MOUTH EVERY MORNING AND 1 TABLET EVERY EVENING AS DIRECTED.     Torsemide 40 MG TABS Take 40 mg by mouth 2 times daily (before meals)     Noted Dr. Colbert refilled Rx for Torsemide 40mg BID 2-3-23.  mg

## 2023-02-07 NOTE — TELEPHONE ENCOUNTER
Please review update - patient presently taking Torsemide 40mg BID as prescribed at recent visit and address whether she should be taking prn doses.  mg

## 2023-02-08 ENCOUNTER — TELEPHONE (OUTPATIENT)
Dept: CARDIOLOGY | Facility: CLINIC | Age: 78
End: 2023-02-08

## 2023-02-08 DIAGNOSIS — I50.32 CHRONIC HEART FAILURE WITH PRESERVED EJECTION FRACTION (H): ICD-10-CM

## 2023-02-08 RX ORDER — TORSEMIDE 20 MG/1
40 TABLET ORAL
Qty: 360 TABLET | Refills: 3 | Status: SHIPPED | OUTPATIENT
Start: 2023-02-08 | End: 2024-01-12

## 2023-02-08 NOTE — TELEPHONE ENCOUNTER
Msg rec'd 2-8-23 @ 0929:  Lauren Colbert MD Gorshe, Maureen, RN  I submitted a new prescription to [facility].  She can continue to utilize a single tablet as needed for weight gain of 3 to 5 pounds.  She should really try to limit her high sodium foods so that she is not taking frequent extra tablets.     SVENL

## 2023-02-08 NOTE — TELEPHONE ENCOUNTER
Left detailed msg for Melba informing her of Dr. Colbert's recommendations that patient continue taking Torsemide 40mg BID with parameters for prn dose - requested call back with any further questions / concerns.  mg

## 2023-02-08 NOTE — TELEPHONE ENCOUNTER
"Return call to patient's daughter Kierra who stated she had called yesterday to inform Dr. Colbert that [facility] was not going to fill patient's Torsemide Rx because medication prescribed was different that previous Rx - Kierra was told by person who answered her call that \"the medication issue was being worked on\" but did not tell her when it would be resolved or who was working on it.    Informed Kierra that whomever she spoke to did not send msg to writer but may have viewed writer's note related to PCP clinic calling for Torsemide Rx clarification.    Determined that Rx sent by Dr. Colbert for 40mg tabs was non-formulary and that 20mg tabs were covered - reassured Kierra that new Rx for Torsemide 20mg tabs would be resubmitted to [facility] to replace previous Rx from Dr. Colbert for 40mg tabs which should resolve issues - understanding verbalized.  mg   "

## 2023-02-08 NOTE — TELEPHONE ENCOUNTER
Phone call to patient - informed her of Dr. Colbert's response / recommendations - patient verbalized understanding and offered no further questions / concerns at this time.  mg

## 2023-02-08 NOTE — TELEPHONE ENCOUNTER
[facility] Call Center    Phone Message    May a detailed message be left on voicemail: yes     Reason for Call: Other: Pt called in and asked that someone from her care team please reach out and inform them of what the Dr advised to do with the medication. She stated that her daughter is the one that manages her medication and she is not sure everything that they spoke about to inform her daughter     Action Taken: Other: Cardio    Travel Screening: Not Applicable     Thank you!  [facility]

## 2023-03-23 ENCOUNTER — TELEPHONE (OUTPATIENT)
Dept: CARDIOLOGY | Facility: CLINIC | Age: 78
End: 2023-03-23
Payer: COMMERCIAL

## 2023-03-23 NOTE — TELEPHONE ENCOUNTER
Please review BP update from home care communicated by PCP nurse - msg sent to sched team with request to arrange next available follow-up with HF NP which was due 6/2023 - any new orders at this time?      Follow-up sched with HF NP on 4-20-23. mg

## 2023-03-23 NOTE — TELEPHONE ENCOUNTER
Rec'd return call from Lindy who stated Dr. Benton wanted Dr. Colbert updated r.e. home care's report of low BP over past 2wks - patient reports no sx.    Informed Lindy that update would be forwarded to Dr. Colbert for recommendations.  mg

## 2023-03-23 NOTE — TELEPHONE ENCOUNTER
----- Message from Marcelle Jerome sent at 3/23/2023  1:47 PM CDT -----  Regarding: JUDD PT  General phone call:    Caller: [facility]  Primary cardiologist: JUDD  Detailed reason for call: PLEASE CALL ANDREW LAWSON  New or active symptoms? NO  Best phone number: [phone]  Best time to contact: ANYTIME  Ok to leave a detailedmessage? YES      Additional Info:  PLEASE CALL FLORES BACK SOON. [facility] CALLED REPORTING BP OF THE PT. PT BP WAS 87/56, 79/58, AND 80/54. FLORES THOUGHT DR ESTRADA NEEDED TO KNOW BECAUSE MANAGING HER MEDICATIONS SHE TAKES TORSEMIDE AND SPIROALACTONE. THANKS-MARCELLE

## 2023-03-24 NOTE — TELEPHONE ENCOUNTER
Phone call to patient to address Dr. Colbert's questions - patient stated she has not been using prn Metolazone and confirmed Torsemide dose of 40mg BID - today's wt 189.4.    Patient reported occasional sx of dizziness and that she does drink plenty of water to avoid dehydration - per patient, home care visits wkly and BP cks are done by machine.    Informed patient that update would be forwarded to Dr. Colbert for recommendations - understanding verbalized.  mg

## 2023-03-24 NOTE — TELEPHONE ENCOUNTER
Msg rec'd 3-23-23 @ 1643:  Lauren Colbert MD Gorshe, Maureen, RN  Can you find out if she has taken any metolazone and what her dose of torsemide is currently. Also if she knows her most recent weight.     JUDD

## 2023-03-27 NOTE — TELEPHONE ENCOUNTER
Phone call to Dorothy PCP's nurse - informed Lindy of Dr. Colbert's response which would also be communicated to patient - explained that patient would understanding verbalized.  mg

## 2023-03-27 NOTE — TELEPHONE ENCOUNTER
Phone call to patient with Dr. oClbert's response - patient verbalized understanding and stated her home BP on Saturday afternoon was 140/63 - wt this am was 188# - home care nurse cks BP wkly on Weds with her own BP cuff.    Instructed patient to continue to monitor daily wts, ck BP daily at different times and have home care nurse compare BP readings between home cuff and hers at visit this week - requested patient call back later in week with update - patient agreed with plan.  mg

## 2023-03-27 NOTE — TELEPHONE ENCOUNTER
Msg rec'd 3-26-23 @ 2115:  Lauren Colbert MD Gorshe, Maureen, RN  Let's see if they can give us more recent blood pressures on her. Her weight is higher than when in clinic in Feb. And I am concerned that if we cut back her diuretic further, her weight will go up higher.     KML

## 2023-03-31 ENCOUNTER — ANCILLARY PROCEDURE (OUTPATIENT)
Dept: NEUROLOGY | Facility: CLINIC | Age: 78
End: 2023-03-31
Attending: PSYCHIATRY & NEUROLOGY
Payer: COMMERCIAL

## 2023-03-31 DIAGNOSIS — G40.209 PARTIAL SYMPTOMATIC EPILEPSY WITH COMPLEX PARTIAL SEIZURES, NOT INTRACTABLE, WITHOUT STATUS EPILEPTICUS (H): ICD-10-CM

## 2023-03-31 PROCEDURE — 95816 EEG AWAKE AND DROWSY: CPT | Performed by: PSYCHIATRY & NEUROLOGY

## 2023-03-31 NOTE — RESULT ENCOUNTER NOTE
Dear Merrily,   EEG shows nonspecific slowing of brain waves.  No seizure activity noted.  Continue current dose of Dilantin.    Guy Marie MD

## 2023-04-05 NOTE — TELEPHONE ENCOUNTER
Follow-up call to patient for BP / wt update - readings taken in am as follows:    3-27-23 - 118/63, 68, 188#  3-28-23  - 112/72, 84, 190#  3-29-23 - 104/59, 81, 192# - Ns visit @ 1545 - 98/61   3-30-23 - 104/71, 86, 191.3#  3-31-23 - 105/62, 80, 189#  4-1-23 - 123/63, 65, 190.2#  4-2-23 - 129/65, 64, 191.3#  4-3-23 - 117/60, 60, 192.2#  4-4-23 - 125/68, 65, 190.2#  4-5-23 - 109/60, 88, 189.7#    Informed patient that update would be forwarded to Dr. Colbert and she would only be contacted with new recommendations, otherwise plan to follow-up with HF NP on 4-20-23 as scheduled - patient verbalized understanding and agreed with plan.  mg

## 2023-04-05 NOTE — TELEPHONE ENCOUNTER
Msg rec'd 4-5-23 @ 1400:  Lauren Colbert MD Gorshe, Maureen, RN  Blood pressures, heart rates and weights look good.  No change in medications.  Follow-up in the heart failure clinic as planned.     JUDD

## 2023-04-20 ENCOUNTER — OFFICE VISIT (OUTPATIENT)
Dept: CARDIOLOGY | Facility: CLINIC | Age: 78
End: 2023-04-20
Payer: COMMERCIAL

## 2023-04-20 VITALS
DIASTOLIC BLOOD PRESSURE: 52 MMHG | SYSTOLIC BLOOD PRESSURE: 110 MMHG | WEIGHT: 188 LBS | HEART RATE: 69 BPM | BODY MASS INDEX: 30.34 KG/M2 | RESPIRATION RATE: 16 BRPM

## 2023-04-20 DIAGNOSIS — I50.32 CHRONIC HEART FAILURE WITH PRESERVED EJECTION FRACTION (H): Primary | ICD-10-CM

## 2023-04-20 DIAGNOSIS — I10 BENIGN ESSENTIAL HYPERTENSION: ICD-10-CM

## 2023-04-20 DIAGNOSIS — I48.0 PAROXYSMAL ATRIAL FIBRILLATION (H): ICD-10-CM

## 2023-04-20 PROCEDURE — 99214 OFFICE O/P EST MOD 30 MIN: CPT | Performed by: NURSE PRACTITIONER

## 2023-04-20 NOTE — PATIENT INSTRUCTIONS
Tommy Rea,    It was a pleasure to see you today at [facility].     My recommendations after this visit include:  - Please follow up with Dr Colbert February 2024   - Please follow up with La Nicholas as needed  - Continue current medications    La Nicholas, CNP

## 2023-04-20 NOTE — LETTER
4/20/2023    Hannah Benton MD  [address]  [place] [address]    RE: Tommy Rea       Dear Colleague,     I had the pleasure of seeing Tommy Rea in the [facility].        Assessment/Recommendations   Assessment:    1.  Heart failure with preserved ejection fraction, NYHA class II: Compensated.  She denies any acute heart failure symptoms.  She has mild fatigue and dyspnea on exertion.  She did have weight gain and lower extremity edema a while ago which has improved.  Her weight today at home was 188 pounds.  2.  Hypertension: Controlled.  Blood pressure 110/52.  She was having hypotension about a month ago but blood pressures have improved.  She denies lightheadedness or dizziness  3.  Paroxysmal atrial fibrillation: Normal sinus rhythm.  She continues Eliquis for anticoagulation and Lopressor for rate control.    Plan:  1.  Continue current medications  2.  Continue monitoring weights and following a low-sodium diet    Tommy Rea will follow up with Dr Colbert February 2024 and in the heart failure clinic as needed.     History of Present Illness/Subjective    Ms. Tommy Rea is a 77 year old female seen at [facility] heart failure clinic today for continued follow-up.  Her daughter accompanies her today. She follows up for heart failure with preserved ejection fraction.  She had a nuclear stress test June 2020 which showed ejection fraction of 46% and was negative for inducible myocardial ischemia or infarction.  She has a past medical history significant for hypertension, hyperlipidemia, paroxysmal atrial fibrillation, COPD.      Today, she states she is feeling well.  She has mild fatigue and dyspnea on exertion.  She denies lightheadedness, shortness of breath, orthopnea, PND, palpitations, chest pain, abdominal fullness/bloating and lower extremity edema.    She is monitoring home weights which are stable between 184-188 pounds.  She is  following a low sodium diet.       Physical Examination Review of Systems   /52 (BP Location: Left arm, Patient Position: Sitting, Cuff Size: Adult Regular)   Pulse 69   Resp 16   Wt 85.3 kg (188 lb)   BMI 30.34 kg/m    Body mass index is 30.34 kg/m .  Wt Readings from Last 3 Encounters:   04/20/23 85.3 kg (188 lb)   02/01/23 83.9 kg (185 lb)   01/24/23 84.4 kg (186 lb)       General Appearance:   no acute distress   ENT/Mouth: No abnormalities   EYES:  no scleral icterus, normal conjunctivae   Neck: no thyromegaly   Chest/Lungs:   lungs are clear to auscultation, no rales or wheezing, equal chest wall expansion    Cardiovascular:   Regular. Normal first and second heart sounds with no murmurs, rubs, or gallops, no edema bilaterally    Abdomen:  bowel sounds are present   Extremities: no cyanosis or clubbing   Skin: warm   Neurologic: no tremors     Psychiatric: alert and oriented x3                                              Medical History  Surgical History Family History Social History   Past Medical History:   Diagnosis Date    (HFpEF) heart failure with preserved ejection fraction (H) 1/20/2020    Arthritis     Asthma     Atrial fibrillation (H) 10/24/2016    B12 deficiency     Benign essential hypertension 8/2/2021    Formatting of this note might be different from the original. Created by Conversion    CAD (coronary artery disease)     Congestive heart failure (H)     COPD (chronic obstructive pulmonary disease) (H)     COPD (chronic obstructive pulmonary disease) (H)     Crohn's disease (H)     Crohn's disease (H)     Depression     Depressive disorder     Esophageal reflux     Essential hypertension     Fracture of left hip requiring operative repair (H)     Heart disease     a fib    History of shingles     Hyperlipidemia     Hyperparathyroidism (H)     Hypertension     Osteoporosis     Pulmonary nodule     benign    Regional enteritis (H) 07/19/2016    Seizure (H)     Seizures (H)     Past  Surgical History:   Procedure Laterality Date    ARTHROPLASTY REVISION HIP Left 2016    removal of gamma nail hardware & conversion to total hip arthroplasty: Dr. Mercado at [facility] REMOVAL DEEP IMPLANT Left 2015    Procedure: Gamma Nail Left Hip;  Surgeon: Kirk De La Cruz MD;  Location: [facility];  Service: Orthopedics    HEMORRHOIDECTOMY      ORIF HIP FRACTURE      ORTHOPEDIC SURGERY      ORIF    OTHER SURGICAL HISTORY      HEMMOIROIDECT    OTHER SURGICAL HISTORY      CVL HEART CATH LEFT    Family History   Problem Relation Age of Onset    Hypertension Mother     Colon Cancer Father     Arthritis Father     Hypertension Father     Heart Disease Mother     Heart Disease Father     Social History     Socioeconomic History    Marital status:      Spouse name: Not on file    Number of children: Not on file    Years of education: Not on file    Highest education level: Not on file   Occupational History    Not on file   Tobacco Use    Smoking status: Former     Types: Cigarettes     Quit date: 2011     Years since quittin.8    Smokeless tobacco: Never   Vaping Use    Vaping status: Not on file   Substance and Sexual Activity    Alcohol use: No    Drug use: No    Sexual activity: Not on file   Other Topics Concern    Parent/sibling w/ CABG, MI or angioplasty before 65F 55M? Not Asked   Social History Narrative    Not on file     Social Determinants of Health     Financial Resource Strain: Not on file   Food Insecurity: Not on file   Transportation Needs: Not on file   Physical Activity: Not on file   Stress: Not on file   Social Connections: Not on file   Intimate Partner Violence: Not on file   Housing Stability: Not on file          Medications  Allergies   Current Outpatient Medications   Medication Sig Dispense Refill    acetaminophen (TYLENOL) 500 MG tablet Take 1,000 mg by mouth 2 times daily       albuterol (PROAIR HFA/PROVENTIL HFA/VENTOLIN HFA) 108 (90 Base)  MCG/ACT inhaler Inhale 2 puffs into the lungs every 4 hours as needed       alendronate (FOSAMAX) 70 MG tablet Take 70 mg by mouth every 7 days       apixaban ANTICOAGULANT (ELIQUIS) 5 MG tablet Take 5 mg by mouth 2 times daily       aspirin 81 MG EC tablet Take 81 mg by mouth daily       colesevelam (WELCHOL) 625 MG tablet Take 3 tablets by mouth At Bedtime      cyanocobalamin (CYANOCOBALAMIN) 1000 MCG/ML injection Inject 1,000 mcg into the muscle every 30 days       HAYLEY CALCIUM-VITAMIN D PO Take by mouth daily       estradiol (ESTRACE) 0.1 MG/GM vaginal cream as needed      fluticasone (FLOVENT HFA) 110 MCG/ACT inhaler Inhale 1 puff into the lungs 2 times daily       loratadine (CLARITIN) 10 MG tablet Take 10 mg by mouth daily as needed       Magnesium Oxide 250 MG TABS Take 250 mg by mouth daily       metolazone (ZAROXOLYN) 2.5 MG tablet Take 1 tablet (2.5 mg) by mouth daily as needed (As instructed by your provider for heart failure symptoms) Take 30 minutes prior to AM dose of torsemide 4 tablet 1    metoprolol tartrate (LOPRESSOR) 50 MG tablet Take 50 mg by mouth 2 times daily       multivitamin w/minerals (THERA-VIT-M) tablet Take 1 tablet by mouth daily       nitroGLYcerin (NITROSTAT) 0.4 MG sublingual tablet Place 0.4 mg under the tongue every 5 minutes as needed       PARoxetine (PAXIL) 40 MG tablet Take 40 mg by mouth daily       phenytoin (DILANTIN) 100 MG ER capsule Take 5 tablets at bedtime 450 capsule 3    potassium chloride ER (KLOR-CON M) 20 MEQ CR tablet TAKE 2 TABLETS BY MOUTH DAILY 180 tablet 3    simvastatin (ZOCOR) 40 MG tablet Take 40 mg by mouth At Bedtime       spironolactone (ALDACTONE) 50 MG tablet Take 1 tablet (50 mg) by mouth daily 90 tablet 3    STELARA 45 MG/0.5ML SOLN every 30 days      topiramate (TOPAMAX) 100 MG tablet Take 1.5 tablets (150 mg) by mouth 2 times daily 270 tablet 3    torsemide (DEMADEX) 20 MG tablet Take 2 tablets (40 mg) by mouth 2 times daily (before meals) 360  tablet 3    Allergies   Allergen Reactions    Amoxicillin GI Disturbance and Other (See Comments)     COLITIS    Has tolerated cefazolin  GI bleeding, Colitis, has tolerated cefazolin       Sulfa Drugs     Sulfamethoxazole-Trimethoprim Other (See Comments)     Other reaction(s): Vaginal Irritation  VAGINAL BLEEDING  Vaginal irritation, vaginal bleeding            Lab Results    Chemistry/lipid CBC Cardiac Enzymes/BNP/TSH/INR   Lab Results   Component Value Date    CHOL 185 12/07/2022    HDL 56 12/07/2022    TRIG 292 (H) 12/07/2022    BUN 23.9 (H) 02/01/2023     02/01/2023    CO2 29 02/01/2023    Lab Results   Component Value Date    WBC 11.4 (H) 07/11/2022    HGB 15.3 07/11/2022    HCT 46.6 07/11/2022     (H) 07/11/2022     07/11/2022    Lab Results   Component Value Date    TROPONINI 0.04 04/25/2020     (H) 09/13/2021    TSH 2.90 07/17/2019             This note has been dictated using voice recognition software. Any grammatical, typographical, or context distortions are unintentional and inherent to the software    30 minutes spent on the date of encounter doing chart review, review of outside records, review of test results, interpretation with above tests, patient visit, documentation and discussion with family.                  Thank you for allowing me to participate in the care of your patient.      Sincerely,     SERENE Thakur [facility]  cc:   Lauren Colbert MD  [address], [address]  [place] [address]

## 2023-04-20 NOTE — PROGRESS NOTES
Assessment/Recommendations   Assessment:    1.  Heart failure with preserved ejection fraction, NYHA class II: Compensated.  She denies any acute heart failure symptoms.  She has mild fatigue and dyspnea on exertion.  She did have weight gain and lower extremity edema a while ago which has improved.  Her weight today at home was 188 pounds.  2.  Hypertension: Controlled.  Blood pressure 110/52.  She was having hypotension about a month ago but blood pressures have improved.  She denies lightheadedness or dizziness  3.  Paroxysmal atrial fibrillation: Normal sinus rhythm.  She continues Eliquis for anticoagulation and Lopressor for rate control.    Plan:  1.  Continue current medications  2.  Continue monitoring weights and following a low-sodium diet    Tommy Rea will follow up with Dr Colbert February 2024 and in the heart failure clinic as needed.     History of Present Illness/Subjective    Ms. Tommy Rea is a 77 year old female seen at [facility] heart failure clinic today for continued follow-up.  Her daughter accompanies her today. She follows up for heart failure with preserved ejection fraction.  She had a nuclear stress test June 2020 which showed ejection fraction of 46% and was negative for inducible myocardial ischemia or infarction.  She has a past medical history significant for hypertension, hyperlipidemia, paroxysmal atrial fibrillation, COPD.      Today, she states she is feeling well.  She has mild fatigue and dyspnea on exertion.  She denies lightheadedness, shortness of breath, orthopnea, PND, palpitations, chest pain, abdominal fullness/bloating and lower extremity edema.    She is monitoring home weights which are stable between 184-188 pounds.  She is following a low sodium diet.       Physical Examination Review of Systems   /52 (BP Location: Left arm, Patient Position: Sitting, Cuff Size: Adult Regular)   Pulse 69   Resp 16   Wt 85.3 kg (188 lb)   BMI  30.34 kg/m    Body mass index is 30.34 kg/m .  Wt Readings from Last 3 Encounters:   04/20/23 85.3 kg (188 lb)   02/01/23 83.9 kg (185 lb)   01/24/23 84.4 kg (186 lb)       General Appearance:   no acute distress   ENT/Mouth: No abnormalities   EYES:  no scleral icterus, normal conjunctivae   Neck: no thyromegaly   Chest/Lungs:   lungs are clear to auscultation, no rales or wheezing, equal chest wall expansion    Cardiovascular:   Regular. Normal first and second heart sounds with no murmurs, rubs, or gallops, no edema bilaterally    Abdomen:  bowel sounds are present   Extremities: no cyanosis or clubbing   Skin: warm   Neurologic: no tremors     Psychiatric: alert and oriented x3                                              Medical History  Surgical History Family History Social History   Past Medical History:   Diagnosis Date     (HFpEF) heart failure with preserved ejection fraction (H) 1/20/2020     Arthritis      Asthma      Atrial fibrillation (H) 10/24/2016     B12 deficiency      Benign essential hypertension 8/2/2021    Formatting of this note might be different from the original. Created by Conversion     CAD (coronary artery disease)      Congestive heart failure (H)      COPD (chronic obstructive pulmonary disease) (H)      COPD (chronic obstructive pulmonary disease) (H)      Crohn's disease (H)      Crohn's disease (H)      Depression      Depressive disorder      Esophageal reflux      Essential hypertension      Fracture of left hip requiring operative repair (H)      Heart disease     a fib     History of shingles      Hyperlipidemia      Hyperparathyroidism (H)      Hypertension      Osteoporosis      Pulmonary nodule     benign     Regional enteritis (H) 07/19/2016     Seizure (H)      Seizures (H)     Past Surgical History:   Procedure Laterality Date     ARTHROPLASTY REVISION HIP Left 07/19/2016    removal of gamma nail hardware & conversion to total hip arthroplasty: Dr. Mercado at [facility]       [facility] REMOVAL DEEP IMPLANT Left 2015    Procedure: Gamma Nail Left Hip;  Surgeon: Kirk De La Cruz MD;  Location: [facility] OR;  Service: Orthopedics     HEMORRHOIDECTOMY       ORIF HIP FRACTURE       ORTHOPEDIC SURGERY      ORIF     OTHER SURGICAL HISTORY      HEMMOIROIDECT     OTHER SURGICAL HISTORY      CVL HEART CATH LEFT    Family History   Problem Relation Age of Onset     Hypertension Mother      Colon Cancer Father      Arthritis Father      Hypertension Father      Heart Disease Mother      Heart Disease Father     Social History     Socioeconomic History     Marital status:      Spouse name: Not on file     Number of children: Not on file     Years of education: Not on file     Highest education level: Not on file   Occupational History     Not on file   Tobacco Use     Smoking status: Former     Types: Cigarettes     Quit date: 2011     Years since quittin.8     Smokeless tobacco: Never   Vaping Use     Vaping status: Not on file   Substance and Sexual Activity     Alcohol use: No     Drug use: No     Sexual activity: Not on file   Other Topics Concern     Parent/sibling w/ CABG, MI or angioplasty before 65F 55M? Not Asked   Social History Narrative     Not on file     Social Determinants of Health     Financial Resource Strain: Not on file   Food Insecurity: Not on file   Transportation Needs: Not on file   Physical Activity: Not on file   Stress: Not on file   Social Connections: Not on file   Intimate Partner Violence: Not on file   Housing Stability: Not on file          Medications  Allergies   Current Outpatient Medications   Medication Sig Dispense Refill     acetaminophen (TYLENOL) 500 MG tablet Take 1,000 mg by mouth 2 times daily        albuterol (PROAIR HFA/PROVENTIL HFA/VENTOLIN HFA) 108 (90 Base) MCG/ACT inhaler Inhale 2 puffs into the lungs every 4 hours as needed        alendronate (FOSAMAX) 70 MG tablet Take 70 mg by mouth every 7 days        apixaban  ANTICOAGULANT (ELIQUIS) 5 MG tablet Take 5 mg by mouth 2 times daily        aspirin 81 MG EC tablet Take 81 mg by mouth daily        colesevelam (WELCHOL) 625 MG tablet Take 3 tablets by mouth At Bedtime       cyanocobalamin (CYANOCOBALAMIN) 1000 MCG/ML injection Inject 1,000 mcg into the muscle every 30 days        HAYLEY CALCIUM-VITAMIN D PO Take by mouth daily        estradiol (ESTRACE) 0.1 MG/GM vaginal cream as needed       fluticasone (FLOVENT HFA) 110 MCG/ACT inhaler Inhale 1 puff into the lungs 2 times daily        loratadine (CLARITIN) 10 MG tablet Take 10 mg by mouth daily as needed        Magnesium Oxide 250 MG TABS Take 250 mg by mouth daily        metolazone (ZAROXOLYN) 2.5 MG tablet Take 1 tablet (2.5 mg) by mouth daily as needed (As instructed by your provider for heart failure symptoms) Take 30 minutes prior to AM dose of torsemide 4 tablet 1     metoprolol tartrate (LOPRESSOR) 50 MG tablet Take 50 mg by mouth 2 times daily        multivitamin w/minerals (THERA-VIT-M) tablet Take 1 tablet by mouth daily        nitroGLYcerin (NITROSTAT) 0.4 MG sublingual tablet Place 0.4 mg under the tongue every 5 minutes as needed        PARoxetine (PAXIL) 40 MG tablet Take 40 mg by mouth daily        phenytoin (DILANTIN) 100 MG ER capsule Take 5 tablets at bedtime 450 capsule 3     potassium chloride ER (KLOR-CON M) 20 MEQ CR tablet TAKE 2 TABLETS BY MOUTH DAILY 180 tablet 3     simvastatin (ZOCOR) 40 MG tablet Take 40 mg by mouth At Bedtime        spironolactone (ALDACTONE) 50 MG tablet Take 1 tablet (50 mg) by mouth daily 90 tablet 3     STELARA 45 MG/0.5ML SOLN every 30 days       topiramate (TOPAMAX) 100 MG tablet Take 1.5 tablets (150 mg) by mouth 2 times daily 270 tablet 3     torsemide (DEMADEX) 20 MG tablet Take 2 tablets (40 mg) by mouth 2 times daily (before meals) 360 tablet 3    Allergies   Allergen Reactions     Amoxicillin GI Disturbance and Other (See Comments)     COLITIS    Has tolerated cefazolin  GI  bleeding, Colitis, has tolerated cefazolin        Sulfa Drugs      Sulfamethoxazole-Trimethoprim Other (See Comments)     Other reaction(s): Vaginal Irritation  VAGINAL BLEEDING  Vaginal irritation, vaginal bleeding            Lab Results    Chemistry/lipid CBC Cardiac Enzymes/BNP/TSH/INR   Lab Results   Component Value Date    CHOL 185 12/07/2022    HDL 56 12/07/2022    TRIG 292 (H) 12/07/2022    BUN 23.9 (H) 02/01/2023     02/01/2023    CO2 29 02/01/2023    Lab Results   Component Value Date    WBC 11.4 (H) 07/11/2022    HGB 15.3 07/11/2022    HCT 46.6 07/11/2022     (H) 07/11/2022     07/11/2022    Lab Results   Component Value Date    TROPONINI 0.04 04/25/2020     (H) 09/13/2021    TSH 2.90 07/17/2019             This note has been dictated using voice recognition software. Any grammatical, typographical, or context distortions are unintentional and inherent to the software    30 minutes spent on the date of encounter doing chart review, review of outside records, review of test results, interpretation with above tests, patient visit, documentation and discussion with family.

## 2023-04-24 ENCOUNTER — LAB REQUISITION (OUTPATIENT)
Dept: LAB | Facility: CLINIC | Age: 78
End: 2023-04-24
Payer: COMMERCIAL

## 2023-04-24 DIAGNOSIS — K50.10 CROHN'S DISEASE OF LARGE INTESTINE WITHOUT COMPLICATIONS (H): ICD-10-CM

## 2023-04-24 LAB
ALBUMIN SERPL BCG-MCNC: 4.3 G/DL (ref 3.5–5.2)
ALP SERPL-CCNC: 87 U/L (ref 35–104)
ALT SERPL W P-5'-P-CCNC: 26 U/L (ref 10–35)
AST SERPL W P-5'-P-CCNC: 23 U/L (ref 10–35)
BASOPHILS # BLD AUTO: 0.1 10E3/UL (ref 0–0.2)
BASOPHILS NFR BLD AUTO: 1 %
BILIRUB DIRECT SERPL-MCNC: <0.2 MG/DL (ref 0–0.3)
BILIRUB SERPL-MCNC: 0.3 MG/DL
EOSINOPHIL # BLD AUTO: 0.4 10E3/UL (ref 0–0.7)
EOSINOPHIL NFR BLD AUTO: 6 %
ERYTHROCYTE [DISTWIDTH] IN BLOOD BY AUTOMATED COUNT: 15 % (ref 10–15)
HCT VFR BLD AUTO: 41.7 % (ref 35–47)
HGB BLD-MCNC: 11.6 G/DL (ref 11.7–15.7)
IMM GRANULOCYTES # BLD: 0 10E3/UL
IMM GRANULOCYTES NFR BLD: 0 %
LYMPHOCYTES # BLD AUTO: 1.8 10E3/UL (ref 0.8–5.3)
LYMPHOCYTES NFR BLD AUTO: 27 %
MCH RBC QN AUTO: 25.3 PG (ref 26.5–33)
MCHC RBC AUTO-ENTMCNC: 27.8 G/DL (ref 31.5–36.5)
MCV RBC AUTO: 91 FL (ref 78–100)
MONOCYTES # BLD AUTO: 0.7 10E3/UL (ref 0–1.3)
MONOCYTES NFR BLD AUTO: 11 %
NEUTROPHILS # BLD AUTO: 3.8 10E3/UL (ref 1.6–8.3)
NEUTROPHILS NFR BLD AUTO: 55 %
NRBC # BLD AUTO: 0 10E3/UL
NRBC BLD AUTO-RTO: 0 /100
PLATELET # BLD AUTO: 366 10E3/UL (ref 150–450)
PROT SERPL-MCNC: 7.6 G/DL (ref 6.4–8.3)
RBC # BLD AUTO: 4.58 10E6/UL (ref 3.8–5.2)
WBC # BLD AUTO: 6.8 10E3/UL (ref 4–11)

## 2023-04-24 PROCEDURE — 80076 HEPATIC FUNCTION PANEL: CPT | Mod: ORL | Performed by: FAMILY MEDICINE

## 2023-04-24 PROCEDURE — 85025 COMPLETE CBC W/AUTO DIFF WBC: CPT | Mod: ORL | Performed by: FAMILY MEDICINE

## 2023-09-05 ENCOUNTER — LAB REQUISITION (OUTPATIENT)
Dept: LAB | Facility: CLINIC | Age: 78
End: 2023-09-05
Payer: COMMERCIAL

## 2023-09-05 ENCOUNTER — DOCUMENTATION ONLY (OUTPATIENT)
Dept: GERIATRICS | Facility: CLINIC | Age: 78
End: 2023-09-05
Payer: COMMERCIAL

## 2023-09-05 DIAGNOSIS — I10 ESSENTIAL (PRIMARY) HYPERTENSION: ICD-10-CM

## 2023-09-05 DIAGNOSIS — S82.831A CLOSED FRACTURE OF DISTAL END OF RIGHT FIBULA, UNSPECIFIED FRACTURE MORPHOLOGY, INITIAL ENCOUNTER: Primary | ICD-10-CM

## 2023-09-05 RX ORDER — OXYCODONE HYDROCHLORIDE 5 MG/1
5 TABLET ORAL EVERY 4 HOURS PRN
COMMUNITY
End: 2023-09-05

## 2023-09-05 RX ORDER — OXYCODONE HYDROCHLORIDE 5 MG/1
5 TABLET ORAL EVERY 4 HOURS PRN
Qty: 30 TABLET | Refills: 0 | Status: SHIPPED | OUTPATIENT
Start: 2023-09-05 | End: 2023-09-06

## 2023-09-06 ENCOUNTER — TRANSITIONAL CARE UNIT VISIT (OUTPATIENT)
Dept: GERIATRICS | Facility: CLINIC | Age: 78
End: 2023-09-06
Payer: COMMERCIAL

## 2023-09-06 ENCOUNTER — TELEPHONE (OUTPATIENT)
Dept: GERIATRICS | Facility: CLINIC | Age: 78
End: 2023-09-06

## 2023-09-06 VITALS
OXYGEN SATURATION: 92 % | RESPIRATION RATE: 20 BRPM | HEIGHT: 67 IN | HEART RATE: 74 BPM | SYSTOLIC BLOOD PRESSURE: 119 MMHG | WEIGHT: 183.4 LBS | BODY MASS INDEX: 28.79 KG/M2 | TEMPERATURE: 96.3 F | DIASTOLIC BLOOD PRESSURE: 62 MMHG

## 2023-09-06 DIAGNOSIS — E21.3 HYPERPARATHYROIDISM (H): ICD-10-CM

## 2023-09-06 DIAGNOSIS — I50.32 CHRONIC HEART FAILURE WITH PRESERVED EJECTION FRACTION (H): ICD-10-CM

## 2023-09-06 DIAGNOSIS — F32.A DEPRESSIVE DISORDER: ICD-10-CM

## 2023-09-06 DIAGNOSIS — E78.5 HYPERLIPIDEMIA, UNSPECIFIED HYPERLIPIDEMIA TYPE: ICD-10-CM
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
## 2023-09-06 DIAGNOSIS — J44.9 CHRONIC OBSTRUCTIVE PULMONARY DISEASE, UNSPECIFIED COPD TYPE (H): ICD-10-CM

## 2023-09-06 DIAGNOSIS — I10 BENIGN ESSENTIAL HYPERTENSION: ICD-10-CM

## 2023-09-06 DIAGNOSIS — I25.10 ATHEROSCLEROSIS OF CORONARY ARTERY OF NATIVE HEART, UNSPECIFIED VESSEL OR LESION TYPE, UNSPECIFIED WHETHER ANGINA PRESENT: ICD-10-CM

## 2023-09-06 DIAGNOSIS — S82.831A CLOSED FRACTURE OF PROXIMAL END OF RIGHT FIBULA, UNSPECIFIED FRACTURE MORPHOLOGY, INITIAL ENCOUNTER: Primary | ICD-10-CM

## 2023-09-06 DIAGNOSIS — I48.0 PAROXYSMAL ATRIAL FIBRILLATION (H): ICD-10-CM

## 2023-09-06 LAB
ANION GAP SERPL CALCULATED.3IONS-SCNC: 11 MMOL/L (ref 7–15)
BUN SERPL-MCNC: 20.5 MG/DL (ref 8–23)
CALCIUM SERPL-MCNC: 8.4 MG/DL (ref 8.8–10.2)
CHLORIDE SERPL-SCNC: 103 MMOL/L (ref 98–107)
CREAT SERPL-MCNC: 0.86 MG/DL (ref 0.51–0.95)
DEPRECATED HCO3 PLAS-SCNC: 28 MMOL/L (ref 22–29)
EGFRCR SERPLBLD CKD-EPI 2021: 69 ML/MIN/1.73M2
ERYTHROCYTE [DISTWIDTH] IN BLOOD BY AUTOMATED COUNT: 17.1 % (ref 10–15)
GLUCOSE SERPL-MCNC: 86 MG/DL (ref 70–99)
HCT VFR BLD AUTO: 31.9 % (ref 35–47)
HGB BLD-MCNC: 8.7 G/DL (ref 11.7–15.7)
MCH RBC QN AUTO: 24 PG (ref 26.5–33)
MCHC RBC AUTO-ENTMCNC: 27.3 G/DL (ref 31.5–36.5)
MCV RBC AUTO: 88 FL (ref 78–100)
PLATELET # BLD AUTO: 308 10E3/UL (ref 150–450)
POTASSIUM SERPL-SCNC: 3.2 MMOL/L (ref 3.4–5.3)
RBC # BLD AUTO: 3.62 10E6/UL (ref 3.8–5.2)
SODIUM SERPL-SCNC: 142 MMOL/L (ref 136–145)
WBC # BLD AUTO: 8.1 10E3/UL (ref 4–11)

## 2023-09-06 PROCEDURE — 80048 BASIC METABOLIC PNL TOTAL CA: CPT | Performed by: PHYSICIAN ASSISTANT

## 2023-09-06 PROCEDURE — 85027 COMPLETE CBC AUTOMATED: CPT | Performed by: PHYSICIAN ASSISTANT

## 2023-09-06 PROCEDURE — 99310 SBSQ NF CARE HIGH MDM 45: CPT | Performed by: PHYSICIAN ASSISTANT

## 2023-09-06 PROCEDURE — 36415 COLL VENOUS BLD VENIPUNCTURE: CPT | Performed by: PHYSICIAN ASSISTANT

## 2023-09-06 PROCEDURE — P9604 ONE-WAY ALLOW PRORATED TRIP: HCPCS | Performed by: PHYSICIAN ASSISTANT

## 2023-09-06 RX ORDER — OXYCODONE HYDROCHLORIDE 5 MG/1
5 TABLET ORAL EVERY 4 HOURS PRN
Qty: 30 TABLET | Refills: 0 | Status: SHIPPED | OUTPATIENT
Start: 2023-09-06 | End: 2023-09-14

## 2023-09-06 RX ORDER — POTASSIUM CHLORIDE 1500 MG/1
60 TABLET, EXTENDED RELEASE ORAL DAILY
COMMUNITY
Start: 2023-09-06 | End: 2023-10-05

## 2023-09-06 RX ORDER — FLUTICASONE PROPIONATE 50 MCG
1 SPRAY, SUSPENSION (ML) NASAL DAILY
COMMUNITY

## 2023-09-06 NOTE — PROGRESS NOTES
[facility] GERIATRICS    PRIMARY CARE PROVIDER AND CLINIC:  Hannah Benton MD, [address] / [place] [address]  Chief Complaint   Patient presents with    Hospital F/U      [place] Medical Record Number:  [id number]  Place of Service where encounter took place:  [facility] ([facility]) [[id number]]    Tommy Rea  is a 78 year old  (1945), admitted to the above facility from  [facility]. Hospital stay 8/28/23 through 8/31/23..   HPI:    Pt is a 77yo female with PMHx paroxysmal afib on AC with apixaban, vitamin D deficiency, seizure disorder, hyperparathyroidism, HTN, COPD, Crohn's disease who was admitted at [facility] from 8/28 - 8/31, 2023 after presenting following a mechanical fall with right knee pain. Workup in ED included plain films of the right knee, tib/fib, and hip which were negative for fracture. Due to severe pain and inability to bear weight, CT of the knee was obtained and also negative for acute fracture but noted significant amount of osseous demineralization and recommended MRI of knee if clinical concern remained high for fracture. She was admitted to the hospital and seen in consultation with orthopedics. MRI of the knee was attempted however unable to be completed due to pt's pain level. Despite large amount of motion artifact, there was evidence of a nondisplaced proximal fibular fracture. For this, pt was placed in a knee immobilizer, cleared to WBAT, and following therapy evaluations referred to TCU for ongoing rehab, med management.    Pt is seen as initial visit today. She is resting in bed on arrival, reports she is overall doing well. Denies cp, sob. Feels pain is controlled at present. She does admit to constipation related to pain medications. Staff are without concerns at this time. Prior to admission, pt was living alone    CODE STATUS/ADVANCE DIRECTIVES DISCUSSION:  No Order  DNR / DNI  ALLERGIES:   Allergies   Allergen Reactions    Amoxicillin  GI Disturbance and Other (See Comments)     COLITIS    Has tolerated cefazolin  GI bleeding, Colitis, has tolerated cefazolin       Sulfa Antibiotics     Sulfamethoxazole-Trimethoprim Other (See Comments)     Other reaction(s): Vaginal Irritation  VAGINAL BLEEDING  Vaginal irritation, vaginal bleeding         PAST MEDICAL HISTORY:   Past Medical History:   Diagnosis Date    (HFpEF) heart failure with preserved ejection fraction (H) 1/20/2020    Arthritis     Asthma     Atrial fibrillation (H) 10/24/2016    B12 deficiency     Benign essential hypertension 8/2/2021    Formatting of this note might be different from the original. Created by Conversion    CAD (coronary artery disease)     Congestive heart failure (H)     COPD (chronic obstructive pulmonary disease) (H)     COPD (chronic obstructive pulmonary disease) (H)     Crohn's disease (H)     Crohn's disease (H)     Depression     Depressive disorder     Esophageal reflux     Essential hypertension     Fracture of left hip requiring operative repair (H)     Heart disease     a fib    History of shingles     Hyperlipidemia     Hyperparathyroidism (H)     Hypertension     Osteoporosis     Pulmonary nodule     benign    Regional enteritis (H) 07/19/2016    Seizure (H)     Seizures (H)       PAST SURGICAL HISTORY:   has a past surgical history that includes orthopedic surgery; hemorrhoidectomy; REMOVAL DEEP IMPLANT (Left, 1/25/2015); Orif Hip Fracture (2015); Arthroplasty revision hip (Left, 07/19/2016); other surgical history; and other surgical history.  FAMILY HISTORY: family history includes Arthritis in her father; Colon Cancer in her father; Heart Disease in her father and mother; Hypertension in her father and mother.  SOCIAL HISTORY:   reports that she quit smoking about 12 years ago. Her smoking use included cigarettes. She has never used smokeless tobacco. She reports that she does not drink alcohol and does not use drugs.  Patient's living condition: lives  alone    Post Discharge Medication Reconciliation Status:   MED REC REQUIRED  Post Medication Reconciliation Status: discharge medications reconciled, continue medications without change       Current Outpatient Medications   Medication Sig    acetaminophen (TYLENOL) 500 MG tablet Take 1,000 mg by mouth 2 times daily     albuterol (PROAIR HFA/PROVENTIL HFA/VENTOLIN HFA) 108 (90 Base) MCG/ACT inhaler Inhale 2 puffs into the lungs every 6 hours as needed    alendronate (FOSAMAX) 70 MG tablet Take 70 mg by mouth every 7 days     apixaban ANTICOAGULANT (ELIQUIS) 5 MG tablet Take 5 mg by mouth 2 times daily     aspirin 81 MG EC tablet Take 81 mg by mouth daily     CALCIUM-VITAMIN D PO Take 1 tablet by mouth daily    colesevelam (WELCHOL) 625 MG tablet Take 3 tablets by mouth At Bedtime    fluticasone (FLONASE) 50 MCG/ACT nasal spray Spray 1 spray into both nostrils daily    fluticasone (FLOVENT HFA) 110 MCG/ACT inhaler Inhale 1 puff into the lungs 2 times daily     loratadine (CLARITIN) 10 MG tablet Take 10 mg by mouth daily as needed     Magnesium Oxide 250 MG TABS Take 250 mg by mouth daily     metolazone (ZAROXOLYN) 2.5 MG tablet Take 1 tablet (2.5 mg) by mouth daily as needed (As instructed by your provider for heart failure symptoms) Take 30 minutes prior to AM dose of torsemide    metoprolol tartrate (LOPRESSOR) 50 MG tablet Take 50 mg by mouth 2 times daily     multivitamin w/minerals (THERA-VIT-M) tablet Take 1 tablet by mouth daily     nitroGLYcerin (NITROSTAT) 0.4 MG sublingual tablet Place 0.4 mg under the tongue every 5 minutes as needed     oxyCODONE (ROXICODONE) 5 MG tablet Take 1 tablet (5 mg) by mouth every 4 hours as needed for moderate to severe pain Give for pain > 6    PARoxetine (PAXIL) 40 MG tablet Take 40 mg by mouth daily     phenytoin (DILANTIN) 100 MG ER capsule Take 5 tablets at bedtime    simvastatin (ZOCOR) 40 MG tablet Take 40 mg by mouth At Bedtime     spironolactone (ALDACTONE) 50 MG  "tablet Take 1 tablet (50 mg) by mouth daily    topiramate (TOPAMAX) 100 MG tablet Take 1.5 tablets (150 mg) by mouth 2 times daily    torsemide (DEMADEX) 20 MG tablet Take 2 tablets (40 mg) by mouth 2 times daily (before meals)    cyanocobalamin (CYANOCOBALAMIN) 1000 MCG/ML injection Inject 1,000 mcg into the muscle every 30 days  (Patient not taking: Reported on 9/6/2023)    HAYLEY CALCIUM-VITAMIN D PO Take by mouth daily  (Patient not taking: Reported on 9/6/2023)    estradiol (ESTRACE) 0.1 MG/GM vaginal cream as needed (Patient not taking: Reported on 9/6/2023)    potassium chloride ER (KLOR-CON M) 20 MEQ CR tablet Take 60 mEq by mouth daily    STELARA 45 MG/0.5ML SOLN every 30 days     No current facility-administered medications for this visit.       ROS:  4 point ROS including Respiratory, CV, GI and , other than that noted in the HPI,  is negative    Vitals:  /62   Pulse 74   Temp (!) 96.3  F (35.7  C)   Resp 20   Ht 1.702 m (5' 7\")   Wt 83.2 kg (183 lb 6.4 oz)   SpO2 92%   BMI 28.72 kg/m    Exam:  GEN: well-developed, well-nourished, appears comfortable  HEENT: NCAT, EOM intact bilaterally, sclera clear, conjunctiva normal, nose & mouth patent, mucous membranes moist  CHEST: lungs CTA bilaterally, no increased work of breathing, no wheeze, crackles, rhonchi  HEART: RRR, S1 & S2  ABD: soft, nontender, nondistended, no guarding or rigidity  MSK: AROM bilateral UE; KI in place to RLE; pedal & radial pulses 2+ bilaterally  NEURO: awake, alert. CN II-XII grossly intact. Sensation grossly intact to light touch.   SKIN: warm & dry without rash, 1+ pedal edema    Lab/Diagnostic data:  Recent labs in [facility] reviewed by me today.   Per progress note dated 8/30:  CBC and INR Basic Metabolic        Recent Labs     08/29/23  0846 08/28/23  0457   WBC 9.4 12.9 H   HGB 9.5 L 10.5 L    281   INR  --  1.2               Recent Labs     08/30/23  0123 08/29/23  1648 08/29/23  0846 08/28/23  0457   SODIUM  --   " --  140 138   POTASSIUM 4.1 3.4 L 2.5 LL 3.7   [id number]  --   --   --  25   BUN  --   --   --  13   CREATININE  --   --  0.64 0.73              ASSESSMENT/PLAN:    Status-post fall  Right proximal fibula fracture  As noted on MRI, additional injuries could not be ruled-out dt motion artifact. Seen in consult with orthopedics. Nonop management. Feels pain is controlled.  -Pain control with tylenol 1000mg BID, oxycodone 5mg q4h PRN  -WBAT to RLE with KI  -PT/OT  -SW for discharge planning    Physical deconditioning  Impaired mobility and ADLs  -PT/OT evaluations    Anemia  Hgb 10.3 at discharge, decreased to 9.3 at discharge. Repeat value today pending.    CAD  HFpEF  HTN / HLD  Chronic, stable.  -Continues on ASA 81mg/d, metoprolol 50mg BID, spironolactone 50mg/d, torsemide 40mg BID, simvastatin 40mg/d  -Daily weights    Hypokalemia  Replaced while inpatient. Repeat today pending.  -Continues on KCl supplement    Afib, paroxysmal  Chronic, rate controlled.  -Continue metoprolol 50mg BID, apixaban 5mg BID    Crohn's disease  Chronic. Admitting to mild constipation.  -Continues on miralax 17g/d  -Change Senna to 1 tab BID    Seizure disorder  Chronic.  -Continue vimpat 150mg BID, dilantin 500mg/d, topiramate 150mg BID    Depression  Chronic.  -Continue paxil 40mg/d    Total time spent with patient visit at the skilled nursing facility was 45 min including patient visit and review of past records.     Electronically signed by:  Lisandro Turcios PA-C

## 2023-09-06 NOTE — LETTER
9/6/2023        RE: Tommy Rea  [address]  [place] [address]        [facility] GERIATRICS    PRIMARY CARE PROVIDER AND CLINIC:  Hannah Benton MD, [address] / [place] [address]  Chief Complaint   Patient presents with     Hospital F/U      [place] Medical Record Number:  [id number]  Place of Service where encounter took place:  [facility] ([facility]) [[id number]]    Tommy Rea  is a 78 year old  (1945), admitted to the above facility from  [facility]. Hospital stay 8/28/23 through 8/31/23..   HPI:    Pt is a 77yo female with PMHx paroxysmal afib on AC with apixaban, vitamin D deficiency, seizure disorder, hyperparathyroidism, HTN, COPD, Crohn's disease who was admitted at [facility] from 8/28 - 8/31, 2023 after presenting following a mechanical fall with right knee pain. Workup in ED included plain films of the right knee, tib/fib, and hip which were negative for fracture. Due to severe pain and inability to bear weight, CT of the knee was obtained and also negative for acute fracture but noted significant amount of osseous demineralization and recommended MRI of knee if clinical concern remained high for fracture. She was admitted to the hospital and seen in consultation with orthopedics. MRI of the knee was attempted however unable to be completed due to pt's pain level. Despite large amount of motion artifact, there was evidence of a nondisplaced proximal fibular fracture. For this, pt was placed in a knee immobilizer, cleared to WBAT, and following therapy evaluations referred to TCU for ongoing rehab, med management.    Pt is seen as initial visit today. She is resting in bed on arrival, reports she is overall doing well. Denies cp, sob. Feels pain is controlled at present. She does admit to constipation related to pain medications. Staff are without concerns at this time. Prior to admission, pt was living alone    CODE STATUS/ADVANCE  DIRECTIVES DISCUSSION:  No Order  DNR / DNI  ALLERGIES:   Allergies   Allergen Reactions     Amoxicillin GI Disturbance and Other (See Comments)     COLITIS    Has tolerated cefazolin  GI bleeding, Colitis, has tolerated cefazolin        Sulfa Antibiotics      Sulfamethoxazole-Trimethoprim Other (See Comments)     Other reaction(s): Vaginal Irritation  VAGINAL BLEEDING  Vaginal irritation, vaginal bleeding         PAST MEDICAL HISTORY:   Past Medical History:   Diagnosis Date     (HFpEF) heart failure with preserved ejection fraction (H) 1/20/2020     Arthritis      Asthma      Atrial fibrillation (H) 10/24/2016     B12 deficiency      Benign essential hypertension 8/2/2021    Formatting of this note might be different from the original. Created by Conversion     CAD (coronary artery disease)      Congestive heart failure (H)      COPD (chronic obstructive pulmonary disease) (H)      COPD (chronic obstructive pulmonary disease) (H)      Crohn's disease (H)      Crohn's disease (H)      Depression      Depressive disorder      Esophageal reflux      Essential hypertension      Fracture of left hip requiring operative repair (H)      Heart disease     a fib     History of shingles      Hyperlipidemia      Hyperparathyroidism (H)      Hypertension      Osteoporosis      Pulmonary nodule     benign     Regional enteritis (H) 07/19/2016     Seizure (H)      Seizures (H)       PAST SURGICAL HISTORY:   has a past surgical history that includes orthopedic surgery; hemorrhoidectomy; REMOVAL DEEP IMPLANT (Left, 1/25/2015); Orif Hip Fracture (2015); Arthroplasty revision hip (Left, 07/19/2016); other surgical history; and other surgical history.  FAMILY HISTORY: family history includes Arthritis in her father; Colon Cancer in her father; Heart Disease in her father and mother; Hypertension in her father and mother.  SOCIAL HISTORY:   reports that she quit smoking about 12 years ago. Her smoking use included cigarettes. She has  never used smokeless tobacco. She reports that she does not drink alcohol and does not use drugs.  Patient's living condition: lives alone    Post Discharge Medication Reconciliation Status:   MED REC REQUIRED  Post Medication Reconciliation Status: discharge medications reconciled, continue medications without change       Current Outpatient Medications   Medication Sig     acetaminophen (TYLENOL) 500 MG tablet Take 1,000 mg by mouth 2 times daily      albuterol (PROAIR HFA/PROVENTIL HFA/VENTOLIN HFA) 108 (90 Base) MCG/ACT inhaler Inhale 2 puffs into the lungs every 6 hours as needed     alendronate (FOSAMAX) 70 MG tablet Take 70 mg by mouth every 7 days      apixaban ANTICOAGULANT (ELIQUIS) 5 MG tablet Take 5 mg by mouth 2 times daily      aspirin 81 MG EC tablet Take 81 mg by mouth daily      CALCIUM-VITAMIN D PO Take 1 tablet by mouth daily     colesevelam (WELCHOL) 625 MG tablet Take 3 tablets by mouth At Bedtime     fluticasone (FLONASE) 50 MCG/ACT nasal spray Spray 1 spray into both nostrils daily     fluticasone (FLOVENT HFA) 110 MCG/ACT inhaler Inhale 1 puff into the lungs 2 times daily      loratadine (CLARITIN) 10 MG tablet Take 10 mg by mouth daily as needed      Magnesium Oxide 250 MG TABS Take 250 mg by mouth daily      metolazone (ZAROXOLYN) 2.5 MG tablet Take 1 tablet (2.5 mg) by mouth daily as needed (As instructed by your provider for heart failure symptoms) Take 30 minutes prior to AM dose of torsemide     metoprolol tartrate (LOPRESSOR) 50 MG tablet Take 50 mg by mouth 2 times daily      multivitamin w/minerals (THERA-VIT-M) tablet Take 1 tablet by mouth daily      nitroGLYcerin (NITROSTAT) 0.4 MG sublingual tablet Place 0.4 mg under the tongue every 5 minutes as needed      oxyCODONE (ROXICODONE) 5 MG tablet Take 1 tablet (5 mg) by mouth every 4 hours as needed for moderate to severe pain Give for pain > 6     PARoxetine (PAXIL) 40 MG tablet Take 40 mg by mouth daily      phenytoin (DILANTIN)  "100 MG ER capsule Take 5 tablets at bedtime     simvastatin (ZOCOR) 40 MG tablet Take 40 mg by mouth At Bedtime      spironolactone (ALDACTONE) 50 MG tablet Take 1 tablet (50 mg) by mouth daily     topiramate (TOPAMAX) 100 MG tablet Take 1.5 tablets (150 mg) by mouth 2 times daily     torsemide (DEMADEX) 20 MG tablet Take 2 tablets (40 mg) by mouth 2 times daily (before meals)     cyanocobalamin (CYANOCOBALAMIN) 1000 MCG/ML injection Inject 1,000 mcg into the muscle every 30 days  (Patient not taking: Reported on 9/6/2023)     HAYLEY CALCIUM-VITAMIN D PO Take by mouth daily  (Patient not taking: Reported on 9/6/2023)     estradiol (ESTRACE) 0.1 MG/GM vaginal cream as needed (Patient not taking: Reported on 9/6/2023)     potassium chloride ER (KLOR-CON M) 20 MEQ CR tablet Take 60 mEq by mouth daily     STELARA 45 MG/0.5ML SOLN every 30 days     No current facility-administered medications for this visit.       ROS:  4 point ROS including Respiratory, CV, GI and , other than that noted in the HPI,  is negative    Vitals:  /62   Pulse 74   Temp (!) 96.3  F (35.7  C)   Resp 20   Ht 1.702 m (5' 7\")   Wt 83.2 kg (183 lb 6.4 oz)   SpO2 92%   BMI 28.72 kg/m    Exam:  GEN: well-developed, well-nourished, appears comfortable  HEENT: NCAT, EOM intact bilaterally, sclera clear, conjunctiva normal, nose & mouth patent, mucous membranes moist  CHEST: lungs CTA bilaterally, no increased work of breathing, no wheeze, crackles, rhonchi  HEART: RRR, S1 & S2  ABD: soft, nontender, nondistended, no guarding or rigidity  MSK: AROM bilateral UE; KI in place to RLE; pedal & radial pulses 2+ bilaterally  NEURO: awake, alert. CN II-XII grossly intact. Sensation grossly intact to light touch.   SKIN: warm & dry without rash, 1+ pedal edema    Lab/Diagnostic data:  Recent labs in [facility] reviewed by me today.   Per progress note dated 8/30:  CBC and INR Basic Metabolic        Recent Labs     08/29/23  0846 08/28/23  0457   WBC 9.4 " 12.9 H   HGB 9.5 L 10.5 L    281   INR  --  1.2               Recent Labs     08/30/23  0123 08/29/23  1648 08/29/23  0846 08/28/23  0457   SODIUM  --   --  140 138   POTASSIUM 4.1 3.4 L 2.5 LL 3.7   [id number]  --   --   --  25   BUN  --   --   --  13   CREATININE  --   --  0.64 0.73              ASSESSMENT/PLAN:    Status-post fall  Right proximal fibula fracture  As noted on MRI, additional injuries could not be ruled-out dt motion artifact. Seen in consult with orthopedics. Nonop management. Feels pain is controlled.  -Pain control with tylenol 1000mg BID, oxycodone 5mg q4h PRN  -WBAT to RLE with KI  -PT/OT  - for discharge planning    Physical deconditioning  Impaired mobility and ADLs  -PT/OT evaluations    Anemia  Hgb 10.3 at discharge, decreased to 9.3 at discharge. Repeat value today pending.    CAD  HFpEF  HTN / HLD  Chronic, stable.  -Continues on ASA 81mg/d, metoprolol 50mg BID, spironolactone 50mg/d, torsemide 40mg BID, simvastatin 40mg/d  -Daily weights    Hypokalemia  Replaced while inpatient. Repeat today pending.  -Continues on KCl supplement    Afib, paroxysmal  Chronic, rate controlled.  -Continue metoprolol 50mg BID, apixaban 5mg BID    Crohn's disease  Chronic. Admitting to mild constipation.  -Continues on miralax 17g/d  -Change Senna to 1 tab BID    Seizure disorder  Chronic.  -Continue vimpat 150mg BID, dilantin 500mg/d, topiramate 150mg BID    Depression  Chronic.  -Continue paxil 40mg/d    Total time spent with patient visit at the skilled nursing facility was 45 min including patient visit and review of past records.     Electronically signed by:  Lisandro Turcios PA-C                   Sincerely,        Lisandro Turcios PA-C

## 2023-09-06 NOTE — TELEPHONE ENCOUNTER
[facility] Geriatrics Lab Note     Provider: Lisandro Turcios PA-C  Facility: [facility] Facility Type:  TCU    Allergies   Allergen Reactions    Amoxicillin GI Disturbance and Other (See Comments)     COLITIS    Has tolerated cefazolin  GI bleeding, Colitis, has tolerated cefazolin       Sulfa Antibiotics     Sulfamethoxazole-Trimethoprim Other (See Comments)     Other reaction(s): Vaginal Irritation  VAGINAL BLEEDING  Vaginal irritation, vaginal bleeding          Labs Reviewed by provider: Heme 2, BMP     Verbal Order/Direction given by Provider: Increase KCl to 60mEq daily, repeat BMP and CBC on 9/11 dx hypokalemia, anemia              Provider giving Order:  Lisandro Turcios PA-C    Verbal Order given to: Arely Oates RN

## 2023-09-08 ENCOUNTER — TELEPHONE (OUTPATIENT)
Dept: GERIATRICS | Facility: CLINIC | Age: 78
End: 2023-09-08
Payer: COMMERCIAL

## 2023-09-08 ENCOUNTER — TRANSFERRED RECORDS (OUTPATIENT)
Dept: HEALTH INFORMATION MANAGEMENT | Facility: CLINIC | Age: 78
End: 2023-09-08
Payer: COMMERCIAL

## 2023-09-08 NOTE — TELEPHONE ENCOUNTER
[facility] Geriatrics Triage Nurse Telephone Encounter    Provider: Lisandro Turcios PA-C  Facility: [facility] Facility Type:  TCU    Caller: Courtney  Call Back Number: [phone]    Allergies:    Allergies   Allergen Reactions    Amoxicillin GI Disturbance and Other (See Comments)     COLITIS    Has tolerated cefazolin  GI bleeding, Colitis, has tolerated cefazolin       Sulfa Antibiotics     Sulfamethoxazole-Trimethoprim Other (See Comments)     Other reaction(s): Vaginal Irritation  VAGINAL BLEEDING  Vaginal irritation, vaginal bleeding           Reason for call: Nurse called to say she was suppose to update with a weight. Weight today is 194.1. Yesterday it was 194.4. Patient's lung sounds are clear. No increased edema noted. Patient did have some SOB which was relieved by her inhaler. Oxygen sats are 96% on RA. Patient is not having any complaints of feeling full of fluid or uncomfortable. No distress.  Daughter is at facility and is concerned requesting that patient get an extra dose of Torsemide.      Verbal Order/Direction given by Provider: Give Torsemide 60 mg po in AM and 40 mg po in PM on Saturday and Sunday only.  Give an extra dose of Torsemide 20 mg now.  Check BMP on Monday.      Provider giving Order:  Lisandro Turcios PA-C    Verbal Order given to: Courtney Salas RN

## 2023-09-14 ENCOUNTER — LAB REQUISITION (OUTPATIENT)
Dept: LAB | Facility: CLINIC | Age: 78
End: 2023-09-14
Payer: COMMERCIAL

## 2023-09-14 ENCOUNTER — TRANSITIONAL CARE UNIT VISIT (OUTPATIENT)
Dept: GERIATRICS | Facility: CLINIC | Age: 78
End: 2023-09-14
Payer: COMMERCIAL

## 2023-09-14 VITALS
HEART RATE: 94 BPM | RESPIRATION RATE: 18 BRPM | DIASTOLIC BLOOD PRESSURE: 52 MMHG | BODY MASS INDEX: 30.05 KG/M2 | WEIGHT: 187 LBS | SYSTOLIC BLOOD PRESSURE: 101 MMHG | HEIGHT: 66 IN | TEMPERATURE: 97.5 F | OXYGEN SATURATION: 96 %

## 2023-09-14 DIAGNOSIS — I25.10 ATHEROSCLEROSIS OF CORONARY ARTERY OF NATIVE HEART, UNSPECIFIED VESSEL OR LESION TYPE, UNSPECIFIED WHETHER ANGINA PRESENT: ICD-10-CM

## 2023-09-14 DIAGNOSIS — J44.9 CHRONIC OBSTRUCTIVE PULMONARY DISEASE, UNSPECIFIED COPD TYPE (H): ICD-10-CM

## 2023-09-14 DIAGNOSIS — E78.5 HYPERLIPIDEMIA, UNSPECIFIED HYPERLIPIDEMIA TYPE: ICD-10-CM

## 2023-09-14 DIAGNOSIS — I48.0 PAROXYSMAL ATRIAL FIBRILLATION (H): ICD-10-CM

## 2023-09-14 DIAGNOSIS — I10 ESSENTIAL (PRIMARY) HYPERTENSION: ICD-10-CM

## 2023-09-14 DIAGNOSIS — E87.6 HYPOKALEMIA: ICD-10-CM

## 2023-09-14 DIAGNOSIS — I50.32 CHRONIC HEART FAILURE WITH PRESERVED EJECTION FRACTION (H): Primary | ICD-10-CM

## 2023-09-14 DIAGNOSIS — S82.831A CLOSED FRACTURE OF PROXIMAL END OF RIGHT FIBULA, UNSPECIFIED FRACTURE MORPHOLOGY, INITIAL ENCOUNTER: ICD-10-CM

## 2023-09-14 PROCEDURE — 99305 1ST NF CARE MODERATE MDM 35: CPT | Performed by: INTERNAL MEDICINE

## 2023-09-14 RX ORDER — METOPROLOL SUCCINATE 50 MG/1
50 TABLET, EXTENDED RELEASE ORAL 2 TIMES DAILY
COMMUNITY
End: 2023-10-05

## 2023-09-14 NOTE — LETTER
9/14/2023        RE: Tommy Rea  [address]  [place] [address]        [facility] GERIATRICS  INITIAL VISIT NOTE  September 14, 2023      PRIMARY CARE PROVIDER AND CLINIC:  Hannah Benton [address] / [place] [address]    [facility] Medical Record Number:  [id number]  Place of Service where encounter took place:  [facility] ([facility]) [[id number]]    Chief Complaint   Patient presents with     Hospital F/U       HPI:    Tommy Rea is a 78 year old  (1945) [facility]. Medical history is notable for CAD, CHF, Crohn's, COPD and hyperparathyroidism. She has had two recent hospitalizations:     **[place] 8/28/23 to 8/31/23 -- presented after a fall and found to have a non displaced R fibular fracture. Ortho consulted and recommended WBAT, immobilizer and outpatient follow up in 2 weeks.   **Discharged to a TCU where developed dyspnea  **[facility] 9/8/23 to 9/13/23 - There is no discharge summary. She presented from TCU with dyspnea and was found to be in a-fib with RVR with decompensated CHF. Cardiology consulted. PTA metoprolol tartrate changed to succinate. Incidentally found to have a LLL PE on cardiac imaging for a DCCV. No LA thrombus. Thought secondary to interruption in anti-coag during recent fibula fracture.     She was admitted to this facility for  rehab, medical management, and nursing care.      History obtained from: facility chart records, facility staff, patient report, [facility] chart review, and Care Everywhere [facility] chart review.      Today, Ms. Rea is seen in her room sitting in a recliner. Her nurse noted SBPs in 80s on her wrists bilaterally and then her RUE. LUE was 106 systolic. No dizziness or lightheadedness. She reports she tends to run lower BPs (chart review would support this). No chest pain or dyspnea. No belly pain. No diarrhea or constipation. Goal si to get stronger and get home. No concerns  today per discussion with nursing. She is working with therapies.     I talked with her daughter, Kierra, by phone. Discussed role of on site medical team. Verified Ms. Rea is not taking the lacosamide at home - appears in [facility] notes as she was getting it? A home nurse comes out and BPs are often lower in 90s systolic. She never endorses any symptoms from this. There is no follow up scheduled, to Kierra's knowledge, for the fibular fracture and she would appreciate if we could mange the follow up on site.     CODE STATUS: DNR / DNI    ALLERGIES:  Allergies   Allergen Reactions     Amoxicillin GI Disturbance and Other (See Comments)     COLITIS    Has tolerated cefazolin  GI bleeding, Colitis, has tolerated cefazolin        Sulfa Antibiotics      Sulfamethoxazole-Trimethoprim Other (See Comments)     Other reaction(s): Vaginal Irritation  VAGINAL BLEEDING  Vaginal irritation, vaginal bleeding          PAST MEDICAL HISTORY:   Past Medical History:   Diagnosis Date     (HFpEF) heart failure with preserved ejection fraction (H) 1/20/2020     Arthritis      Asthma      Atrial fibrillation (H) 10/24/2016     B12 deficiency      Benign essential hypertension 8/2/2021    Formatting of this note might be different from the original. Created by Conversion     CAD (coronary artery disease)      Congestive heart failure (H)      COPD (chronic obstructive pulmonary disease) (H)      COPD (chronic obstructive pulmonary disease) (H)      Crohn's disease (H)      Crohn's disease (H)      Depression      Depressive disorder      Esophageal reflux      Essential hypertension      Fracture of left hip requiring operative repair (H)      Heart disease     a fib     History of shingles      Hyperlipidemia      Hyperparathyroidism (H)      Hypertension      Osteoporosis      Pulmonary nodule     benign     Regional enteritis (H) 07/19/2016     Seizure (H)      Seizures (H)        PAST SURGICAL HISTORY:   Past Surgical History:    Procedure Laterality Date     ARTHROPLASTY REVISION HIP Left 07/19/2016    removal of gamma nail hardware & conversion to total hip arthroplasty: Dr. Mercado at [facility] REMOVAL DEEP IMPLANT Left 1/25/2015    Procedure: Gamma Nail Left Hip;  Surgeon: Kirk De La Cruz MD;  Location: [facility] OR;  Service: Orthopedics     HEMORRHOIDECTOMY       ORIF HIP FRACTURE  2015     ORTHOPEDIC SURGERY      ORIF     OTHER SURGICAL HISTORY      HEMMOIROIDECT     OTHER SURGICAL HISTORY      CVL HEART CATH LEFT       FAMILY HISTORY:   Family History   Problem Relation Age of Onset     Hypertension Mother      Colon Cancer Father      Arthritis Father      Hypertension Father      Heart Disease Mother      Heart Disease Father      SOCIAL HISTORY:   Patient's living condition: lives alone    MEDICATIONS:  Post Discharge Medication Reconciliation Status: discharge medications reconciled and changed, per note/orders.  Current Outpatient Medications   Medication Sig Dispense Refill     acetaminophen (TYLENOL) 500 MG tablet Take 1,000 mg by mouth 2 times daily        albuterol (PROAIR HFA/PROVENTIL HFA/VENTOLIN HFA) 108 (90 Base) MCG/ACT inhaler Inhale 2 puffs into the lungs every 6 hours as needed       alendronate (FOSAMAX) 70 MG tablet Take 70 mg by mouth every 7 days        apixaban ANTICOAGULANT (ELIQUIS) 5 MG tablet Take 5 mg by mouth 2 times daily        aspirin 81 MG EC tablet Take 81 mg by mouth daily        CALCIUM-VITAMIN D PO Take 1 tablet by mouth daily       colesevelam (WELCHOL) 625 MG tablet Take 3 tablets by mouth At Bedtime       cyanocobalamin (CYANOCOBALAMIN) 1000 MCG/ML injection Inject 1,000 mcg into the muscle every 30 days       fluticasone (FLONASE) 50 MCG/ACT nasal spray Spray 1 spray into both nostrils daily       fluticasone (FLOVENT HFA) 110 MCG/ACT inhaler Inhale 1 puff into the lungs 2 times daily        loratadine (CLARITIN) 10 MG tablet Take 10 mg by mouth daily as needed        Magnesium  "Oxide 250 MG TABS Take 250 mg by mouth daily        metolazone (ZAROXOLYN) 2.5 MG tablet Take 1 tablet (2.5 mg) by mouth daily as needed (As instructed by your provider for heart failure symptoms) Take 30 minutes prior to AM dose of torsemide 4 tablet 1     metoprolol succinate ER (TOPROL XL) 50 MG 24 hr tablet Take 50 mg by mouth 2 times daily       multivitamin w/minerals (THERA-VIT-M) tablet Take 1 tablet by mouth daily        nitroGLYcerin (NITROSTAT) 0.4 MG sublingual tablet Place 0.4 mg under the tongue every 5 minutes as needed        PARoxetine (PAXIL) 40 MG tablet Take 40 mg by mouth daily        phenytoin (DILANTIN) 100 MG ER capsule Take 5 tablets at bedtime 450 capsule 3     potassium chloride ER (KLOR-CON M) 20 MEQ CR tablet Take 60 mEq by mouth daily       simvastatin (ZOCOR) 40 MG tablet Take 40 mg by mouth At Bedtime        spironolactone (ALDACTONE) 50 MG tablet Take 1 tablet (50 mg) by mouth daily 90 tablet 3     STELARA 45 MG/0.5ML SOLN every 30 days       topiramate (TOPAMAX) 100 MG tablet Take 1.5 tablets (150 mg) by mouth 2 times daily 270 tablet 3     torsemide (DEMADEX) 20 MG tablet Take 2 tablets (40 mg) by mouth 2 times daily (before meals) 360 tablet 3       ROS:  6 point ROS neg other than the symptoms noted above in the HPI.    PHYSICAL EXAM:  /52   Pulse 94   Temp 97.5  F (36.4  C)   Resp 18   Ht 1.676 m (5' 6\")   Wt 84.8 kg (187 lb)   SpO2 96%   BMI 30.18 kg/m    Gen: sitting in recliner, alert, cooperative and in no acute distress  Card: irregularly irregular, S1, S2, no murmurs  Resp: lungs clear to auscultation bilaterally, no crackles or wheezes anteriorly or laterally   Ext: no LE edema  Neuro: CX II-XII grossly in tact; ROM in all four extremities grossly in tact  Psych: alert and oriented to self and general situation; normal affect     LABORATORY/IMAGING DATA:  Reviewed as per [facility] and/or [facility]    ASSESSMENT/PLAN:    Paroxysmal Atrial Fibrillation  HR " 60s-80s  -- metoprolol XL 50 mg BID  -- apixaban 5 mg BID    CAD, HFpEF (EF 65%), HTN, HLD  SBPs 100s-120s. Weight 187 lbs.   -- ASA 81 mg daily, colesevelam 1875 mg at bedtime, metoprolol XL 50 mg BID, simvastatin 40 mg daily and spironolactone 50 mg daily and torsemide 40 mg BID  -- metolazone PRN   -- follow BPs, weights clinical volume status    LLL Pulmonary Emboli (9/11/23)  Felt to be subacute in setting of disrupted anticoag with recent fibula fracture. Metoprolol tartrate changed to succinate in the hospital.   -- metoprolol XL 50 mg BID  -- apixaban 5 mg BID    Right Fibular Head Fracture (8/29/23)  Non-op management  -- WBAT  -- immobilizer  -- APAP 1000 mg BID   -- PT/OT  -- was to have 2 week outpatient follow up with ortho - ?if that was today in [facility] - family and facility not aware of any appointment  -- will talk with our internal ortho team re: next steps    COPD  Lungs clear today  -- fluticasone 110 mcg BID, albuterol MDI q6h PRN    Seizure Disorder  She is not on lacosamide - verified this with her daughter.   -- phenytoin 500 mg at bedtime, topiramate 150 mg BID     Crohn's Disease  -- ustekinumab (Stelara) every 30 days     Physical Deconditioning  In setting of hospitalization and underlying medical conditions  -- ongoing PT/OT    Electronically signed by:  Kemi Winchester MD                          Sincerely,        Kemi Winchester MD

## 2023-09-14 NOTE — PROGRESS NOTES
[facility] GERIATRICS  INITIAL VISIT NOTE  September 14, 2023      PRIMARY CARE PROVIDER AND CLINIC:  Hannah Benton [address] / [place] [address]    [facility] Medical Record Number:  [id number]  Place of Service where encounter took place:  [facility] ([facility]) [[id number]]    Chief Complaint   Patient presents with    Hospital F/U       HPI:    Tommy Rea is a 78 year old  (1945) [facility] Medical history is notable for CAD, CHF, Crohn's, COPD and hyperparathyroidism. She has had two recent hospitalizations:     **[place] 8/28/23 to 8/31/23 -- presented after a fall and found to have a non displaced R fibular fracture. Ortho consulted and recommended WBAT, immobilizer and outpatient follow up in 2 weeks.   **Discharged to a TCU where developed dyspnea  **[facility] 9/8/23 to 9/13/23 - There is no discharge summary. She presented from TCU with dyspnea and was found to be in a-fib with RVR with decompensated CHF. Cardiology consulted. PTA metoprolol tartrate changed to succinate. Incidentally found to have a LLL PE on cardiac imaging for a DCCV. No LA thrombus. Thought secondary to interruption in anti-coag during recent fibula fracture.     She was admitted to this facility for  rehab, medical management, and nursing care.      History obtained from: facility chart records, facility staff, patient report, [facility] chart review, and Care Everywhere [facility] chart review.      Today, Ms. Rea is seen in her room sitting in a recliner. Her nurse noted SBPs in 80s on her wrists bilaterally and then her RUE. LUE was 106 systolic. No dizziness or lightheadedness. She reports she tends to run lower BPs (chart review would support this). No chest pain or dyspnea. No belly pain. No diarrhea or constipation. Goal si to get stronger and get home. No concerns today per discussion with nursing. She is working with therapies.     I talked with her daughter,  Kierra, by phone. Discussed role of on site medical team. Verified Ms. Rea is not taking the lacosamide at home - appears in [facility] notes as she was getting it? A home nurse comes out and BPs are often lower in 90s systolic. She never endorses any symptoms from this. There is no follow up scheduled, to Kierra's knowledge, for the fibular fracture and she would appreciate if we could mange the follow up on site.     CODE STATUS: DNR / DNI    ALLERGIES:  Allergies   Allergen Reactions    Amoxicillin GI Disturbance and Other (See Comments)     COLITIS    Has tolerated cefazolin  GI bleeding, Colitis, has tolerated cefazolin       Sulfa Antibiotics     Sulfamethoxazole-Trimethoprim Other (See Comments)     Other reaction(s): Vaginal Irritation  VAGINAL BLEEDING  Vaginal irritation, vaginal bleeding          PAST MEDICAL HISTORY:   Past Medical History:   Diagnosis Date    (HFpEF) heart failure with preserved ejection fraction (H) 1/20/2020    Arthritis     Asthma     Atrial fibrillation (H) 10/24/2016    B12 deficiency     Benign essential hypertension 8/2/2021    Formatting of this note might be different from the original. Created by Conversion    CAD (coronary artery disease)     Congestive heart failure (H)     COPD (chronic obstructive pulmonary disease) (H)     COPD (chronic obstructive pulmonary disease) (H)     Crohn's disease (H)     Crohn's disease (H)     Depression     Depressive disorder     Esophageal reflux     Essential hypertension     Fracture of left hip requiring operative repair (H)     Heart disease     a fib    History of shingles     Hyperlipidemia     Hyperparathyroidism (H)     Hypertension     Osteoporosis     Pulmonary nodule     benign    Regional enteritis (H) 07/19/2016    Seizure (H)     Seizures (H)        PAST SURGICAL HISTORY:   Past Surgical History:   Procedure Laterality Date    ARTHROPLASTY REVISION HIP Left 07/19/2016    removal of gamma nail hardware & conversion to total  hip arthroplasty: Dr. Mercado at [facility] REMOVAL DEEP IMPLANT Left 1/25/2015    Procedure: Gamma Nail Left Hip;  Surgeon: Kirk De La Cruz MD;  Location: [facility] OR;  Service: Orthopedics    HEMORRHOIDECTOMY      ORIF HIP FRACTURE  2015    ORTHOPEDIC SURGERY      ORIF    OTHER SURGICAL HISTORY      HEMMOIROIDECT    OTHER SURGICAL HISTORY      CVL HEART CATH LEFT       FAMILY HISTORY:   Family History   Problem Relation Age of Onset    Hypertension Mother     Colon Cancer Father     Arthritis Father     Hypertension Father     Heart Disease Mother     Heart Disease Father      SOCIAL HISTORY:   Patient's living condition: lives alone    MEDICATIONS:  Post Discharge Medication Reconciliation Status: discharge medications reconciled and changed, per note/orders.  Current Outpatient Medications   Medication Sig Dispense Refill    acetaminophen (TYLENOL) 500 MG tablet Take 1,000 mg by mouth 2 times daily       albuterol (PROAIR HFA/PROVENTIL HFA/VENTOLIN HFA) 108 (90 Base) MCG/ACT inhaler Inhale 2 puffs into the lungs every 6 hours as needed      alendronate (FOSAMAX) 70 MG tablet Take 70 mg by mouth every 7 days       apixaban ANTICOAGULANT (ELIQUIS) 5 MG tablet Take 5 mg by mouth 2 times daily       aspirin 81 MG EC tablet Take 81 mg by mouth daily       CALCIUM-VITAMIN D PO Take 1 tablet by mouth daily      colesevelam (WELCHOL) 625 MG tablet Take 3 tablets by mouth At Bedtime      cyanocobalamin (CYANOCOBALAMIN) 1000 MCG/ML injection Inject 1,000 mcg into the muscle every 30 days      fluticasone (FLONASE) 50 MCG/ACT nasal spray Spray 1 spray into both nostrils daily      fluticasone (FLOVENT HFA) 110 MCG/ACT inhaler Inhale 1 puff into the lungs 2 times daily       loratadine (CLARITIN) 10 MG tablet Take 10 mg by mouth daily as needed       Magnesium Oxide 250 MG TABS Take 250 mg by mouth daily       metolazone (ZAROXOLYN) 2.5 MG tablet Take 1 tablet (2.5 mg) by mouth daily as needed (As instructed by  "your provider for heart failure symptoms) Take 30 minutes prior to AM dose of torsemide 4 tablet 1    metoprolol succinate ER (TOPROL XL) 50 MG 24 hr tablet Take 50 mg by mouth 2 times daily      multivitamin w/minerals (THERA-VIT-M) tablet Take 1 tablet by mouth daily       nitroGLYcerin (NITROSTAT) 0.4 MG sublingual tablet Place 0.4 mg under the tongue every 5 minutes as needed       PARoxetine (PAXIL) 40 MG tablet Take 40 mg by mouth daily       phenytoin (DILANTIN) 100 MG ER capsule Take 5 tablets at bedtime 450 capsule 3    potassium chloride ER (KLOR-CON M) 20 MEQ CR tablet Take 60 mEq by mouth daily      simvastatin (ZOCOR) 40 MG tablet Take 40 mg by mouth At Bedtime       spironolactone (ALDACTONE) 50 MG tablet Take 1 tablet (50 mg) by mouth daily 90 tablet 3    STELARA 45 MG/0.5ML SOLN every 30 days      topiramate (TOPAMAX) 100 MG tablet Take 1.5 tablets (150 mg) by mouth 2 times daily 270 tablet 3    torsemide (DEMADEX) 20 MG tablet Take 2 tablets (40 mg) by mouth 2 times daily (before meals) 360 tablet 3       ROS:  6 point ROS neg other than the symptoms noted above in the HPI.    PHYSICAL EXAM:  /52   Pulse 94   Temp 97.5  F (36.4  C)   Resp 18   Ht 1.676 m (5' 6\")   Wt 84.8 kg (187 lb)   SpO2 96%   BMI 30.18 kg/m    Gen: sitting in recliner, alert, cooperative and in no acute distress  Card: irregularly irregular, S1, S2, no murmurs  Resp: lungs clear to auscultation bilaterally, no crackles or wheezes anteriorly or laterally   Ext: no LE edema  Neuro: CX II-XII grossly in tact; ROM in all four extremities grossly in tact  Psych: alert and oriented to self and general situation; normal affect     LABORATORY/IMAGING DATA:  Reviewed as per [facility] and/or [facility]    ASSESSMENT/PLAN:    Paroxysmal Atrial Fibrillation  HR 60s-80s  -- metoprolol XL 50 mg BID  -- apixaban 5 mg BID    CAD, HFpEF (EF 65%), HTN, HLD  SBPs 100s-120s. Weight 187 lbs.   -- ASA 81 mg daily, colesevelam 1875 mg at " bedtime, metoprolol XL 50 mg BID, simvastatin 40 mg daily and spironolactone 50 mg daily and torsemide 40 mg BID  -- metolazone PRN   -- follow BPs, weights clinical volume status    Hypokalemia  Noted from prior TCU - was diuresed at [place].   -- [facility] 9/29/23    LLL Pulmonary Emboli (9/11/23)  Felt to be subacute in setting of disrupted anticoag with recent fibula fracture. Metoprolol tartrate changed to succinate in the hospital.   -- metoprolol XL 50 mg BID  -- apixaban 5 mg BID    Right Fibular Head Fracture (8/29/23)  Non-op management  -- WBAT  -- immobilizer  -- APAP 1000 mg BID   -- PT/OT  -- was to have 2 week outpatient follow up with ortho - ?if that was today in [facility] - family and facility not aware of any appointment  -- will talk with our internal ortho team re: next steps    COPD  Lungs clear today  -- fluticasone 110 mcg BID, albuterol MDI q6h PRN    Seizure Disorder  She is not on lacosamide - verified this with her daughter.   -- phenytoin 500 mg at bedtime, topiramate 150 mg BID     Crohn's Disease  -- ustekinumab (Stelara) every 30 days     Mild Major Recurrent Depression  Mood and spirits were good today  -- paroxetine 40 mg daily  -- supportive cares    Physical Deconditioning  In setting of hospitalization and underlying medical conditions  -- ongoing PT/OT    Electronically signed by:  Kemi Winchester MD

## 2023-09-14 NOTE — Clinical Note
Hey hey! Can I get some help with this one at [facility]. Suspect doesn't need a visit, just XR and a plan for when she can go without the immobilizer. Daughter, Kierra, is the best contact - patient is a limited historian. No pain. Fracture is a soft call on a MRI only from late August. Please let me know if this is an OK ask of you guys! Kemi

## 2023-09-15 LAB
ANION GAP SERPL CALCULATED.3IONS-SCNC: 9 MMOL/L (ref 7–15)
BUN SERPL-MCNC: 18.9 MG/DL (ref 8–23)
CALCIUM SERPL-MCNC: 8.8 MG/DL (ref 8.8–10.2)
CHLORIDE SERPL-SCNC: 103 MMOL/L (ref 98–107)
CREAT SERPL-MCNC: 0.68 MG/DL (ref 0.51–0.95)
DEPRECATED HCO3 PLAS-SCNC: 30 MMOL/L (ref 22–29)
EGFRCR SERPLBLD CKD-EPI 2021: 89 ML/MIN/1.73M2
GLUCOSE SERPL-MCNC: 82 MG/DL (ref 70–99)
POTASSIUM SERPL-SCNC: 4 MMOL/L (ref 3.4–5.3)
SODIUM SERPL-SCNC: 142 MMOL/L (ref 136–145)

## 2023-09-15 PROCEDURE — 36415 COLL VENOUS BLD VENIPUNCTURE: CPT | Mod: ORL | Performed by: INTERNAL MEDICINE

## 2023-09-15 PROCEDURE — P9604 ONE-WAY ALLOW PRORATED TRIP: HCPCS | Mod: ORL | Performed by: INTERNAL MEDICINE

## 2023-09-15 PROCEDURE — 80048 BASIC METABOLIC PNL TOTAL CA: CPT | Mod: ORL | Performed by: INTERNAL MEDICINE

## 2023-09-19 ENCOUNTER — TRANSITIONAL CARE UNIT VISIT (OUTPATIENT)
Dept: GERIATRICS | Facility: CLINIC | Age: 78
End: 2023-09-19
Payer: COMMERCIAL

## 2023-09-19 VITALS
OXYGEN SATURATION: 93 % | HEART RATE: 71 BPM | RESPIRATION RATE: 19 BRPM | DIASTOLIC BLOOD PRESSURE: 52 MMHG | TEMPERATURE: 97.1 F | SYSTOLIC BLOOD PRESSURE: 102 MMHG | WEIGHT: 180.2 LBS | BODY MASS INDEX: 28.96 KG/M2 | HEIGHT: 66 IN

## 2023-09-19 DIAGNOSIS — I48.0 PAROXYSMAL ATRIAL FIBRILLATION (H): ICD-10-CM

## 2023-09-19 DIAGNOSIS — I50.32 CHRONIC HEART FAILURE WITH PRESERVED EJECTION FRACTION (H): ICD-10-CM

## 2023-09-19 DIAGNOSIS — S82.424D: Primary | ICD-10-CM

## 2023-09-19 PROCEDURE — 99309 SBSQ NF CARE MODERATE MDM 30: CPT | Performed by: NURSE PRACTITIONER

## 2023-09-19 NOTE — PROGRESS NOTES
"[facility] GERIATRICS    Chief Complaint   Patient presents with    RECHECK     HPI:  Tommy Rea is a 78 year old  (1945), who is being seen today for an episodic care visit at: [facility] ([facility]) [[id number]]. Today's concern is: TCU follow up, weights, knee pain and bracing.     TCU: Tommy has a hx of hyerparathyroidism, vit D def, htn, copd, chrohns dx on immune modulator, normocytic anemia, and a R non displaced fibular fx who was hospitalized at [facility] for acute chf and afib with RVR as well as PE.   She presented to [facility] from another U where she had been admitted from 8/28-8/31 for a fall and non displaced fib fracture. She was treated conservatively with pain management and knee immobilizer. She was discharged to a U where she developed SOB and 20lb weight gain and found to have a fib with RVR and subacute PE, on eliquis.     She is seen today for TCU follow up. She reports good pain control. Denies HA or dizziness, SOB or DELVALLE. Weights and VS reviewed, Bps on softer side but baseline. Discussed removing immobilizer which she is okay with.     REVIEW OF SYSTEMS:  4 point ROS including Respiratory, CV, GI and , other than that noted in the HPI,  is negativ    Objective:   /52   Pulse 71   Temp 97.1  F (36.2  C)   Resp 19   Ht 1.676 m (5' 6\")   Wt 81.7 kg (180 lb 3.2 oz)   SpO2 93%   BMI 29.09 kg/m    General appearance: alert, cooperative.   Lungs: respirations unlabored on room air.   Extremities: extremities normal, atraumatic, non pitting lymphedema.   Skin: No rashes or lesions  Neurologic: oriented. No focal deficits.   Psych: interacts well with caregivers,  pleasant      Recent labs in [facility] reviewed by me today.     Assessment/Plan:  1. Closed nondisplaced transverse fracture of shaft of right fibula with routine healing    2. Chronic heart failure with preserved ejection fraction (H)    3. Paroxysmal atrial fibrillation (H)      Patient reporting " improvement in knee pain. Okay to reduce use of immobilizer. Continue PT and OT. No redness, swelling.   CHF- weight reviewed 184-185. No sob or thomas. Bps soft but this is baseline. She denies HA or dizziness.   PAF: on elequis, metoprolol.       Electronically signed by: Donald Graham, CNP

## 2023-09-19 NOTE — LETTER
"    9/19/2023        RE: Tommy Rea  [address]  [place] [address]        [facility] GERIATRICS    Chief Complaint   Patient presents with     RECHECK     HPI:  Tommy Rea is a 78 year old  (1945), who is being seen today for an episodic care visit at: [facility] ([facility]) [[id number]]. Today's concern is: TCU follow up, weights, knee pain and bracing.     TCU: Tommy has a hx of hyerparathyroidism, vit D def, htn, copd, chrohns dx on immune modulator, normocytic anemia, and a R non displaced fibular fx who was hospitalized at [facility] for acute chf and afib with RVR as well as PE.   She presented to [facility] from another U where she had been admitted from 8/28-8/31 for a fall and non displaced fib fracture. She was treated conservatively with pain management and knee immobilizer. She was discharged to a U where she developed SOB and 20lb weight gain and found to have a fib with RVR and subacute PE, on eliquis.     She is seen today for TCU follow up. She reports good pain control. Denies HA or dizziness, SOB or DELVALLE. Weights and VS reviewed, Bps on softer side but baseline. Discussed removing immobilizer which she is okay with.     REVIEW OF SYSTEMS:  4 point ROS including Respiratory, CV, GI and , other than that noted in the HPI,  is negativ    Objective:   /52   Pulse 71   Temp 97.1  F (36.2  C)   Resp 19   Ht 1.676 m (5' 6\")   Wt 81.7 kg (180 lb 3.2 oz)   SpO2 93%   BMI 29.09 kg/m    General appearance: alert, cooperative.   Lungs: respirations unlabored on room air.   Extremities: extremities normal, atraumatic, non pitting lymphedema.   Skin: No rashes or lesions  Neurologic: oriented. No focal deficits.   Psych: interacts well with caregivers,  pleasant      Recent labs in [facility] reviewed by me today.     Assessment/Plan:  1. Closed nondisplaced transverse fracture of shaft of right fibula with routine healing    2. Chronic heart failure " with preserved ejection fraction (H)    3. Paroxysmal atrial fibrillation (H)      Patient reporting improvement in knee pain. Okay to reduce use of immobilizer. Continue PT and OT. No redness, swelling.   CHF- weight reviewed 184-185. No sob or thomas. Bps soft but this is baseline. She denies HA or dizziness.   PAF: on elequis, metoprolol.       Electronically signed by: Donald Graham CNP     Sincerely,        Donald Graham, CNP

## 2023-09-20 ENCOUNTER — TELEPHONE (OUTPATIENT)
Dept: CARDIOLOGY | Facility: CLINIC | Age: 78
End: 2023-09-20
Payer: COMMERCIAL

## 2023-09-20 DIAGNOSIS — I48.0 PAROXYSMAL ATRIAL FIBRILLATION (H): ICD-10-CM

## 2023-09-20 DIAGNOSIS — I50.32 CHRONIC HEART FAILURE WITH PRESERVED EJECTION FRACTION (H): Primary | ICD-10-CM

## 2023-09-20 NOTE — TELEPHONE ENCOUNTER
Please review [facility] records in CareEverywhere (9-8-23 to 9-13-23), address when patient should follow-up in clinic and with whom since you have no availability.  mg

## 2023-09-20 NOTE — TELEPHONE ENCOUNTER
[facility] Call Center    Phone Message    May a detailed message be left on voicemail: yes     Reason for Call: Other: Pt's daughter calling on behalf of the patient requesting we take a look at documentation from hospital admission on 09/08/23 at [facility] and assist with recommendation on a hospital discharge follow up appt. Dr. Colbert does not have any availability in her template right now and they are wondering if she would need to be seen by Say[facility] or what follow up we would recommend. Please return call to discuss.      Action Taken: Other: Cardiology    Travel Screening: Not Applicable    Thank you!  [facility]

## 2023-09-22 NOTE — TELEPHONE ENCOUNTER
Msg rec'd 9-22-23 @ 0740:  Lauren Colbert MD Gorshe, Maureen, RN  Had PAF with CHF exacerbation so could be seen in HF clinic.  KML    Follow-up order placed per protocol - msg sent to sched team.  mg

## 2023-09-23 ENCOUNTER — TELEPHONE (OUTPATIENT)
Dept: GERIATRICS | Facility: CLINIC | Age: 78
End: 2023-09-23
Payer: COMMERCIAL

## 2023-09-23 NOTE — TELEPHONE ENCOUNTER
[facility] TELEPHONE ENCOUNTER    Chief Complaint   Patient presents with    Medication Question       Tommy Rea is a 78 year old  (1945),Nurse called today to report: Patient is requesting her monthly injection of Ustekinumab (Stelara) 45 mg/0.5 mL for her Crohn disease. This was ordered to be on HOLD while in TCU due to cost of medication being 1300$. Patient is willing to bring her supply at home to use on site.     ASSESSMENT/PLAN    OK to restart this on TCU with home supply. Staff to administer per instructions.     Electronically signed by:   SERENE Sánchez CNP

## 2023-09-25 PROBLEM — S82.424D: Status: ACTIVE | Noted: 2023-09-14

## 2023-09-26 ENCOUNTER — DISCHARGE SUMMARY NURSING HOME (OUTPATIENT)
Dept: GERIATRICS | Facility: CLINIC | Age: 78
End: 2023-09-26
Payer: COMMERCIAL

## 2023-09-26 DIAGNOSIS — I10 BENIGN ESSENTIAL HYPERTENSION: ICD-10-CM

## 2023-09-26 DIAGNOSIS — I50.32 CHRONIC HEART FAILURE WITH PRESERVED EJECTION FRACTION (H): ICD-10-CM

## 2023-09-26 DIAGNOSIS — S82.424D: Primary | ICD-10-CM

## 2023-09-26 PROCEDURE — 99316 NF DSCHRG MGMT 30 MIN+: CPT | Performed by: NURSE PRACTITIONER

## 2023-09-26 NOTE — LETTER
9/26/2023        RE: Tommy Rea  [address]  [place] [address]        [facility] GERIATRICS DISCHARGE SUMMARY  PATIENT'S NAME: Tommy Rea  YOB: 1945  MEDICAL RECORD NUMBER:  [id number]  Place of Service where encounter took place:  [facility] ([facility]) [[id number]]    PRIMARY CARE PROVIDER AND CLINIC RESPONSIBLE AFTER TRANSFER:   Hannah Benton MD, [address] / [place] [address]     Transferring providers: Donald Graham CNP, Kemi Winchester MD  Recent Hospitalization/ED:  Hospital  [facility] stay 9/8/23 to 9/13/23.  Date of SNF Admission: September 13, 2023  Date of SNF (anticipated) Discharge: September 27, 2023  Discharged to: previous independent home  Cognitive Scores:  n/a  Physical Function:  ambulating with walker  DME: No DME needed    CODE STATUS/ADVANCE DIRECTIVES DISCUSSION:  No CPR- Do NOT Intubate   ALLERGIES: Amoxicillin, Sulfa antibiotics, and Sulfamethoxazole-trimethoprim    NURSING FACILITY COURSE   Medication Changes/Rationale:   none    Summary of nursing facility stay:      Closed nondisplaced transverse fracture of shaft of right fibula with routine healing  Benign essential hypertension  Chronic heart failure with preserved ejection fraction (H)    Tommy has a hx of hyerparathyroidism, vit D def, htn, copd, chrohns dx on immune modulator, normocytic anemia, and a R non displaced fibular fx who was hospitalized at [facility] for acute chf and afib with RVR as well as PE.   She presented to [facility] from another TCU where she had been admitted from 8/28-8/31 for a fall and non displaced fib fracture. She was treated conservatively with pain management and knee immobilizer. She was discharged to a TCU where she developed SOB and 20lb weight gain and found to have a fib with RVR and subacute PE, on eliquis.     TCU: her pain improved and she weaned off of knee immobilizer. LE swelling is baseline. Weights trending  down. Bps soft whic his also baseline. She denies dizziness, headache, orthostatic sx.     Discharge Medications:  MED REC REQUIRED  Post Medication Reconciliation Status:  Discharge medications reconciled, continue medications without change       Current Outpatient Medications   Medication Sig Dispense Refill     acetaminophen (TYLENOL) 500 MG tablet Take 1,000 mg by mouth 2 times daily        albuterol (PROAIR HFA/PROVENTIL HFA/VENTOLIN HFA) 108 (90 Base) MCG/ACT inhaler Inhale 2 puffs into the lungs every 6 hours as needed       alendronate (FOSAMAX) 70 MG tablet Take 70 mg by mouth every 7 days        apixaban ANTICOAGULANT (ELIQUIS) 5 MG tablet Take 5 mg by mouth 2 times daily        aspirin 81 MG EC tablet Take 81 mg by mouth daily        calcium carbonate (SUPER CALCIUM) 1500 (600 Ca) MG tablet Take 600 mg by mouth 2 times daily (with meals)       CALCIUM-VITAMIN D PO Take 1 tablet by mouth daily       colesevelam (WELCHOL) 625 MG tablet Take 3 tablets by mouth At Bedtime       cyanocobalamin (CYANOCOBALAMIN) 1000 MCG/ML injection Inject 1,000 mcg into the muscle every 30 days       fluticasone (FLONASE) 50 MCG/ACT nasal spray Spray 1 spray into both nostrils daily       fluticasone (FLOVENT HFA) 110 MCG/ACT inhaler Inhale 1 puff into the lungs 2 times daily        loratadine (CLARITIN) 10 MG tablet Take 10 mg by mouth daily as needed        Magnesium Oxide 250 MG TABS Take 250 mg by mouth daily        metolazone (ZAROXOLYN) 2.5 MG tablet Take 1 tablet (2.5 mg) by mouth daily as needed (As instructed by your provider for heart failure symptoms) Take 30 minutes prior to AM dose of torsemide 8 tablet 1     metoprolol succinate ER (TOPROL XL) 50 MG 24 hr tablet Take 1 tablet (50 mg) by mouth 2 times daily 180 tablet 3     multivitamin w/minerals (THERA-VIT-M) tablet Take 1 tablet by mouth daily        nitroGLYcerin (NITROSTAT) 0.4 MG sublingual tablet Place 1 tablet (0.4 mg) under the tongue every 5 minutes as  needed for chest pain 25 tablet 1     PARoxetine (PAXIL) 40 MG tablet Take 40 mg by mouth daily        phenytoin (DILANTIN) 100 MG ER capsule Take 5 tablets at bedtime 450 capsule 3     potassium chloride ER (KLOR-CON M) 20 MEQ CR tablet Take 1 tablet (20 mEq) by mouth daily 270 tablet 3     simvastatin (ZOCOR) 40 MG tablet Take 40 mg by mouth At Bedtime        sodium chloride (OCEAN) 0.65 % nasal spray Spray 1 spray in nostril daily as needed       spironolactone (ALDACTONE) 50 MG tablet Take 1 tablet (50 mg) by mouth daily 90 tablet 3     STELARA 45 MG/0.5ML SOLN every 30 days       topiramate (TOPAMAX) 100 MG tablet Take 1.5 tablets (150 mg) by mouth 2 times daily 270 tablet 3     torsemide (DEMADEX) 20 MG tablet Take 2 tablets (40 mg) by mouth 2 times daily (before meals) 360 tablet 3     Vitamin D3 (CHOLECALCIFEROL) 125 MCG (5000 UT) tablet Take 125 mcg by mouth daily          Controlled medications:   not applicable/none     Past Medical History:   Past Medical History:   Diagnosis Date     (HFpEF) heart failure with preserved ejection fraction (H) 1/20/2020     Arthritis      Asthma      Atrial fibrillation (H) 10/24/2016     B12 deficiency      Benign essential hypertension 8/2/2021    Formatting of this note might be different from the original. Created by Conversion     CAD (coronary artery disease)      Congestive heart failure (H)      COPD (chronic obstructive pulmonary disease) (H)      COPD (chronic obstructive pulmonary disease) (H)      Crohn's disease (H)      Crohn's disease (H)      Depression      Depressive disorder      Esophageal reflux      Essential hypertension      Fracture of left hip requiring operative repair (H)      Heart disease     a fib     History of shingles      Hyperlipidemia      Hyperparathyroidism (H24)      Hypertension      Osteoporosis      Pulmonary nodule     benign     Regional enteritis (H) 07/19/2016     Seizure (H)      Seizures (H)      Physical Exam:   Vitals: BP  "102/56   Pulse 99   Temp 97.3  F (36.3  C)   Resp 18   Ht 1.676 m (5' 6\")   Wt 84.8 kg (186 lb 14.4 oz)   SpO2 97%   BMI 30.17 kg/m    BMI: Body mass index is 30.17 kg/m .  General appearance: alert, cooperative.   Lungs: respirations unlabored,no wheezing or rales.   Cardiovascular: Regular rate and rhythm.   ABDOMEN: Globular and soft, non tender.    Extremities: extremities normal, atraumatic, no cyanosis or edema  Skin: No rashes or lesions  Neurologic: oriented. No focal deficits.   Psych: interacts well with caregivers,  pleasant    SNF labs: Recent labs in [facility] reviewed by me today.     DISCHARGE PLAN:  Follow up labs: per pcp  Medical Follow Up:      Follow up with primary care provider in 1-2 weeks  [facility] scheduled appointments: n/a  Discharge Services: Home Care:  Occupational Therapy, Physical Therapy, and Registered Nurse  Discharge Instructions Verbalized to Patient at Discharge:   Weigh yourself daily in the morning and keep a record. Call your primary clinic: a) if you are more short of breath, or b) if your weight changes more than 3 pounds in one day or more than 5 pounds in one week.     TOTAL DISCHARGE TIME:   Greater than 30 minutes  Electronically signed by:  Donald Graham CNP     Documentation of Face to Face and Certification for Home Health Services    I certify that patientLily Sanders is under my care and that I, or a nurse practitioner or physician's assistant working with me, had a face-to-face encounter that meets the physician face-to-face encounter requirements with this patient on: 9/26/23    This encounter with the patient was in whole, or in part, for the following medical condition, which is the primary reason for home health care: fib fx    I certify that, based on my findings, the following services are medically necessary home health services: Nursing, Occupational Therapy, and Physical Therapy.    My clinical findings support the need for the above services " because: RN required for medication management, PT and OT required for continued functional improvment in home setting .     Further, I certify that my clinical findings support that this patient is homebound (i.e. absences from home require considerable and taxing effort and are for medical reasons or Oriental orthodox services or infrequently or of short duration when for other reasons) because: Requires assistance of another person or specialized equipment to access medical services because patient: Requires supervision of another for safe transfer...    Based on the above findings. I certify that this patient is confined to the home and needs intermittent skilled nursing care, physical therapy and/or speech therapy.  The patient is under my care, and I have initiated the establishment of the plan of care.  This patient will be followed by a physician who will periodically review the plan of care.                      Sincerely,        Donald Graham, CNP

## 2023-09-27 VITALS
OXYGEN SATURATION: 97 % | BODY MASS INDEX: 30.04 KG/M2 | DIASTOLIC BLOOD PRESSURE: 56 MMHG | RESPIRATION RATE: 18 BRPM | HEIGHT: 66 IN | TEMPERATURE: 97.3 F | WEIGHT: 186.9 LBS | HEART RATE: 99 BPM | SYSTOLIC BLOOD PRESSURE: 102 MMHG

## 2023-09-27 RX ORDER — ALENDRONATE SODIUM 70 MG/1
70 TABLET ORAL WEEKLY
Qty: 12 TABLET | OUTPATIENT
Start: 2023-09-27

## 2023-09-27 NOTE — PROGRESS NOTES
[facility] GERIATRICS DISCHARGE SUMMARY  PATIENT'S NAME: Tommy Rea  YOB: 1945  MEDICAL RECORD NUMBER:  [id number]  Place of Service where encounter took place:  [facility] ([facility]) [[id number]]    PRIMARY CARE PROVIDER AND CLINIC RESPONSIBLE AFTER TRANSFER:   Hannah Benton MD, [address] / [place] [address]     Transferring providers: Donald Graham CNP, Kemi Winchester MD  Recent Hospitalization/ED:  Hospital  [facility] stay 9/8/23 to 9/13/23.  Date of SNF Admission: September 13, 2023  Date of SNF (anticipated) Discharge: September 27, 2023  Discharged to: previous independent home  Cognitive Scores:  n/a  Physical Function:  ambulating with walker  DME: No DME needed    CODE STATUS/ADVANCE DIRECTIVES DISCUSSION:  No CPR- Do NOT Intubate   ALLERGIES: Amoxicillin, Sulfa antibiotics, and Sulfamethoxazole-trimethoprim    NURSING FACILITY COURSE   Medication Changes/Rationale:   none    Summary of nursing facility stay:      Closed nondisplaced transverse fracture of shaft of right fibula with routine healing  Benign essential hypertension  Chronic heart failure with preserved ejection fraction (H)    Tommy has a hx of hyerparathyroidism, vit D def, htn, copd, chrohns dx on immune modulator, normocytic anemia, and a R non displaced fibular fx who was hospitalized at [facility] for acute chf and afib with RVR as well as PE.   She presented to [facility] from another TCU where she had been admitted from 8/28-8/31 for a fall and non displaced fib fracture. She was treated conservatively with pain management and knee immobilizer. She was discharged to a TCU where she developed SOB and 20lb weight gain and found to have a fib with RVR and subacute PE, on eliquis.     TCU: her pain improved and she weaned off of knee immobilizer. LE swelling is baseline. Weights trending down. Bps soft whic his also baseline. She denies dizziness, headache, orthostatic sx.      Discharge Medications:  MED REC REQUIRED  Post Medication Reconciliation Status:  Discharge medications reconciled, continue medications without change       Current Outpatient Medications   Medication Sig Dispense Refill    acetaminophen (TYLENOL) 500 MG tablet Take 1,000 mg by mouth 2 times daily       albuterol (PROAIR HFA/PROVENTIL HFA/VENTOLIN HFA) 108 (90 Base) MCG/ACT inhaler Inhale 2 puffs into the lungs every 6 hours as needed      alendronate (FOSAMAX) 70 MG tablet Take 70 mg by mouth every 7 days       apixaban ANTICOAGULANT (ELIQUIS) 5 MG tablet Take 5 mg by mouth 2 times daily       aspirin 81 MG EC tablet Take 81 mg by mouth daily       calcium carbonate (SUPER CALCIUM) 1500 (600 Ca) MG tablet Take 600 mg by mouth 2 times daily (with meals)      CALCIUM-VITAMIN D PO Take 1 tablet by mouth daily      colesevelam (WELCHOL) 625 MG tablet Take 3 tablets by mouth At Bedtime      cyanocobalamin (CYANOCOBALAMIN) 1000 MCG/ML injection Inject 1,000 mcg into the muscle every 30 days      fluticasone (FLONASE) 50 MCG/ACT nasal spray Spray 1 spray into both nostrils daily      fluticasone (FLOVENT HFA) 110 MCG/ACT inhaler Inhale 1 puff into the lungs 2 times daily       loratadine (CLARITIN) 10 MG tablet Take 10 mg by mouth daily as needed       Magnesium Oxide 250 MG TABS Take 250 mg by mouth daily       metolazone (ZAROXOLYN) 2.5 MG tablet Take 1 tablet (2.5 mg) by mouth daily as needed (As instructed by your provider for heart failure symptoms) Take 30 minutes prior to AM dose of torsemide 8 tablet 1    metoprolol succinate ER (TOPROL XL) 50 MG 24 hr tablet Take 1 tablet (50 mg) by mouth 2 times daily 180 tablet 3    multivitamin w/minerals (THERA-VIT-M) tablet Take 1 tablet by mouth daily       nitroGLYcerin (NITROSTAT) 0.4 MG sublingual tablet Place 1 tablet (0.4 mg) under the tongue every 5 minutes as needed for chest pain 25 tablet 1    PARoxetine (PAXIL) 40 MG tablet Take 40 mg by mouth daily        "phenytoin (DILANTIN) 100 MG ER capsule Take 5 tablets at bedtime 450 capsule 3    potassium chloride ER (KLOR-CON M) 20 MEQ CR tablet Take 1 tablet (20 mEq) by mouth daily 270 tablet 3    simvastatin (ZOCOR) 40 MG tablet Take 40 mg by mouth At Bedtime       sodium chloride (OCEAN) 0.65 % nasal spray Spray 1 spray in nostril daily as needed      spironolactone (ALDACTONE) 50 MG tablet Take 1 tablet (50 mg) by mouth daily 90 tablet 3    STELARA 45 MG/0.5ML SOLN every 30 days      topiramate (TOPAMAX) 100 MG tablet Take 1.5 tablets (150 mg) by mouth 2 times daily 270 tablet 3    torsemide (DEMADEX) 20 MG tablet Take 2 tablets (40 mg) by mouth 2 times daily (before meals) 360 tablet 3    Vitamin D3 (CHOLECALCIFEROL) 125 MCG (5000 UT) tablet Take 125 mcg by mouth daily          Controlled medications:   not applicable/none     Past Medical History:   Past Medical History:   Diagnosis Date    (HFpEF) heart failure with preserved ejection fraction (H) 1/20/2020    Arthritis     Asthma     Atrial fibrillation (H) 10/24/2016    B12 deficiency     Benign essential hypertension 8/2/2021    Formatting of this note might be different from the original. Created by Conversion    CAD (coronary artery disease)     Congestive heart failure (H)     COPD (chronic obstructive pulmonary disease) (H)     COPD (chronic obstructive pulmonary disease) (H)     Crohn's disease (H)     Crohn's disease (H)     Depression     Depressive disorder     Esophageal reflux     Essential hypertension     Fracture of left hip requiring operative repair (H)     Heart disease     a fib    History of shingles     Hyperlipidemia     Hyperparathyroidism (H24)     Hypertension     Osteoporosis     Pulmonary nodule     benign    Regional enteritis (H) 07/19/2016    Seizure (H)     Seizures (H)      Physical Exam:   Vitals: /56   Pulse 99   Temp 97.3  F (36.3  C)   Resp 18   Ht 1.676 m (5' 6\")   Wt 84.8 kg (186 lb 14.4 oz)   SpO2 97%   BMI 30.17 " kg/m    BMI: Body mass index is 30.17 kg/m .  General appearance: alert, cooperative.   Lungs: respirations unlabored,no wheezing or rales.   Cardiovascular: Regular rate and rhythm.   ABDOMEN: Globular and soft, non tender.    Extremities: extremities normal, atraumatic, no cyanosis or edema  Skin: No rashes or lesions  Neurologic: oriented. No focal deficits.   Psych: interacts well with caregivers,  pleasant    SNF labs: Recent labs in [facility] reviewed by me today.     DISCHARGE PLAN:  Follow up labs: per pcp  Medical Follow Up:      Follow up with primary care provider in 1-2 weeks  [facility] scheduled appointments: n/a  Discharge Services: Home Care:  Occupational Therapy, Physical Therapy, and Registered Nurse  Discharge Instructions Verbalized to Patient at Discharge:   Weigh yourself daily in the morning and keep a record. Call your primary clinic: a) if you are more short of breath, or b) if your weight changes more than 3 pounds in one day or more than 5 pounds in one week.     TOTAL DISCHARGE TIME:   Greater than 30 minutes  Electronically signed by:  Donald Graham CNP     Documentation of Face to Face and Certification for Home Health Services    I certify that patientLily Sanders is under my care and that I, or a nurse practitioner or physician's assistant working with me, had a face-to-face encounter that meets the physician face-to-face encounter requirements with this patient on: 9/26/23    This encounter with the patient was in whole, or in part, for the following medical condition, which is the primary reason for home health care: fib fx    I certify that, based on my findings, the following services are medically necessary home health services: Nursing, Occupational Therapy, and Physical Therapy.    My clinical findings support the need for the above services because: RN required for medication management, PT and OT required for continued functional improvment in home setting .     Further,  I certify that my clinical findings support that this patient is homebound (i.e. absences from home require considerable and taxing effort and are for medical reasons or Orthodox services or infrequently or of short duration when for other reasons) because: Requires assistance of another person or specialized equipment to access medical services because patient: Requires supervision of another for safe transfer...    Based on the above findings. I certify that this patient is confined to the home and needs intermittent skilled nursing care, physical therapy and/or speech therapy.  The patient is under my care, and I have initiated the establishment of the plan of care.  This patient will be followed by a physician who will periodically review the plan of care.

## 2023-10-05 ENCOUNTER — OFFICE VISIT (OUTPATIENT)
Dept: CARDIOLOGY | Facility: CLINIC | Age: 78
End: 2023-10-05
Attending: INTERNAL MEDICINE
Payer: COMMERCIAL

## 2023-10-05 VITALS
BODY MASS INDEX: 29.21 KG/M2 | WEIGHT: 181 LBS | OXYGEN SATURATION: 95 % | RESPIRATION RATE: 20 BRPM | DIASTOLIC BLOOD PRESSURE: 52 MMHG | SYSTOLIC BLOOD PRESSURE: 94 MMHG | HEART RATE: 72 BPM

## 2023-10-05 DIAGNOSIS — I10 BENIGN ESSENTIAL HYPERTENSION: ICD-10-CM

## 2023-10-05 DIAGNOSIS — I25.10 ATHEROSCLEROSIS OF CORONARY ARTERY OF NATIVE HEART, UNSPECIFIED VESSEL OR LESION TYPE, UNSPECIFIED WHETHER ANGINA PRESENT: ICD-10-CM

## 2023-10-05 DIAGNOSIS — I48.0 PAROXYSMAL ATRIAL FIBRILLATION (H): ICD-10-CM

## 2023-10-05 DIAGNOSIS — I50.32 CHRONIC HEART FAILURE WITH PRESERVED EJECTION FRACTION (H): Primary | ICD-10-CM

## 2023-10-05 LAB
ANION GAP SERPL CALCULATED.3IONS-SCNC: 8 MMOL/L (ref 7–15)
BUN SERPL-MCNC: 15 MG/DL (ref 8–23)
CALCIUM SERPL-MCNC: 9.6 MG/DL (ref 8.8–10.2)
CHLORIDE SERPL-SCNC: 105 MMOL/L (ref 98–107)
CREAT SERPL-MCNC: 0.86 MG/DL (ref 0.51–0.95)
DEPRECATED HCO3 PLAS-SCNC: 27 MMOL/L (ref 22–29)
EGFRCR SERPLBLD CKD-EPI 2021: 69 ML/MIN/1.73M2
GLUCOSE SERPL-MCNC: 92 MG/DL (ref 70–99)
POTASSIUM SERPL-SCNC: 5.7 MMOL/L (ref 3.4–5.3)
SODIUM SERPL-SCNC: 140 MMOL/L (ref 135–145)

## 2023-10-05 PROCEDURE — 80048 BASIC METABOLIC PNL TOTAL CA: CPT | Performed by: NURSE PRACTITIONER

## 2023-10-05 PROCEDURE — 36415 COLL VENOUS BLD VENIPUNCTURE: CPT | Performed by: NURSE PRACTITIONER

## 2023-10-05 PROCEDURE — 99215 OFFICE O/P EST HI 40 MIN: CPT | Performed by: NURSE PRACTITIONER

## 2023-10-05 RX ORDER — NITROGLYCERIN 0.4 MG/1
0.4 TABLET SUBLINGUAL EVERY 5 MIN PRN
Qty: 25 TABLET | Refills: 1 | Status: SHIPPED | OUTPATIENT
Start: 2023-10-05

## 2023-10-05 RX ORDER — POTASSIUM CHLORIDE 1500 MG/1
60 TABLET, EXTENDED RELEASE ORAL DAILY
Qty: 270 TABLET | Refills: 3 | Status: SHIPPED | OUTPATIENT
Start: 2023-10-05 | End: 2023-10-06

## 2023-10-05 RX ORDER — METOPROLOL SUCCINATE 50 MG/1
50 TABLET, EXTENDED RELEASE ORAL 2 TIMES DAILY
Qty: 180 TABLET | Refills: 3 | Status: SHIPPED | OUTPATIENT
Start: 2023-10-05 | End: 2023-11-24

## 2023-10-05 RX ORDER — ECHINACEA PURPUREA EXTRACT 125 MG
1 TABLET ORAL DAILY PRN
COMMUNITY

## 2023-10-05 RX ORDER — METOLAZONE 2.5 MG/1
2.5 TABLET ORAL DAILY PRN
Qty: 8 TABLET | Refills: 1 | Status: SHIPPED | OUTPATIENT
Start: 2023-10-05

## 2023-10-05 NOTE — LETTER
10/5/2023    Hannah Benton MD  [address]  [place] [address]    RE: Tommy Rea       Dear Colleague,     I had the pleasure of seeing Tommy Rea in the [facility].        Assessment/Recommendations   Assessment:    1. Heart failure with preserved ejection fraction, NYHA class II: Compensated.  She has mild fatigue and dyspnea on exertion.  Her weight is stable.  She is trying to follow a low-salt diet.  2.  Hypertension: Controlled.  Blood pressure 94/52.  She denies lightheadedness or dizziness  3.  Paroxysmal atrial fibrillation: Rate controlled.  Recently hospitalized at [facility] with atrial fibrillation with rapid ventricular response.  Cardioversion was planned but canceled due to acute PE.  She continues Eliquis for anticoagulation.  She was started on Toprol 50 mg twice a day.    Plan:  1.  Continue current medications  2.  BMP pending  3.  Continue monitoring daily weights and following a low-sodium diet    Tommy Rea will follow up with Dr. Colbert in January, EP NP to discuss atrial fibrillation, and in the heart failure clinic in 6 months.     History of Present Illness/Subjective    Ms. Tommy Rea is a 78 year old female seen at [facility] heart failure clinic today for continued follow-up.  Her daughter accompanies her today.  She follows up for heart failure with preserved ejection fraction.  She was hospitalized late August at [facility] due to a mechanical fall and suffered a right fibula fracture.  She was discharged to TCU.  While at TCU she had increased shortness of breath and a weight gain of 20 pounds.  She was rehospitalized September 8 to 13 with acute heart failure and atrial fibrillation with rapid ventricular response.  Cardioversion was planned but canceled due to acute PE.  She was started on Toprol for rate control.  She was diuresed and symptoms improved she lost 15 pounds.  She has a past medical history significant  for hypertension, paroxysmal atrial fibrillation, asthma, COPD, hyperlipidemia.    Today, she has mild fatigue and dyspnea on exertion.  He states she did not feel her atrial fibrillation just that she was more short of breath.  She denies lightheadedness, shortness of breath, orthopnea, PND, palpitations, chest pain, abdominal fullness/bloating, and lower extremity edema.      She is monitoring home weights which are stable between 180-185 pounds.  She is following a low sodium diet.  She lives alone.      ECHOCARDIOGRAM: 9/10/2023 [facility]  Final Conclusion    1. Normal left ventricular systolic function. Normal left ventricular systolic function.   Estimated left ventricular ejection fraction is 65%.    No regional wall motion abnormalities.    2. Normal right ventricular chamber size. Normal right ventricular systolic function.    3. No significant valvular heart disease.    4. Aortic sinus of Valsalva is normal in size (3.4 cm). Normal indexed ascending aorta   dimension (3.6 cm, 1.9 cm/m ).    5. Moderate-severe left atrial enlargement. Left atrial volume index is 47 ml/m .    6. Normal inferior vena cava with normal inspiratory collapse.    Estimated EF: 65%      Physical Examination Review of Systems   BP 94/52 (BP Location: Right arm, Patient Position: Sitting, Cuff Size: Adult Regular)   Pulse 72   Resp 20   Wt 82.1 kg (181 lb)   SpO2 95%   BMI 29.21 kg/m    Body mass index is 29.21 kg/m .  Wt Readings from Last 3 Encounters:   10/05/23 82.1 kg (181 lb)   09/27/23 84.8 kg (186 lb 14.4 oz)   09/21/23 84.6 kg (186 lb 9.6 oz)       General Appearance:   no acute distress   ENT/Mouth: No abnormalities   EYES:  no scleral icterus, normal conjunctivae   Neck: no thyromegaly   Chest/Lungs:   lungs are clear to auscultation, no rales or wheezing, equal chest wall expansion    Cardiovascular:   Irregularly irregular. Normal first and second heart sounds with no murmurs, rubs, or gallops, no edema  bilaterally    Abdomen:  bowel sounds are present   Extremities: no cyanosis or clubbing   Skin: warm   Neurologic: no tremors     Psychiatric: alert and oriented x3                                              Medical History  Surgical History Family History Social History   Past Medical History:   Diagnosis Date    (HFpEF) heart failure with preserved ejection fraction (H) 1/20/2020    Arthritis     Asthma     Atrial fibrillation (H) 10/24/2016    B12 deficiency     Benign essential hypertension 8/2/2021    Formatting of this note might be different from the original. Created by Conversion    CAD (coronary artery disease)     Congestive heart failure (H)     COPD (chronic obstructive pulmonary disease) (H)     COPD (chronic obstructive pulmonary disease) (H)     Crohn's disease (H)     Crohn's disease (H)     Depression     Depressive disorder     Esophageal reflux     Essential hypertension     Fracture of left hip requiring operative repair (H)     Heart disease     a fib    History of shingles     Hyperlipidemia     Hyperparathyroidism (H24)     Hypertension     Osteoporosis     Pulmonary nodule     benign    Regional enteritis (H) 07/19/2016    Seizure (H)     Seizures (H)     Past Surgical History:   Procedure Laterality Date    ARTHROPLASTY REVISION HIP Left 07/19/2016    removal of gamma nail hardware & conversion to total hip arthroplasty: Dr. Mercado at [facility] REMOVAL DEEP IMPLANT Left 1/25/2015    Procedure: Gamma Nail Left Hip;  Surgeon: Kirk De La Cruz MD;  Location: [facility];  Service: Orthopedics    HEMORRHOIDECTOMY      ORIF HIP FRACTURE  2015    ORTHOPEDIC SURGERY      ORIF    OTHER SURGICAL HISTORY      HEMMOIROIDECT    OTHER SURGICAL HISTORY      CVL HEART CATH LEFT    Family History   Problem Relation Age of Onset    Hypertension Mother     Colon Cancer Father     Arthritis Father     Hypertension Father     Heart Disease Mother     Heart Disease Father     Social History      Socioeconomic History    Marital status:      Spouse name: Not on file    Number of children: Not on file    Years of education: Not on file    Highest education level: Not on file   Occupational History    Not on file   Tobacco Use    Smoking status: Former     Types: Cigarettes     Quit date: 2011     Years since quittin.2    Smokeless tobacco: Never   Substance and Sexual Activity    Alcohol use: No    Drug use: No    Sexual activity: Not on file   Other Topics Concern    Parent/sibling w/ CABG, MI or angioplasty before 65F 55M? Not Asked   Social History Narrative    Not on file     Social Determinants of Health     Financial Resource Strain: Not on file   Food Insecurity: Not on file   Transportation Needs: Not on file   Physical Activity: Not on file   Stress: Not on file   Social Connections: Not on file   Interpersonal Safety: Not on file   Housing Stability: Not on file          Medications  Allergies   Current Outpatient Medications   Medication Sig Dispense Refill    acetaminophen (TYLENOL) 500 MG tablet Take 1,000 mg by mouth 2 times daily       albuterol (PROAIR HFA/PROVENTIL HFA/VENTOLIN HFA) 108 (90 Base) MCG/ACT inhaler Inhale 2 puffs into the lungs every 6 hours as needed      alendronate (FOSAMAX) 70 MG tablet Take 70 mg by mouth every 7 days       apixaban ANTICOAGULANT (ELIQUIS) 5 MG tablet Take 5 mg by mouth 2 times daily       aspirin 81 MG EC tablet Take 81 mg by mouth daily       calcium carbonate (SUPER CALCIUM) 1500 (600 Ca) MG tablet Take 600 mg by mouth 2 times daily (with meals)      CALCIUM-VITAMIN D PO Take 1 tablet by mouth daily      colesevelam (WELCHOL) 625 MG tablet Take 3 tablets by mouth At Bedtime      cyanocobalamin (CYANOCOBALAMIN) 1000 MCG/ML injection Inject 1,000 mcg into the muscle every 30 days      fluticasone (FLONASE) 50 MCG/ACT nasal spray Spray 1 spray into both nostrils daily      fluticasone (FLOVENT HFA) 110 MCG/ACT inhaler Inhale 1 puff into  the lungs 2 times daily       loratadine (CLARITIN) 10 MG tablet Take 10 mg by mouth daily as needed       Magnesium Oxide 250 MG TABS Take 250 mg by mouth daily       metolazone (ZAROXOLYN) 2.5 MG tablet Take 1 tablet (2.5 mg) by mouth daily as needed (As instructed by your provider for heart failure symptoms) Take 30 minutes prior to AM dose of torsemide 8 tablet 1    metoprolol succinate ER (TOPROL XL) 50 MG 24 hr tablet Take 1 tablet (50 mg) by mouth 2 times daily 180 tablet 3    multivitamin w/minerals (THERA-VIT-M) tablet Take 1 tablet by mouth daily       nitroGLYcerin (NITROSTAT) 0.4 MG sublingual tablet Place 1 tablet (0.4 mg) under the tongue every 5 minutes as needed for chest pain 25 tablet 1    PARoxetine (PAXIL) 40 MG tablet Take 40 mg by mouth daily       phenytoin (DILANTIN) 100 MG ER capsule Take 5 tablets at bedtime 450 capsule 3    potassium chloride ER (KLOR-CON M) 20 MEQ CR tablet Take 3 tablets (60 mEq) by mouth daily 270 tablet 3    simvastatin (ZOCOR) 40 MG tablet Take 40 mg by mouth At Bedtime       sodium chloride (OCEAN) 0.65 % nasal spray Spray 1 spray in nostril daily as needed      spironolactone (ALDACTONE) 50 MG tablet Take 1 tablet (50 mg) by mouth daily 90 tablet 3    STELARA 45 MG/0.5ML SOLN every 30 days      topiramate (TOPAMAX) 100 MG tablet Take 1.5 tablets (150 mg) by mouth 2 times daily 270 tablet 3    torsemide (DEMADEX) 20 MG tablet Take 2 tablets (40 mg) by mouth 2 times daily (before meals) 360 tablet 3    Vitamin D3 (CHOLECALCIFEROL) 125 MCG (5000 UT) tablet Take 125 mcg by mouth daily      Allergies   Allergen Reactions    Amoxicillin GI Disturbance and Other (See Comments)     COLITIS    Has tolerated cefazolin  GI bleeding, Colitis, has tolerated cefazolin       Sulfa Antibiotics     Sulfamethoxazole-Trimethoprim Other (See Comments)     Other reaction(s): Vaginal Irritation  VAGINAL BLEEDING  Vaginal irritation, vaginal bleeding            Lab Results     Chemistry/lipid CBC Cardiac Enzymes/BNP/TSH/INR   Lab Results   Component Value Date    CHOL 185 12/07/2022    HDL 56 12/07/2022    TRIG 292 (H) 12/07/2022    BUN 18.9 09/15/2023     09/15/2023    CO2 30 (H) 09/15/2023    Lab Results   Component Value Date    WBC 8.1 09/06/2023    HGB 8.7 (L) 09/06/2023    HCT 31.9 (L) 09/06/2023    MCV 88 09/06/2023     09/06/2023    Lab Results   Component Value Date    TROPONINI 0.04 04/25/2020     (H) 09/13/2021    TSH 2.90 07/17/2019             This note has been dictated using voice recognition software. Any grammatical, typographical, or context distortions are unintentional and inherent to the software    40 minutes spent on the date of encounter doing chart review, review of outside records, review of test results, interpretation with above tests, patient visit, documentation, and discussion with family.                Thank you for allowing me to participate in the care of your patient.      Sincerely,     SERENE Thakur [facility]  cc:   Lauren Colbert MD  [address], [address]  [place] [address]

## 2023-10-05 NOTE — PATIENT INSTRUCTIONS
Tommy Rea,    It was a pleasure to see you today at [facility].     My recommendations after this visit include:  - Please follow up with Dr Colbert in January    - Please follow up with EP JESSICA to discuss atrial fibrillation  - Please follow up with La Nicholas in 6 months  - I have checked labs  - Continue current medications  - My nurse # [phone]    La Nicholas, CNP

## 2023-10-05 NOTE — PROGRESS NOTES
Assessment/Recommendations   Assessment:    1. Heart failure with preserved ejection fraction, NYHA class II: Compensated.  She has mild fatigue and dyspnea on exertion.  Her weight is stable.  She is trying to follow a low-salt diet.  2.  Hypertension: Controlled.  Blood pressure 94/52.  She denies lightheadedness or dizziness  3.  Paroxysmal atrial fibrillation: Rate controlled.  Recently hospitalized at [facility] with atrial fibrillation with rapid ventricular response.  Cardioversion was planned but canceled due to acute PE.  She continues Eliquis for anticoagulation.  She was started on Toprol 50 mg twice a day.    Plan:  1.  Continue current medications  2.  BMP pending  3.  Continue monitoring daily weights and following a low-sodium diet    Tommy Rea will follow up with Dr. Colbert in January, EP NP to discuss atrial fibrillation, and in the heart failure clinic in 6 months.     History of Present Illness/Subjective    Ms. Tommy Rea is a 78 year old female seen at [facility] heart failure clinic today for continued follow-up.  Her daughter accompanies her today.  She follows up for heart failure with preserved ejection fraction.  She was hospitalized late August at [facility] due to a mechanical fall and suffered a right fibula fracture.  She was discharged to TCU.  While at TCU she had increased shortness of breath and a weight gain of 20 pounds.  She was rehospitalized September 8 to 13 with acute heart failure and atrial fibrillation with rapid ventricular response.  Cardioversion was planned but canceled due to acute PE.  She was started on Toprol for rate control.  She was diuresed and symptoms improved she lost 15 pounds.  She has a past medical history significant for hypertension, paroxysmal atrial fibrillation, asthma, COPD, hyperlipidemia.    Today, she has mild fatigue and dyspnea on exertion.  He states she did not feel her atrial fibrillation just that she was more  short of breath.  She denies lightheadedness, shortness of breath, orthopnea, PND, palpitations, chest pain, abdominal fullness/bloating, and lower extremity edema.      She is monitoring home weights which are stable between 180-185 pounds.  She is following a low sodium diet.  She lives alone.      ECHOCARDIOGRAM: 9/10/2023 [facility]  Final Conclusion    1. Normal left ventricular systolic function. Normal left ventricular systolic function.   Estimated left ventricular ejection fraction is 65%.    No regional wall motion abnormalities.    2. Normal right ventricular chamber size. Normal right ventricular systolic function.    3. No significant valvular heart disease.    4. Aortic sinus of Valsalva is normal in size (3.4 cm). Normal indexed ascending aorta   dimension (3.6 cm, 1.9 cm/m ).    5. Moderate-severe left atrial enlargement. Left atrial volume index is 47 ml/m .    6. Normal inferior vena cava with normal inspiratory collapse.    Estimated EF: 65%      Physical Examination Review of Systems   BP 94/52 (BP Location: Right arm, Patient Position: Sitting, Cuff Size: Adult Regular)   Pulse 72   Resp 20   Wt 82.1 kg (181 lb)   SpO2 95%   BMI 29.21 kg/m    Body mass index is 29.21 kg/m .  Wt Readings from Last 3 Encounters:   10/05/23 82.1 kg (181 lb)   09/27/23 84.8 kg (186 lb 14.4 oz)   09/21/23 84.6 kg (186 lb 9.6 oz)       General Appearance:   no acute distress   ENT/Mouth: No abnormalities   EYES:  no scleral icterus, normal conjunctivae   Neck: no thyromegaly   Chest/Lungs:   lungs are clear to auscultation, no rales or wheezing, equal chest wall expansion    Cardiovascular:   Irregularly irregular. Normal first and second heart sounds with no murmurs, rubs, or gallops, no edema bilaterally    Abdomen:  bowel sounds are present   Extremities: no cyanosis or clubbing   Skin: warm   Neurologic: no tremors     Psychiatric: alert and oriented x3                                               Medical History  Surgical History Family History Social History   Past Medical History:   Diagnosis Date    (HFpEF) heart failure with preserved ejection fraction (H) 1/20/2020    Arthritis     Asthma     Atrial fibrillation (H) 10/24/2016    B12 deficiency     Benign essential hypertension 8/2/2021    Formatting of this note might be different from the original. Created by Conversion    CAD (coronary artery disease)     Congestive heart failure (H)     COPD (chronic obstructive pulmonary disease) (H)     COPD (chronic obstructive pulmonary disease) (H)     Crohn's disease (H)     Crohn's disease (H)     Depression     Depressive disorder     Esophageal reflux     Essential hypertension     Fracture of left hip requiring operative repair (H)     Heart disease     a fib    History of shingles     Hyperlipidemia     Hyperparathyroidism (H24)     Hypertension     Osteoporosis     Pulmonary nodule     benign    Regional enteritis (H) 07/19/2016    Seizure (H)     Seizures (H)     Past Surgical History:   Procedure Laterality Date    ARTHROPLASTY REVISION HIP Left 07/19/2016    removal of gamma nail hardware & conversion to total hip arthroplasty: Dr. Mercado at [facility] REMOVAL DEEP IMPLANT Left 1/25/2015    Procedure: Gamma Nail Left Hip;  Surgeon: Kirk De La Cruz MD;  Location: [facility] OR;  Service: Orthopedics    HEMORRHOIDECTOMY      ORIF HIP FRACTURE  2015    ORTHOPEDIC SURGERY      ORIF    OTHER SURGICAL HISTORY      HEMMOIROIDECT    OTHER SURGICAL HISTORY      CVL HEART CATH LEFT    Family History   Problem Relation Age of Onset    Hypertension Mother     Colon Cancer Father     Arthritis Father     Hypertension Father     Heart Disease Mother     Heart Disease Father     Social History     Socioeconomic History    Marital status:      Spouse name: Not on file    Number of children: Not on file    Years of education: Not on file    Highest education level: Not on file   Occupational  History    Not on file   Tobacco Use    Smoking status: Former     Types: Cigarettes     Quit date: 2011     Years since quittin.2    Smokeless tobacco: Never   Substance and Sexual Activity    Alcohol use: No    Drug use: No    Sexual activity: Not on file   Other Topics Concern    Parent/sibling w/ CABG, MI or angioplasty before 65F 55M? Not Asked   Social History Narrative    Not on file     Social Determinants of Health     Financial Resource Strain: Not on file   Food Insecurity: Not on file   Transportation Needs: Not on file   Physical Activity: Not on file   Stress: Not on file   Social Connections: Not on file   Interpersonal Safety: Not on file   Housing Stability: Not on file          Medications  Allergies   Current Outpatient Medications   Medication Sig Dispense Refill    acetaminophen (TYLENOL) 500 MG tablet Take 1,000 mg by mouth 2 times daily       albuterol (PROAIR HFA/PROVENTIL HFA/VENTOLIN HFA) 108 (90 Base) MCG/ACT inhaler Inhale 2 puffs into the lungs every 6 hours as needed      alendronate (FOSAMAX) 70 MG tablet Take 70 mg by mouth every 7 days       apixaban ANTICOAGULANT (ELIQUIS) 5 MG tablet Take 5 mg by mouth 2 times daily       aspirin 81 MG EC tablet Take 81 mg by mouth daily       calcium carbonate (SUPER CALCIUM) 1500 (600 Ca) MG tablet Take 600 mg by mouth 2 times daily (with meals)      CALCIUM-VITAMIN D PO Take 1 tablet by mouth daily      colesevelam (WELCHOL) 625 MG tablet Take 3 tablets by mouth At Bedtime      cyanocobalamin (CYANOCOBALAMIN) 1000 MCG/ML injection Inject 1,000 mcg into the muscle every 30 days      fluticasone (FLONASE) 50 MCG/ACT nasal spray Spray 1 spray into both nostrils daily      fluticasone (FLOVENT HFA) 110 MCG/ACT inhaler Inhale 1 puff into the lungs 2 times daily       loratadine (CLARITIN) 10 MG tablet Take 10 mg by mouth daily as needed       Magnesium Oxide 250 MG TABS Take 250 mg by mouth daily       metolazone (ZAROXOLYN) 2.5 MG tablet  Take 1 tablet (2.5 mg) by mouth daily as needed (As instructed by your provider for heart failure symptoms) Take 30 minutes prior to AM dose of torsemide 8 tablet 1    metoprolol succinate ER (TOPROL XL) 50 MG 24 hr tablet Take 1 tablet (50 mg) by mouth 2 times daily 180 tablet 3    multivitamin w/minerals (THERA-VIT-M) tablet Take 1 tablet by mouth daily       nitroGLYcerin (NITROSTAT) 0.4 MG sublingual tablet Place 1 tablet (0.4 mg) under the tongue every 5 minutes as needed for chest pain 25 tablet 1    PARoxetine (PAXIL) 40 MG tablet Take 40 mg by mouth daily       phenytoin (DILANTIN) 100 MG ER capsule Take 5 tablets at bedtime 450 capsule 3    potassium chloride ER (KLOR-CON M) 20 MEQ CR tablet Take 3 tablets (60 mEq) by mouth daily 270 tablet 3    simvastatin (ZOCOR) 40 MG tablet Take 40 mg by mouth At Bedtime       sodium chloride (OCEAN) 0.65 % nasal spray Spray 1 spray in nostril daily as needed      spironolactone (ALDACTONE) 50 MG tablet Take 1 tablet (50 mg) by mouth daily 90 tablet 3    STELARA 45 MG/0.5ML SOLN every 30 days      topiramate (TOPAMAX) 100 MG tablet Take 1.5 tablets (150 mg) by mouth 2 times daily 270 tablet 3    torsemide (DEMADEX) 20 MG tablet Take 2 tablets (40 mg) by mouth 2 times daily (before meals) 360 tablet 3    Vitamin D3 (CHOLECALCIFEROL) 125 MCG (5000 UT) tablet Take 125 mcg by mouth daily      Allergies   Allergen Reactions    Amoxicillin GI Disturbance and Other (See Comments)     COLITIS    Has tolerated cefazolin  GI bleeding, Colitis, has tolerated cefazolin       Sulfa Antibiotics     Sulfamethoxazole-Trimethoprim Other (See Comments)     Other reaction(s): Vaginal Irritation  VAGINAL BLEEDING  Vaginal irritation, vaginal bleeding            Lab Results    Chemistry/lipid CBC Cardiac Enzymes/BNP/TSH/INR   Lab Results   Component Value Date    CHOL 185 12/07/2022    HDL 56 12/07/2022    TRIG 292 (H) 12/07/2022    BUN 18.9 09/15/2023     09/15/2023    CO2 30 (H)  09/15/2023    Lab Results   Component Value Date    WBC 8.1 09/06/2023    HGB 8.7 (L) 09/06/2023    HCT 31.9 (L) 09/06/2023    MCV 88 09/06/2023     09/06/2023    Lab Results   Component Value Date    TROPONINI 0.04 04/25/2020     (H) 09/13/2021    TSH 2.90 07/17/2019             This note has been dictated using voice recognition software. Any grammatical, typographical, or context distortions are unintentional and inherent to the software    40 minutes spent on the date of encounter doing chart review, review of outside records, review of test results, interpretation with above tests, patient visit, documentation, and discussion with family.

## 2023-10-06 ENCOUNTER — TELEPHONE (OUTPATIENT)
Dept: CARDIOLOGY | Facility: CLINIC | Age: 78
End: 2023-10-06
Payer: COMMERCIAL

## 2023-10-06 DIAGNOSIS — I50.32 CHRONIC HEART FAILURE WITH PRESERVED EJECTION FRACTION (H): Primary | ICD-10-CM

## 2023-10-06 RX ORDER — POTASSIUM CHLORIDE 1500 MG/1
20 TABLET, EXTENDED RELEASE ORAL DAILY
Qty: 270 TABLET | Refills: 3 | Status: SHIPPED | OUTPATIENT
Start: 2023-10-06

## 2023-10-06 NOTE — TELEPHONE ENCOUNTER
Patient is contacted with lab results and recommendations, advised to reduce KCL Supplement to 20 meq per day, recheck labs early next week, orders placed. Advised to reduce her dietary intake of foods high in potassium.  Breanne SMITH RN BSN, CHFN, PCCN-K

## 2023-10-06 NOTE — TELEPHONE ENCOUNTER
----- Message from SERENE Thakur CNP sent at 10/6/2023  7:27 AM CDT -----  Please inform patient of lab results.  Potassium level elevated.  Her potassium had been increased during hospital stay.  Please have her reduce to 20 mEq daily and recheck BMP early next week.  Also reduce high potassium foods in diet.  Thank you

## 2023-10-09 ENCOUNTER — TELEPHONE (OUTPATIENT)
Dept: CARDIOLOGY | Facility: CLINIC | Age: 78
End: 2023-10-09
Payer: COMMERCIAL

## 2023-10-09 DIAGNOSIS — I50.32 CHRONIC HEART FAILURE WITH PRESERVED EJECTION FRACTION (H): Primary | ICD-10-CM

## 2023-10-09 NOTE — TELEPHONE ENCOUNTER
Health Call Center    Phone Message    May a detailed message be left on voicemail: yes     Reason for Call: Other: Please have lab orders (potassium check) faxed over to [facility] in [place].  Pt see's her PCP on Wed. 10.11.23 for labs and they want them all done at the same time.  Daughter did not have fax number bu phone number is [phone].   Pt also realized that she was taking twice the amount of potassium from Oct. 5 - 8th so that may have made the results of last potassium incorrect.  Pt is now taking the correct dose since 10.9.23.  Daughter Kierra is hoping the lab draw on Wed 10.11.23 will reflect a change.    Action Taken: Message routed to:  [facility] cardio    Travel Screening: Not Applicable    Thank you!  [facility]

## 2023-10-11 ENCOUNTER — LAB REQUISITION (OUTPATIENT)
Dept: LAB | Facility: CLINIC | Age: 78
End: 2023-10-11
Payer: COMMERCIAL

## 2023-10-11 ENCOUNTER — TRANSFERRED RECORDS (OUTPATIENT)
Dept: HEALTH INFORMATION MANAGEMENT | Facility: CLINIC | Age: 78
End: 2023-10-11

## 2023-10-11 DIAGNOSIS — I11.0 HYPERTENSIVE HEART DISEASE WITH HEART FAILURE (H): ICD-10-CM

## 2023-10-11 LAB
ANION GAP SERPL CALCULATED.3IONS-SCNC: 13 MMOL/L (ref 7–15)
BUN SERPL-MCNC: 16.3 MG/DL (ref 8–23)
CALCIUM SERPL-MCNC: 8.9 MG/DL (ref 8.8–10.2)
CHLORIDE SERPL-SCNC: 98 MMOL/L (ref 98–107)
CREAT SERPL-MCNC: 0.94 MG/DL (ref 0.51–0.95)
DEPRECATED HCO3 PLAS-SCNC: 25 MMOL/L (ref 22–29)
EGFRCR SERPLBLD CKD-EPI 2021: 62 ML/MIN/1.73M2
GLUCOSE SERPL-MCNC: 102 MG/DL (ref 70–99)
PHQ9 SCORE: 0
POTASSIUM SERPL-SCNC: 4 MMOL/L (ref 3.4–5.3)
SODIUM SERPL-SCNC: 136 MMOL/L (ref 135–145)

## 2023-10-11 PROCEDURE — 80048 BASIC METABOLIC PNL TOTAL CA: CPT | Mod: ORL | Performed by: FAMILY MEDICINE

## 2023-11-24 ENCOUNTER — OFFICE VISIT (OUTPATIENT)
Dept: CARDIOLOGY | Facility: CLINIC | Age: 78
End: 2023-11-24
Payer: COMMERCIAL

## 2023-11-24 VITALS
OXYGEN SATURATION: 98 % | SYSTOLIC BLOOD PRESSURE: 124 MMHG | DIASTOLIC BLOOD PRESSURE: 62 MMHG | HEART RATE: 87 BPM | RESPIRATION RATE: 24 BRPM

## 2023-11-24 DIAGNOSIS — I25.10 ATHEROSCLEROSIS OF CORONARY ARTERY OF NATIVE HEART, UNSPECIFIED VESSEL OR LESION TYPE, UNSPECIFIED WHETHER ANGINA PRESENT: ICD-10-CM

## 2023-11-24 DIAGNOSIS — I50.32 CHRONIC HEART FAILURE WITH PRESERVED EJECTION FRACTION (H): ICD-10-CM

## 2023-11-24 DIAGNOSIS — I48.19 PERSISTENT ATRIAL FIBRILLATION (H): Primary | ICD-10-CM

## 2023-11-24 LAB
ANION GAP SERPL CALCULATED.3IONS-SCNC: 11 MMOL/L (ref 7–15)
BUN SERPL-MCNC: 19.4 MG/DL (ref 8–23)
CALCIUM SERPL-MCNC: 9 MG/DL (ref 8.8–10.2)
CHLORIDE SERPL-SCNC: 103 MMOL/L (ref 98–107)
CREAT SERPL-MCNC: 0.84 MG/DL (ref 0.51–0.95)
DEPRECATED HCO3 PLAS-SCNC: 25 MMOL/L (ref 22–29)
EGFRCR SERPLBLD CKD-EPI 2021: 71 ML/MIN/1.73M2
GLUCOSE SERPL-MCNC: 99 MG/DL (ref 70–99)
POTASSIUM SERPL-SCNC: 4.2 MMOL/L (ref 3.4–5.3)
SODIUM SERPL-SCNC: 139 MMOL/L (ref 135–145)

## 2023-11-24 PROCEDURE — 99215 OFFICE O/P EST HI 40 MIN: CPT | Mod: 25 | Performed by: NURSE PRACTITIONER

## 2023-11-24 PROCEDURE — 80048 BASIC METABOLIC PNL TOTAL CA: CPT | Performed by: NURSE PRACTITIONER

## 2023-11-24 PROCEDURE — 36415 COLL VENOUS BLD VENIPUNCTURE: CPT | Performed by: NURSE PRACTITIONER

## 2023-11-24 RX ORDER — LIDOCAINE 40 MG/G
CREAM TOPICAL
Status: CANCELLED | OUTPATIENT
Start: 2023-11-24

## 2023-11-24 RX ORDER — SOTALOL HYDROCHLORIDE 80 MG/1
80 TABLET ORAL 2 TIMES DAILY
Qty: 180 TABLET | Refills: 3 | Status: SHIPPED | OUTPATIENT
Start: 2023-11-24

## 2023-11-24 NOTE — H&P (VIEW-ONLY)
HEART CARE ELECTROPHYSIOLOGY CONSULTATON NOTE      [facility]  [phone]    Thank you, Dr. Nicholas, for asking the [facility] Electrophysiology team to see Ms. Tommy Rea to evaluate atrial fibrillation.    Assessment/Recommendations   Assessment/Plan:  1.  Persistent Atrial Fibrillation: Persistent AF-RVR since approximately 9/8/2023 associated with acute on chronic HFpEF.  Ventricular rates appear well-controlled.  No specific awareness of arrhythmia, but possibly mildly symptomatic.  She has not had any known episodes of A-fib since 2016, but echo shows severe left atrial enlargement, so likely has had a more significant history of AF.    We had a lengthy discussion of the physiology and natural progression of atrial fibrillation and treatment options including rate control versus rhythm control depending upon the presence of symptoms.  We further discussed rhythm control with medications or pulmonary vein isolation ablation.  She was given information to review at home.    Recommend cardioversion for symptom clarification.  Discussed the cardioversion procedure, risks, and expected recovery.  She states that her questions were answered to her satisfaction and she wishes to proceed.  Cardioversion ordered today.Due to severe AE, recommend initiation of membrane active antiarrhythmic medication prior to cardioversion.  2 days prior to cardioversion, start sotalol 80 mg twice daily and discontinue metoprolol.      She was reassured that atrial fibrillation is not life-threatening, but carries an increased risk for stroke.  She has a [id number] score of 5 for age >75, CAD, CHF, HTN.  Recent cardiac CT confirms the absence of FLORENCE thrombus.  Continue Eliquis 5 mg twice daily for stroke prophylaxis.  She denies missing any doses in >3 weeks.    2.  Chronic heart failure with preserved ejection fraction: NYHA class II.  Compensated.  Continue torsemide and  spironolactone.  Managed by heart failure clinic.    3.  Hypertension: Blood pressure at target.  Continue management with torsemide, spironolactone.  Reevaluate with switch from metoprolol to sotalol as above.    Follow up in 1 month     History of Present Illness/Subjective    HPI: Tommy Rea is a 78 year old female who comes in today accompanied by her daughter for EP consultation of atrial fibrillation.  She has a history of atrial fibrillation, coronary artery disease, chronic heart failure with preserved ejection fraction, seizures, COPD, asthma, lymphedema, hypertension, anemia, hyperparathyroidism, Crohn's disease, remote history of orthostatic syncope.  She had a mechanical fall 8/28/2023 resulting in right fibular fracture (nonsurgical) for which she was hospitalized, then discharged to TCU for rehab.  She was noted to have gradually progressive shortness of breath, 20 pound weight gain, and elevated respiratory rates.  She was hospitalized at [facility] 9/8/2023 for acute HFpEF, AF-RVR, and acute PE.  It is thought that PE was secondary to interruption of Eliquis during recent hospitalization for fibular fracture.    Arrhythmia history  Dx/date: Diagnosed with paroxysmal atrial fibrillation in November 2016.  No known recurrence since until she was hospitalized 9/8/2023 for AF-RVR and acute heart failure.  Cardioversion was planned but canceled due to acute PE.  Sx: No specific awareness of arrhythmia, may be asymptomatic  [id number] score: 5 for age >75, CAD, CHF, HTN  Oral anticoagulation: Eliquis 5 mg twice daily  Antiarrhythmic medications, AV roxane blocking agents: Metoprolol XL 50 mg daily  Procedures  DCCV: N/A  Ablation: N/A    Tommy states that she feels well.  She believes she may now be somewhat aware of an irregular heartbeat, though admits that it may be because she knows she is in A-fib.  She denies chest discomfort, palpitations, abdominal fullness/bloating or peripheral edema,  shortness of breath, paroxysmal nocturnal dyspnea, orthopnea, lightheadedness, dizziness, pre-syncope, or syncope.    Cardiographics (EKG personally reviewed):  EKG done 9/8/2023 shows trill fibrillation with rapid ventricular response at 118 bpm  EKG done 7/11/2022 shows sinus rhythm at 67 bpm, QRS 74 ms, QT/QTc 424/448 ms    ECHO done 9/10/2023:   1. Normal left ventricular systolic function. Normal left ventricular systolic function.   Estimated left ventricular ejection fraction is 65%.    No regional wall motion abnormalities.    2. Normal right ventricular chamber size. Normal right ventricular systolic function.    3. No significant valvular heart disease.    4. Aortic sinus of Valsalva is normal in size (3.4 cm). Normal indexed ascending aorta   dimension (3.6 cm, 1.9 cm/m ).    5. Moderate-severe left atrial enlargement. Left atrial volume index is 47 ml/m .    6. Normal inferior vena cava with normal inspiratory collapse.    Estimated EF: 65%     Cardiac CT done 9/11/2023:  No left atrial appendage thrombus.   Severe left atrial enlargement.   Severe coronary artery atherosclerosis involving right coronary artery and   left anterior descending artery. The study is not geared towards   assessment of luminal stenosis.   LIMITED CHEST: No significant findings within the lungs. Short segment of   subacute appearing pulmonary emboli left lower lobe.     NM stress test done 6/8/2020:  Narrative & Impression     The nuclear stress test is negative for inducible myocardial ischemia or infarction.    The left ventricular ejection fraction at stress is 46% with mild global hypokinesis.    Left ventricular function is mildly reduced.    Only upright images could be acquired.    There is no prior study for comparison.     I have reviewed and updated the patient's Past Medical History, Social History, Family History and Medication List.  Outside records personally reviewed.     Physical Examination  Review of Systems    Vitals: /62 (BP Location: Left arm, Patient Position: Sitting, Cuff Size: Adult Regular)   Pulse 87   Resp 24   SpO2 98%   BMI= There is no height or weight on file to calculate BMI.  Wt Readings from Last 3 Encounters:   10/05/23 82.1 kg (181 lb)   09/27/23 84.8 kg (186 lb 14.4 oz)   09/21/23 84.6 kg (186 lb 9.6 oz)       General Appearance:   Alert, well-appearing and in no acute distress.   HEENT: Atraumatic, normocephalic.  No scleral icterus, normal conjunctivae, EOMs intact, PERRL.  Mucous membranes pink and moist.     Chest/Lungs:   Chest symmetric, spine straight.  Respirations unlabored.  Lungs are clear to auscultation.   Cardiovascular:   Irregularly irregular rate and rhythm.  Normal first and second heart sounds with no murmurs, rubs, or gallops; radial and posterior tibial pulses are intact, No edema.   Abdomen:  Soft, nondistended.   Extremities: No cyanosis or clubbing.   Musculoskeletal: Moves all extremities.     Skin: Warm, dry, intact.    Neurologic: Mood and affect are appropriate.  Alert and oriented to person, place, time, and situation.     ROS: 10 point ROS neg other than the symptoms noted above in the HPI.        Medical History  Surgical History Family History Social History   Past Medical History:   Diagnosis Date    (HFpEF) heart failure with preserved ejection fraction (H) 1/20/2020    Arthritis     Asthma     Atrial fibrillation (H) 10/24/2016    B12 deficiency     Benign essential hypertension 8/2/2021    Formatting of this note might be different from the original. Created by Conversion    CAD (coronary artery disease)     Congestive heart failure (H)     COPD (chronic obstructive pulmonary disease) (H)     COPD (chronic obstructive pulmonary disease) (H)     Crohn's disease (H)     Crohn's disease (H)     Depression     Depressive disorder     Esophageal reflux     Essential hypertension     Fracture of left hip requiring operative repair (H)     Heart disease     a fib     History of shingles     Hyperlipidemia     Hyperparathyroidism (H24)     Hypertension     Osteoporosis     Pulmonary nodule     benign    Regional enteritis (H) 2016    Seizure (H)     Seizures (H)      Past Surgical History:   Procedure Laterality Date    ARTHROPLASTY REVISION HIP Left 2016    removal of gamma nail hardware & conversion to total hip arthroplasty: Dr. Mercado at [facility] REMOVAL DEEP IMPLANT Left 2015    Procedure: Gamma Nail Left Hip;  Surgeon: Kirk De La Cruz MD;  Location: [facility] OR;  Service: Orthopedics    HEMORRHOIDECTOMY      ORIF HIP FRACTURE      ORTHOPEDIC SURGERY      ORIF    OTHER SURGICAL HISTORY      HEMMOIROIDECT    OTHER SURGICAL HISTORY      CVL HEART CATH LEFT     Family History   Problem Relation Age of Onset    Hypertension Mother     Colon Cancer Father     Arthritis Father     Hypertension Father     Heart Disease Mother     Heart Disease Father         Social History     Socioeconomic History    Marital status:      Spouse name: Not on file    Number of children: Not on file    Years of education: Not on file    Highest education level: Not on file   Occupational History    Not on file   Tobacco Use    Smoking status: Former     Types: Cigarettes     Quit date: 2011     Years since quittin.4    Smokeless tobacco: Never   Substance and Sexual Activity    Alcohol use: No    Drug use: No    Sexual activity: Not on file   Other Topics Concern    Parent/sibling w/ CABG, MI or angioplasty before 65F 55M? Not Asked   Social History Narrative    Not on file     Social Determinants of Health     Financial Resource Strain: Not on file   Food Insecurity: Not on file   Transportation Needs: Not on file   Physical Activity: Not on file   Stress: Not on file   Social Connections: Not on file   Interpersonal Safety: Not on file   Housing Stability: Not on file           Medications  Allergies   Current Outpatient Medications    Medication Sig Dispense Refill    acetaminophen (TYLENOL) 500 MG tablet Take 1,000 mg by mouth 2 times daily       albuterol (PROAIR HFA/PROVENTIL HFA/VENTOLIN HFA) 108 (90 Base) MCG/ACT inhaler Inhale 2 puffs into the lungs every 6 hours as needed      alendronate (FOSAMAX) 70 MG tablet Take 70 mg by mouth every 7 days       apixaban ANTICOAGULANT (ELIQUIS) 5 MG tablet Take 5 mg by mouth 2 times daily       aspirin 81 MG EC tablet Take 81 mg by mouth daily       CALCIUM-VITAMIN D PO Take 2 tablets by mouth daily      colesevelam (WELCHOL) 625 MG tablet Take 3 tablets by mouth At Bedtime      cyanocobalamin (CYANOCOBALAMIN) 1000 MCG/ML injection Inject 1,000 mcg into the muscle every 30 days      fluticasone (FLONASE) 50 MCG/ACT nasal spray Spray 1 spray into both nostrils daily      fluticasone (FLOVENT HFA) 110 MCG/ACT inhaler Inhale 1 puff into the lungs 2 times daily       loratadine (CLARITIN) 10 MG tablet Take 10 mg by mouth daily as needed       Magnesium Oxide 250 MG TABS Take 250 mg by mouth daily       metolazone (ZAROXOLYN) 2.5 MG tablet Take 1 tablet (2.5 mg) by mouth daily as needed (As instructed by your provider for heart failure symptoms) Take 30 minutes prior to AM dose of torsemide 8 tablet 1    multivitamin w/minerals (THERA-VIT-M) tablet Take 1 tablet by mouth daily       nitroGLYcerin (NITROSTAT) 0.4 MG sublingual tablet Place 1 tablet (0.4 mg) under the tongue every 5 minutes as needed for chest pain 25 tablet 1    PARoxetine (PAXIL) 40 MG tablet Take 40 mg by mouth daily       phenytoin (DILANTIN) 100 MG ER capsule Take 5 tablets at bedtime 450 capsule 3    potassium chloride ER (KLOR-CON M) 20 MEQ CR tablet Take 1 tablet (20 mEq) by mouth daily 270 tablet 3    simvastatin (ZOCOR) 40 MG tablet Take 40 mg by mouth At Bedtime       sodium chloride (OCEAN) 0.65 % nasal spray Spray 1 spray in nostril daily as needed      sotalol (BETAPACE) 80 MG tablet Take 1 tablet (80 mg) by mouth 2 times  "daily 180 tablet 3    spironolactone (ALDACTONE) 50 MG tablet Take 1 tablet (50 mg) by mouth daily 90 tablet 3    STELARA 45 MG/0.5ML SOLN Inject 45 mg Subcutaneous every 30 days      topiramate (TOPAMAX) 100 MG tablet Take 1.5 tablets (150 mg) by mouth 2 times daily 270 tablet 3    torsemide (DEMADEX) 20 MG tablet Take 2 tablets (40 mg) by mouth 2 times daily (before meals) 360 tablet 3    Vitamin D3 (CHOLECALCIFEROL) 125 MCG (5000 UT) tablet Take 125 mcg by mouth daily         Allergies   Allergen Reactions    Amoxicillin GI Disturbance and Other (See Comments)     COLITIS    Has tolerated cefazolin  GI bleeding, Colitis, has tolerated cefazolin       Sulfa Antibiotics     Sulfamethoxazole-Trimethoprim Other (See Comments)     Other reaction(s): Vaginal Irritation  VAGINAL BLEEDING  Vaginal irritation, vaginal bleeding             Lab Results    Chemistry/lipid CBC Cardiac Enzymes/BNP/TSH/INR   Recent Labs   Lab Test 12/07/22  1630   CHOL 185   HDL 56   LDL 71   TRIG 292*     Recent Labs   Lab Test 12/07/22  1630 11/08/21  1458 03/02/20  0928   LDL 71 53 69     Recent Labs   Lab Test 10/11/23  1032      POTASSIUM 4.0   CHLORIDE 98   CO2 25   *   BUN 16.3   CR 0.94   GFRESTIMATED 62   DIONNA 8.9     Recent Labs   Lab Test 10/11/23  1032 10/05/23  1622 09/15/23  1021   CR 0.94 0.86 0.68      Recent Labs   Lab Test 09/06/23  0533   WBC 8.1   HGB 8.7*   HCT 31.9*   MCV 88        Recent Labs   Lab Test 09/06/23  0533 04/24/23  1052 07/11/22  1355   HGB 8.7* 11.6* 15.3    Recent Labs   Lab Test 04/25/20  2300 07/16/19  1934 06/10/19  1618   TROPONINI 0.04 0.04 0.06     Recent Labs   Lab Test 09/13/21  0925 11/09/20  0907 10/02/20  0718   * 190* 564*     Recent Labs   Lab Test 07/17/19  0620   TSH 2.90     No results for input(s): \"INR\" in the last 54632 hours.   62 minutes were spent on the date of encounter performing chart review, history and exam, documentation, and further activities as noted " above.

## 2023-11-24 NOTE — Clinical Note
Plan for DCCV: 2 days prior, start sotalol 80 mg twice daily and discontinue metoprolol.  On Eliquis-no missed doses.  Orders in Epic.

## 2023-11-24 NOTE — LETTER
11/24/2023    Hannah Benton MD  [address]  [place] [address]    RE: Tommy MENDEZ Álvaro       Dear Colleague,     I had the pleasure of seeing Tommy Rea in the [facility]alth [facility].    HEART CARE ELECTROPHYSIOLOGY CONSULTATON NOTE      [facility]  [phone]    Thank you, Dr. Nicholas, for asking the [facility] Heart Care Electrophysiology team to see Ms. Tommy Rea to evaluate atrial fibrillation.    Assessment/Recommendations   Assessment/Plan:  1.  Persistent Atrial Fibrillation: Persistent AF-RVR since approximately 9/8/2023 associated with acute on chronic HFpEF.  Ventricular rates appear well-controlled.  No specific awareness of arrhythmia, but possibly mildly symptomatic.  She has not had any known episodes of A-fib since 2016, but echo shows severe left atrial enlargement, so likely has had a more significant history of AF.    We had a lengthy discussion of the physiology and natural progression of atrial fibrillation and treatment options including rate control versus rhythm control depending upon the presence of symptoms.  We further discussed rhythm control with medications or pulmonary vein isolation ablation.  She was given information to review at home.    Recommend cardioversion for symptom clarification.  Discussed the cardioversion procedure, risks, and expected recovery.  She states that her questions were answered to her satisfaction and she wishes to proceed.  Cardioversion ordered today.Due to severe AE, recommend initiation of membrane active antiarrhythmic medication prior to cardioversion.  2 days prior to cardioversion, start sotalol 80 mg twice daily and discontinue metoprolol.      She was reassured that atrial fibrillation is not life-threatening, but carries an increased risk for stroke.  She has a AIG0MT2-FFNf score of 5 for age >75, CAD, CHF, HTN.  Recent cardiac CT confirms the absence of FLORENCE thrombus.  Continue  Eliquis 5 mg twice daily for stroke prophylaxis.  She denies missing any doses in >3 weeks.    2.  Chronic heart failure with preserved ejection fraction: NYHA class II.  Compensated.  Continue torsemide and spironolactone.  Managed by heart failure clinic.    3.  Hypertension: Blood pressure at target.  Continue management with torsemide, spironolactone.  Reevaluate with switch from metoprolol to sotalol as above.    Follow up in 1 month     History of Present Illness/Subjective    HPI: Tommy Rea is a 78 year old female who comes in today accompanied by her daughter for EP consultation of atrial fibrillation.  She has a history of atrial fibrillation, coronary artery disease, chronic heart failure with preserved ejection fraction, seizures, COPD, asthma, lymphedema, hypertension, anemia, hyperparathyroidism, Crohn's disease, remote history of orthostatic syncope.  She had a mechanical fall 8/28/2023 resulting in right fibular fracture (nonsurgical) for which she was hospitalized, then discharged to TCU for rehab.  She was noted to have gradually progressive shortness of breath, 20 pound weight gain, and elevated respiratory rates.  She was hospitalized at [facility] 9/8/2023 for acute HFpEF, AF-RVR, and acute PE.  It is thought that PE was secondary to interruption of Eliquis during recent hospitalization for fibular fracture.    Arrhythmia history  Dx/date: Diagnosed with paroxysmal atrial fibrillation in November 2016.  No known recurrence since until she was hospitalized 9/8/2023 for AF-RVR and acute heart failure.  Cardioversion was planned but canceled due to acute PE.  Sx: No specific awareness of arrhythmia, may be asymptomatic  [id number] score: 5 for age >75, CAD, CHF, HTN  Oral anticoagulation: Eliquis 5 mg twice daily  Antiarrhythmic medications, AV roxane blocking agents: Metoprolol XL 50 mg daily  Procedures  DCCV: N/A  Ablation: N/A    Tommy states that she feels well.  She believes she may  now be somewhat aware of an irregular heartbeat, though admits that it may be because she knows she is in A-fib.  She denies chest discomfort, palpitations, abdominal fullness/bloating or peripheral edema, shortness of breath, paroxysmal nocturnal dyspnea, orthopnea, lightheadedness, dizziness, pre-syncope, or syncope.    Cardiographics (EKG personally reviewed):  EKG done 9/8/2023 shows trill fibrillation with rapid ventricular response at 118 bpm  EKG done 7/11/2022 shows sinus rhythm at 67 bpm, QRS 74 ms, QT/QTc 424/448 ms    ECHO done 9/10/2023:   1. Normal left ventricular systolic function. Normal left ventricular systolic function.   Estimated left ventricular ejection fraction is 65%.    No regional wall motion abnormalities.    2. Normal right ventricular chamber size. Normal right ventricular systolic function.    3. No significant valvular heart disease.    4. Aortic sinus of Valsalva is normal in size (3.4 cm). Normal indexed ascending aorta   dimension (3.6 cm, 1.9 cm/m ).    5. Moderate-severe left atrial enlargement. Left atrial volume index is 47 ml/m .    6. Normal inferior vena cava with normal inspiratory collapse.    Estimated EF: 65%     Cardiac CT done 9/11/2023:  No left atrial appendage thrombus.   Severe left atrial enlargement.   Severe coronary artery atherosclerosis involving right coronary artery and   left anterior descending artery. The study is not geared towards   assessment of luminal stenosis.   LIMITED CHEST: No significant findings within the lungs. Short segment of   subacute appearing pulmonary emboli left lower lobe.     NM stress test done 6/8/2020:  Narrative & Impression     The nuclear stress test is negative for inducible myocardial ischemia or infarction.    The left ventricular ejection fraction at stress is 46% with mild global hypokinesis.    Left ventricular function is mildly reduced.    Only upright images could be acquired.    There is no prior study for  comparison.     I have reviewed and updated the patient's Past Medical History, Social History, Family History and Medication List.  Outside records personally reviewed.     Physical Examination  Review of Systems   Vitals: /62 (BP Location: Left arm, Patient Position: Sitting, Cuff Size: Adult Regular)   Pulse 87   Resp 24   SpO2 98%   BMI= There is no height or weight on file to calculate BMI.  Wt Readings from Last 3 Encounters:   10/05/23 82.1 kg (181 lb)   09/27/23 84.8 kg (186 lb 14.4 oz)   09/21/23 84.6 kg (186 lb 9.6 oz)       General Appearance:   Alert, well-appearing and in no acute distress.   HEENT: Atraumatic, normocephalic.  No scleral icterus, normal conjunctivae, EOMs intact, PERRL.  Mucous membranes pink and moist.     Chest/Lungs:   Chest symmetric, spine straight.  Respirations unlabored.  Lungs are clear to auscultation.   Cardiovascular:   Irregularly irregular rate and rhythm.  Normal first and second heart sounds with no murmurs, rubs, or gallops; radial and posterior tibial pulses are intact, No edema.   Abdomen:  Soft, nondistended.   Extremities: No cyanosis or clubbing.   Musculoskeletal: Moves all extremities.     Skin: Warm, dry, intact.    Neurologic: Mood and affect are appropriate.  Alert and oriented to person, place, time, and situation.     ROS: 10 point ROS neg other than the symptoms noted above in the HPI.        Medical History  Surgical History Family History Social History   Past Medical History:   Diagnosis Date    (HFpEF) heart failure with preserved ejection fraction (H) 1/20/2020    Arthritis     Asthma     Atrial fibrillation (H) 10/24/2016    B12 deficiency     Benign essential hypertension 8/2/2021    Formatting of this note might be different from the original. Created by Conversion    CAD (coronary artery disease)     Congestive heart failure (H)     COPD (chronic obstructive pulmonary disease) (H)     COPD (chronic obstructive pulmonary disease) (H)      Crohn's disease (H)     Crohn's disease (H)     Depression     Depressive disorder     Esophageal reflux     Essential hypertension     Fracture of left hip requiring operative repair (H)     Heart disease     a fib    History of shingles     Hyperlipidemia     Hyperparathyroidism (H24)     Hypertension     Osteoporosis     Pulmonary nodule     benign    Regional enteritis (H) 2016    Seizure (H)     Seizures (H)      Past Surgical History:   Procedure Laterality Date    ARTHROPLASTY REVISION HIP Left 2016    removal of gamma nail hardware & conversion to total hip arthroplasty: Dr. Mercado at [facility] REMOVAL DEEP IMPLANT Left 2015    Procedure: Gamma Nail Left Hip;  Surgeon: Kirk De La Cruz MD;  Location: [facility] OR;  Service: Orthopedics    HEMORRHOIDECTOMY      ORIF HIP FRACTURE      ORTHOPEDIC SURGERY      ORIF    OTHER SURGICAL HISTORY      HEMMOIROIDECT    OTHER SURGICAL HISTORY      CVL HEART CATH LEFT     Family History   Problem Relation Age of Onset    Hypertension Mother     Colon Cancer Father     Arthritis Father     Hypertension Father     Heart Disease Mother     Heart Disease Father         Social History     Socioeconomic History    Marital status:      Spouse name: Not on file    Number of children: Not on file    Years of education: Not on file    Highest education level: Not on file   Occupational History    Not on file   Tobacco Use    Smoking status: Former     Types: Cigarettes     Quit date: 2011     Years since quittin.4    Smokeless tobacco: Never   Substance and Sexual Activity    Alcohol use: No    Drug use: No    Sexual activity: Not on file   Other Topics Concern    Parent/sibling w/ CABG, MI or angioplasty before 65F 55M? Not Asked   Social History Narrative    Not on file     Social Determinants of Health     Financial Resource Strain: Not on file   Food Insecurity: Not on file   Transportation Needs: Not on file   Physical  Activity: Not on file   Stress: Not on file   Social Connections: Not on file   Interpersonal Safety: Not on file   Housing Stability: Not on file           Medications  Allergies   Current Outpatient Medications   Medication Sig Dispense Refill    acetaminophen (TYLENOL) 500 MG tablet Take 1,000 mg by mouth 2 times daily       albuterol (PROAIR HFA/PROVENTIL HFA/VENTOLIN HFA) 108 (90 Base) MCG/ACT inhaler Inhale 2 puffs into the lungs every 6 hours as needed      alendronate (FOSAMAX) 70 MG tablet Take 70 mg by mouth every 7 days       apixaban ANTICOAGULANT (ELIQUIS) 5 MG tablet Take 5 mg by mouth 2 times daily       aspirin 81 MG EC tablet Take 81 mg by mouth daily       CALCIUM-VITAMIN D PO Take 2 tablets by mouth daily      colesevelam (WELCHOL) 625 MG tablet Take 3 tablets by mouth At Bedtime      cyanocobalamin (CYANOCOBALAMIN) 1000 MCG/ML injection Inject 1,000 mcg into the muscle every 30 days      fluticasone (FLONASE) 50 MCG/ACT nasal spray Spray 1 spray into both nostrils daily      fluticasone (FLOVENT HFA) 110 MCG/ACT inhaler Inhale 1 puff into the lungs 2 times daily       loratadine (CLARITIN) 10 MG tablet Take 10 mg by mouth daily as needed       Magnesium Oxide 250 MG TABS Take 250 mg by mouth daily       metolazone (ZAROXOLYN) 2.5 MG tablet Take 1 tablet (2.5 mg) by mouth daily as needed (As instructed by your provider for heart failure symptoms) Take 30 minutes prior to AM dose of torsemide 8 tablet 1    multivitamin w/minerals (THERA-VIT-M) tablet Take 1 tablet by mouth daily       nitroGLYcerin (NITROSTAT) 0.4 MG sublingual tablet Place 1 tablet (0.4 mg) under the tongue every 5 minutes as needed for chest pain 25 tablet 1    PARoxetine (PAXIL) 40 MG tablet Take 40 mg by mouth daily       phenytoin (DILANTIN) 100 MG ER capsule Take 5 tablets at bedtime 450 capsule 3    potassium chloride ER (KLOR-CON M) 20 MEQ CR tablet Take 1 tablet (20 mEq) by mouth daily 270 tablet 3    simvastatin (ZOCOR)  40 MG tablet Take 40 mg by mouth At Bedtime       sodium chloride (OCEAN) 0.65 % nasal spray Spray 1 spray in nostril daily as needed      sotalol (BETAPACE) 80 MG tablet Take 1 tablet (80 mg) by mouth 2 times daily 180 tablet 3    spironolactone (ALDACTONE) 50 MG tablet Take 1 tablet (50 mg) by mouth daily 90 tablet 3    STELARA 45 MG/0.5ML SOLN Inject 45 mg Subcutaneous every 30 days      topiramate (TOPAMAX) 100 MG tablet Take 1.5 tablets (150 mg) by mouth 2 times daily 270 tablet 3    torsemide (DEMADEX) 20 MG tablet Take 2 tablets (40 mg) by mouth 2 times daily (before meals) 360 tablet 3    Vitamin D3 (CHOLECALCIFEROL) 125 MCG (5000 UT) tablet Take 125 mcg by mouth daily         Allergies   Allergen Reactions    Amoxicillin GI Disturbance and Other (See Comments)     COLITIS    Has tolerated cefazolin  GI bleeding, Colitis, has tolerated cefazolin       Sulfa Antibiotics     Sulfamethoxazole-Trimethoprim Other (See Comments)     Other reaction(s): Vaginal Irritation  VAGINAL BLEEDING  Vaginal irritation, vaginal bleeding             Lab Results    Chemistry/lipid CBC Cardiac Enzymes/BNP/TSH/INR   Recent Labs   Lab Test 12/07/22  1630   CHOL 185   HDL 56   LDL 71   TRIG 292*     Recent Labs   Lab Test 12/07/22  1630 11/08/21  1458 03/02/20  0928   LDL 71 53 69     Recent Labs   Lab Test 10/11/23  1032      POTASSIUM 4.0   CHLORIDE 98   CO2 25   *   BUN 16.3   CR 0.94   GFRESTIMATED 62   DIONNA 8.9     Recent Labs   Lab Test 10/11/23  1032 10/05/23  1622 09/15/23  1021   CR 0.94 0.86 0.68      Recent Labs   Lab Test 09/06/23  0533   WBC 8.1   HGB 8.7*   HCT 31.9*   MCV 88        Recent Labs   Lab Test 09/06/23  0533 04/24/23  1052 07/11/22  1355   HGB 8.7* 11.6* 15.3    Recent Labs   Lab Test 04/25/20  2300 07/16/19  1934 06/10/19  1618   TROPONINI 0.04 0.04 0.06     Recent Labs   Lab Test 09/13/21  0925 11/09/20  0907 10/02/20  0718   * 190* 564*     Recent Labs   Lab Test 07/17/19  0620  "  TSH 2.90     No results for input(s): \"INR\" in the last 81869 hours.   62 minutes were spent on the date of encounter performing chart review, history and exam, documentation, and further activities as noted above.                Thank you for allowing me to participate in the care of your patient.      Sincerely,     SERENE Reynoso CNP     [facility]  cc:   SERENE Thakur CNP  [address], [address]  [place] [address]      "

## 2023-11-24 NOTE — PROGRESS NOTES
HEART CARE ELECTROPHYSIOLOGY CONSULTATON NOTE      [facility]  [phone]    Thank you, Dr. Nicholas, for asking the [facility] Electrophysiology team to see Ms. Tommy Rea to evaluate atrial fibrillation.    Assessment/Recommendations   Assessment/Plan:  1.  Persistent Atrial Fibrillation: Persistent AF-RVR since approximately 9/8/2023 associated with acute on chronic HFpEF.  Ventricular rates appear well-controlled.  No specific awareness of arrhythmia, but possibly mildly symptomatic.  She has not had any known episodes of A-fib since 2016, but echo shows severe left atrial enlargement, so likely has had a more significant history of AF.    We had a lengthy discussion of the physiology and natural progression of atrial fibrillation and treatment options including rate control versus rhythm control depending upon the presence of symptoms.  We further discussed rhythm control with medications or pulmonary vein isolation ablation.  She was given information to review at home.    Recommend cardioversion for symptom clarification.  Discussed the cardioversion procedure, risks, and expected recovery.  She states that her questions were answered to her satisfaction and she wishes to proceed.  Cardioversion ordered today.Due to severe AE, recommend initiation of membrane active antiarrhythmic medication prior to cardioversion.  2 days prior to cardioversion, start sotalol 80 mg twice daily and discontinue metoprolol.      She was reassured that atrial fibrillation is not life-threatening, but carries an increased risk for stroke.  She has a [id number] score of 5 for age >75, CAD, CHF, HTN.  Recent cardiac CT confirms the absence of FLORENCE thrombus.  Continue Eliquis 5 mg twice daily for stroke prophylaxis.  She denies missing any doses in >3 weeks.    2.  Chronic heart failure with preserved ejection fraction: NYHA class II.  Compensated.  Continue torsemide and  spironolactone.  Managed by heart failure clinic.    3.  Hypertension: Blood pressure at target.  Continue management with torsemide, spironolactone.  Reevaluate with switch from metoprolol to sotalol as above.    Follow up in 1 month     History of Present Illness/Subjective    HPI: Tommy Rea is a 78 year old female who comes in today accompanied by her daughter for EP consultation of atrial fibrillation.  She has a history of atrial fibrillation, coronary artery disease, chronic heart failure with preserved ejection fraction, seizures, COPD, asthma, lymphedema, hypertension, anemia, hyperparathyroidism, Crohn's disease, remote history of orthostatic syncope.  She had a mechanical fall 8/28/2023 resulting in right fibular fracture (nonsurgical) for which she was hospitalized, then discharged to TCU for rehab.  She was noted to have gradually progressive shortness of breath, 20 pound weight gain, and elevated respiratory rates.  She was hospitalized at [facility] 9/8/2023 for acute HFpEF, AF-RVR, and acute PE.  It is thought that PE was secondary to interruption of Eliquis during recent hospitalization for fibular fracture.    Arrhythmia history  Dx/date: Diagnosed with paroxysmal atrial fibrillation in November 2016.  No known recurrence since until she was hospitalized 9/8/2023 for AF-RVR and acute heart failure.  Cardioversion was planned but canceled due to acute PE.  Sx: No specific awareness of arrhythmia, may be asymptomatic  [id number] score: 5 for age >75, CAD, CHF, HTN  Oral anticoagulation: Eliquis 5 mg twice daily  Antiarrhythmic medications, AV roxane blocking agents: Metoprolol XL 50 mg daily  Procedures  DCCV: N/A  Ablation: N/A    Tommy states that she feels well.  She believes she may now be somewhat aware of an irregular heartbeat, though admits that it may be because she knows she is in A-fib.  She denies chest discomfort, palpitations, abdominal fullness/bloating or peripheral edema,  shortness of breath, paroxysmal nocturnal dyspnea, orthopnea, lightheadedness, dizziness, pre-syncope, or syncope.    Cardiographics (EKG personally reviewed):  EKG done 9/8/2023 shows trill fibrillation with rapid ventricular response at 118 bpm  EKG done 7/11/2022 shows sinus rhythm at 67 bpm, QRS 74 ms, QT/QTc 424/448 ms    ECHO done 9/10/2023:   1. Normal left ventricular systolic function. Normal left ventricular systolic function.   Estimated left ventricular ejection fraction is 65%.    No regional wall motion abnormalities.    2. Normal right ventricular chamber size. Normal right ventricular systolic function.    3. No significant valvular heart disease.    4. Aortic sinus of Valsalva is normal in size (3.4 cm). Normal indexed ascending aorta   dimension (3.6 cm, 1.9 cm/m ).    5. Moderate-severe left atrial enlargement. Left atrial volume index is 47 ml/m .    6. Normal inferior vena cava with normal inspiratory collapse.    Estimated EF: 65%     Cardiac CT done 9/11/2023:  No left atrial appendage thrombus.   Severe left atrial enlargement.   Severe coronary artery atherosclerosis involving right coronary artery and   left anterior descending artery. The study is not geared towards   assessment of luminal stenosis.   LIMITED CHEST: No significant findings within the lungs. Short segment of   subacute appearing pulmonary emboli left lower lobe.     NM stress test done 6/8/2020:  Narrative & Impression     The nuclear stress test is negative for inducible myocardial ischemia or infarction.    The left ventricular ejection fraction at stress is 46% with mild global hypokinesis.    Left ventricular function is mildly reduced.    Only upright images could be acquired.    There is no prior study for comparison.     I have reviewed and updated the patient's Past Medical History, Social History, Family History and Medication List.  Outside records personally reviewed.     Physical Examination  Review of Systems    Vitals: /62 (BP Location: Left arm, Patient Position: Sitting, Cuff Size: Adult Regular)   Pulse 87   Resp 24   SpO2 98%   BMI= There is no height or weight on file to calculate BMI.  Wt Readings from Last 3 Encounters:   10/05/23 82.1 kg (181 lb)   09/27/23 84.8 kg (186 lb 14.4 oz)   09/21/23 84.6 kg (186 lb 9.6 oz)       General Appearance:   Alert, well-appearing and in no acute distress.   HEENT: Atraumatic, normocephalic.  No scleral icterus, normal conjunctivae, EOMs intact, PERRL.  Mucous membranes pink and moist.     Chest/Lungs:   Chest symmetric, spine straight.  Respirations unlabored.  Lungs are clear to auscultation.   Cardiovascular:   Irregularly irregular rate and rhythm.  Normal first and second heart sounds with no murmurs, rubs, or gallops; radial and posterior tibial pulses are intact, No edema.   Abdomen:  Soft, nondistended.   Extremities: No cyanosis or clubbing.   Musculoskeletal: Moves all extremities.     Skin: Warm, dry, intact.    Neurologic: Mood and affect are appropriate.  Alert and oriented to person, place, time, and situation.     ROS: 10 point ROS neg other than the symptoms noted above in the HPI.        Medical History  Surgical History Family History Social History   Past Medical History:   Diagnosis Date    (HFpEF) heart failure with preserved ejection fraction (H) 1/20/2020    Arthritis     Asthma     Atrial fibrillation (H) 10/24/2016    B12 deficiency     Benign essential hypertension 8/2/2021    Formatting of this note might be different from the original. Created by Conversion    CAD (coronary artery disease)     Congestive heart failure (H)     COPD (chronic obstructive pulmonary disease) (H)     COPD (chronic obstructive pulmonary disease) (H)     Crohn's disease (H)     Crohn's disease (H)     Depression     Depressive disorder     Esophageal reflux     Essential hypertension     Fracture of left hip requiring operative repair (H)     Heart disease     a fib     History of shingles     Hyperlipidemia     Hyperparathyroidism (H24)     Hypertension     Osteoporosis     Pulmonary nodule     benign    Regional enteritis (H) 2016    Seizure (H)     Seizures (H)      Past Surgical History:   Procedure Laterality Date    ARTHROPLASTY REVISION HIP Left 2016    removal of gamma nail hardware & conversion to total hip arthroplasty: Dr. Mercado at [facility] REMOVAL DEEP IMPLANT Left 2015    Procedure: Gamma Nail Left Hip;  Surgeon: Kirk De La Cruz MD;  Location: [facility] OR;  Service: Orthopedics    HEMORRHOIDECTOMY      ORIF HIP FRACTURE      ORTHOPEDIC SURGERY      ORIF    OTHER SURGICAL HISTORY      HEMMOIROIDECT    OTHER SURGICAL HISTORY      CVL HEART CATH LEFT     Family History   Problem Relation Age of Onset    Hypertension Mother     Colon Cancer Father     Arthritis Father     Hypertension Father     Heart Disease Mother     Heart Disease Father         Social History     Socioeconomic History    Marital status:      Spouse name: Not on file    Number of children: Not on file    Years of education: Not on file    Highest education level: Not on file   Occupational History    Not on file   Tobacco Use    Smoking status: Former     Types: Cigarettes     Quit date: 2011     Years since quittin.4    Smokeless tobacco: Never   Substance and Sexual Activity    Alcohol use: No    Drug use: No    Sexual activity: Not on file   Other Topics Concern    Parent/sibling w/ CABG, MI or angioplasty before 65F 55M? Not Asked   Social History Narrative    Not on file     Social Determinants of Health     Financial Resource Strain: Not on file   Food Insecurity: Not on file   Transportation Needs: Not on file   Physical Activity: Not on file   Stress: Not on file   Social Connections: Not on file   Interpersonal Safety: Not on file   Housing Stability: Not on file           Medications  Allergies   Current Outpatient Medications    Medication Sig Dispense Refill    acetaminophen (TYLENOL) 500 MG tablet Take 1,000 mg by mouth 2 times daily       albuterol (PROAIR HFA/PROVENTIL HFA/VENTOLIN HFA) 108 (90 Base) MCG/ACT inhaler Inhale 2 puffs into the lungs every 6 hours as needed      alendronate (FOSAMAX) 70 MG tablet Take 70 mg by mouth every 7 days       apixaban ANTICOAGULANT (ELIQUIS) 5 MG tablet Take 5 mg by mouth 2 times daily       aspirin 81 MG EC tablet Take 81 mg by mouth daily       CALCIUM-VITAMIN D PO Take 2 tablets by mouth daily      colesevelam (WELCHOL) 625 MG tablet Take 3 tablets by mouth At Bedtime      cyanocobalamin (CYANOCOBALAMIN) 1000 MCG/ML injection Inject 1,000 mcg into the muscle every 30 days      fluticasone (FLONASE) 50 MCG/ACT nasal spray Spray 1 spray into both nostrils daily      fluticasone (FLOVENT HFA) 110 MCG/ACT inhaler Inhale 1 puff into the lungs 2 times daily       loratadine (CLARITIN) 10 MG tablet Take 10 mg by mouth daily as needed       Magnesium Oxide 250 MG TABS Take 250 mg by mouth daily       metolazone (ZAROXOLYN) 2.5 MG tablet Take 1 tablet (2.5 mg) by mouth daily as needed (As instructed by your provider for heart failure symptoms) Take 30 minutes prior to AM dose of torsemide 8 tablet 1    multivitamin w/minerals (THERA-VIT-M) tablet Take 1 tablet by mouth daily       nitroGLYcerin (NITROSTAT) 0.4 MG sublingual tablet Place 1 tablet (0.4 mg) under the tongue every 5 minutes as needed for chest pain 25 tablet 1    PARoxetine (PAXIL) 40 MG tablet Take 40 mg by mouth daily       phenytoin (DILANTIN) 100 MG ER capsule Take 5 tablets at bedtime 450 capsule 3    potassium chloride ER (KLOR-CON M) 20 MEQ CR tablet Take 1 tablet (20 mEq) by mouth daily 270 tablet 3    simvastatin (ZOCOR) 40 MG tablet Take 40 mg by mouth At Bedtime       sodium chloride (OCEAN) 0.65 % nasal spray Spray 1 spray in nostril daily as needed      sotalol (BETAPACE) 80 MG tablet Take 1 tablet (80 mg) by mouth 2 times  "daily 180 tablet 3    spironolactone (ALDACTONE) 50 MG tablet Take 1 tablet (50 mg) by mouth daily 90 tablet 3    STELARA 45 MG/0.5ML SOLN Inject 45 mg Subcutaneous every 30 days      topiramate (TOPAMAX) 100 MG tablet Take 1.5 tablets (150 mg) by mouth 2 times daily 270 tablet 3    torsemide (DEMADEX) 20 MG tablet Take 2 tablets (40 mg) by mouth 2 times daily (before meals) 360 tablet 3    Vitamin D3 (CHOLECALCIFEROL) 125 MCG (5000 UT) tablet Take 125 mcg by mouth daily         Allergies   Allergen Reactions    Amoxicillin GI Disturbance and Other (See Comments)     COLITIS    Has tolerated cefazolin  GI bleeding, Colitis, has tolerated cefazolin       Sulfa Antibiotics     Sulfamethoxazole-Trimethoprim Other (See Comments)     Other reaction(s): Vaginal Irritation  VAGINAL BLEEDING  Vaginal irritation, vaginal bleeding             Lab Results    Chemistry/lipid CBC Cardiac Enzymes/BNP/TSH/INR   Recent Labs   Lab Test 12/07/22  1630   CHOL 185   HDL 56   LDL 71   TRIG 292*     Recent Labs   Lab Test 12/07/22  1630 11/08/21  1458 03/02/20  0928   LDL 71 53 69     Recent Labs   Lab Test 10/11/23  1032      POTASSIUM 4.0   CHLORIDE 98   CO2 25   *   BUN 16.3   CR 0.94   GFRESTIMATED 62   DIONNA 8.9     Recent Labs   Lab Test 10/11/23  1032 10/05/23  1622 09/15/23  1021   CR 0.94 0.86 0.68      Recent Labs   Lab Test 09/06/23  0533   WBC 8.1   HGB 8.7*   HCT 31.9*   MCV 88        Recent Labs   Lab Test 09/06/23  0533 04/24/23  1052 07/11/22  1355   HGB 8.7* 11.6* 15.3    Recent Labs   Lab Test 04/25/20  2300 07/16/19  1934 06/10/19  1618   TROPONINI 0.04 0.04 0.06     Recent Labs   Lab Test 09/13/21  0925 11/09/20  0907 10/02/20  0718   * 190* 564*     Recent Labs   Lab Test 07/17/19  0620   TSH 2.90     No results for input(s): \"INR\" in the last [id number] hours.   62 minutes were spent on the date of encounter performing chart review, history and exam, documentation, and further activities as noted " above.

## 2023-11-24 NOTE — PATIENT INSTRUCTIONS
[facility]  Cardiac Electrophysiology  [address]  [place] [address]   Office: [phone]  Fax: [phone]       Tommy Rea,    It was a pleasure to see you today at the [facility].     My recommendations after this visit include:    Plan for cardioversion to see if you feel better in normal rhythm  2 days before cardioversion, stop taking metoprolol and start taking sotalol 80 mg twice daily    Instructions for the day of cardioversion:  This is an outpatient procedure done at [facility].  Plan on being at [facility] for 3-4 hrs.  Nothing to eat or drink for 8 hours before your procedure.  This includes gum and/or hard candies.  Take your morning medications with sips of water.  If you take your blood thinner in the morning, please take it.  Please hold vitamins, supplements the day of cardioversion.  You will need a .      You need 21 days of continuous use of Eliquis before cardioversion.  *Please call if you have questions about the cardioversion or you have missed tablets of your blood thinner.    Cardioversion will be ordered today and you will get a call from the  to schedule your cardioversion.      Follow up in 1 month    An Keith CNP  [facility], Electrophysiology  [phone]  EP nurses [phone]             ATRIAL FIBRILLATION: Patient Information     What is atrial fibrillation?  Atrial fibrillation (AF, A-fib) is a common heart rhythm problem (arrhythmia) occurring within the upper chambers of the heart (the atria).  In normal rhythm, the upper and lower chambers of the heart are electrically driven to contract in a coordinated sequence.  In atrial fibrillation, the atria lose their ability to contract because of rapid and chaotic electrical activity.  The lower chambers of the heart (the ventricles) continue to pump blood throughout the body, though with irregular and  often faster rate due to the chaotic activity within the atria.          How do I know if I have atrial fibrillation?   Some people may feel their heart beating faster, harder, or irregularly while in atrial fibrillation.  Others may be lightheaded, fatigued, feel weak or tired or become more short of breath especially with activities.  Some patients have no symptoms at all.  Atrial fibrillation may be found due to an irregular pulse or on an electrocardiogram (ECG). Atrial fibrillation can start and stop on its own, and episodes can last from seconds to several months.       How common is atrial fibrillation?   An estimated 3-6 million people in the [place] have atrial fibrillation.  Atrial fibrillation is a common heart rhythm problem for older persons, affecting as estimated 12-15% of people over the age of 65 years of age.     What causes atrial fibrillation?   Age is the most important risk factor for atrial fibrillation.  Atrial fibrillation is more common in people with other heart disease, high blood pressure, diabetes, obesity, sleep apnea and in people who regularly consume alcohol.  Surgery, lung disease, or thyroid problems can lead to atrial fibrillation.  Atrial fibrillation has multiple possible causes, and in most cases a single cause cannot be found.  Atrial fibrillation is a progressive condition, usually starting with at an early stage with short and infrequent episodes.  In later stages of disease, more frequent and longer lasting episodes of atrial fibrillation occur, ultimately culminating in episodes which do not spontaneously terminate.  Generally, more enlargement and scarring within the upper chambers of the heart is observed as atrial fibrillation progresses from early to late-stage disease.     How is atrial fibrillation diagnosed and evaluated?    Because of its start-stop nature, atrial fibrillation can be challenging to diagnose.  Atrial fibrillation is most commonly diagnosed  via cardiac rhythm recordings - either an ECG or wearable cardiac rhythm monitor.  For patients with pacemakers, defibrillators or implantable loop recorders, atrial fibrillation may be recorded via these devices.  Recently, commercially available devices (eg. Apple Watch, ABA English device, certain FitBit devices, others) can allow patients to take 30 second cardiac rhythm recordings which may document atrial fibrillation.  Once atrial fibrillation is diagnosed, additional tests include blood tests and an echocardiogram.  The echocardiogram uses ultrasound to look at your heart to assess your cardiac function and evaluate for other heart disease.  Additional evaluation may include CT or MRI studies.     Is atrial fibrillation dangerous?   Atrial fibrillation is not usually a life-threatening arrhythmia.  The most serious consequences of atrial fibrillation including stroke and worsening of overall cardiac function.  While in atrial fibrillation, the upper cardiac chambers do not contract normally, resulting in slower blood flow and increased risk of clot formation.  If this blood clot becomes detached from the heart a stroke can occur.  Unfortunately, stroke can be the first sign of atrial fibrillation for some people.  With a stroke, you may notice abnormal sensation, weakness on one side of the body or face, changes in your vision or speech.  If you have any of these signs, you should contact EMS and be evaluated in an emergency room as soon as possible.       How is atrial fibrillation treated?     Several treatment options exist for suppressing atrial fibrillation - however, it is not an easily curable arrhythmia.  The first goal in managing atrial fibrillation is to minimize stroke risk.  The second goal is to improve symptoms associated with atrial fibrillation.  Finally, in patients with reduced cardiac function, maintaining normal rhythm can help improve cardiac function.       Blood thinners are used to reduce  the risk of stroke in patients with high estimated stroke risk related to atrial fibrillation.  For patients at higher risk of bleeding related to blood thinner use, implantable devices may be an option to reduce stroke risk without the need for long term blood thinner use.       Atrial fibrillation can be managed via two strategies: rate control and rhythm control.  In a rate control strategy, continued atrial fibrillation is accepted and medications (eg. beta-blockers or calcium channel blockers) are used to control the lower chamber rate.  In a rhythm control strategy, anti-arrhythmic medications or catheter ablation are used to maintain normal cardiac rhythm and slow disease progression by suppressing atrial fibrillation.  A procedure called a cardioversion, in which an electric shock is delivered through patches placed on the chest wall while under deep sedation, can be performed to temporarily restore normal cardiac rhythm, though does not address the chance of atrial fibrillation recurrence.  Treatments are more effective for earlier rather than later stage atrial fibrillation.  Lifestyle modifications (maintaining a healthy weight, aerobic exercise, diagnosing and treating sleep apnea, and minimizing alcohol intake) are important elements of atrial fibrillation rhythm control.      What is catheter ablation for atrial fibrillation?  Cardiac catheter ablation is a commonly performed, minimally invasive procedure performed by a cardiac electrophysiologist to treat many different cardiac rhythm abnormalities.  During catheter ablation, long, thin, flexible tubes are advanced into the heart via small sheaths inserted into the femoral veins and thermal energy (either heating or cooling) is applied within the heart to disrupt abnormal electrical activity.  Atrial fibrillation ablation is performed under general anesthesia, with procedures generally taking approximately 2-3 hours.  Patients are typically observed  for 3-5 hours after the ablation, and in most cases can be discharged home the same day.  Atrial fibrillation ablation is associated with better outcomes (mortality, cardiovascular hospitalizations, atrial arrhythmia recurrences) compared to antiarrhythmic drug therapy.  However, atrial fibrillation recurrences are not uncommon, and repeat catheter ablation may be offered.  Your electrophysiology team can review atrial fibrillation ablation, anticipated success rates, risks, and recovery expectations with you.     What are anti-arrhythmic medications?  Anti-arrhythmic medications are specialized drugs which alter cardiac electrical functioning to suppress arrhythmias.  There are several anti-arrhythmic medications available, each with its own success rate and side effects.  Some anti-arrhythmic medications are less effective though safer to use, others are more effective though have serious potential toxicities.  Atrial fibrillation recurrences are common and may require dose adjustment or change in antiarrhythmic therapy.  Your electrophysiology team will carefully consider which medication would be the best and safest for your particular case.       Can I live a normal life?    The goal of atrial fibrillation management is for patients to live normal lives without being limited by symptoms related to atrial fibrillation.     Are any additional educational resources available?  There are a number of excellent atrial fibrillation education resources available to you online.  A few options you may wish to review include:  hrsonline.org/guide-atrial-fibrillation  afibmatters.org  getsmartaboutafib.com  stopaf.com     What comes next?    Consider your management options and let us know how we can help in your decision process.  Please take medications as they have been prescribed.  You should also get any tests that may have been ordered for you.       When to Call Your Doctor or seek emergency care:  Call your doctor  or seek emergency care if you have any significant changes with the following:  Weakness  Dizziness  Fainting  Fatigue  Shortness of breath  Chest pain with increased activity  If you are concerned that your heart rate is too fast or too slow  Bleeding that does not stop in 10 minutes  Coughing or throwing up blood  Bloody diarrhea or bleeding hemorrhoids  Dark-colored urine or black stool  Allergic reactions:  Rash  Itching  Swelling  Trouble breathing or swallowing        Please call the [facility] at [phone] if you have concerns about your symptoms, your medicines, or your follow-up appointments.

## 2023-11-27 ENCOUNTER — DOCUMENTATION ONLY (OUTPATIENT)
Dept: CARDIOLOGY | Facility: CLINIC | Age: 78
End: 2023-11-27
Payer: COMMERCIAL

## 2023-11-27 NOTE — PROGRESS NOTES
H&P  PMD: []  Received [] Card OV: [x]  Date:  Sent LM  []  Teach  []   Orders  I [x] P  [x]  Letter []   AC: Eliquis NP Med Review:  start sotolol 2 days prior. Discontinue metoprolol when starting sotolol    DM Meds: None  GLP-1:None       1945  Home:[phone] (home) Cell:[phone] (mobile)  Emergency Contact: DALIA THOMAS [phone]  PCP: Hannah Benton, [phone]    +++Important patient information for CSC/Cath Lab staff : None+++    [facility] EP Cath Lab Procedure Order   Procedure: Cardioversion for AF  Ordering Provider: An Keith NP  Date Ordered and Prepped: 11/27/2023 Deonna Silverio RN  Anticipated Case Duration:  Standard  Scheduling Needs/Timeframe:  Next Available  Scheduling Contact: Please contact pt to schedule  Cardiology Follow Up Apt s/p: Standard- EP JESSICA @ 4-6 wks or previously scheduled General Card apt  Current Device/Device Co Needed for Procedure: None NoneNone  Pre-Procedural Testing needed: None  Anesthesia:  General    [facility] EP Cath Lab Prep   H&P:  Compled by cardiology on 11/24/23 if scheduled within 30 days, pt to schedule with PMD if procedure outside of this timeframe  Pre-op Labs: K if pt taking diuretic medication or hx of low Potassium, Beta HcG if appropriate, and INR if on Warfarin will be ordered AM of procedure and Review of most recent labs, WEL for procedure  T&S Pre-Procedure Review: T&S is not required for DCCV/DFT Testing  Medical Records Pertinent for Procedure:  None  Contrast Dye Allergies: NA  GLP-1 Protocol: If on Dulaglutide (Trulicity) (weekly)- Injection hold 7 days prior to procedure  , Exenatide extended release (Bydureon bcise) (weekly)- Injection hold 7 days prior to procedure, Exenatide (Byetta) (twice daily)- Oral Tablet hold day prior and morning of procedure and for Injection hold 7 days prior to procedure, Semaglutide (Ozempic) (weekly)- Injection and Oral hold 7 days prior to procedure, Liraglutide (Victoza, Saxenda) (daily)- Injection hold  day prior and morning of procedure    Allergies   Allergen Reactions    Amoxicillin GI Disturbance and Other (See Comments)     COLITIS    Has tolerated cefazolin  GI bleeding, Colitis, has tolerated cefazolin       Sulfa Antibiotics     Sulfamethoxazole-Trimethoprim Other (See Comments)     Other reaction(s): Vaginal Irritation  VAGINAL BLEEDING  Vaginal irritation, vaginal bleeding          Current Outpatient Medications:     acetaminophen (TYLENOL) 500 MG tablet, Take 1,000 mg by mouth 2 times daily , Disp: , Rfl:     albuterol (PROAIR HFA/PROVENTIL HFA/VENTOLIN HFA) 108 (90 Base) MCG/ACT inhaler, Inhale 2 puffs into the lungs every 6 hours as needed, Disp: , Rfl:     alendronate (FOSAMAX) 70 MG tablet, Take 70 mg by mouth every 7 days , Disp: , Rfl:     apixaban ANTICOAGULANT (ELIQUIS) 5 MG tablet, Take 5 mg by mouth 2 times daily , Disp: , Rfl:     aspirin 81 MG EC tablet, Take 81 mg by mouth daily , Disp: , Rfl:     CALCIUM-VITAMIN D PO, Take 2 tablets by mouth daily, Disp: , Rfl:     colesevelam (WELCHOL) 625 MG tablet, Take 3 tablets by mouth At Bedtime, Disp: , Rfl:     cyanocobalamin (CYANOCOBALAMIN) 1000 MCG/ML injection, Inject 1,000 mcg into the muscle every 30 days, Disp: , Rfl:     fluticasone (FLONASE) 50 MCG/ACT nasal spray, Spray 1 spray into both nostrils daily, Disp: , Rfl:     fluticasone (FLOVENT HFA) 110 MCG/ACT inhaler, Inhale 1 puff into the lungs 2 times daily , Disp: , Rfl:     loratadine (CLARITIN) 10 MG tablet, Take 10 mg by mouth daily as needed , Disp: , Rfl:     Magnesium Oxide 250 MG TABS, Take 250 mg by mouth daily , Disp: , Rfl:     metolazone (ZAROXOLYN) 2.5 MG tablet, Take 1 tablet (2.5 mg) by mouth daily as needed (As instructed by your provider for heart failure symptoms) Take 30 minutes prior to AM dose of torsemide, Disp: 8 tablet, Rfl: 1    multivitamin w/minerals (THERA-VIT-M) tablet, Take 1 tablet by mouth daily , Disp: , Rfl:     nitroGLYcerin (NITROSTAT) 0.4 MG  sublingual tablet, Place 1 tablet (0.4 mg) under the tongue every 5 minutes as needed for chest pain, Disp: 25 tablet, Rfl: 1    PARoxetine (PAXIL) 40 MG tablet, Take 40 mg by mouth daily , Disp: , Rfl:     phenytoin (DILANTIN) 100 MG ER capsule, Take 5 tablets at bedtime, Disp: 450 capsule, Rfl: 3    potassium chloride ER (KLOR-CON M) 20 MEQ CR tablet, Take 1 tablet (20 mEq) by mouth daily, Disp: 270 tablet, Rfl: 3    simvastatin (ZOCOR) 40 MG tablet, Take 40 mg by mouth At Bedtime , Disp: , Rfl:     sodium chloride (OCEAN) 0.65 % nasal spray, Spray 1 spray in nostril daily as needed, Disp: , Rfl:     sotalol (BETAPACE) 80 MG tablet, Take 1 tablet (80 mg) by mouth 2 times daily, Disp: 180 tablet, Rfl: 3    spironolactone (ALDACTONE) 50 MG tablet, Take 1 tablet (50 mg) by mouth daily, Disp: 90 tablet, Rfl: 3    STELARA 45 MG/0.5ML SOLN, Inject 45 mg Subcutaneous every 30 days, Disp: , Rfl:     topiramate (TOPAMAX) 100 MG tablet, Take 1.5 tablets (150 mg) by mouth 2 times daily, Disp: 270 tablet, Rfl: 3    torsemide (DEMADEX) 20 MG tablet, Take 2 tablets (40 mg) by mouth 2 times daily (before meals), Disp: 360 tablet, Rfl: 3    Vitamin D3 (CHOLECALCIFEROL) 125 MCG (5000 UT) tablet, Take 125 mcg by mouth daily, Disp: , Rfl:     Documentation Date:11/27/2023 8:45 AM  Deonna Silverio RN

## 2023-11-28 ENCOUNTER — TELEPHONE (OUTPATIENT)
Dept: CARDIOLOGY | Facility: CLINIC | Age: 78
End: 2023-11-28
Payer: COMMERCIAL

## 2023-11-28 NOTE — TELEPHONE ENCOUNTER
Pre-Procedure Education    Procedure: DCCV with Debora Bean NP on 12/6/2023 with arrival time 8:30 am      PT IS ON ELIQUIS AND NO MISSED DOSES    COVID: COVID policy- if pt develops COVID like symptoms prior to procedure, he/she would need to complete an at home with a rapid antigen COVID test 1-2 days prior to your procedure date. If COVID + pt is aware the procedure will need to be rescheduled, and to contact CV scheduling as soon as possible    Pre-Op H&P: Completed- Available in Epic    Education:       PT HAS A  FOR PROCEDURE THAT WILL STAY WITH HIM      ALL INSTRUCTIONS REVIEWED WI TH DAUGHTER AND PT    PT INSTRUCTED TO HOLD ANY VITAMINS MINERALS CALCIUM IRON OR SUPPLEMENTS THE MORNING OF CV  PT INSTRUCTED NO GUM CHEWING MINTS OR CANDY THE MORNING OF CV  PT INSTRUCTED TO LEAVE JEWELRY AT HOME  PT INSTRUCTED TO BATHE OR SHOWER BEFORE COMING IN  PT IS ON EIQUIS  PT INSTRUCTED PER LOW ADDISON CNP TO STOP HER METOPROLOL 2 DAYS PRIOR CV AND START HER SOTALOL 80 MG Q 12 HRS   PT HAS COPD  PT HAS A POST CV FOLLOW UP WITH DR ESTRADA 1/24      Contact: Reviewed via phone with pt and spouse/caregiver    Pre-Procedure Instruction: NPO after midnight pre procedure, Defined NPO with pt, Remove all jewelry and leave all valuables at home, Shower prior to arrival, Sedation plan/orders, Transportation requirements and arrangements post procedure, Post-procedure follow up process, Post-procedure restrictions/expectations, and Pre-procedure letter sent- letter tab  Risks:      Medication:   Instructions regarding anticoagulants: Eliquis- To continue anticoagulation uninterrupted through their procedure    Instructions regarding antiarrhythmic medication: Sotalol;  PT TO START SOTALOL 80 MG Q 12 HRS 2 DAYS PRIOR CV    Instructions given to pt regarding diuretics medication: None    Instructions given to pt regarding DM/GLP-1 medication:   DM- None  GLP-1- None    Instructions for medication, other than anticoagulants  and antiarrhythmics listed above, given to pt: Take all medication AM of procedure with small sips of water       Important patient information for staff:  P[T NEW SOTALOL START HAS COPD    11/28/2023 1:21 PM  Lani Chang LPN

## 2023-12-06 ENCOUNTER — ANESTHESIA (OUTPATIENT)
Dept: CARDIOLOGY | Facility: HOSPITAL | Age: 78
End: 2023-12-06
Payer: COMMERCIAL

## 2023-12-06 ENCOUNTER — HOSPITAL ENCOUNTER (OUTPATIENT)
Dept: CARDIOLOGY | Facility: HOSPITAL | Age: 78
Discharge: HOME | End: 2023-12-06
Attending: NURSE PRACTITIONER | Admitting: NURSE PRACTITIONER
Payer: COMMERCIAL

## 2023-12-06 ENCOUNTER — ANESTHESIA EVENT (OUTPATIENT)
Dept: CARDIOLOGY | Facility: HOSPITAL | Age: 78
End: 2023-12-06
Payer: COMMERCIAL

## 2023-12-06 VITALS
TEMPERATURE: 98.1 F | HEART RATE: 58 BPM | RESPIRATION RATE: 16 BRPM | DIASTOLIC BLOOD PRESSURE: 61 MMHG | SYSTOLIC BLOOD PRESSURE: 136 MMHG | OXYGEN SATURATION: 99 %

## 2023-12-06 DIAGNOSIS — Z51.81 ENCOUNTER FOR MONITORING SOTALOL THERAPY: Primary | ICD-10-CM

## 2023-12-06 DIAGNOSIS — Z79.899 ENCOUNTER FOR MONITORING SOTALOL THERAPY: Primary | ICD-10-CM

## 2023-12-06 DIAGNOSIS — I48.19 PERSISTENT ATRIAL FIBRILLATION (H): ICD-10-CM

## 2023-12-06 LAB
ATRIAL RATE - MUSE: 57 BPM
DIASTOLIC BLOOD PRESSURE - MUSE: NORMAL MMHG
INTERPRETATION ECG - MUSE: NORMAL
P AXIS - MUSE: 54 DEGREES
PR INTERVAL - MUSE: 160 MS
QRS DURATION - MUSE: 76 MS
QT - MUSE: 508 MS
QTC - MUSE: 494 MS
R AXIS - MUSE: -2 DEGREES
SYSTOLIC BLOOD PRESSURE - MUSE: NORMAL MMHG
T AXIS - MUSE: 1 DEGREES
VENTRICULAR RATE- MUSE: 57 BPM

## 2023-12-06 PROCEDURE — 93005 ELECTROCARDIOGRAM TRACING: CPT

## 2023-12-06 PROCEDURE — 93010 ELECTROCARDIOGRAM REPORT: CPT | Performed by: INTERNAL MEDICINE

## 2023-12-06 PROCEDURE — 99207 ECG 12-LEAD WITH MUSE (LHE): CPT | Mod: RTG | Performed by: INTERNAL MEDICINE

## 2023-12-06 RX ORDER — LIDOCAINE 40 MG/G
CREAM TOPICAL
Status: DISCONTINUED | OUTPATIENT
Start: 2023-12-06 | End: 2023-12-06 | Stop reason: HOSPADM

## 2023-12-06 RX ORDER — SODIUM CHLORIDE, SODIUM LACTATE, POTASSIUM CHLORIDE, CALCIUM CHLORIDE 600; 310; 30; 20 MG/100ML; MG/100ML; MG/100ML; MG/100ML
INJECTION, SOLUTION INTRAVENOUS CONTINUOUS
Status: DISCONTINUED | OUTPATIENT
Start: 2023-12-06 | End: 2023-12-06 | Stop reason: HOSPADM

## 2023-12-06 ASSESSMENT — ENCOUNTER SYMPTOMS
DYSRHYTHMIAS: 1
SEIZURES: 1

## 2023-12-06 ASSESSMENT — LIFESTYLE VARIABLES: TOBACCO_USE: 1

## 2023-12-06 ASSESSMENT — COPD QUESTIONNAIRES: COPD: 1

## 2023-12-06 ASSESSMENT — ACTIVITIES OF DAILY LIVING (ADL): ADLS_ACUITY_SCORE: 35

## 2023-12-06 NOTE — INTERVAL H&P NOTE
I have reviewed the surgical (or preoperative) H&P that is linked to this encounter, and examined the patient. There are no significant changes  Patient confirms no  missed doses of Eliquis in the last 21 days    Clinical Conditions Present on Arrival:  Clinically Significant Risk Factors Present on Admission                # Drug Induced Coagulation Defect: home medication list includes an anticoagulant medication  # Drug Induced Platelet Defect: home medication list includes an antiplatelet medication

## 2023-12-06 NOTE — ANESTHESIA PREPROCEDURE EVALUATION
Anesthesia Pre-Procedure Evaluation    Patient: Tommy Rea   MRN: [id number] : 1945        Procedure :   Cardioversion External       Past Medical History:   Diagnosis Date    (HFpEF) heart failure with preserved ejection fraction (H) 2020    Arthritis     Asthma     Atrial fibrillation (H) 10/24/2016    B12 deficiency     Benign essential hypertension 2021    Formatting of this note might be different from the original. Created by Conversion    CAD (coronary artery disease)     Congestive heart failure (H)     COPD (chronic obstructive pulmonary disease) (H)     COPD (chronic obstructive pulmonary disease) (H)     Crohn's disease (H)     Crohn's disease (H)     Depression     Depressive disorder     Esophageal reflux     Essential hypertension     Fracture of left hip requiring operative repair (H)     Heart disease     a fib    History of shingles     Hyperlipidemia     Hyperparathyroidism (H24)     Hypertension     Osteoporosis     Pulmonary nodule     benign    Regional enteritis (H) 2016    Seizure (H)     Seizures (H)       Past Surgical History:   Procedure Laterality Date    ARTHROPLASTY REVISION HIP Left 2016    removal of gamma nail hardware & conversion to total hip arthroplasty: Dr. Mercado [facility] REMOVAL DEEP IMPLANT Left 2015    Procedure: Gamma Nail Left Hip;  Surgeon: Kirk De La Cruz MD;  Location: [facility];  Service: Orthopedics    HEMORRHOIDECTOMY      ORIF HIP FRACTURE      ORTHOPEDIC SURGERY      ORIF    OTHER SURGICAL HISTORY      HEMMOIROIDECT    OTHER SURGICAL HISTORY      CVL HEART CATH LEFT      Allergies   Allergen Reactions    Amoxicillin GI Disturbance and Other (See Comments)     COLITIS    Has tolerated cefazolin  GI bleeding, Colitis, has tolerated cefazolin       Sulfa Antibiotics     Sulfamethoxazole-Trimethoprim Other (See Comments)     Other reaction(s): Vaginal Irritation  VAGINAL BLEEDING  Vaginal irritation,  vaginal bleeding         Social History     Tobacco Use    Smoking status: Former     Types: Cigarettes     Quit date: 2011     Years since quittin.4    Smokeless tobacco: Never   Substance Use Topics    Alcohol use: No      Wt Readings from Last 1 Encounters:   10/05/23 82.1 kg (181 lb)        Anesthesia Evaluation            ROS/MED HX  ENT/Pulmonary:     (+)                tobacco use, Past use,    asthma    COPD,              Neurologic:     (+)       seizures,                         Cardiovascular: Comment: ECHO done 9/10/2023:   1. Normal left ventricular systolic function. Normal left ventricular systolic function.   Estimated left ventricular ejection fraction is 65%.    No regional wall motion abnormalities.    2. Normal right ventricular chamber size. Normal right ventricular systolic function.    3. No significant valvular heart disease.    4. Aortic sinus of Valsalva is normal in size (3.4 cm). Normal indexed ascending aorta   dimension (3.6 cm, 1.9 cm/m ).    5. Moderate-severe left atrial enlargement. Left atrial volume index is 47 ml/m .    6. Normal inferior vena cava with normal inspiratory collapse.    Estimated EF: 65%       (+) Dyslipidemia hypertension- -  CAD -  - -      CHF                  dysrhythmias, a-fib,             METS/Exercise Tolerance:     Hematologic:     (+)      anemia,          Musculoskeletal:       GI/Hepatic:     (+) GERD,      Inflammatory bowel disease (Crohn's disease),             Renal/Genitourinary:       Endo: Comment: Hyperparathyroidism    (+)               Obesity (BMI 30),       Psychiatric/Substance Use:     (+) psychiatric history depression       Infectious Disease:       Malignancy:       Other:               OUTSIDE LABS:  CBC:   Lab Results   Component Value Date    WBC 8.1 2023    WBC 6.8 2023    HGB 8.7 (L) 2023    HGB 11.6 (L) 2023    HCT 31.9 (L) 2023    HCT 41.7 2023     2023      "04/24/2023     BMP:   Lab Results   Component Value Date     11/24/2023     10/11/2023    POTASSIUM 4.2 11/24/2023    POTASSIUM 4.0 10/11/2023    CHLORIDE 103 11/24/2023    CHLORIDE 98 10/11/2023    CO2 25 11/24/2023    CO2 25 10/11/2023    BUN 19.4 11/24/2023    BUN 16.3 10/11/2023    CR 0.84 11/24/2023    CR 0.94 10/11/2023    GLC 99 11/24/2023     (H) 10/11/2023     COAGS: No results found for: \"PTT\", \"INR\", \"FIBR\"  POC: No results found for: \"BGM\", \"HCG\", \"HCGS\"  HEPATIC:   Lab Results   Component Value Date    ALBUMIN 4.3 04/24/2023    PROTTOTAL 7.6 04/24/2023    ALT 26 04/24/2023    AST 23 04/24/2023    ALKPHOS 87 04/24/2023    BILITOTAL 0.3 04/24/2023     OTHER:   Lab Results   Component Value Date    LACT 1.5 04/27/2020    DIONNA 9.0 11/24/2023    PHOS 2.5 06/18/2019    MAG 1.9 04/25/2020    TSH 2.90 07/17/2019    CRP 2.9 (H) 04/25/2020              Gilmar Glynn MD    I have reviewed the pertinent notes and labs in the chart from the past 30 days and (re)examined the patient.  Any updates or changes from those notes are reflected in this note.                  "

## 2023-12-06 NOTE — PROGRESS NOTES
Pt discharged with AVS with her daughter.  Patient brought to lobby by transport team via wheelchair.

## 2023-12-13 ENCOUNTER — LAB REQUISITION (OUTPATIENT)
Dept: LAB | Facility: CLINIC | Age: 78
End: 2023-12-13
Payer: COMMERCIAL

## 2023-12-13 DIAGNOSIS — I25.10 ATHEROSCLEROTIC HEART DISEASE OF NATIVE CORONARY ARTERY WITHOUT ANGINA PECTORIS: ICD-10-CM

## 2023-12-13 DIAGNOSIS — E53.8 DEFICIENCY OF OTHER SPECIFIED B GROUP VITAMINS: ICD-10-CM

## 2023-12-13 PROCEDURE — 80053 COMPREHEN METABOLIC PANEL: CPT | Mod: ORL | Performed by: FAMILY MEDICINE

## 2023-12-13 PROCEDURE — 80061 LIPID PANEL: CPT | Mod: ORL | Performed by: FAMILY MEDICINE

## 2023-12-13 PROCEDURE — 82607 VITAMIN B-12: CPT | Mod: ORL | Performed by: FAMILY MEDICINE

## 2023-12-14 LAB
ALBUMIN SERPL BCG-MCNC: 4 G/DL (ref 3.5–5.2)
ALP SERPL-CCNC: 80 U/L (ref 40–150)
ALT SERPL W P-5'-P-CCNC: 29 U/L (ref 0–50)
ANION GAP SERPL CALCULATED.3IONS-SCNC: 11 MMOL/L (ref 7–15)
AST SERPL W P-5'-P-CCNC: 26 U/L (ref 0–45)
BILIRUB SERPL-MCNC: 0.2 MG/DL
BUN SERPL-MCNC: 14.5 MG/DL (ref 8–23)
CALCIUM SERPL-MCNC: 8.6 MG/DL (ref 8.8–10.2)
CHLORIDE SERPL-SCNC: 98 MMOL/L (ref 98–107)
CHOLEST SERPL-MCNC: 165 MG/DL
CREAT SERPL-MCNC: 0.8 MG/DL (ref 0.51–0.95)
DEPRECATED HCO3 PLAS-SCNC: 26 MMOL/L (ref 22–29)
EGFRCR SERPLBLD CKD-EPI 2021: 75 ML/MIN/1.73M2
FASTING STATUS PATIENT QL REPORTED: ABNORMAL
GLUCOSE SERPL-MCNC: 98 MG/DL (ref 70–99)
HDLC SERPL-MCNC: 48 MG/DL
LDLC SERPL CALC-MCNC: 56 MG/DL
NONHDLC SERPL-MCNC: 117 MG/DL
POTASSIUM SERPL-SCNC: 3.9 MMOL/L (ref 3.4–5.3)
PROT SERPL-MCNC: 7.4 G/DL (ref 6.4–8.3)
SODIUM SERPL-SCNC: 135 MMOL/L (ref 135–145)
TRIGL SERPL-MCNC: 306 MG/DL
VIT B12 SERPL-MCNC: 587 PG/ML (ref 232–1245)

## 2024-01-12 DIAGNOSIS — I50.32 CHRONIC HEART FAILURE WITH PRESERVED EJECTION FRACTION (H): ICD-10-CM

## 2024-01-12 RX ORDER — TORSEMIDE 20 MG/1
TABLET ORAL
Qty: 360 TABLET | Refills: 0 | Status: SHIPPED | OUTPATIENT
Start: 2024-01-12 | End: 2024-04-11

## 2024-01-24 ENCOUNTER — OFFICE VISIT (OUTPATIENT)
Dept: CARDIOLOGY | Facility: CLINIC | Age: 79
End: 2024-01-24
Attending: INTERNAL MEDICINE
Payer: COMMERCIAL

## 2024-01-24 VITALS
RESPIRATION RATE: 16 BRPM | WEIGHT: 179 LBS | BODY MASS INDEX: 28.89 KG/M2 | OXYGEN SATURATION: 98 % | DIASTOLIC BLOOD PRESSURE: 68 MMHG | HEART RATE: 56 BPM | SYSTOLIC BLOOD PRESSURE: 108 MMHG

## 2024-01-24 DIAGNOSIS — I50.32 CHRONIC HEART FAILURE WITH PRESERVED EJECTION FRACTION (H): ICD-10-CM

## 2024-01-24 DIAGNOSIS — E78.2 MIXED HYPERLIPIDEMIA: ICD-10-CM

## 2024-01-24 DIAGNOSIS — I48.19 PERSISTENT ATRIAL FIBRILLATION (H): ICD-10-CM

## 2024-01-24 DIAGNOSIS — I25.10 CORONARY ARTERY DISEASE INVOLVING NATIVE CORONARY ARTERY OF NATIVE HEART, UNSPECIFIED WHETHER ANGINA PRESENT: Primary | ICD-10-CM

## 2024-01-24 DIAGNOSIS — I10 BENIGN ESSENTIAL HYPERTENSION: ICD-10-CM

## 2024-01-24 PROCEDURE — 99215 OFFICE O/P EST HI 40 MIN: CPT | Performed by: INTERNAL MEDICINE

## 2024-01-24 RX ORDER — SPIRONOLACTONE 50 MG/1
50 TABLET, FILM COATED ORAL DAILY
Qty: 90 TABLET | Refills: 3 | Status: SHIPPED | OUTPATIENT
Start: 2024-01-24

## 2024-01-24 NOTE — LETTER
1/24/2024    Hannah Benton MD  [address]  [place] [address]    RE: Tommy MENDEZ Álvaro       Dear Colleague,     I had the pleasure of seeing Tommy Rea in the [facility].      Thank you, Dr. Hannah Benton, for asking the [facility] team to see Ms. Tommy Rea to follow-up on coronary artery disease, chronic HFpEF, paroxysmal atrial fibrillation.    Assessment/Recommendations   Assessment:    1.  Coronary artery disease, moderate based on angiography in 2012.  Her last nuclear stress test in October 2022 demonstrated no evidence of ischemia or infarction.  She denies to me any symptoms of exertional chest discomfort.  She does have mild exertional dyspnea which she does not believe has changed.  I suspect some of her exertional dyspnea is due to cardiovascular deconditioning as well as her chronic HFpEF.  She has just completed physical therapy and is walking down the hallway of her apartment building and back.  We talked about the importance of continuing to do this on a daily basis and increasing the distance of her walk to try to improve on cardiovascular conditioning.  2.  Chronic HFpEF, currently controlled.  She denies any symptoms of orthopnea, PND or lower extremity edema.  Her weight is actually down from previous weights in our clinic.  At this point we will continue current doses of diuretic therapy.  3.  Paroxysmal atrial fibrillation with recurrent episodes this past fall.  She was switched from metoprolol to sotalol with planned cardioversion in early December but was found to be back in sinus rhythm when she presented for the cardioversion.  Heart rates are somewhat slow today at 56 although again she denies any worsening of her mild exertional dyspnea or symptoms of lightheadedness or near syncope.  Will be following up in the A-fib clinic in 2 weeks and thus we will hold on making any changes in her medication.  4.  Mixed hyperlipidemia  with excellent LDL control at 56.  No change in medications.    Plan:  1.  Continue current medications  2.  Encouraged the patient to continue with regular walking program to improve cardiovascular endurance  3.  Tentative follow-up with me in 1 year or sooner if new issues  4.  Follow-up in the A-fib clinic in 2 weeks as previously recommended       History of Present Illness    Ms. Tommy Rea is a 78 year old female with history of moderate coronary artery disease by angiography in 2012, mixed hyperlipidemia, chronic HFpEF, paroxysmal atrial fibrillation who returns to the office today for her yearly follow-up visit.  Recently had more issues with recurrent atrial fibrillation and was seen in the A-fib clinic and switched from metoprolol to sotalol with plan for cardioversion in early December.  When she presented for the cardioversion, she was found to actually be back in sinus rhythm.  She has been maintained on sotalol therapy over the past 6 weeks and reports no symptoms of lightheadedness or near syncope.  Has been undergoing physical therapy at her home which is just completed.  We talked about the importance of continuing with her walks in the hallway of her apartment building, trying to do it 5 days each week.  Denies any recent weight gain, orthopnea, PND or lower extremity edema.  Also denies any exertional chest discomfort.    ECG (personally reviewed): No ECG today    Cardiac Imaging Studies (personally reviewed): No new imaging     Physical Examination Review of Systems   /68 (BP Location: Left arm, Patient Position: Sitting, Cuff Size: Adult Regular)   Pulse 56   Resp 16   Wt 81.2 kg (179 lb)   SpO2 98%   BMI 28.89 kg/m    Body mass index is 28.89 kg/m .  Wt Readings from Last 3 Encounters:   01/24/24 81.2 kg (179 lb)   10/05/23 82.1 kg (181 lb)   09/27/23 84.8 kg (186 lb 14.4 oz)     General Appearance:   Awake, Alert, No acute distress.   HEENT:  No scleral icterus; the mucous  membranes were pink and moist.   Neck: No cervical bruits or jugular venous distention    Chest: The spine was straight. The chest was symmetric.   Lungs:   Respirations unlabored; the lungs are clear to auscultation. No wheezing   Cardiovascular:   Regular rhythm.  S1, S2 normal.  No murmur or gallop   Abdomen:  No organomegaly, masses, bruits, or tenderness. Bowels sounds are present   Extremities: No peripheral edema bilaterally   Skin: No xanthelasma. Warm, Dry.   Musculoskeletal: No tenderness.   Neurologic: Mood and affect are appropriate.    Enc Vitals  BP: 108/68  Pulse: 56  Resp: 16  SpO2: 98 %  Weight: 81.2 kg (179 lb) (morning weight)                                         Medical History  Surgical History Family History Social History   Past Medical History:   Diagnosis Date    (HFpEF) heart failure with preserved ejection fraction (H) 1/20/2020    Arthritis     Asthma     Atrial fibrillation (H) 10/24/2016    B12 deficiency     Benign essential hypertension 8/2/2021    Formatting of this note might be different from the original. Created by Conversion    CAD (coronary artery disease)     Congestive heart failure (H)     COPD (chronic obstructive pulmonary disease) (H)     COPD (chronic obstructive pulmonary disease) (H)     Crohn's disease (H)     Crohn's disease (H)     Depression     Depressive disorder     Esophageal reflux     Essential hypertension     Fracture of left hip requiring operative repair (H)     Heart disease     a fib    History of shingles     Hyperlipidemia     Hyperparathyroidism (H24)     Hypertension     Osteoporosis     Pulmonary nodule     benign    Regional enteritis (H) 07/19/2016    Seizure (H)     Seizures (H)     Past Surgical History:   Procedure Laterality Date    ARTHROPLASTY REVISION HIP Left 07/19/2016    removal of gamma nail hardware & conversion to total hip arthroplasty: Dr. Mercado at [facility] REMOVAL DEEP IMPLANT Left 1/25/2015    Procedure: Gamma Nail  Left Hip;  Surgeon: Kirk De La Cruz MD;  Location: [facility] Main OR;  Service: Orthopedics    HEMORRHOIDECTOMY      ORIF HIP FRACTURE      ORTHOPEDIC SURGERY      ORIF    OTHER SURGICAL HISTORY      HEMMOIROIDECT    OTHER SURGICAL HISTORY      CVL HEART CATH LEFT    Family History   Problem Relation Age of Onset    Hypertension Mother     Colon Cancer Father     Arthritis Father     Hypertension Father     Heart Disease Mother     Heart Disease Father     Social History     Socioeconomic History    Marital status:      Spouse name: Not on file    Number of children: Not on file    Years of education: Not on file    Highest education level: Not on file   Occupational History    Not on file   Tobacco Use    Smoking status: Former     Types: Cigarettes     Quit date: 2011     Years since quittin.5    Smokeless tobacco: Never   Substance and Sexual Activity    Alcohol use: No    Drug use: No    Sexual activity: Not on file   Other Topics Concern    Parent/sibling w/ CABG, MI or angioplasty before 65F 55M? Not Asked   Social History Narrative    Not on file     Social Determinants of Health     Financial Resource Strain: Not on file   Food Insecurity: Not on file   Transportation Needs: Not on file   Physical Activity: Not on file   Stress: Not on file   Social Connections: Not on file   Interpersonal Safety: Not on file   Housing Stability: Not on file          Medications  Allergies   Current Outpatient Medications   Medication Sig Dispense Refill    acetaminophen (TYLENOL) 500 MG tablet Take 1,000 mg by mouth 2 times daily       albuterol (PROAIR HFA/PROVENTIL HFA/VENTOLIN HFA) 108 (90 Base) MCG/ACT inhaler Inhale 2 puffs into the lungs every 6 hours as needed      alendronate (FOSAMAX) 70 MG tablet Take 70 mg by mouth every 7 days Only 2 tabs left then stopping      apixaban ANTICOAGULANT (ELIQUIS) 5 MG tablet Take 5 mg by mouth 2 times daily       aspirin 81 MG EC tablet Take 81 mg by mouth  daily       CALCIUM-VITAMIN D PO Take 2 tablets by mouth daily      colesevelam (WELCHOL) 625 MG tablet Take 3 tablets by mouth At Bedtime      cyanocobalamin (CYANOCOBALAMIN) 1000 MCG/ML injection Inject 1,000 mcg into the muscle every 30 days      fluticasone (FLONASE) 50 MCG/ACT nasal spray Spray 1 spray into both nostrils daily      fluticasone (FLOVENT HFA) 110 MCG/ACT inhaler Inhale 1 puff into the lungs 2 times daily       loratadine (CLARITIN) 10 MG tablet Take 10 mg by mouth daily as needed       Magnesium Oxide 250 MG TABS Take 250 mg by mouth daily       multivitamin w/minerals (THERA-VIT-M) tablet Take 1 tablet by mouth daily       nitroGLYcerin (NITROSTAT) 0.4 MG sublingual tablet Place 1 tablet (0.4 mg) under the tongue every 5 minutes as needed for chest pain 25 tablet 1    PARoxetine (PAXIL) 40 MG tablet Take 40 mg by mouth daily       phenytoin (DILANTIN) 100 MG ER capsule Take 5 tablets at bedtime 450 capsule 3    potassium chloride ER (KLOR-CON M) 20 MEQ CR tablet Take 1 tablet (20 mEq) by mouth daily 270 tablet 3    simvastatin (ZOCOR) 40 MG tablet Take 40 mg by mouth At Bedtime       sodium chloride (OCEAN) 0.65 % nasal spray Spray 1 spray in nostril daily as needed      sotalol (BETAPACE) 80 MG tablet Take 1 tablet (80 mg) by mouth 2 times daily 180 tablet 3    spironolactone (ALDACTONE) 50 MG tablet Take 1 tablet (50 mg) by mouth daily 90 tablet 3    STELARA 45 MG/0.5ML SOLN Inject 45 mg Subcutaneous every 30 days      topiramate (TOPAMAX) 100 MG tablet Take 1.5 tablets (150 mg) by mouth 2 times daily 270 tablet 3    torsemide (DEMADEX) 20 MG tablet TAKE 2 TABLETS TWICE DAILY BEFORE MEALS 360 tablet 0    Vitamin D3 (CHOLECALCIFEROL) 125 MCG (5000 UT) tablet Take 125 mcg by mouth daily      metolazone (ZAROXOLYN) 2.5 MG tablet Take 1 tablet (2.5 mg) by mouth daily as needed (As instructed by your provider for heart failure symptoms) Take 30 minutes prior to AM dose of torsemide (Patient not  taking: Reported on 1/24/2024) 8 tablet 1      Allergies   Allergen Reactions    Amoxicillin GI Disturbance and Other (See Comments)     COLITIS    Has tolerated cefazolin  GI bleeding, Colitis, has tolerated cefazolin       Sulfa Antibiotics     Sulfamethoxazole-Trimethoprim Other (See Comments)     Other reaction(s): Vaginal Irritation  VAGINAL BLEEDING  Vaginal irritation, vaginal bleeding            Lab Results    Chemistry/lipid CBC Cardiac Enzymes/BNP/TSH/INR   Recent Labs   Lab Test 12/13/23  1617   TRIG 306*   LDL 56   BUN 14.5      CO2 26    Recent Labs   Lab Test 09/06/23  0533   WBC 8.1   HGB 8.7*   HCT 31.9*   MCV 88       Recent Labs   Lab Test 09/13/21  0925 05/11/20  0812 04/25/20  2300 07/21/19  0545 07/17/19  0620   TROPONINI  --   --  0.04  --   --    *   < >  --    < >  --    TSH  --   --   --   --  2.90    < > = values in this interval not displayed.        A total of 40 minutes was spent reviewing patient's medical records, obtaining history and performing examination, as well as discussing diagnoses/ recommendations with patient and answering all questions.                        Thank you for allowing me to participate in the care of your patient.      Sincerely,     Lauren Colbert MD     [facility]  cc:   Lauren Colbert MD  [address], [address]  [place] [address]

## 2024-01-24 NOTE — PROGRESS NOTES
Thank you, Dr. Hannah Benton, for asking the [facility] team to see Ms. Tommy Rea to follow-up on coronary artery disease, chronic HFpEF, paroxysmal atrial fibrillation.    Assessment/Recommendations   Assessment:    1.  Coronary artery disease, moderate based on angiography in 2012.  Her last nuclear stress test in October 2022 demonstrated no evidence of ischemia or infarction.  She denies to me any symptoms of exertional chest discomfort.  She does have mild exertional dyspnea which she does not believe has changed.  I suspect some of her exertional dyspnea is due to cardiovascular deconditioning as well as her chronic HFpEF.  She has just completed physical therapy and is walking down the hallway of her apartment building and back.  We talked about the importance of continuing to do this on a daily basis and increasing the distance of her walk to try to improve on cardiovascular conditioning.  2.  Chronic HFpEF, currently controlled.  She denies any symptoms of orthopnea, PND or lower extremity edema.  Her weight is actually down from previous weights in our clinic.  At this point we will continue current doses of diuretic therapy.  3.  Paroxysmal atrial fibrillation with recurrent episodes this past fall.  She was switched from metoprolol to sotalol with planned cardioversion in early December but was found to be back in sinus rhythm when she presented for the cardioversion.  Heart rates are somewhat slow today at 56 although again she denies any worsening of her mild exertional dyspnea or symptoms of lightheadedness or near syncope.  Will be following up in the A-fib clinic in 2 weeks and thus we will hold on making any changes in her medication.  4.  Mixed hyperlipidemia with excellent LDL control at 56.  No change in medications.    Plan:  1.  Continue current medications  2.  Encouraged the patient to continue with regular walking program to improve cardiovascular endurance  3.   Tentative follow-up with me in 1 year or sooner if new issues  4.  Follow-up in the A-fib clinic in 2 weeks as previously recommended       History of Present Illness    Ms. Tommy Rea is a 78 year old female with history of moderate coronary artery disease by angiography in 2012, mixed hyperlipidemia, chronic HFpEF, paroxysmal atrial fibrillation who returns to the office today for her yearly follow-up visit.  Recently had more issues with recurrent atrial fibrillation and was seen in the A-fib clinic and switched from metoprolol to sotalol with plan for cardioversion in early December.  When she presented for the cardioversion, she was found to actually be back in sinus rhythm.  She has been maintained on sotalol therapy over the past 6 weeks and reports no symptoms of lightheadedness or near syncope.  Has been undergoing physical therapy at her home which is just completed.  We talked about the importance of continuing with her walks in the hallway of her apartment building, trying to do it 5 days each week.  Denies any recent weight gain, orthopnea, PND or lower extremity edema.  Also denies any exertional chest discomfort.    ECG (personally reviewed): No ECG today    Cardiac Imaging Studies (personally reviewed): No new imaging     Physical Examination Review of Systems   /68 (BP Location: Left arm, Patient Position: Sitting, Cuff Size: Adult Regular)   Pulse 56   Resp 16   Wt 81.2 kg (179 lb)   SpO2 98%   BMI 28.89 kg/m    Body mass index is 28.89 kg/m .  Wt Readings from Last 3 Encounters:   01/24/24 81.2 kg (179 lb)   10/05/23 82.1 kg (181 lb)   09/27/23 84.8 kg (186 lb 14.4 oz)     General Appearance:   Awake, Alert, No acute distress.   HEENT:  No scleral icterus; the mucous membranes were pink and moist.   Neck: No cervical bruits or jugular venous distention    Chest: The spine was straight. The chest was symmetric.   Lungs:   Respirations unlabored; the lungs are clear to  auscultation. No wheezing   Cardiovascular:   Regular rhythm.  S1, S2 normal.  No murmur or gallop   Abdomen:  No organomegaly, masses, bruits, or tenderness. Bowels sounds are present   Extremities: No peripheral edema bilaterally   Skin: No xanthelasma. Warm, Dry.   Musculoskeletal: No tenderness.   Neurologic: Mood and affect are appropriate.    Enc Vitals  BP: 108/68  Pulse: 56  Resp: 16  SpO2: 98 %  Weight: 81.2 kg (179 lb) (morning weight)                                         Medical History  Surgical History Family History Social History   Past Medical History:   Diagnosis Date    (HFpEF) heart failure with preserved ejection fraction (H) 1/20/2020    Arthritis     Asthma     Atrial fibrillation (H) 10/24/2016    B12 deficiency     Benign essential hypertension 8/2/2021    Formatting of this note might be different from the original. Created by Conversion    CAD (coronary artery disease)     Congestive heart failure (H)     COPD (chronic obstructive pulmonary disease) (H)     COPD (chronic obstructive pulmonary disease) (H)     Crohn's disease (H)     Crohn's disease (H)     Depression     Depressive disorder     Esophageal reflux     Essential hypertension     Fracture of left hip requiring operative repair (H)     Heart disease     a fib    History of shingles     Hyperlipidemia     Hyperparathyroidism (H24)     Hypertension     Osteoporosis     Pulmonary nodule     benign    Regional enteritis (H) 07/19/2016    Seizure (H)     Seizures (H)     Past Surgical History:   Procedure Laterality Date    ARTHROPLASTY REVISION HIP Left 07/19/2016    removal of gamma nail hardware & conversion to total hip arthroplasty: Dr. Mercado at [facility] REMOVAL DEEP IMPLANT Left 1/25/2015    Procedure: Gamma Nail Left Hip;  Surgeon: Kirk De La Cruz MD;  Location: [facility] OR;  Service: Orthopedics    HEMORRHOIDECTOMY      ORIF HIP FRACTURE  2015    ORTHOPEDIC SURGERY      ORIF    OTHER SURGICAL HISTORY       HEMMOIROIDECT    OTHER SURGICAL HISTORY      CVL HEART CATH LEFT    Family History   Problem Relation Age of Onset    Hypertension Mother     Colon Cancer Father     Arthritis Father     Hypertension Father     Heart Disease Mother     Heart Disease Father     Social History     Socioeconomic History    Marital status:      Spouse name: Not on file    Number of children: Not on file    Years of education: Not on file    Highest education level: Not on file   Occupational History    Not on file   Tobacco Use    Smoking status: Former     Types: Cigarettes     Quit date: 2011     Years since quittin.5    Smokeless tobacco: Never   Substance and Sexual Activity    Alcohol use: No    Drug use: No    Sexual activity: Not on file   Other Topics Concern    Parent/sibling w/ CABG, MI or angioplasty before 65F 55M? Not Asked   Social History Narrative    Not on file     Social Determinants of Health     Financial Resource Strain: Not on file   Food Insecurity: Not on file   Transportation Needs: Not on file   Physical Activity: Not on file   Stress: Not on file   Social Connections: Not on file   Interpersonal Safety: Not on file   Housing Stability: Not on file          Medications  Allergies   Current Outpatient Medications   Medication Sig Dispense Refill    acetaminophen (TYLENOL) 500 MG tablet Take 1,000 mg by mouth 2 times daily       albuterol (PROAIR HFA/PROVENTIL HFA/VENTOLIN HFA) 108 (90 Base) MCG/ACT inhaler Inhale 2 puffs into the lungs every 6 hours as needed      alendronate (FOSAMAX) 70 MG tablet Take 70 mg by mouth every 7 days Only 2 tabs left then stopping      apixaban ANTICOAGULANT (ELIQUIS) 5 MG tablet Take 5 mg by mouth 2 times daily       aspirin 81 MG EC tablet Take 81 mg by mouth daily       CALCIUM-VITAMIN D PO Take 2 tablets by mouth daily      colesevelam (WELCHOL) 625 MG tablet Take 3 tablets by mouth At Bedtime      cyanocobalamin (CYANOCOBALAMIN) 1000 MCG/ML injection Inject  1,000 mcg into the muscle every 30 days      fluticasone (FLONASE) 50 MCG/ACT nasal spray Spray 1 spray into both nostrils daily      fluticasone (FLOVENT HFA) 110 MCG/ACT inhaler Inhale 1 puff into the lungs 2 times daily       loratadine (CLARITIN) 10 MG tablet Take 10 mg by mouth daily as needed       Magnesium Oxide 250 MG TABS Take 250 mg by mouth daily       multivitamin w/minerals (THERA-VIT-M) tablet Take 1 tablet by mouth daily       nitroGLYcerin (NITROSTAT) 0.4 MG sublingual tablet Place 1 tablet (0.4 mg) under the tongue every 5 minutes as needed for chest pain 25 tablet 1    PARoxetine (PAXIL) 40 MG tablet Take 40 mg by mouth daily       phenytoin (DILANTIN) 100 MG ER capsule Take 5 tablets at bedtime 450 capsule 3    potassium chloride ER (KLOR-CON M) 20 MEQ CR tablet Take 1 tablet (20 mEq) by mouth daily 270 tablet 3    simvastatin (ZOCOR) 40 MG tablet Take 40 mg by mouth At Bedtime       sodium chloride (OCEAN) 0.65 % nasal spray Spray 1 spray in nostril daily as needed      sotalol (BETAPACE) 80 MG tablet Take 1 tablet (80 mg) by mouth 2 times daily 180 tablet 3    spironolactone (ALDACTONE) 50 MG tablet Take 1 tablet (50 mg) by mouth daily 90 tablet 3    STELARA 45 MG/0.5ML SOLN Inject 45 mg Subcutaneous every 30 days      topiramate (TOPAMAX) 100 MG tablet Take 1.5 tablets (150 mg) by mouth 2 times daily 270 tablet 3    torsemide (DEMADEX) 20 MG tablet TAKE 2 TABLETS TWICE DAILY BEFORE MEALS 360 tablet 0    Vitamin D3 (CHOLECALCIFEROL) 125 MCG (5000 UT) tablet Take 125 mcg by mouth daily      metolazone (ZAROXOLYN) 2.5 MG tablet Take 1 tablet (2.5 mg) by mouth daily as needed (As instructed by your provider for heart failure symptoms) Take 30 minutes prior to AM dose of torsemide (Patient not taking: Reported on 1/24/2024) 8 tablet 1      Allergies   Allergen Reactions    Amoxicillin GI Disturbance and Other (See Comments)     COLITIS    Has tolerated cefazolin  GI bleeding, Colitis, has tolerated  cefazolin       Sulfa Antibiotics     Sulfamethoxazole-Trimethoprim Other (See Comments)     Other reaction(s): Vaginal Irritation  VAGINAL BLEEDING  Vaginal irritation, vaginal bleeding            Lab Results    Chemistry/lipid CBC Cardiac Enzymes/BNP/TSH/INR   Recent Labs   Lab Test 12/13/23  1617   TRIG 306*   LDL 56   BUN 14.5      CO2 26    Recent Labs   Lab Test 09/06/23  0533   WBC 8.1   HGB 8.7*   HCT 31.9*   MCV 88       Recent Labs   Lab Test 09/13/21  0925 05/11/20  0812 04/25/20  2300 07/21/19  0545 07/17/19  0620   TROPONINI  --   --  0.04  --   --    *   < >  --    < >  --    TSH  --   --   --   --  2.90    < > = values in this interval not displayed.        A total of 40 minutes was spent reviewing patient's medical records, obtaining history and performing examination, as well as discussing diagnoses/ recommendations with patient and answering all questions.

## 2024-01-26 NOTE — PROGRESS NOTES
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  Previous workup included EEG that showed left hemispheric sharp activity.  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  I have recommended:    1.  Continue Dilantin 500 mg at bedtime and Topamax 150 mg twice daily.  Prescriptions were filled for next year  2.  Check free and bound phenytoin level, Topamax level and comprehensive metabolic panel  3.  She had reported similar episodes with feeling of aura and had an EEG in  that showed paroxysmal slowing in theta and delta range but no sharp activity was seen.  Intermittent facial warmth and burning sensation could be side effect of Topamax  4.  Follow-up in 1 year    Thank you again for this referral, please feel free to contact me if you have any questions.    Guy Marie MD  [facility]  (Formerly, [facility]     HISTORY OF PRESENT ILLNESS     Patient is a 78-year-old female with history of depression with anxiety who returns for yearly follow-up for complex partial seizures she was last seen on 2023 when she reported increased auras but no change was made in the dose of Dilantin 500 mg at bedtime and Topamax 150 mg twice daily.  EEG showed nonspecific slowing but no seizure activity.  Lab work included normal free level of Dilantin and also Topamax level was normal and no change was made.  She reports no seizures but  occasionally noticed warmth sensation on the face that has for short duration.  There is no history of any typical RIN tonic-clonic activity.  These episodes occur at random    Briefly patient is a female with CHF, depression and anxiety, paroxysmal A. fib and complex partial seizure who was  initially evaluated for seizure when she presented with a generalized seizure and EEG showed left hemispheric sharp activity. MRI was normal. 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 Patient reports that her seizures are triggered by stress and usually she gets an aura. 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) G40.209    Hyperlipidemia E78.5    Depressive disorder F32.A    Coronary atherosclerosis I25.10    B12 deficiency E53.8    Persistent atrial fibrillation (H) I48.19    (HFpEF) heart failure with preserved ejection fraction (H) I50.30    COPD (chronic obstructive pulmonary disease) (H) J44.9    Benign essential hypertension I10    Hyperparathyroidism (H24) E21.3    Closed nondisplaced transverse fracture of shaft of right fibula with routine healing S82.424D         PAST MEDICAL & SURGICAL HISTORY     Past Medical History:   Patient  has a past medical history of (HFpEF) heart failure with preserved ejection fraction (H) (1/20/2020), Arthritis, Asthma, Atrial fibrillation (H) (10/24/2016), B12 deficiency, Benign essential hypertension (8/2/2021), CAD (coronary artery disease), Congestive heart failure (H), COPD (chronic obstructive pulmonary disease) (H), COPD (chronic obstructive pulmonary disease) (H), Crohn's disease (H), Crohn's disease (H), Depression, Depressive disorder, Esophageal reflux, Essential hypertension, Fracture of left hip requiring operative repair (H),  Heart disease, History of shingles, Hyperlipidemia, Hyperparathyroidism (H24), Hypertension, Osteoporosis, Pulmonary nodule, Regional enteritis (H) (07/19/2016), Seizure (H), and Seizures (H).    Surgical History:  She  has a past surgical history that includes orthopedic surgery; hemorrhoidectomy; REMOVAL DEEP IMPLANT (Left, 1/25/2015); Orif Hip Fracture (2015); Arthroplasty revision hip (Left, 07/19/2016); other surgical history; and other surgical history.     SOCIAL HISTORY     Reviewed, and she  reports that she quit smoking about 12 years ago. Her smoking use included cigarettes. She has never used smokeless tobacco. She reports that she does not drink alcohol and does not use drugs.     FAMILY HISTORY     Reviewed, and family history includes Arthritis in her father; Colon Cancer in her father; Heart Disease in her father and mother; Hypertension in her father and mother.     ALLERGIES     Allergies   Allergen Reactions    Amoxicillin GI Disturbance and Other (See Comments)     COLITIS    Has tolerated cefazolin  GI bleeding, Colitis, has tolerated cefazolin       Sulfa Antibiotics     Sulfamethoxazole-Trimethoprim Other (See Comments)     Other reaction(s): Vaginal Irritation  VAGINAL BLEEDING  Vaginal irritation, vaginal bleeding            REVIEW OF SYSTEMS     A 12 point review of system was performed and was negative except as outlined in the history of present illness.     HOME MEDICATIONS     Current Outpatient Rx   Medication Sig Dispense Refill    acetaminophen (TYLENOL) 500 MG tablet Take 1,000 mg by mouth 2 times daily       albuterol (PROAIR HFA/PROVENTIL HFA/VENTOLIN HFA) 108 (90 Base) MCG/ACT inhaler Inhale 2 puffs into the lungs every 6 hours as needed      alendronate (FOSAMAX) 70 MG tablet Take 70 mg by mouth every 7 days Only 2 tabs left then stopping      apixaban ANTICOAGULANT (ELIQUIS) 5 MG tablet Take 5 mg by mouth 2 times daily       aspirin 81 MG EC tablet Take 81 mg by mouth daily        CALCIUM-VITAMIN D PO Take 2 tablets by mouth daily      colesevelam (WELCHOL) 625 MG tablet Take 3 tablets by mouth At Bedtime      cyanocobalamin (CYANOCOBALAMIN) 1000 MCG/ML injection Inject 1,000 mcg into the muscle every 30 days      fluticasone (FLONASE) 50 MCG/ACT nasal spray Spray 1 spray into both nostrils daily      fluticasone (FLOVENT HFA) 110 MCG/ACT inhaler Inhale 1 puff into the lungs 2 times daily       loratadine (CLARITIN) 10 MG tablet Take 10 mg by mouth daily as needed       Magnesium Oxide 250 MG TABS Take 250 mg by mouth daily       metolazone (ZAROXOLYN) 2.5 MG tablet Take 1 tablet (2.5 mg) by mouth daily as needed (As instructed by your provider for heart failure symptoms) Take 30 minutes prior to AM dose of torsemide 8 tablet 1    multivitamin w/minerals (THERA-VIT-M) tablet Take 1 tablet by mouth daily       nitroGLYcerin (NITROSTAT) 0.4 MG sublingual tablet Place 1 tablet (0.4 mg) under the tongue every 5 minutes as needed for chest pain 25 tablet 1    PARoxetine (PAXIL) 40 MG tablet Take 40 mg by mouth daily       phenytoin (DILANTIN) 100 MG ER capsule Take 5 tablets at bedtime 450 capsule 3    potassium chloride ER (KLOR-CON M) 20 MEQ CR tablet Take 1 tablet (20 mEq) by mouth daily 270 tablet 3    simvastatin (ZOCOR) 40 MG tablet Take 40 mg by mouth At Bedtime       sodium chloride (OCEAN) 0.65 % nasal spray Spray 1 spray in nostril daily as needed      sotalol (BETAPACE) 80 MG tablet Take 1 tablet (80 mg) by mouth 2 times daily 180 tablet 3    spironolactone (ALDACTONE) 50 MG tablet Take 1 tablet (50 mg) by mouth daily 90 tablet 3    STELARA 45 MG/0.5ML SOLN Inject 45 mg Subcutaneous every 30 days      topiramate (TOPAMAX) 100 MG tablet Take 1.5 tablets (150 mg) by mouth 2 times daily 270 tablet 3    torsemide (DEMADEX) 20 MG tablet TAKE 2 TABLETS TWICE DAILY BEFORE MEALS 360 tablet 0    Vitamin D3 (CHOLECALCIFEROL) 125 MCG (5000 UT) tablet Take 125 mcg by mouth daily        "    PHYSICAL EXAM     Vital signs  /62 (BP Location: Left arm, Patient Position: Sitting)   Pulse 62   Ht 1.676 m (5' 6\")   Wt 80.7 kg (178 lb)   BMI 28.73 kg/m      Weight:   178 lbs 0 oz    General Physical Exam: Patient is alert and oriented x 3. Vital signs were reviewed and are documented in EMR. Neck was supple  Neurological Exam:  Patient is alert and oriented x3 speech was normal there was no dysarthria or aphasia.  Cranial nerves are intact.  She does appear hard of hearing.  Face symmetrical tongue midline.  Strength in all extremities 5/5.  Sensation intact.  Gait normal.  No dysmetria noted on finger-nose testing.     PERTINENT DIAGNOSTIC STUDIES     Following studies were reviewed:     CT HEAD 4/26/20  1.  No CT evidence for acute intracranial process.  2.  Brain atrophy and presumed chronic microvascular ischemic changes as above.  3.  Mucosal thickening is noted involving the left sphenoid sinus. Correlation for sinusitis is recommended.     CT HEAD 7/17/19  1. No intracranial mass. No hemorrhage or stroke.  2. Mild presumed chronic small vessel ischemic change and age-related change again visualized.  3. Interval development of polypoid soft tissue in the external auditory canals.     MRI 4/27/20  HEAD MRI:  1.  No acute intracranial finding. No evidence for recent ischemia, intracranial hemorrhage, or mass.    HEAD MRA:  1.  Negative MR angiogram of the brain. No evidence for aneurysm, proximal vessel occlusion, high-grade intracranial stenosis or high flow vascular lesion.    NECK MRA:  1.  Atherosclerotic narrowing of the proximal left internal carotid artery is not well-characterized due to motion artifact but suspected to be in the 50% range based on NASCET criteria.  2.  No hemodynamically significant narrowing within the right carotid artery.   3.  Vertebral arteries are patent. No dissection.     EEG 3/31/2022  This is an abnormal EEG due to paroxysmal slowing of the background and " theta range that suggests nonspecific generalized cerebral dysfunction. Such slowing can be seen in metabolic, toxic abnormalities and due to medication effect. Clinical correlation is recommended. No sharp discharges or rhythmic activity was seen to suggest seizures.     EEG 8/3/2021  Nonspecific slowing in theta range.  No sharp activity seen to suggest seizures     EEG 4/27/20  This is an abnormal EEG due to diffusely slow background in   theta and delta range that suggests nonspecific generalized cerebral   dysfunction.  EEG background activity is contaminated with muscle   artifact.  Such nonspecific slowing suggests diffuse cerebral dysfunction   but no clear epileptiform activity was noted     MRI OF THE HEAD DONE in 2015 was normal. EEG was abnormal suggesting low threshold for seizure     PERTINENT LABS  Following labs were reviewed:  Lab Requisition on 12/13/2023   Component Date Value Ref Range Status    Vitamin B12 12/13/2023 587  232 - 1,245 pg/mL Final    Sodium 12/13/2023 135  135 - 145 mmol/L Final    Potassium 12/13/2023 3.9  3.4 - 5.3 mmol/L Final    Carbon Dioxide (CO2) 12/13/2023 26  22 - 29 mmol/L Final    Anion Gap 12/13/2023 11  7 - 15 mmol/L Final    Urea Nitrogen 12/13/2023 14.5  8.0 - 23.0 mg/dL Final    Creatinine 12/13/2023 0.80  0.51 - 0.95 mg/dL Final    GFR Estimate 12/13/2023 75  >60 mL/min/1.73m2 Final    Calcium 12/13/2023 8.6 (L)  8.8 - 10.2 mg/dL Final    Chloride 12/13/2023 98  98 - 107 mmol/L Final    Glucose 12/13/2023 98  70 - 99 mg/dL Final    Alkaline Phosphatase 12/13/2023 80  40 - 150 U/L Final    AST 12/13/2023 26  0 - 45 U/L Final    ALT 12/13/2023 29  0 - 50 U/L Final    Protein Total 12/13/2023 7.4  6.4 - 8.3 g/dL Final    Albumin 12/13/2023 4.0  3.5 - 5.2 g/dL Final    Bilirubin Total 12/13/2023 0.2  <=1.2 mg/dL Final    Cholesterol 12/13/2023 165  <200 mg/dL Final    Triglycerides 12/13/2023 306 (H)  <150 mg/dL Final    Direct Measure HDL 12/13/2023 48 (L)  >=50  mg/dL Final    LDL Cholesterol Calculated 12/13/2023 56  <=100 mg/dL Final    Non HDL Cholesterol 12/13/2023 117  <130 mg/dL Final    Patient Fasting > 8hrs? 12/13/2023 Unknown   Final   Admission on 12/06/2023, Discharged on 12/06/2023   Component Date Value Ref Range Status    Ventricular Rate 12/06/2023 57  BPM Final    Atrial Rate 12/06/2023 57  BPM Final    NC Interval 12/06/2023 160  ms Final    QRS Duration 12/06/2023 76  ms Final    QT 12/06/2023 508  ms Final    QTc 12/06/2023 494  ms Final    P Axis 12/06/2023 54  degrees Final    R AXIS 12/06/2023 -2  degrees Final    T Axis 12/06/2023 1  degrees Final    Interpretation ECG 12/06/2023    Final                    Value:Sinus bradycardia  Nonspecific ST abnormality  Prolonged QT  Abnormal ECG  When compared with ECG of 11-JUL-2022 13:40,  QT has lengthened  Confirmed by LIS LOPEZ, [facility] LOC: ([id number]) on 12/6/2023 3:29:06 PM     Office Visit on 11/24/2023   Component Date Value Ref Range Status    Sodium 11/24/2023 139  135 - 145 mmol/L Final    Potassium 11/24/2023 4.2  3.4 - 5.3 mmol/L Final    Chloride 11/24/2023 103  98 - 107 mmol/L Final    Carbon Dioxide (CO2) 11/24/2023 25  22 - 29 mmol/L Final    Anion Gap 11/24/2023 11  7 - 15 mmol/L Final    Urea Nitrogen 11/24/2023 19.4  8.0 - 23.0 mg/dL Final    Creatinine 11/24/2023 0.84  0.51 - 0.95 mg/dL Final    GFR Estimate 11/24/2023 71  >60 mL/min/1.73m2 Final    Calcium 11/24/2023 9.0  8.8 - 10.2 mg/dL Final    Glucose 11/24/2023 99  70 - 99 mg/dL Final         Total time spent for face to face visit, reviewing labs/imaging studies, counseling and coordination of care was: 30 Minutes spent on the date of the encounter doing chart review, review of outside records, review of test results, interpretation of tests, patient visit, and documentation   The longitudinal plan of care for complex partial seizures was addressed during this visit. Due to the added complexity in care, I will continue to support  Tommy in the subsequent management of this condition(s) and with the ongoing continuity of care of this condition(s).  This note was dictated using voice recognition software.  Any grammatical or context distortions are unintentional and inherent to the software.    No orders of the defined types were placed in this encounter.     New Prescriptions    No medications on file     Modified Medications    No medications on file

## 2024-01-29 ENCOUNTER — OFFICE VISIT (OUTPATIENT)
Dept: NEUROLOGY | Facility: CLINIC | Age: 79
End: 2024-01-29
Attending: PSYCHIATRY & NEUROLOGY
Payer: COMMERCIAL

## 2024-01-29 ENCOUNTER — LAB (OUTPATIENT)
Dept: LAB | Facility: HOSPITAL | Age: 79
End: 2024-01-29
Payer: COMMERCIAL

## 2024-01-29 VITALS
HEART RATE: 62 BPM | BODY MASS INDEX: 28.61 KG/M2 | DIASTOLIC BLOOD PRESSURE: 62 MMHG | SYSTOLIC BLOOD PRESSURE: 124 MMHG | HEIGHT: 66 IN | WEIGHT: 178 LBS

## 2024-01-29 DIAGNOSIS — G40.209 NONINTRACTABLE EPILEPSY WITH COMPLEX PARTIAL SEIZURES (H): ICD-10-CM

## 2024-01-29 DIAGNOSIS — G40.209 NONINTRACTABLE EPILEPSY WITH COMPLEX PARTIAL SEIZURES (H): Primary | ICD-10-CM

## 2024-01-29 LAB
ALBUMIN SERPL BCG-MCNC: 4 G/DL (ref 3.5–5.2)
ALP SERPL-CCNC: 78 U/L (ref 40–150)
ALT SERPL W P-5'-P-CCNC: 27 U/L (ref 0–50)
ANION GAP SERPL CALCULATED.3IONS-SCNC: 7 MMOL/L (ref 7–15)
AST SERPL W P-5'-P-CCNC: 30 U/L (ref 0–45)
BILIRUB SERPL-MCNC: 0.2 MG/DL
BUN SERPL-MCNC: 25.8 MG/DL (ref 8–23)
CALCIUM SERPL-MCNC: 9.2 MG/DL (ref 8.8–10.2)
CHLORIDE SERPL-SCNC: 101 MMOL/L (ref 98–107)
CREAT SERPL-MCNC: 0.86 MG/DL (ref 0.51–0.95)
DEPRECATED HCO3 PLAS-SCNC: 31 MMOL/L (ref 22–29)
EGFRCR SERPLBLD CKD-EPI 2021: 69 ML/MIN/1.73M2
GLUCOSE SERPL-MCNC: 102 MG/DL (ref 70–99)
PHENYTOIN SERPL-MCNC: 18.5 UG/ML
POTASSIUM SERPL-SCNC: 4.4 MMOL/L (ref 3.4–5.3)
PROT SERPL-MCNC: 7.9 G/DL (ref 6.4–8.3)
SODIUM SERPL-SCNC: 139 MMOL/L (ref 135–145)

## 2024-01-29 PROCEDURE — G2211 COMPLEX E/M VISIT ADD ON: HCPCS | Performed by: PSYCHIATRY & NEUROLOGY

## 2024-01-29 PROCEDURE — 99214 OFFICE O/P EST MOD 30 MIN: CPT | Performed by: PSYCHIATRY & NEUROLOGY

## 2024-01-29 PROCEDURE — 80185 ASSAY OF PHENYTOIN TOTAL: CPT

## 2024-01-29 PROCEDURE — 82040 ASSAY OF SERUM ALBUMIN: CPT

## 2024-01-29 PROCEDURE — 80186 ASSAY OF PHENYTOIN FREE: CPT

## 2024-01-29 PROCEDURE — 36415 COLL VENOUS BLD VENIPUNCTURE: CPT

## 2024-01-29 PROCEDURE — 80201 ASSAY OF TOPIRAMATE: CPT

## 2024-01-29 RX ORDER — PHENYTOIN SODIUM 100 MG/1
CAPSULE, EXTENDED RELEASE ORAL
Qty: 450 CAPSULE | Refills: 3 | Status: SHIPPED | OUTPATIENT
Start: 2024-01-29 | End: 2024-02-20

## 2024-01-29 RX ORDER — TOPIRAMATE 100 MG/1
150 TABLET, FILM COATED ORAL 2 TIMES DAILY
Qty: 270 TABLET | Refills: 3 | Status: SHIPPED | OUTPATIENT
Start: 2024-01-29 | End: 2024-02-20

## 2024-01-29 NOTE — NURSING NOTE
Chief Complaint   Patient presents with    Seizures     Annual follow up- patient reports her head feels like it gets real hot and then goes away- lasts for a few seconds. Feels like she is going to have an aura but she does not. Happens 7-8x per day every few weeks      Katherine Preston CMA on 1/29/2024 at 9:33 AM  [facility]

## 2024-01-29 NOTE — LETTER
2024         RE: Tommy Rea  [address]  [place] [address]        Dear Colleague,    Thank you for referring your patient, Tommy Rea, to the [facility] [place]. Please see a copy of my visit note below.    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  Previous workup included EEG that showed left hemispheric sharp activity.  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  I have recommended:    1.  Continue Dilantin 500 mg at bedtime and Topamax 150 mg twice daily.  Prescriptions were filled for next year  2.  Check free and bound phenytoin level, Topamax level and comprehensive metabolic panel  3.  She had reported similar episodes with feeling of aura and had an EEG in  that showed paroxysmal slowing in theta and delta range but no sharp activity was seen.  Intermittent facial warmth and burning sensation could be side effect of Topamax  4.  Follow-up in 1 year    Thank you again for this referral, please feel free to contact me if you have any questions.    Guy Marie MD  [facility]  (Formerly, [facility]     HISTORY OF PRESENT ILLNESS     Patient is a 78-year-old female with history of depression with anxiety who returns for yearly follow-up for complex partial seizures she was last seen on 2023 when she reported increased auras but no change  was made in the dose of Dilantin 500 mg at bedtime and Topamax 150 mg twice daily.  EEG showed nonspecific slowing but no seizure activity.  Lab work included normal free level of Dilantin and also Topamax level was normal and no change was made.  She reports no seizures but occasionally noticed warmth sensation on the face that has for short duration.  There is no history of any typical RIN tonic-clonic activity.  These episodes occur at random    Briefly patient is a female with CHF, depression and anxiety, paroxysmal A. fib and complex partial seizure who was  initially evaluated for seizure when she presented with a generalized seizure and EEG showed left hemispheric sharp activity. MRI was normal. 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 Patient reports that her seizures are triggered by stress and usually she gets an aura. 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) G40.209     Hyperlipidemia E78.5     Depressive disorder F32.A     Coronary atherosclerosis I25.10     B12 deficiency E53.8     Persistent atrial fibrillation (H) I48.19     (HFpEF) heart failure with preserved ejection fraction (H) I50.30     COPD (chronic obstructive pulmonary disease) (H) J44.9     Benign essential hypertension I10     Hyperparathyroidism (H24) E21.3     Closed nondisplaced transverse fracture of shaft of right fibula with routine healing S82.424D         PAST MEDICAL & SURGICAL HISTORY     Past Medical History:   Patient  has a past medical history of (HFpEF) heart failure with preserved ejection fraction (H) (1/20/2020), Arthritis, Asthma, Atrial fibrillation (H) (10/24/2016), B12 deficiency, Benign essential hypertension (8/2/2021), CAD (coronary artery disease), Congestive  heart failure (H), COPD (chronic obstructive pulmonary disease) (H), COPD (chronic obstructive pulmonary disease) (H), Crohn's disease (H), Crohn's disease (H), Depression, Depressive disorder, Esophageal reflux, Essential hypertension, Fracture of left hip requiring operative repair (H), Heart disease, History of shingles, Hyperlipidemia, Hyperparathyroidism (H24), Hypertension, Osteoporosis, Pulmonary nodule, Regional enteritis (H) (07/19/2016), Seizure (H), and Seizures (H).    Surgical History:  She  has a past surgical history that includes orthopedic surgery; hemorrhoidectomy; REMOVAL DEEP IMPLANT (Left, 1/25/2015); Orif Hip Fracture (2015); Arthroplasty revision hip (Left, 07/19/2016); other surgical history; and other surgical history.     SOCIAL HISTORY     Reviewed, and she  reports that she quit smoking about 12 years ago. Her smoking use included cigarettes. She has never used smokeless tobacco. She reports that she does not drink alcohol and does not use drugs.     FAMILY HISTORY     Reviewed, and family history includes Arthritis in her father; Colon Cancer in her father; Heart Disease in her father and mother; Hypertension in her father and mother.     ALLERGIES     Allergies   Allergen Reactions     Amoxicillin GI Disturbance and Other (See Comments)     COLITIS    Has tolerated cefazolin  GI bleeding, Colitis, has tolerated cefazolin        Sulfa Antibiotics      Sulfamethoxazole-Trimethoprim Other (See Comments)     Other reaction(s): Vaginal Irritation  VAGINAL BLEEDING  Vaginal irritation, vaginal bleeding            REVIEW OF SYSTEMS     A 12 point review of system was performed and was negative except as outlined in the history of present illness.     HOME MEDICATIONS     Current Outpatient Rx   Medication Sig Dispense Refill     acetaminophen (TYLENOL) 500 MG tablet Take 1,000 mg by mouth 2 times daily        albuterol (PROAIR HFA/PROVENTIL HFA/VENTOLIN HFA) 108 (90 Base) MCG/ACT inhaler  Inhale 2 puffs into the lungs every 6 hours as needed       alendronate (FOSAMAX) 70 MG tablet Take 70 mg by mouth every 7 days Only 2 tabs left then stopping       apixaban ANTICOAGULANT (ELIQUIS) 5 MG tablet Take 5 mg by mouth 2 times daily        aspirin 81 MG EC tablet Take 81 mg by mouth daily        CALCIUM-VITAMIN D PO Take 2 tablets by mouth daily       colesevelam (WELCHOL) 625 MG tablet Take 3 tablets by mouth At Bedtime       cyanocobalamin (CYANOCOBALAMIN) 1000 MCG/ML injection Inject 1,000 mcg into the muscle every 30 days       fluticasone (FLONASE) 50 MCG/ACT nasal spray Spray 1 spray into both nostrils daily       fluticasone (FLOVENT HFA) 110 MCG/ACT inhaler Inhale 1 puff into the lungs 2 times daily        loratadine (CLARITIN) 10 MG tablet Take 10 mg by mouth daily as needed        Magnesium Oxide 250 MG TABS Take 250 mg by mouth daily        metolazone (ZAROXOLYN) 2.5 MG tablet Take 1 tablet (2.5 mg) by mouth daily as needed (As instructed by your provider for heart failure symptoms) Take 30 minutes prior to AM dose of torsemide 8 tablet 1     multivitamin w/minerals (THERA-VIT-M) tablet Take 1 tablet by mouth daily        nitroGLYcerin (NITROSTAT) 0.4 MG sublingual tablet Place 1 tablet (0.4 mg) under the tongue every 5 minutes as needed for chest pain 25 tablet 1     PARoxetine (PAXIL) 40 MG tablet Take 40 mg by mouth daily        phenytoin (DILANTIN) 100 MG ER capsule Take 5 tablets at bedtime 450 capsule 3     potassium chloride ER (KLOR-CON M) 20 MEQ CR tablet Take 1 tablet (20 mEq) by mouth daily 270 tablet 3     simvastatin (ZOCOR) 40 MG tablet Take 40 mg by mouth At Bedtime        sodium chloride (OCEAN) 0.65 % nasal spray Spray 1 spray in nostril daily as needed       sotalol (BETAPACE) 80 MG tablet Take 1 tablet (80 mg) by mouth 2 times daily 180 tablet 3     spironolactone (ALDACTONE) 50 MG tablet Take 1 tablet (50 mg) by mouth daily 90 tablet 3     STELARA 45 MG/0.5ML SOLN Inject 45  "mg Subcutaneous every 30 days       topiramate (TOPAMAX) 100 MG tablet Take 1.5 tablets (150 mg) by mouth 2 times daily 270 tablet 3     torsemide (DEMADEX) 20 MG tablet TAKE 2 TABLETS TWICE DAILY BEFORE MEALS 360 tablet 0     Vitamin D3 (CHOLECALCIFEROL) 125 MCG (5000 UT) tablet Take 125 mcg by mouth daily           PHYSICAL EXAM     Vital signs  /62 (BP Location: Left arm, Patient Position: Sitting)   Pulse 62   Ht 1.676 m (5' 6\")   Wt 80.7 kg (178 lb)   BMI 28.73 kg/m      Weight:   178 lbs 0 oz    General Physical Exam: Patient is alert and oriented x 3. Vital signs were reviewed and are documented in EMR. Neck was supple  Neurological Exam:  Patient is alert and oriented x3 speech was normal there was no dysarthria or aphasia.  Cranial nerves are intact.  She does appear hard of hearing.  Face symmetrical tongue midline.  Strength in all extremities 5/5.  Sensation intact.  Gait normal.  No dysmetria noted on finger-nose testing.     PERTINENT DIAGNOSTIC STUDIES     Following studies were reviewed:     CT HEAD 4/26/20  1.  No CT evidence for acute intracranial process.  2.  Brain atrophy and presumed chronic microvascular ischemic changes as above.  3.  Mucosal thickening is noted involving the left sphenoid sinus. Correlation for sinusitis is recommended.     CT HEAD 7/17/19  1. No intracranial mass. No hemorrhage or stroke.  2. Mild presumed chronic small vessel ischemic change and age-related change again visualized.  3. Interval development of polypoid soft tissue in the external auditory canals.     MRI 4/27/20  HEAD MRI:  1.  No acute intracranial finding. No evidence for recent ischemia, intracranial hemorrhage, or mass.    HEAD MRA:  1.  Negative MR angiogram of the brain. No evidence for aneurysm, proximal vessel occlusion, high-grade intracranial stenosis or high flow vascular lesion.    NECK MRA:  1.  Atherosclerotic narrowing of the proximal left internal carotid artery is not " well-characterized due to motion artifact but suspected to be in the 50% range based on NASCET criteria.  2.  No hemodynamically significant narrowing within the right carotid artery.   3.  Vertebral arteries are patent. No dissection.     EEG 3/31/2022  This is an abnormal EEG due to paroxysmal slowing of the background and theta range that suggests nonspecific generalized cerebral dysfunction. Such slowing can be seen in metabolic, toxic abnormalities and due to medication effect. Clinical correlation is recommended. No sharp discharges or rhythmic activity was seen to suggest seizures.     EEG 8/3/2021  Nonspecific slowing in theta range.  No sharp activity seen to suggest seizures     EEG 4/27/20  This is an abnormal EEG due to diffusely slow background in   theta and delta range that suggests nonspecific generalized cerebral   dysfunction.  EEG background activity is contaminated with muscle   artifact.  Such nonspecific slowing suggests diffuse cerebral dysfunction   but no clear epileptiform activity was noted     MRI OF THE HEAD DONE in 2015 was normal. EEG was abnormal suggesting low threshold for seizure     PERTINENT LABS  Following labs were reviewed:  Lab Requisition on 12/13/2023   Component Date Value Ref Range Status     Vitamin B12 12/13/2023 587  232 - 1,245 pg/mL Final     Sodium 12/13/2023 135  135 - 145 mmol/L Final     Potassium 12/13/2023 3.9  3.4 - 5.3 mmol/L Final     Carbon Dioxide (CO2) 12/13/2023 26  22 - 29 mmol/L Final     Anion Gap 12/13/2023 11  7 - 15 mmol/L Final     Urea Nitrogen 12/13/2023 14.5  8.0 - 23.0 mg/dL Final     Creatinine 12/13/2023 0.80  0.51 - 0.95 mg/dL Final     GFR Estimate 12/13/2023 75  >60 mL/min/1.73m2 Final     Calcium 12/13/2023 8.6 (L)  8.8 - 10.2 mg/dL Final     Chloride 12/13/2023 98  98 - 107 mmol/L Final     Glucose 12/13/2023 98  70 - 99 mg/dL Final     Alkaline Phosphatase 12/13/2023 80  40 - 150 U/L Final     AST 12/13/2023 26  0 - 45 U/L Final      ALT 12/13/2023 29  0 - 50 U/L Final     Protein Total 12/13/2023 7.4  6.4 - 8.3 g/dL Final     Albumin 12/13/2023 4.0  3.5 - 5.2 g/dL Final     Bilirubin Total 12/13/2023 0.2  <=1.2 mg/dL Final     Cholesterol 12/13/2023 165  <200 mg/dL Final     Triglycerides 12/13/2023 306 (H)  <150 mg/dL Final     Direct Measure HDL 12/13/2023 48 (L)  >=50 mg/dL Final     LDL Cholesterol Calculated 12/13/2023 56  <=100 mg/dL Final     Non HDL Cholesterol 12/13/2023 117  <130 mg/dL Final     Patient Fasting > 8hrs? 12/13/2023 Unknown   Final   Admission on 12/06/2023, Discharged on 12/06/2023   Component Date Value Ref Range Status     Ventricular Rate 12/06/2023 57  BPM Final     Atrial Rate 12/06/2023 57  BPM Final     FL Interval 12/06/2023 160  ms Final     QRS Duration 12/06/2023 76  ms Final     QT 12/06/2023 508  ms Final     QTc 12/06/2023 494  ms Final     P Axis 12/06/2023 54  degrees Final     R AXIS 12/06/2023 -2  degrees Final     T Axis 12/06/2023 1  degrees Final     Interpretation ECG 12/06/2023    Final                    Value:Sinus bradycardia  Nonspecific ST abnormality  Prolonged QT  Abnormal ECG  When compared with ECG of 11-JUL-2022 13:40,  QT has lengthened  Confirmed by HANNA HAYS MD LOC: ([id number]) on 12/6/2023 3:29:06 PM     Office Visit on 11/24/2023   Component Date Value Ref Range Status     Sodium 11/24/2023 139  135 - 145 mmol/L Final     Potassium 11/24/2023 4.2  3.4 - 5.3 mmol/L Final     Chloride 11/24/2023 103  98 - 107 mmol/L Final     Carbon Dioxide (CO2) 11/24/2023 25  22 - 29 mmol/L Final     Anion Gap 11/24/2023 11  7 - 15 mmol/L Final     Urea Nitrogen 11/24/2023 19.4  8.0 - 23.0 mg/dL Final     Creatinine 11/24/2023 0.84  0.51 - 0.95 mg/dL Final     GFR Estimate 11/24/2023 71  >60 mL/min/1.73m2 Final     Calcium 11/24/2023 9.0  8.8 - 10.2 mg/dL Final     Glucose 11/24/2023 99  70 - 99 mg/dL Final         Total time spent for face to face visit, reviewing labs/imaging studies, counseling  and coordination of care was: 30 Minutes spent on the date of the encounter doing chart review, review of outside records, review of test results, interpretation of tests, patient visit, and documentation   The longitudinal plan of care for complex partial seizures was addressed during this visit. Due to the added complexity in care, I will continue to support Merrily in the subsequent management of this condition(s) and with the ongoing continuity of care of this condition(s).  This note was dictated using voice recognition software.  Any grammatical or context distortions are unintentional and inherent to the software.    No orders of the defined types were placed in this encounter.     New Prescriptions    No medications on file     Modified Medications    No medications on file                 Again, thank you for allowing me to participate in the care of your patient.        Sincerely,        Guy Marie MD

## 2024-01-31 LAB
PHENYTOIN FREE SERPL-MCNC: 2.1 UG/ML
TOPIRAMATE SERPL-MCNC: 6.5 UG/ML

## 2024-02-09 ENCOUNTER — TELEPHONE (OUTPATIENT)
Dept: CARDIOLOGY | Facility: CLINIC | Age: 79
End: 2024-02-09

## 2024-02-09 NOTE — TELEPHONE ENCOUNTER
[facility] Call Center    Phone Message    May a detailed message be left on voicemail: yes     Reason for Call: Other: Daughter Kierra had to cancel appt today 2/9/24. Daughter car is in the shop. Kierra wanted to reschedule to video visit. If not ok please reach out to Kierra. Thank you     Action Taken: Other: cardiology     Travel Screening: Not Applicable    Thank you!  [facility]

## 2024-02-11 ENCOUNTER — HEALTH MAINTENANCE LETTER (OUTPATIENT)
Age: 79
End: 2024-02-11

## 2024-02-19 DIAGNOSIS — G40.209 NONINTRACTABLE EPILEPSY WITH COMPLEX PARTIAL SEIZURES (H): ICD-10-CM

## 2024-02-19 NOTE — TELEPHONE ENCOUNTER
Sharlene message sent to patient to see if she would like her Topamax sent to [facility]  Katherine Preston CMA on 2/19/2024 at 11:54 AM  [facility]

## 2024-02-20 RX ORDER — TOPIRAMATE 100 MG/1
TABLET, FILM COATED ORAL
Qty: 270 TABLET | Refills: 3 | Status: SHIPPED | OUTPATIENT
Start: 2024-02-20

## 2024-02-20 RX ORDER — PHENYTOIN SODIUM 100 MG/1
CAPSULE, EXTENDED RELEASE ORAL
Qty: 450 CAPSULE | Refills: 3 | Status: SHIPPED | OUTPATIENT
Start: 2024-02-20

## 2024-02-20 NOTE — TELEPHONE ENCOUNTER
Spoke with the patient and she would like Topamax and Dilantin rx sent to [facility]  Medication T'd for review and signature  Katherine Preston CMA on 2/20/2024 at 3:21 PM  [facility]

## 2024-03-08 ENCOUNTER — OFFICE VISIT (OUTPATIENT)
Dept: CARDIOLOGY | Facility: CLINIC | Age: 79
End: 2024-03-08
Attending: NURSE PRACTITIONER
Payer: COMMERCIAL

## 2024-03-08 VITALS
SYSTOLIC BLOOD PRESSURE: 104 MMHG | DIASTOLIC BLOOD PRESSURE: 48 MMHG | WEIGHT: 179 LBS | HEIGHT: 66 IN | HEART RATE: 62 BPM | BODY MASS INDEX: 28.77 KG/M2 | RESPIRATION RATE: 14 BRPM

## 2024-03-08 DIAGNOSIS — I50.32 CHRONIC HEART FAILURE WITH PRESERVED EJECTION FRACTION (H): ICD-10-CM

## 2024-03-08 DIAGNOSIS — I25.10 ATHEROSCLEROSIS OF CORONARY ARTERY OF NATIVE HEART WITHOUT ANGINA PECTORIS, UNSPECIFIED VESSEL OR LESION TYPE: ICD-10-CM

## 2024-03-08 DIAGNOSIS — I48.19 PERSISTENT ATRIAL FIBRILLATION (H): Primary | ICD-10-CM

## 2024-03-08 DIAGNOSIS — I10 BENIGN ESSENTIAL HYPERTENSION: ICD-10-CM

## 2024-03-08 PROCEDURE — 99214 OFFICE O/P EST MOD 30 MIN: CPT | Performed by: NURSE PRACTITIONER

## 2024-03-08 PROCEDURE — G2211 COMPLEX E/M VISIT ADD ON: HCPCS | Performed by: NURSE PRACTITIONER

## 2024-03-08 RX ORDER — ESTRADIOL 0.1 MG/G
CREAM VAGINAL
COMMUNITY
Start: 2024-01-31

## 2024-03-08 NOTE — PATIENT INSTRUCTIONS
Tommy Rea,    It was a pleasure to see you today at the [facility].     My recommendations after this visit include:    Continue current medications    Consider ablation to treat A fib vs medications (sotalol)    Keep a log of A fib episodes (frequency, duration, symptoms)  Call if you have frequent or prolonged A fib    Follow up in 3 months, or sooner if needed    An Keith, CNP  [facility], Electrophysiology  [phone]  EP nurses [phone]     Information on Atrial Fibrillation Ablations    What is Catheter Ablation?  A Catheter Ablation is a procedure that treats certain types of abnormal heart rhythms (arrhythmia). There are several components to the procedure, but the final purpose is target and destroy (ablate) small areas of your heart muscle that are causing the arrhythmia.     Why is an Ablations Done?  A catheter ablation is an effective way to treat some types of abnormal heart rhythms. An ablation is a relatively low risk procedure that may permanently cure your abnormal heart rhythm.  The ablation process damages the heart cells which results in scarring of that area. The scar is electrically inactive and can produce a permanent cure for the abnormal rhythm.  Ablation procedures can help avoid the need for rhythm medications and give patients the ability to return to their normal activity and live an active life. In patients that do not have symptoms, ablations are not typically done as there may still be an increased risk of stroke.    Why is Catheter Ablation Done?  Sometimes, the heart s electrical system does not work properly.  This can cause abnormal heart rhythms, called arrhythmias.  During an arrhythmia, the heart may beat too fast, too slowly, or irregularly.  Your doctor has recommended catheter ablation to treat a rapid (fast) heart rhythm, or tachycardia.      How Catheter Ablation Is Done  Catheter ablation uses thin, flexible  wires called electrode catheters to find and destroy (ablate) problem cells.     Here is how the procedure is done:  The pulmonary veins will be treated first. There are currently two tools used to ablate around the pulmonary veins.  Radiofrequency catheter will heat the tissue.  Cryo-balloon catheter will freeze the tissue.   Testing will be done to confirm that effective treatment has been delivered.   Further testing may be performed to see if a fib is still present or if some other rhythm problem such as atrial flutter is present. If an ongoing rhythm problem is discovered then further ablation can be done to isolate and destroy those areas responsible for the arrhythmia.     Your  Experience during Catheter Ablation    In most cases, catheter ablations are done in an electrophysiology (EP) lab.  The procedural area: You will be transferred back to the procedure room once you have been appropriately prepped by the nursing staff and you are ready for your ablation.   Sedation: You to be put completely asleep for your ablation using general anesthesia.  Inserting the catheters: You will have 3-4 catheters inserted into the veins. Catheter locations can include the shoulder, neck, and groins. Catheters are guided to the heart with the help of ultrasound and x-ray monitors.  Finishing up: When the procedure is finished, the catheters are taken out of your body. A special closure device or suture may be used to help seal these puncture sites. You re then taken to your room to rest, and will be cared for by a nurse during your recovery.    Risks and Complications  The risks of catheter ablation are fairly low compared to the benefits you receive. Possible risks and complications include:  Common (up to 10%)  Bleeding or bruising  Shortness of breath  Heartburn  Uncommon (< 1%)  Blood clots  A slow heart rhythm (requiring a permanent pacemaker)  Perforation of the heart muscle, blood vessel, or lung (may require an  emergency procedure)  Stroke  Damage to a heart valve   Heart attack, also known as acute myocardial infarction, or AMI   Death (extremely rare)    Before your Catheter Ablation  Before your catheter ablation, you will meet with the Electrophysiologist (specially trained heart doctor) who will do the procedure. The provider or a registered nurse will provide you with detailed instructions on how to prepare for this procedure, some of these instructions are listed below.  You will likely be told to stop or change your heart rhythm medications for a period of time before your ablation.   You may have testing done several days prior to your ablation or the morning of your ablation, such as an ECG, x-ray, blood tests, or echocardiogram.   You will not be allowed to eat or drink 8 hours before your ablation. You will be given further instructions by your physician or a registered nurse regarding the medication you will take the morning of your procedure.  You will need to arrange to have a  home from the hospital; you will not be permitted to drive after your procedure due to the sedation that you receive.   You are allowed to bring personal items and clothing to the hospital, but please refrain from bringing any valuables as the hospital is not responsible for any lost or stolen items.  You will need to bring a list of the names and dosages of the medication you are taking to the hospital.  It is important to mention to your doctor or registered nurse if you have any allergies, reactions to anesthesia, or have had history of bleeding problems.    Arriving at the Hospital the morning of your Catheter Ablation  Please check in at the time that was given to you by the , who scheduled your procedure. You do not need to arrive any earlier than the time that you were given.    When you arrive, you will be directed to the area where they will be performing your procedure. The doctor or registered nurse will meet  with you prior to your ablation, this is a good time to ask questions and address any concerns you may have. You will then be asked to sign the consent form for your ablation, if this has not already been done.    The nursing staff will begin to prepare you for your procedure:  A nurse will shave and cleanse the area where the ablation catheters will be placed. These areas are most commonly the left and right groin sites (the fold between your thigh and abdomen), and in some cases the chest, arm, and neck. This is done to reduce the risk of infection.    The nursing staff will start an intravenous (IV) line into a vein in your arm, which allows the staff to give you medication and sedatives to help you relax prior and during your ablation.  In some cases, the nursing staff will need to place a catheter that will drain urine from your bladder (Sullivan Catheter), which is required due to the length and complexity of the ablation you are having.    After Catheter Ablation  Recovery immediately after your ablation in the hospital  After your catheter ablation procedure, you will be taken to a recovery room. After your ablation, you may be required to lay flat or be on bed rest. During this time, a nurse will monitor you, and you will be given medication to make you comfortable.     Going Home  When it is time to go home, your will need to have an adult family member or friend drive you. Most people can walk, climb stairs, and perform light activity soon after catheter ablation. You can most likely return to your full routine within a few days. However, you may be told to avoid running, heavy lifting, and other strenuous activities for a short time. Please make sure to follow any specific activity restrictions provided by the medical staff at the time of your discharge from the hospital.  Doctor's typically advise that you not drive until your post procedure assessment visit.   Avoid heavy physical activity and heavy  lifting for several days after the procedure to allow your body to heal.  Ask your doctor when you can expect to return to work.  Take your temperature and check your incision for signs of infection (redness, swelling, drainage, or warmth) every day for a week. It is normal to have a small bruise or lump where the catheter was inserted.  Take your medications exactly as directed. Do not skip doses or stop medication without consulting your physician prior.  Learn to take your own pulse and keep a record of your results.    Follow-Up  After your ablations you will have a follow up visit to see how you are doing, to assess your rhythm after your ablation, and to address any medication changes if necessary. In many cases, one ablation is enough to treat an arrhythmia. However, sometimes the problem returns or another is found. If this happens, you may need a second catheter ablation. Tell your healthcare provider if you have any new or returning symptoms.    Common Symptoms after Catheter Ablation  In the first few weeks after catheter ablation, you may feel mild chest fullness or aching. You may also feel as if your heart is skipping beats or your heartbeat may feel faster than normal. You may think that your heart rhythm problem is about to return. These sensations are normal and usually go away with time. Talk to your healthcare provider if you are concerned.      When to Call Your Doctor  Increased bleeding, bruising, or pain at the insertion site  Episodes of atrial fibrillation are common post procedure, call the clinic if episodes are lasting longer than 4-6 hours  Difficulty with your speech or walking, or any visual disturbance  Lightheaded, dizziness, or feeling faint  Shortness of breath or chest pain  Coldness, swelling, or numbness of the arm or leg near the insertion site  A bruise or lump at the insertion site that is larger than a walnut  A fever over 100 F

## 2024-03-08 NOTE — LETTER
3/8/2024    Hannah Benton MD  [address]  [place] [address]    RE: Tommy MENDEZ Rea       Dear Colleague,     I had the pleasure of seeing Tommy Rea in the [facility].    HEART CARE ELECTROPHYSIOLOGY NOTE      [facility]  [phone]        Assessment/Recommendations   Assessment/Plan:  1.  Persistent Atrial Fibrillation:  Spontaneous conversion to sinus rhythm or initiation of sotalol.  No symptomatic improvement noted, but symptoms of mental fogginess and dizziness associated with an episode of A-fib a few weeks ago.  We reviewed the natural progression of atrial fibrillation and treatment options including rate control versus rhythm control with medications or pulmonary vein isolation ablation.  She was given information to review at home.  She was instructed to keep a log of A-fib episodes including frequency, duration, and symptoms.  -- Continue sotalol 80 mg twice daily    She was reassured that atrial fibrillation is not life-threatening, but carries an increased risk for stroke.  She has a [id number] score of 5 for age >75, CAD, CHF, HTN.  Cardiac CT confirmed the absence of FLORENCE thrombus.   -- Continue Eliquis 5 mg twice daily for stroke prophylaxis.      2.  Chronic heart failure with preserved ejection fraction: NYHA class II.  Compensated.  Continue torsemide and spironolactone. PRN metolazone.  Managed by heart failure clinic.    3.  Hypertension: Blood pressure at target.  Continue management with torsemide, spironolactone, sotalol as above.     4.  Coronary artery disease: Denies anginal symptoms.  Continue statin.    Follow up in 3 months     History of Present Illness/Subjective    HPI: Tommy Rea is a 78 year old female who comes in today accompanied by her daughter for EP follow-up of atrial fibrillation.  She has a history of atrial fibrillation, coronary artery disease, chronic heart failure with preserved ejection fraction,  seizures, COPD, asthma, lymphedema, hypertension, anemia, hyperparathyroidism, Crohn's disease, remote history of orthostatic syncope.  She had a mechanical fall 8/28/2023 resulting in right fibular fracture (nonsurgical) for which she was hospitalized, then discharged to TCU for rehab.  She was noted to have gradually progressive shortness of breath, 20 pound weight gain, and elevated respiratory rates.  She was hospitalized at [facility] 9/8/2023 for acute HFpEF, AF-RVR, and acute PE.  It is thought that PE was secondary to interruption of Eliquis during recent hospitalization for fibular fracture.    Arrhythmia history  Dx/date: Diagnosed with paroxysmal atrial fibrillation in November 2016.  No known recurrence since until she was hospitalized 9/8/2023 for AF-RVR and acute heart failure.  Echo showed severe LAE.  Cardioversion was planned but canceled due to acute PE.  She was started on sotalol and planned for cardioversion on 12/6/2023, but arrived in SR.  Sx: Mental fogginess, dizziness  [id number] score: 5 for age >75, CAD, CHF, HTN  Oral anticoagulation: Eliquis 5 mg twice daily  Antiarrhythmic medications, AV roxane blocking agents: Metoprolol XL 50 mg daily (previous).  Sotalol 80 mg twice daily  Procedures  DCCV: N/A  Ablation: N/A    Tommy states that she feels well.  She does not note any significant symptomatic improvement with maintenance of sinus rhythm, but had a brief episode of A-fib a few weeks ago associated with mental fogginess and dizziness.  She occasionally feels her heartbeat, particularly at night for falling asleep; she notes that it is steady and regular.  She denies chest discomfort, palpitations, abdominal fullness/bloating or peripheral edema, shortness of breath, paroxysmal nocturnal dyspnea, orthopnea, lightheadedness, dizziness, pre-syncope, or syncope.    Cardiographics (EKG personally reviewed):  EKG done 12/6/2023 shows sinus rhythm at 57 bpm, QRS 76 ms, QT/QTc 508/494 ms  EKG  "done 9/8/2023 shows atrial fibrillation with rapid ventricular response at 118 bpm  EKG done 7/11/2022 shows sinus rhythm at 67 bpm, QRS 74 ms, QT/QTc 424/448 ms    ECHO done 9/10/2023:   1. Normal left ventricular systolic function. Normal left ventricular systolic function.   Estimated left ventricular ejection fraction is 65%.    No regional wall motion abnormalities.    2. Normal right ventricular chamber size. Normal right ventricular systolic function.    3. No significant valvular heart disease.    4. Aortic sinus of Valsalva is normal in size (3.4 cm). Normal indexed ascending aorta   dimension (3.6 cm, 1.9 cm/m ).    5. Moderate-severe left atrial enlargement. Left atrial volume index is 47 ml/m .    6. Normal inferior vena cava with normal inspiratory collapse.    Estimated EF: 65%     Cardiac CT done 9/11/2023:  No left atrial appendage thrombus.   Severe left atrial enlargement.   Severe coronary artery atherosclerosis involving right coronary artery and   left anterior descending artery. The study is not geared towards   assessment of luminal stenosis.   LIMITED CHEST: No significant findings within the lungs. Short segment of   subacute appearing pulmonary emboli left lower lobe.     NM stress test done 6/8/2020:  Narrative & Impression     The nuclear stress test is negative for inducible myocardial ischemia or infarction.    The left ventricular ejection fraction at stress is 46% with mild global hypokinesis.    Left ventricular function is mildly reduced.    Only upright images could be acquired.    There is no prior study for comparison.     I have reviewed and updated the patient's Past Medical History, Social History, Family History and Medication List.  Outside records personally reviewed.     Physical Examination  Review of Systems   Vitals: /48 (BP Location: Right arm, Patient Position: Sitting, Cuff Size: Adult Regular)   Pulse 62   Resp 14   Ht 1.676 m (5' 6\")   Wt 81.2 kg (179 lb) "   BMI 28.89 kg/m    BMI= Body mass index is 28.89 kg/m .  Wt Readings from Last 3 Encounters:   03/08/24 81.2 kg (179 lb)   01/29/24 80.7 kg (178 lb)   01/24/24 81.2 kg (179 lb)       General Appearance:   Alert, well-appearing and in no acute distress.   HEENT: Atraumatic, normocephalic.  No scleral icterus, normal conjunctivae, EOMs intact, PERRL.  Mucous membranes pink and moist.     Chest/Lungs:   Chest symmetric, spine straight.  Respirations unlabored.  Lungs are clear to auscultation.   Cardiovascular:   Irregularly irregular rate and rhythm.  Normal first and second heart sounds with no murmurs, rubs, or gallops; radial pulses are intact, No edema.   Abdomen:  Soft, nondistended.   Extremities: No cyanosis or clubbing.   Musculoskeletal: Moves all extremities.     Skin: Warm, dry, intact.    Neurologic: Mood and affect are appropriate.  Alert and oriented to person, place, time, and situation.     ROS: 10 point ROS neg other than the symptoms noted above in the HPI.        Medical History  Surgical History Family History Social History   Past Medical History:   Diagnosis Date    (HFpEF) heart failure with preserved ejection fraction (H) 1/20/2020    Arthritis     Asthma     Atrial fibrillation (H) 10/24/2016    B12 deficiency     Benign essential hypertension 8/2/2021    Formatting of this note might be different from the original. Created by Conversion    CAD (coronary artery disease)     Congestive heart failure (H)     COPD (chronic obstructive pulmonary disease) (H)     COPD (chronic obstructive pulmonary disease) (H)     Crohn's disease (H)     Crohn's disease (H)     Depression     Depressive disorder     Esophageal reflux     Essential hypertension     Fracture of left hip requiring operative repair (H)     Heart disease     a fib    History of shingles     Hyperlipidemia     Hyperparathyroidism (H24)     Hypertension     Osteoporosis     Pulmonary nodule     benign    Regional enteritis (H)  2016    Seizure (H)     Seizures (H)      Past Surgical History:   Procedure Laterality Date    ARTHROPLASTY REVISION HIP Left 2016    removal of gamma nail hardware & conversion to total hip arthroplasty: Dr. Mercado at [facility] REMOVAL DEEP IMPLANT Left 2015    Procedure: Gamma Nail Left Hip;  Surgeon: Kirk De La Cruz MD;  Location: [facility] OR;  Service: Orthopedics    HEMORRHOIDECTOMY      ORIF HIP FRACTURE      ORTHOPEDIC SURGERY      ORIF    OTHER SURGICAL HISTORY      HEMMOIROIDECT    OTHER SURGICAL HISTORY      CVL HEART CATH LEFT     Family History   Problem Relation Age of Onset    Hypertension Mother     Colon Cancer Father     Arthritis Father     Hypertension Father     Heart Disease Mother     Heart Disease Father         Social History     Socioeconomic History    Marital status:      Spouse name: Not on file    Number of children: Not on file    Years of education: Not on file    Highest education level: Not on file   Occupational History    Not on file   Tobacco Use    Smoking status: Former     Types: Cigarettes     Quit date: 2011     Years since quittin.7    Smokeless tobacco: Never   Substance and Sexual Activity    Alcohol use: No    Drug use: No    Sexual activity: Not on file   Other Topics Concern    Parent/sibling w/ CABG, MI or angioplasty before 65F 55M? Not Asked   Social History Narrative    Not on file     Social Determinants of Health     Financial Resource Strain: Not on file   Food Insecurity: Not on file   Transportation Needs: Not on file   Physical Activity: Not on file   Stress: Not on file   Social Connections: Not on file   Interpersonal Safety: Not on file   Housing Stability: Not on file           Medications  Allergies   Current Outpatient Medications   Medication Sig Dispense Refill    acetaminophen (TYLENOL) 500 MG tablet Take 1,000 mg by mouth 2 times daily       albuterol (PROAIR HFA/PROVENTIL HFA/VENTOLIN HFA) 108 (90  Base) MCG/ACT inhaler Inhale 2 puffs into the lungs every 6 hours as needed      alendronate (FOSAMAX) 70 MG tablet Take 70 mg by mouth every 7 days Only 2 tabs left then stopping      apixaban ANTICOAGULANT (ELIQUIS) 5 MG tablet Take 5 mg by mouth 2 times daily       aspirin 81 MG EC tablet Take 81 mg by mouth daily       CALCIUM-VITAMIN D PO Take 2 tablets by mouth daily      colesevelam (WELCHOL) 625 MG tablet Take 3 tablets by mouth At Bedtime      cyanocobalamin (CYANOCOBALAMIN) 1000 MCG/ML injection Inject 1,000 mcg into the muscle every 30 days      estradiol (ESTRACE) 0.1 MG/GM vaginal cream Place vaginally three times a week      fluticasone (FLONASE) 50 MCG/ACT nasal spray Spray 1 spray into both nostrils daily      fluticasone (FLOVENT HFA) 110 MCG/ACT inhaler Inhale 1 puff into the lungs 2 times daily       loratadine (CLARITIN) 10 MG tablet Take 10 mg by mouth daily as needed       Magnesium Oxide 250 MG TABS Take 250 mg by mouth daily       metolazone (ZAROXOLYN) 2.5 MG tablet Take 1 tablet (2.5 mg) by mouth daily as needed (As instructed by your provider for heart failure symptoms) Take 30 minutes prior to AM dose of torsemide 8 tablet 1    multivitamin w/minerals (THERA-VIT-M) tablet Take 1 tablet by mouth daily       nitroGLYcerin (NITROSTAT) 0.4 MG sublingual tablet Place 1 tablet (0.4 mg) under the tongue every 5 minutes as needed for chest pain 25 tablet 1    PARoxetine (PAXIL) 40 MG tablet Take 40 mg by mouth daily       phenytoin (DILANTIN) 100 MG ER capsule Take 5 tablets at bedtime 450 capsule 3    potassium chloride ER (KLOR-CON M) 20 MEQ CR tablet Take 1 tablet (20 mEq) by mouth daily 270 tablet 3    simvastatin (ZOCOR) 40 MG tablet Take 40 mg by mouth At Bedtime       sodium chloride (OCEAN) 0.65 % nasal spray Spray 1 spray in nostril daily as needed      sotalol (BETAPACE) 80 MG tablet Take 1 tablet (80 mg) by mouth 2 times daily 180 tablet 3    spironolactone (ALDACTONE) 50 MG tablet  Take 1 tablet (50 mg) by mouth daily 90 tablet 3    STELARA 45 MG/0.5ML SOLN Inject 45 mg Subcutaneous every 30 days      topiramate (TOPAMAX) 100 MG tablet TAKE ONE AND ONE-HALF TABLETS TWICE A  tablet 3    torsemide (DEMADEX) 20 MG tablet TAKE 2 TABLETS TWICE DAILY BEFORE MEALS 360 tablet 0    Vitamin D3 (CHOLECALCIFEROL) 125 MCG (5000 UT) tablet Take 125 mcg by mouth daily         Allergies   Allergen Reactions    Amoxicillin GI Disturbance and Other (See Comments)     COLITIS    Has tolerated cefazolin  GI bleeding, Colitis, has tolerated cefazolin       Sulfa Antibiotics     Sulfamethoxazole-Trimethoprim Other (See Comments)     Other reaction(s): Vaginal Irritation  VAGINAL BLEEDING  Vaginal irritation, vaginal bleeding             Lab Results    Chemistry/lipid CBC Cardiac Enzymes/BNP/TSH/INR   Recent Labs   Lab Test 12/13/23  1617 12/07/22  1630   CHOL 165 185   HDL 48* 56   LDL 56 71   TRIG 306* 292*     Recent Labs   Lab Test 01/29/24  1013 12/13/23  1617    135   POTASSIUM 4.4 3.9   CHLORIDE 101 98   CO2 31* 26   ANIONGAP 7 11   * 98   BUN 25.8* 14.5   CR 0.86 0.80   DIONNA 9.2 8.6*     Liver Function Studies -   Recent Labs   Lab Test 01/29/24  1013   PROTTOTAL 7.9   ALBUMIN 4.0   BILITOTAL 0.2   ALKPHOS 78   AST 30   ALT 27      CBC RESULTS:   Recent Labs   Lab Test 09/06/23  0533   WBC 8.1   RBC 3.62*   HGB 8.7*   HCT 31.9*   MCV 88   MCH 24.0*   MCHC 27.3*   RDW 17.1*           TSH   Date Value Ref Range Status   07/17/2019 2.90 0.30 - 5.00 uIU/mL Final          The longitudinal plan of care for the diagnosis(es)/condition(s) as documented were addressed during this visit. Due to the added complexity in care, I will continue to support Tommy in the subsequent management and with ongoing continuity of care.                Thank you for allowing me to participate in the care of your patient.      Sincerely,     SERNEE Reynoso CNP     [facility]  [facility]  cc:   SERENE Reynoso CNP  [address], [address]  [place] [address]

## 2024-03-08 NOTE — PROGRESS NOTES
HEART CARE ELECTROPHYSIOLOGY NOTE      [facility]  [phone]        Assessment/Recommendations   Assessment/Plan:  1.  Persistent Atrial Fibrillation:  Spontaneous conversion to sinus rhythm or initiation of sotalol.  No symptomatic improvement noted, but symptoms of mental fogginess and dizziness associated with an episode of A-fib a few weeks ago.  We reviewed the natural progression of atrial fibrillation and treatment options including rate control versus rhythm control with medications or pulmonary vein isolation ablation.  She was given information to review at home.  She was instructed to keep a log of A-fib episodes including frequency, duration, and symptoms.  -- Continue sotalol 80 mg twice daily    She was reassured that atrial fibrillation is not life-threatening, but carries an increased risk for stroke.  She has a [id number] score of 5 for age >75, CAD, CHF, HTN.  Cardiac CT confirmed the absence of FLORENCE thrombus.   -- Continue Eliquis 5 mg twice daily for stroke prophylaxis.      2.  Chronic heart failure with preserved ejection fraction: NYHA class II.  Compensated.  Continue torsemide and spironolactone. PRN metolazone.  Managed by heart failure clinic.    3.  Hypertension: Blood pressure at target.  Continue management with torsemide, spironolactone, sotalol as above.     4.  Coronary artery disease: Denies anginal symptoms.  Continue statin.    Follow up in 3 months     History of Present Illness/Subjective    HPI: Tommy Rea is a 78 year old female who comes in today accompanied by her daughter for EP follow-up of atrial fibrillation.  She has a history of atrial fibrillation, coronary artery disease, chronic heart failure with preserved ejection fraction, seizures, COPD, asthma, lymphedema, hypertension, anemia, hyperparathyroidism, Crohn's disease, remote history of orthostatic syncope.  She had a mechanical fall 8/28/2023 resulting in right fibular fracture  (nonsurgical) for which she was hospitalized, then discharged to TCU for rehab.  She was noted to have gradually progressive shortness of breath, 20 pound weight gain, and elevated respiratory rates.  She was hospitalized at [facility] 9/8/2023 for acute HFpEF, AF-RVR, and acute PE.  It is thought that PE was secondary to interruption of Eliquis during recent hospitalization for fibular fracture.    Arrhythmia history  Dx/date: Diagnosed with paroxysmal atrial fibrillation in November 2016.  No known recurrence since until she was hospitalized 9/8/2023 for AF-RVR and acute heart failure.  Echo showed severe LAE.  Cardioversion was planned but canceled due to acute PE.  She was started on sotalol and planned for cardioversion on 12/6/2023, but arrived in SR.  Sx: Mental fogginess, dizziness  [id number] score: 5 for age >75, CAD, CHF, HTN  Oral anticoagulation: Eliquis 5 mg twice daily  Antiarrhythmic medications, AV roxane blocking agents: Metoprolol XL 50 mg daily (previous).  Sotalol 80 mg twice daily  Procedures  DCCV: N/A  Ablation: N/A    Tommy states that she feels well.  She does not note any significant symptomatic improvement with maintenance of sinus rhythm, but had a brief episode of A-fib a few weeks ago associated with mental fogginess and dizziness.  She occasionally feels her heartbeat, particularly at night for falling asleep; she notes that it is steady and regular.  She denies chest discomfort, palpitations, abdominal fullness/bloating or peripheral edema, shortness of breath, paroxysmal nocturnal dyspnea, orthopnea, lightheadedness, dizziness, pre-syncope, or syncope.    Cardiographics (EKG personally reviewed):  EKG done 12/6/2023 shows sinus rhythm at 57 bpm, QRS 76 ms, QT/QTc 508/494 ms  EKG done 9/8/2023 shows atrial fibrillation with rapid ventricular response at 118 bpm  EKG done 7/11/2022 shows sinus rhythm at 67 bpm, QRS 74 ms, QT/QTc 424/448 ms    ECHO done 9/10/2023:   1. Normal left  "ventricular systolic function. Normal left ventricular systolic function.   Estimated left ventricular ejection fraction is 65%.    No regional wall motion abnormalities.    2. Normal right ventricular chamber size. Normal right ventricular systolic function.    3. No significant valvular heart disease.    4. Aortic sinus of Valsalva is normal in size (3.4 cm). Normal indexed ascending aorta   dimension (3.6 cm, 1.9 cm/m ).    5. Moderate-severe left atrial enlargement. Left atrial volume index is 47 ml/m .    6. Normal inferior vena cava with normal inspiratory collapse.    Estimated EF: 65%     Cardiac CT done 9/11/2023:  No left atrial appendage thrombus.   Severe left atrial enlargement.   Severe coronary artery atherosclerosis involving right coronary artery and   left anterior descending artery. The study is not geared towards   assessment of luminal stenosis.   LIMITED CHEST: No significant findings within the lungs. Short segment of   subacute appearing pulmonary emboli left lower lobe.     NM stress test done 6/8/2020:  Narrative & Impression     The nuclear stress test is negative for inducible myocardial ischemia or infarction.    The left ventricular ejection fraction at stress is 46% with mild global hypokinesis.    Left ventricular function is mildly reduced.    Only upright images could be acquired.    There is no prior study for comparison.     I have reviewed and updated the patient's Past Medical History, Social History, Family History and Medication List.  Outside records personally reviewed.     Physical Examination  Review of Systems   Vitals: /48 (BP Location: Right arm, Patient Position: Sitting, Cuff Size: Adult Regular)   Pulse 62   Resp 14   Ht 1.676 m (5' 6\")   Wt 81.2 kg (179 lb)   BMI 28.89 kg/m    BMI= Body mass index is 28.89 kg/m .  Wt Readings from Last 3 Encounters:   03/08/24 81.2 kg (179 lb)   01/29/24 80.7 kg (178 lb)   01/24/24 81.2 kg (179 lb)       General " Appearance:   Alert, well-appearing and in no acute distress.   HEENT: Atraumatic, normocephalic.  No scleral icterus, normal conjunctivae, EOMs intact, PERRL.  Mucous membranes pink and moist.     Chest/Lungs:   Chest symmetric, spine straight.  Respirations unlabored.  Lungs are clear to auscultation.   Cardiovascular:   Irregularly irregular rate and rhythm.  Normal first and second heart sounds with no murmurs, rubs, or gallops; radial pulses are intact, No edema.   Abdomen:  Soft, nondistended.   Extremities: No cyanosis or clubbing.   Musculoskeletal: Moves all extremities.     Skin: Warm, dry, intact.    Neurologic: Mood and affect are appropriate.  Alert and oriented to person, place, time, and situation.     ROS: 10 point ROS neg other than the symptoms noted above in the HPI.        Medical History  Surgical History Family History Social History   Past Medical History:   Diagnosis Date    (HFpEF) heart failure with preserved ejection fraction (H) 1/20/2020    Arthritis     Asthma     Atrial fibrillation (H) 10/24/2016    B12 deficiency     Benign essential hypertension 8/2/2021    Formatting of this note might be different from the original. Created by Conversion    CAD (coronary artery disease)     Congestive heart failure (H)     COPD (chronic obstructive pulmonary disease) (H)     COPD (chronic obstructive pulmonary disease) (H)     Crohn's disease (H)     Crohn's disease (H)     Depression     Depressive disorder     Esophageal reflux     Essential hypertension     Fracture of left hip requiring operative repair (H)     Heart disease     a fib    History of shingles     Hyperlipidemia     Hyperparathyroidism (H24)     Hypertension     Osteoporosis     Pulmonary nodule     benign    Regional enteritis (H) 07/19/2016    Seizure (H)     Seizures (H)      Past Surgical History:   Procedure Laterality Date    ARTHROPLASTY REVISION HIP Left 07/19/2016    removal of gamma nail hardware & conversion to total  hip arthroplasty: Dr. Mercado at [facility] REMOVAL DEEP IMPLANT Left 2015    Procedure: Gamma Nail Left Hip;  Surgeon: Kirk De La Cruz MD;  Location: [facility] OR;  Service: Orthopedics    HEMORRHOIDECTOMY      ORIF HIP FRACTURE      ORTHOPEDIC SURGERY      ORIF    OTHER SURGICAL HISTORY      HEMMOIROIDECT    OTHER SURGICAL HISTORY      CVL HEART CATH LEFT     Family History   Problem Relation Age of Onset    Hypertension Mother     Colon Cancer Father     Arthritis Father     Hypertension Father     Heart Disease Mother     Heart Disease Father         Social History     Socioeconomic History    Marital status:      Spouse name: Not on file    Number of children: Not on file    Years of education: Not on file    Highest education level: Not on file   Occupational History    Not on file   Tobacco Use    Smoking status: Former     Types: Cigarettes     Quit date: 2011     Years since quittin.7    Smokeless tobacco: Never   Substance and Sexual Activity    Alcohol use: No    Drug use: No    Sexual activity: Not on file   Other Topics Concern    Parent/sibling w/ CABG, MI or angioplasty before 65F 55M? Not Asked   Social History Narrative    Not on file     Social Determinants of Health     Financial Resource Strain: Not on file   Food Insecurity: Not on file   Transportation Needs: Not on file   Physical Activity: Not on file   Stress: Not on file   Social Connections: Not on file   Interpersonal Safety: Not on file   Housing Stability: Not on file           Medications  Allergies   Current Outpatient Medications   Medication Sig Dispense Refill    acetaminophen (TYLENOL) 500 MG tablet Take 1,000 mg by mouth 2 times daily       albuterol (PROAIR HFA/PROVENTIL HFA/VENTOLIN HFA) 108 (90 Base) MCG/ACT inhaler Inhale 2 puffs into the lungs every 6 hours as needed      alendronate (FOSAMAX) 70 MG tablet Take 70 mg by mouth every 7 days Only 2 tabs left then stopping      apixaban  ANTICOAGULANT (ELIQUIS) 5 MG tablet Take 5 mg by mouth 2 times daily       aspirin 81 MG EC tablet Take 81 mg by mouth daily       CALCIUM-VITAMIN D PO Take 2 tablets by mouth daily      colesevelam (WELCHOL) 625 MG tablet Take 3 tablets by mouth At Bedtime      cyanocobalamin (CYANOCOBALAMIN) 1000 MCG/ML injection Inject 1,000 mcg into the muscle every 30 days      estradiol (ESTRACE) 0.1 MG/GM vaginal cream Place vaginally three times a week      fluticasone (FLONASE) 50 MCG/ACT nasal spray Spray 1 spray into both nostrils daily      fluticasone (FLOVENT HFA) 110 MCG/ACT inhaler Inhale 1 puff into the lungs 2 times daily       loratadine (CLARITIN) 10 MG tablet Take 10 mg by mouth daily as needed       Magnesium Oxide 250 MG TABS Take 250 mg by mouth daily       metolazone (ZAROXOLYN) 2.5 MG tablet Take 1 tablet (2.5 mg) by mouth daily as needed (As instructed by your provider for heart failure symptoms) Take 30 minutes prior to AM dose of torsemide 8 tablet 1    multivitamin w/minerals (THERA-VIT-M) tablet Take 1 tablet by mouth daily       nitroGLYcerin (NITROSTAT) 0.4 MG sublingual tablet Place 1 tablet (0.4 mg) under the tongue every 5 minutes as needed for chest pain 25 tablet 1    PARoxetine (PAXIL) 40 MG tablet Take 40 mg by mouth daily       phenytoin (DILANTIN) 100 MG ER capsule Take 5 tablets at bedtime 450 capsule 3    potassium chloride ER (KLOR-CON M) 20 MEQ CR tablet Take 1 tablet (20 mEq) by mouth daily 270 tablet 3    simvastatin (ZOCOR) 40 MG tablet Take 40 mg by mouth At Bedtime       sodium chloride (OCEAN) 0.65 % nasal spray Spray 1 spray in nostril daily as needed      sotalol (BETAPACE) 80 MG tablet Take 1 tablet (80 mg) by mouth 2 times daily 180 tablet 3    spironolactone (ALDACTONE) 50 MG tablet Take 1 tablet (50 mg) by mouth daily 90 tablet 3    STELARA 45 MG/0.5ML SOLN Inject 45 mg Subcutaneous every 30 days      topiramate (TOPAMAX) 100 MG tablet TAKE ONE AND ONE-HALF TABLETS TWICE A   tablet 3    torsemide (DEMADEX) 20 MG tablet TAKE 2 TABLETS TWICE DAILY BEFORE MEALS 360 tablet 0    Vitamin D3 (CHOLECALCIFEROL) 125 MCG (5000 UT) tablet Take 125 mcg by mouth daily         Allergies   Allergen Reactions    Amoxicillin GI Disturbance and Other (See Comments)     COLITIS    Has tolerated cefazolin  GI bleeding, Colitis, has tolerated cefazolin       Sulfa Antibiotics     Sulfamethoxazole-Trimethoprim Other (See Comments)     Other reaction(s): Vaginal Irritation  VAGINAL BLEEDING  Vaginal irritation, vaginal bleeding             Lab Results    Chemistry/lipid CBC Cardiac Enzymes/BNP/TSH/INR   Recent Labs   Lab Test 12/13/23  1617 12/07/22  1630   CHOL 165 185   HDL 48* 56   LDL 56 71   TRIG 306* 292*     Recent Labs   Lab Test 01/29/24  1013 12/13/23  1617    135   POTASSIUM 4.4 3.9   CHLORIDE 101 98   CO2 31* 26   ANIONGAP 7 11   * 98   BUN 25.8* 14.5   CR 0.86 0.80   DIONNA 9.2 8.6*     Liver Function Studies -   Recent Labs   Lab Test 01/29/24  1013   PROTTOTAL 7.9   ALBUMIN 4.0   BILITOTAL 0.2   ALKPHOS 78   AST 30   ALT 27      CBC RESULTS:   Recent Labs   Lab Test 09/06/23  0533   WBC 8.1   RBC 3.62*   HGB 8.7*   HCT 31.9*   MCV 88   MCH 24.0*   MCHC 27.3*   RDW 17.1*           TSH   Date Value Ref Range Status   07/17/2019 2.90 0.30 - 5.00 uIU/mL Final          The longitudinal plan of care for the diagnosis(es)/condition(s) as documented were addressed during this visit. Due to the added complexity in care, I will continue to support Tommy in the subsequent management and with ongoing continuity of care.

## 2024-04-11 DIAGNOSIS — I50.32 CHRONIC HEART FAILURE WITH PRESERVED EJECTION FRACTION (H): ICD-10-CM

## 2024-04-11 RX ORDER — TORSEMIDE 20 MG/1
40 TABLET ORAL 2 TIMES DAILY
Qty: 360 TABLET | Refills: 2 | Status: SHIPPED | OUTPATIENT
Start: 2024-04-11

## 2024-08-15 ENCOUNTER — TRANSFERRED RECORDS (OUTPATIENT)
Dept: HEALTH INFORMATION MANAGEMENT | Facility: CLINIC | Age: 79
End: 2024-08-15
Payer: COMMERCIAL

## 2024-08-20 ENCOUNTER — VIRTUAL VISIT (OUTPATIENT)
Dept: CARDIOLOGY | Facility: CLINIC | Age: 79
End: 2024-08-20
Attending: NURSE PRACTITIONER
Payer: COMMERCIAL

## 2024-08-20 DIAGNOSIS — I25.10 ATHEROSCLEROSIS OF CORONARY ARTERY OF NATIVE HEART WITHOUT ANGINA PECTORIS, UNSPECIFIED VESSEL OR LESION TYPE: ICD-10-CM

## 2024-08-20 DIAGNOSIS — I50.32 CHRONIC HEART FAILURE WITH PRESERVED EJECTION FRACTION (H): ICD-10-CM

## 2024-08-20 DIAGNOSIS — I10 BENIGN ESSENTIAL HYPERTENSION: ICD-10-CM

## 2024-08-20 DIAGNOSIS — I48.19 PERSISTENT ATRIAL FIBRILLATION (H): Primary | ICD-10-CM

## 2024-08-20 PROCEDURE — G2211 COMPLEX E/M VISIT ADD ON: HCPCS | Mod: 95 | Performed by: NURSE PRACTITIONER

## 2024-08-20 PROCEDURE — 99214 OFFICE O/P EST MOD 30 MIN: CPT | Mod: 95 | Performed by: NURSE PRACTITIONER

## 2024-08-20 RX ORDER — BENZONATATE 200 MG/1
1 CAPSULE ORAL 3 TIMES DAILY
COMMUNITY
Start: 2024-08-17 | End: 2024-08-24

## 2024-08-20 NOTE — PROGRESS NOTES
"    The patient has been notified of following:     \"This video visit will be conducted via a call between you and your physician/provider. We have found that certain health care needs can be provided without the need for an in-person physical exam.  This service lets us provide the care you need with a video conversation.  If a prescription is necessary we can send it directly to your pharmacy.  If lab work is needed we can place an order for that and you can then stop by our lab to have the test done at a later time.      Patient has given verbal consent to a Video visit? Yes    HEART CARE VIDEO ENCOUNTER        The patient has chosen to have the visit conducted as a video visit, to reduce risk of exposure given the current status of Coronavirus in our community. This video visit is being conducted via a call between the patient and physician/provider. Health care needs are being provided without a physical exam.     Assessment/Recommendations   Assessment/Plan:  1.  Persistent Atrial Fibrillation:  No symptomatology or evidence of AF recurrence.  Briefly discussed treatment options.  May consider catheter ablation in the future.  -- Continue sotalol 80 mg twice daily    She was reassured that atrial fibrillation is not life-threatening, but carries an increased risk for stroke.  She has a [id number] score of 5 for age >75, CAD, CHF, HTN.  Cardiac CT confirmed the absence of FLORENCE thrombus.   -- Continue Eliquis 5 mg twice daily for stroke prophylaxis.      2.  Chronic heart failure with preserved ejection fraction: NYHA class II.  Compensated.  Continue torsemide and spironolactone. PRN metolazone.  Managed by heart failure clinic.    3.  Hypertension: Blood pressure at target.  Continue management with torsemide, spironolactone, sotalol as above.     4.  Coronary artery disease: Denies anginal symptoms.  Continue statin.    Follow up in 3 months  Follow up with Heart Failure JESSICA (overdue)    I have reviewed the " note as documented.  This accurately captures the substance of my conversation with the patient.    Total time of video between patient and provider was 4 minutes   Start Time: 0840   Stop Time: 0844    Originating Location (pt. Location): Home    Distant Location (provider location):  [facility]     Mode of Communication:  Video Conference via [place]       History of Present Illness/Subjective    Tommy Rea is a 79 year old female who is being evaluated via a billable video visit and has consented to a video visit. She has a history of atrial fibrillation, coronary artery disease, chronic heart failure with preserved ejection fraction, seizures, COPD, asthma, lymphedema, hypertension, anemia, hyperparathyroidism, Crohn's disease, remote history of orthostatic syncope.  She had a mechanical fall 8/28/2023 resulting in right fibular fracture (nonsurgical) for which she was hospitalized, then discharged to TCU for rehab.  She was noted to have gradually progressive shortness of breath, 20 pound weight gain, and elevated respiratory rates.  She was hospitalized at [place] 9/8/2023 for acute HFpEF, AF-RVR, and acute PE.  It is thought that PE was secondary to interruption of Eliquis during recent hospitalization for fibular fracture.    Arrhythmia history  Dx/date: Diagnosed with paroxysmal atrial fibrillation in November 2016.  No known recurrence since until she was hospitalized 9/8/2023 for AF-RVR and acute heart failure.  Echo showed severe LAE.  Cardioversion was planned but canceled due to acute PE.  She was started on sotalol and planned for cardioversion on 12/6/2023, but arrived in SR.  Sx: Mental fogginess, dizziness  [id number] score: 5 for age >75, CAD, CHF, HTN  Oral anticoagulation: Eliquis 5 mg twice daily  Antiarrhythmic medications, AV roxane blocking agents: Metoprolol XL 50 mg daily (previous).  Sotalol 80 mg twice daily  Procedures  DCCV: N/A  Ablation:  N/A    Tommy states that she feels well, though has a cough (seen by PCP).  She has not had any A-fib.  She reports her heart rate consistently in the 70s.  She states that her weights have been stable.  She denies chest discomfort, palpitations, abdominal fullness/bloating or peripheral edema, shortness of breath, paroxysmal nocturnal dyspnea, orthopnea, lightheadedness, dizziness, pre-syncope, or syncope.    Cardiographics (EKG personally reviewed):  EKG done 12/6/2023 shows sinus rhythm at 57 bpm, QRS 76 ms, QT/QTc 508/494 ms  EKG done 9/8/2023 shows atrial fibrillation with rapid ventricular response at 118 bpm  EKG done 7/11/2022 shows sinus rhythm at 67 bpm, QRS 74 ms, QT/QTc 424/448 ms    ECHO done 9/10/2023:   1. Normal left ventricular systolic function. Normal left ventricular systolic function.   Estimated left ventricular ejection fraction is 65%.    No regional wall motion abnormalities.    2. Normal right ventricular chamber size. Normal right ventricular systolic function.    3. No significant valvular heart disease.    4. Aortic sinus of Valsalva is normal in size (3.4 cm). Normal indexed ascending aorta   dimension (3.6 cm, 1.9 cm/m ).    5. Moderate-severe left atrial enlargement. Left atrial volume index is 47 ml/m .    6. Normal inferior vena cava with normal inspiratory collapse.    Estimated EF: 65%     Cardiac CT done 9/11/2023:  No left atrial appendage thrombus.   Severe left atrial enlargement.   Severe coronary artery atherosclerosis involving right coronary artery and   left anterior descending artery. The study is not geared towards   assessment of luminal stenosis.   LIMITED CHEST: No significant findings within the lungs. Short segment of   subacute appearing pulmonary emboli left lower lobe.     NM stress test done 6/8/2020:  Narrative & Impression     The nuclear stress test is negative for inducible myocardial ischemia or infarction.    The left ventricular ejection fraction at  stress is 46% with mild global hypokinesis.    Left ventricular function is mildly reduced.    Only upright images could be acquired.    There is no prior study for comparison.       I have reviewed and updated the patient's Past Medical History, Social History, Family History and Medication List.  Outside records reviewed.     Physical Examination performed via live video encounter Review of Systems   General Appearance:   no distress, normal body habitus, upright.   ENT/Mouth: membranes moist, no nasal discharge or bleeding gums.  Normal head shape, no evidence of injury or laceration.     EYES:  no scleral icterus, normal conjunctivae   Neck: no evidence of thyromegaly.  Supple   Chest/Lungs:   No audible wheezing equal chest wall expansion. Non labored breathing.  No cough.   Cardiovascular:   No evidence of elevated jugular venous pressure.  No evidence of pitting edema bilaterally    Abdomen:  no evidence of abdominal distention. No observe juandice.     Extremities: no cyanosis or clubbing noted.    Skin: no xanthelasma, normal skin color. No evidence of facial lacerations.      Neurologic: Normal arm motion bilateral, no tremors.  No evidence of focal defect.       Psychiatric: alert and oriented x3, calm                                               Medical History  Surgical History Family History Social History   Past Medical History:   Diagnosis Date    (HFpEF) heart failure with preserved ejection fraction (H) 1/20/2020    Arthritis     Asthma     Atrial fibrillation (H) 10/24/2016    B12 deficiency     Benign essential hypertension 8/2/2021    Formatting of this note might be different from the original. Created by Conversion    CAD (coronary artery disease)     Congestive heart failure (H)     COPD (chronic obstructive pulmonary disease) (H)     COPD (chronic obstructive pulmonary disease) (H)     Crohn's disease (H)     Crohn's disease (H)     Depression     Depressive disorder     Esophageal reflux      Essential hypertension     Fracture of left hip requiring operative repair (H)     Heart disease     a fib    History of shingles     Hyperlipidemia     Hyperparathyroidism (H24)     Hypertension     Osteoporosis     Pulmonary nodule     benign    Regional enteritis (H) 2016    Seizure (H)     Seizures (H)     Past Surgical History:   Procedure Laterality Date    ARTHROPLASTY REVISION HIP Left 2016    removal of gamma nail hardware & conversion to total hip arthroplasty: Dr. Mercado at [facility] REMOVAL DEEP IMPLANT Left 2015    Procedure: Gamma Nail Left Hip;  Surgeon: Kirk De La Cruz MD;  [facility];  Service: Orthopedics    HEMORRHOIDECTOMY      ORIF HIP FRACTURE      ORTHOPEDIC SURGERY      ORIF    OTHER SURGICAL HISTORY      HEMMOIROIDECT    OTHER SURGICAL HISTORY      CVL HEART CATH LEFT    Family History   Problem Relation Age of Onset    Hypertension Mother     Colon Cancer Father     Arthritis Father     Hypertension Father     Heart Disease Mother     Heart Disease Father       Social History     Socioeconomic History    Marital status:      Spouse name: Not on file    Number of children: Not on file    Years of education: Not on file    Highest education level: Not on file   Occupational History    Not on file   Tobacco Use    Smoking status: Former     Current packs/day: 0.00     Types: Cigarettes     Quit date: 2011     Years since quittin.1    Smokeless tobacco: Never   Substance and Sexual Activity    Alcohol use: No    Drug use: No    Sexual activity: Not on file   Other Topics Concern    Parent/sibling w/ CABG, MI or angioplasty before 65F 55M? Not Asked   Social History Narrative    Not on file     Social Determinants of Health     Financial Resource Strain: Not on file   Food Insecurity: Not on file   Transportation Needs: Not on file   Physical Activity: Not on file   Stress: Not on file   Social Connections: Unknown (2023)     Received from [facility] [employer] [facility], [facility] [employer] [facility]    Social Connections     Frequency of Communication with Friends and Family: Not on file   Interpersonal Safety: Not on file   Housing Stability: Not on file          Medications  Allergies   Current Outpatient Medications   Medication Sig Dispense Refill    acetaminophen (TYLENOL) 500 MG tablet Take 1,000 mg by mouth 2 times daily       albuterol (PROAIR HFA/PROVENTIL HFA/VENTOLIN HFA) 108 (90 Base) MCG/ACT inhaler Inhale 2 puffs into the lungs every 6 hours as needed      apixaban ANTICOAGULANT (ELIQUIS) 5 MG tablet Take 5 mg by mouth 2 times daily       aspirin 81 MG EC tablet Take 81 mg by mouth daily       CALCIUM-VITAMIN D PO Take 2 tablets by mouth daily      colesevelam (WELCHOL) 625 MG tablet Take 3 tablets by mouth At Bedtime      cyanocobalamin (CYANOCOBALAMIN) 1000 MCG/ML injection Inject 1,000 mcg into the muscle every 30 days      estradiol (ESTRACE) 0.1 MG/GM vaginal cream Place vaginally three times a week      fluticasone (FLONASE) 50 MCG/ACT nasal spray Spray 1 spray into both nostrils daily      fluticasone (FLOVENT HFA) 110 MCG/ACT inhaler Inhale 1 puff into the lungs 2 times daily       loratadine (CLARITIN) 10 MG tablet Take 10 mg by mouth daily as needed       Magnesium Oxide 250 MG TABS Take 250 mg by mouth daily       metolazone (ZAROXOLYN) 2.5 MG tablet Take 1 tablet (2.5 mg) by mouth daily as needed (As instructed by your provider for heart failure symptoms) Take 30 minutes prior to AM dose of torsemide 8 tablet 1    multivitamin w/minerals (THERA-VIT-M) tablet Take 1 tablet by mouth daily       nitroGLYcerin (NITROSTAT) 0.4 MG sublingual tablet Place 1 tablet (0.4 mg) under the tongue every 5 minutes as needed for chest pain 25 tablet 1    PARoxetine (PAXIL) 40 MG tablet Take 40 mg by mouth daily       phenytoin (DILANTIN) 100 MG ER capsule Take 5 tablets at bedtime 450 capsule 3     potassium chloride ER (KLOR-CON M) 20 MEQ CR tablet Take 1 tablet (20 mEq) by mouth daily 270 tablet 3    simvastatin (ZOCOR) 40 MG tablet Take 40 mg by mouth At Bedtime       sodium chloride (OCEAN) 0.65 % nasal spray Spray 1 spray in nostril daily as needed      sotalol (BETAPACE) 80 MG tablet Take 1 tablet (80 mg) by mouth 2 times daily 180 tablet 3    spironolactone (ALDACTONE) 50 MG tablet Take 1 tablet (50 mg) by mouth daily 90 tablet 3    STELARA 45 MG/0.5ML SOLN Inject 45 mg Subcutaneous every 30 days      topiramate (TOPAMAX) 100 MG tablet TAKE ONE AND ONE-HALF TABLETS TWICE A  tablet 3    torsemide (DEMADEX) 20 MG tablet Take 2 tablets (40 mg) by mouth 2 times daily 360 tablet 2    Vitamin D3 (CHOLECALCIFEROL) 125 MCG (5000 UT) tablet Take 125 mcg by mouth daily      benzonatate (TESSALON) 200 MG capsule Take 1 capsule by mouth 3 times daily      Allergies   Allergen Reactions    Amoxicillin GI Disturbance and Other (See Comments)     COLITIS    Has tolerated cefazolin  GI bleeding, Colitis, has tolerated cefazolin       Sulfa Antibiotics     Sulfamethoxazole-Trimethoprim Other (See Comments)     Other reaction(s): Vaginal Irritation  VAGINAL BLEEDING  Vaginal irritation, vaginal bleeding            Lab Results    Chemistry/lipid CBC Cardiac Enzymes/BNP/TSH/INR   Lab Results   Component Value Date    CHOL 165 12/13/2023    HDL 48 (L) 12/13/2023    TRIG 306 (H) 12/13/2023    BUN 25.8 (H) 01/29/2024     01/29/2024    CO2 31 (H) 01/29/2024    Lab Results   Component Value Date    WBC 8.1 09/06/2023    HGB 8.7 (L) 09/06/2023    HCT 31.9 (L) 09/06/2023    MCV 88 09/06/2023     09/06/2023    Lab Results   Component Value Date    TROPONINI 0.04 04/25/2020     (H) 09/13/2021    TSH 2.90 07/17/2019      The longitudinal plan of care for the diagnosis(es)/condition(s) as documented were addressed during this visit. Due to the added complexity in care, I will continue to support Tommy in  the subsequent management and with ongoing continuity of care.    SERENE Reynoso CNP  Cardiac Electrophysiology

## 2024-08-20 NOTE — PATIENT INSTRUCTIONS
Tommy Rea,    It was a pleasure to see you today at the [facility].     My recommendations after this visit include:    Continue current medications    Consider ablation to treat A-fib as an alternative to sotalol    Follow-up with Tran Boyle CNP (heart failure)  Follow up with me in 3 months    An Keith CNP  [facility], Electrophysiology  [phone]  EP nurses [phone]

## 2024-08-20 NOTE — LETTER
"8/20/2024    Hannah Benton MD  [address]  [place] [address]    RE: Tommy Rea       Dear Colleague,     I had the pleasure of seeing Tommy Rea in the [facility].      The patient has been notified of following:     \"This video visit will be conducted via a call between you and your physician/provider. We have found that certain health care needs can be provided without the need for an in-person physical exam.  This service lets us provide the care you need with a video conversation.  If a prescription is necessary we can send it directly to your pharmacy.  If lab work is needed we can place an order for that and you can then stop by our lab to have the test done at a later time.      Patient has given verbal consent to a Video visit? Yes    HEART CARE VIDEO ENCOUNTER        The patient has chosen to have the visit conducted as a video visit, to reduce risk of exposure given the current status of Coronavirus in our community. This video visit is being conducted via a call between the patient and physician/provider. Health care needs are being provided without a physical exam.     Assessment/Recommendations   Assessment/Plan:  1.  Persistent Atrial Fibrillation:  No symptomatology or evidence of AF recurrence.  Briefly discussed treatment options.  May consider catheter ablation in the future.  -- Continue sotalol 80 mg twice daily    She was reassured that atrial fibrillation is not life-threatening, but carries an increased risk for stroke.  She has a [id number] score of 5 for age >75, CAD, CHF, HTN.  Cardiac CT confirmed the absence of FLORENCE thrombus.   -- Continue Eliquis 5 mg twice daily for stroke prophylaxis.      2.  Chronic heart failure with preserved ejection fraction: NYHA class II.  Compensated.  Continue torsemide and spironolactone. PRN metolazone.  Managed by heart failure clinic.    3.  Hypertension: Blood pressure at target.  Continue management with " torsemide, spironolactone, sotalol as above.     4.  Coronary artery disease: Denies anginal symptoms.  Continue statin.    Follow up in 3 months  Follow up with Heart Failure JESSICA (overdue)    I have reviewed the note as documented.  This accurately captures the substance of my conversation with the patient.    Total time of video between patient and provider was 4 minutes   Start Time: 0840   Stop Time: 0844    Originating Location (pt. Location): Home    Distant Location (provider location):  [facility]     Mode of Communication:  Video Conference via [employer]       History of Present Illness/Subjective    Tommy Rea is a 79 year old female who is being evaluated via a billable video visit and has consented to a video visit. She has a history of atrial fibrillation, coronary artery disease, chronic heart failure with preserved ejection fraction, seizures, COPD, asthma, lymphedema, hypertension, anemia, hyperparathyroidism, Crohn's disease, remote history of orthostatic syncope.  She had a mechanical fall 8/28/2023 resulting in right fibular fracture (nonsurgical) for which she was hospitalized, then discharged to TCU for rehab.  She was noted to have gradually progressive shortness of breath, 20 pound weight gain, and elevated respiratory rates.  She was hospitalized at [facility] 9/8/2023 for acute HFpEF, AF-RVR, and acute PE.  It is thought that PE was secondary to interruption of Eliquis during recent hospitalization for fibular fracture.    Arrhythmia history  Dx/date: Diagnosed with paroxysmal atrial fibrillation in November 2016.  No known recurrence since until she was hospitalized 9/8/2023 for AF-RVR and acute heart failure.  Echo showed severe LAE.  Cardioversion was planned but canceled due to acute PE.  She was started on sotalol and planned for cardioversion on 12/6/2023, but arrived in SR.  Sx: Mental fogginess, dizziness  [id number] score: 5 for age >75, CAD, CHF,  HTN  Oral anticoagulation: Eliquis 5 mg twice daily  Antiarrhythmic medications, AV roxane blocking agents: Metoprolol XL 50 mg daily (previous).  Sotalol 80 mg twice daily  Procedures  DCCV: N/A  Ablation: N/A    Tommy states that she feels well, though has a cough (seen by PCP).  She has not had any A-fib.  She reports her heart rate consistently in the 70s.  She states that her weights have been stable.  She denies chest discomfort, palpitations, abdominal fullness/bloating or peripheral edema, shortness of breath, paroxysmal nocturnal dyspnea, orthopnea, lightheadedness, dizziness, pre-syncope, or syncope.    Cardiographics (EKG personally reviewed):  EKG done 12/6/2023 shows sinus rhythm at 57 bpm, QRS 76 ms, QT/QTc 508/494 ms  EKG done 9/8/2023 shows atrial fibrillation with rapid ventricular response at 118 bpm  EKG done 7/11/2022 shows sinus rhythm at 67 bpm, QRS 74 ms, QT/QTc 424/448 ms    ECHO done 9/10/2023:   1. Normal left ventricular systolic function. Normal left ventricular systolic function.   Estimated left ventricular ejection fraction is 65%.    No regional wall motion abnormalities.    2. Normal right ventricular chamber size. Normal right ventricular systolic function.    3. No significant valvular heart disease.    4. Aortic sinus of Valsalva is normal in size (3.4 cm). Normal indexed ascending aorta   dimension (3.6 cm, 1.9 cm/m ).    5. Moderate-severe left atrial enlargement. Left atrial volume index is 47 ml/m .    6. Normal inferior vena cava with normal inspiratory collapse.    Estimated EF: 65%     Cardiac CT done 9/11/2023:  No left atrial appendage thrombus.   Severe left atrial enlargement.   Severe coronary artery atherosclerosis involving right coronary artery and   left anterior descending artery. The study is not geared towards   assessment of luminal stenosis.   LIMITED CHEST: No significant findings within the lungs. Short segment of   subacute appearing pulmonary emboli left  lower lobe.     NM stress test done 6/8/2020:  Narrative & Impression      The nuclear stress test is negative for inducible myocardial ischemia or infarction.     The left ventricular ejection fraction at stress is 46% with mild global hypokinesis.     Left ventricular function is mildly reduced.     Only upright images could be acquired.     There is no prior study for comparison.       I have reviewed and updated the patient's Past Medical History, Social History, Family History and Medication List.  Outside records reviewed.     Physical Examination performed via live video encounter Review of Systems   General Appearance:   no distress, normal body habitus, upright.   ENT/Mouth: membranes moist, no nasal discharge or bleeding gums.  Normal head shape, no evidence of injury or laceration.     EYES:  no scleral icterus, normal conjunctivae   Neck: no evidence of thyromegaly.  Supple   Chest/Lungs:   No audible wheezing equal chest wall expansion. Non labored breathing.  No cough.   Cardiovascular:   No evidence of elevated jugular venous pressure.  No evidence of pitting edema bilaterally    Abdomen:  no evidence of abdominal distention. No observe juandice.     Extremities: no cyanosis or clubbing noted.    Skin: no xanthelasma, normal skin color. No evidence of facial lacerations.      Neurologic: Normal arm motion bilateral, no tremors.  No evidence of focal defect.       Psychiatric: alert and oriented x3, calm                                               Medical History  Surgical History Family History Social History   Past Medical History:   Diagnosis Date     (HFpEF) heart failure with preserved ejection fraction (H) 1/20/2020     Arthritis      Asthma      Atrial fibrillation (H) 10/24/2016     B12 deficiency      Benign essential hypertension 8/2/2021    Formatting of this note might be different from the original. Created by Conversion     CAD (coronary artery disease)      Congestive heart failure (H)       COPD (chronic obstructive pulmonary disease) (H)      COPD (chronic obstructive pulmonary disease) (H)      Crohn's disease (H)      Crohn's disease (H)      Depression      Depressive disorder      Esophageal reflux      Essential hypertension      Fracture of left hip requiring operative repair (H)      Heart disease     a fib     History of shingles      Hyperlipidemia      Hyperparathyroidism (H24)      Hypertension      Osteoporosis      Pulmonary nodule     benign     Regional enteritis (H) 2016     Seizure (H)      Seizures (H)     Past Surgical History:   Procedure Laterality Date     ARTHROPLASTY REVISION HIP Left 2016    removal of gamma nail hardware & conversion to total hip arthroplasty: Dr. Mercado at [facility] REMOVAL DEEP IMPLANT Left 2015    Procedure: Gamma Nail Left Hip;  Surgeon: Kirk De La Cruz MD;  Location: [facility];  Service: Orthopedics     HEMORRHOIDECTOMY       ORIF HIP FRACTURE       ORTHOPEDIC SURGERY      ORIF     OTHER SURGICAL HISTORY      HEMMOIROIDECT     OTHER SURGICAL HISTORY      CVL HEART CATH LEFT    Family History   Problem Relation Age of Onset     Hypertension Mother      Colon Cancer Father      Arthritis Father      Hypertension Father      Heart Disease Mother      Heart Disease Father       Social History     Socioeconomic History     Marital status:      Spouse name: Not on file     Number of children: Not on file     Years of education: Not on file     Highest education level: Not on file   Occupational History     Not on file   Tobacco Use     Smoking status: Former     Current packs/day: 0.00     Types: Cigarettes     Quit date: 2011     Years since quittin.1     Smokeless tobacco: Never   Substance and Sexual Activity     Alcohol use: No     Drug use: No     Sexual activity: Not on file   Other Topics Concern     Parent/sibling w/ CABG, MI or angioplasty before 65F 55M? Not Asked   Social History Narrative      Not on file     Social Determinants of Health     Financial Resource Strain: Not on file   Food Insecurity: Not on file   Transportation Needs: Not on file   Physical Activity: Not on file   Stress: Not on file   Social Connections: Unknown (8/28/2023)    [facility], [facility]    Social Connections      Frequency of Communication with Friends and Family: Not on file   Interpersonal Safety: Not on file   Housing Stability: Not on file          Medications  Allergies   Current Outpatient Medications   Medication Sig Dispense Refill     acetaminophen (TYLENOL) 500 MG tablet Take 1,000 mg by mouth 2 times daily        albuterol (PROAIR HFA/PROVENTIL HFA/VENTOLIN HFA) 108 (90 Base) MCG/ACT inhaler Inhale 2 puffs into the lungs every 6 hours as needed       apixaban ANTICOAGULANT (ELIQUIS) 5 MG tablet Take 5 mg by mouth 2 times daily        aspirin 81 MG EC tablet Take 81 mg by mouth daily        CALCIUM-VITAMIN D PO Take 2 tablets by mouth daily       colesevelam (WELCHOL) 625 MG tablet Take 3 tablets by mouth At Bedtime       cyanocobalamin (CYANOCOBALAMIN) 1000 MCG/ML injection Inject 1,000 mcg into the muscle every 30 days       estradiol (ESTRACE) 0.1 MG/GM vaginal cream Place vaginally three times a week       fluticasone (FLONASE) 50 MCG/ACT nasal spray Spray 1 spray into both nostrils daily       fluticasone (FLOVENT HFA) 110 MCG/ACT inhaler Inhale 1 puff into the lungs 2 times daily        loratadine (CLARITIN) 10 MG tablet Take 10 mg by mouth daily as needed        Magnesium Oxide 250 MG TABS Take 250 mg by mouth daily        metolazone (ZAROXOLYN) 2.5 MG tablet Take 1 tablet (2.5 mg) by mouth daily as needed (As instructed by your provider for heart failure symptoms) Take 30 minutes prior to AM dose of torsemide 8 tablet 1     multivitamin w/minerals (THERA-VIT-M) tablet Take 1 tablet by mouth daily        nitroGLYcerin  (NITROSTAT) 0.4 MG sublingual tablet Place 1 tablet (0.4 mg) under the tongue every 5 minutes as needed for chest pain 25 tablet 1     PARoxetine (PAXIL) 40 MG tablet Take 40 mg by mouth daily        phenytoin (DILANTIN) 100 MG ER capsule Take 5 tablets at bedtime 450 capsule 3     potassium chloride ER (KLOR-CON M) 20 MEQ CR tablet Take 1 tablet (20 mEq) by mouth daily 270 tablet 3     simvastatin (ZOCOR) 40 MG tablet Take 40 mg by mouth At Bedtime        sodium chloride (OCEAN) 0.65 % nasal spray Spray 1 spray in nostril daily as needed       sotalol (BETAPACE) 80 MG tablet Take 1 tablet (80 mg) by mouth 2 times daily 180 tablet 3     spironolactone (ALDACTONE) 50 MG tablet Take 1 tablet (50 mg) by mouth daily 90 tablet 3     STELARA 45 MG/0.5ML SOLN Inject 45 mg Subcutaneous every 30 days       topiramate (TOPAMAX) 100 MG tablet TAKE ONE AND ONE-HALF TABLETS TWICE A  tablet 3     torsemide (DEMADEX) 20 MG tablet Take 2 tablets (40 mg) by mouth 2 times daily 360 tablet 2     Vitamin D3 (CHOLECALCIFEROL) 125 MCG (5000 UT) tablet Take 125 mcg by mouth daily       benzonatate (TESSALON) 200 MG capsule Take 1 capsule by mouth 3 times daily      Allergies   Allergen Reactions     Amoxicillin GI Disturbance and Other (See Comments)     COLITIS    Has tolerated cefazolin  GI bleeding, Colitis, has tolerated cefazolin        Sulfa Antibiotics      Sulfamethoxazole-Trimethoprim Other (See Comments)     Other reaction(s): Vaginal Irritation  VAGINAL BLEEDING  Vaginal irritation, vaginal bleeding            Lab Results    Chemistry/lipid CBC Cardiac Enzymes/BNP/TSH/INR   Lab Results   Component Value Date    CHOL 165 12/13/2023    HDL 48 (L) 12/13/2023    TRIG 306 (H) 12/13/2023    BUN 25.8 (H) 01/29/2024     01/29/2024    CO2 31 (H) 01/29/2024    Lab Results   Component Value Date    WBC 8.1 09/06/2023    HGB 8.7 (L) 09/06/2023    HCT 31.9 (L) 09/06/2023    MCV 88 09/06/2023     09/06/2023    Lab Results    Component Value Date    TROPONINI 0.04 04/25/2020     (H) 09/13/2021    TSH 2.90 07/17/2019      The longitudinal plan of care for the diagnosis(es)/condition(s) as documented were addressed during this visit. Due to the added complexity in care, I will continue to support Merrily in the subsequent management and with ongoing continuity of care.    SERENE Reynoso CNP  Cardiac Electrophysiology                                       Thank you for allowing me to participate in the care of your patient.      Sincerely,     SERENE Reynoso CNP     [facility]  cc:   SERENE Reynoso CNP  [address], [address]  [place] [address]

## 2024-08-30 ENCOUNTER — TELEPHONE (OUTPATIENT)
Dept: CARDIOLOGY | Facility: CLINIC | Age: 79
End: 2024-08-30
Payer: COMMERCIAL

## 2024-08-30 NOTE — TELEPHONE ENCOUNTER
[facility] Call Center    Phone Message    May a detailed message be left on voicemail: yes     Reason for Call: Other: Patient's daughter Kierra will like ot know if appt with Michelee on 10/18 can be virtual. Please call Kierra back at [phone] to further discuss.     Action Taken: Other: Cardiology    Travel Screening: Not Applicable     Thank you!  [facility]

## 2024-09-03 NOTE — TELEPHONE ENCOUNTER
Patient's daughter is contacted to discuss potential for virtual visit. Advised that since Dilciamadhu has not met Tran Boyle CNP that we would prefer in person visit. She was in agreement with this plan. Breanne SMITH RN BSN, CHFN, PCCN-K

## 2024-10-18 ENCOUNTER — OFFICE VISIT (OUTPATIENT)
Dept: CARDIOLOGY | Facility: CLINIC | Age: 79
End: 2024-10-18
Attending: NURSE PRACTITIONER
Payer: COMMERCIAL

## 2024-10-18 VITALS
SYSTOLIC BLOOD PRESSURE: 104 MMHG | HEART RATE: 62 BPM | RESPIRATION RATE: 24 BRPM | WEIGHT: 178 LBS | BODY MASS INDEX: 28.73 KG/M2 | OXYGEN SATURATION: 96 % | DIASTOLIC BLOOD PRESSURE: 48 MMHG

## 2024-10-18 DIAGNOSIS — I50.32 CHRONIC HEART FAILURE WITH PRESERVED EJECTION FRACTION (H): Primary | ICD-10-CM

## 2024-10-18 DIAGNOSIS — I25.10 ATHEROSCLEROSIS OF CORONARY ARTERY OF NATIVE HEART WITHOUT ANGINA PECTORIS, UNSPECIFIED VESSEL OR LESION TYPE: ICD-10-CM

## 2024-10-18 DIAGNOSIS — E83.42 HYPOMAGNESEMIA: ICD-10-CM

## 2024-10-18 DIAGNOSIS — E87.6 HYPOKALEMIA: ICD-10-CM

## 2024-10-18 DIAGNOSIS — I48.19 PERSISTENT ATRIAL FIBRILLATION (H): ICD-10-CM

## 2024-10-18 DIAGNOSIS — I10 BENIGN ESSENTIAL HYPERTENSION: ICD-10-CM

## 2024-10-18 LAB
ANION GAP SERPL CALCULATED.3IONS-SCNC: 10 MMOL/L (ref 7–15)
BUN SERPL-MCNC: 17.1 MG/DL (ref 8–23)
CALCIUM SERPL-MCNC: 8.7 MG/DL (ref 8.8–10.4)
CHLORIDE SERPL-SCNC: 101 MMOL/L (ref 98–107)
CREAT SERPL-MCNC: 0.76 MG/DL (ref 0.51–0.95)
EGFRCR SERPLBLD CKD-EPI 2021: 79 ML/MIN/1.73M2
GLUCOSE SERPL-MCNC: 101 MG/DL (ref 70–99)
HCO3 SERPL-SCNC: 28 MMOL/L (ref 22–29)
HOLD SPECIMEN: NORMAL
MAGNESIUM SERPL-MCNC: 2.5 MG/DL (ref 1.7–2.3)
NT-PROBNP SERPL-MCNC: 576 PG/ML (ref 0–1800)
POTASSIUM SERPL-SCNC: 4 MMOL/L (ref 3.4–5.3)
SODIUM SERPL-SCNC: 139 MMOL/L (ref 135–145)

## 2024-10-18 PROCEDURE — 80048 BASIC METABOLIC PNL TOTAL CA: CPT | Performed by: NURSE PRACTITIONER

## 2024-10-18 PROCEDURE — G2211 COMPLEX E/M VISIT ADD ON: HCPCS | Performed by: NURSE PRACTITIONER

## 2024-10-18 PROCEDURE — 99214 OFFICE O/P EST MOD 30 MIN: CPT | Performed by: NURSE PRACTITIONER

## 2024-10-18 PROCEDURE — 36415 COLL VENOUS BLD VENIPUNCTURE: CPT | Performed by: NURSE PRACTITIONER

## 2024-10-18 PROCEDURE — 83880 ASSAY OF NATRIURETIC PEPTIDE: CPT | Performed by: NURSE PRACTITIONER

## 2024-10-18 PROCEDURE — 83735 ASSAY OF MAGNESIUM: CPT | Performed by: NURSE PRACTITIONER

## 2024-10-18 NOTE — PROGRESS NOTES
Assessment/Recommendations   Assessment:      # Chronic heart failure with preserved ejection fraction NYHA class II:  Patient reports occasional shortness of breath.  Patient is well compensated on exam.  Current home weight is 173-177 lbs.  Weight is down 8-10 pounds since October 2023.  Per daughter, some decline in appetite.    On low-sodium diet.  Reports drinking more than 64 ounces of fluid per day     Heart failure regimen:  -Diuretic therapy with Torsemide 40 mg twice a day  -MRA therapy with Spironolactone 50 mg daily    # Hypokalemia: On potassium chloride 20 mEq daily    Hypomagnesemia: On magnesium oxide    # Hypertension: Blood pressure today is 104/48.  She reports home blood pressure running mostly systolic in 90s.  She reports occasional lightheadedness.    # Coronary artery disease: No chest pain.    # Persistent atrial fibrillation: Stable    Plan/Recommendation:  -BMP and NT proBNP pending   -Consider cutting back on spironolactone from 50 mg to 25 mg daily if stable NT proBNP  -Will uptitrate potassium chloride supplement as indicated with close lab monitoring.  -Limit salt intake to < 2000 mg/day, daily weight monitoring, and maintain fluid intake of 50 to 60 ounces per day    Follow up with Dr. Colbert in 4 months. Follow up with me 7 months.  Follow-up in A-fib clinic as scheduled in January     The longitudinal plan of care for chronic heart failure with preserved ejection fraction was addressed during this visit.?Due to the added   complexity in care, I will continue to support Tommy Rea in the subsequent management of this   condition(s) and with the ongoing continuity of care of this condition(s)       History of Present Illness/Subjective    Ms. Tommy Rea is a 79 year old female with a past medical history of persistent atrial fibrillation, coronary artery disease, COPD, asthma, lymphedema, seizures, hypertension, anemia, hyperparathyroidism, Crohn's disease,  orthostatic syncope, hypokalemia, hypomagnesemia, and chronic heart failure with preserved ejection fraction who is seen at [facility] for continued heart failure follow up.    Patient was last seen by my colleague in heart failure clinic in October 2023.  She has been following with her primary cardiology and A-fib clinic.     Today, Merrily accompanied by her daughter.  She reports occasional shortness of breath but denies worsening.  She has not taking metolazone for months.  She reports occasional lightheadedness.  Per daughter, her blood pressure runs systolic in 90s most of the time.  Reports intermittent abdominal bloating.  She denies fatigue, lightheadedness, shortness of breath, orthopnea, PND, palpitations, chest pain, and lower extremity edema.        ECHO June 2019-Reviewed:   Narrative & Impression    When compared to the previous study dated 10/25/2016, no significant change.    Left ventricle ejection fraction is normal. The calculated left ventricular ejection fraction is 64%.    Normal left ventricular size and systolic function.    Normal right ventricular size and systolic function.    No hemodynamically significant valvular heart abnormalities.     Physical Examination Review of Systems   /48 (BP Location: Left arm, Patient Position: Sitting, Cuff Size: Adult Regular)   Pulse 62   Resp 24   Wt 80.7 kg (178 lb)   SpO2 96%   BMI 28.73 kg/m    Body mass index is 28.73 kg/m .  Wt Readings from Last 3 Encounters:   10/18/24 80.7 kg (178 lb)   03/08/24 81.2 kg (179 lb)   01/29/24 80.7 kg (178 lb)     General Appearance:   no distress, normal body habitus   ENT/Mouth: membranes moist, no oral lesions or bleeding gums.      EYES:  no scleral icterus, normal conjunctivae   Neck: no carotid bruits or thyromegaly   Chest/Lungs:   lungs are clear to auscultation, no rales or wheezing, equal chest wall expansion    Cardiovascular:   Heart rate regular. Normal  first and second heart sounds with no murmurs, rubs, or gallops; JVP is difficult to assess due to the patient's obesity and body habitus   , no edema bilaterally    Abdomen:  no organomegaly, masses, bruits, or tenderness; bowel sounds are present   Extremities   no cyanosis or clubbing; CMS intact.   Skin: no xanthelasma, warm.    Neurologic:  no tremors   Psychiatric: alert and oriented x3, calm          Enc Vitals  BP: 104/48  Pulse: 62  Resp: 24  SpO2: 96 %  Weight: 80.7 kg (178 lb) (shoes on)                                        Negative unless noted in HPI     Medical History  Surgical History Family History Social History   Past Medical History:   Diagnosis Date    (HFpEF) heart failure with preserved ejection fraction (H) 1/20/2020    Arthritis     Asthma     Atrial fibrillation (H) 10/24/2016    B12 deficiency     Benign essential hypertension 8/2/2021    Formatting of this note might be different from the original. Created by Conversion    CAD (coronary artery disease)     Congestive heart failure (H)     COPD (chronic obstructive pulmonary disease) (H)     COPD (chronic obstructive pulmonary disease) (H)     Crohn's disease (H)     Crohn's disease (H)     Depression     Depressive disorder     Esophageal reflux     Essential hypertension     Fracture of left hip requiring operative repair (H)     Heart disease     a fib    History of shingles     Hyperlipidemia     Hyperparathyroidism (H)     Hypertension     Osteoporosis     Pulmonary nodule     benign    Regional enteritis (H) 07/19/2016    Seizure (H)     Seizures (H)     Past Surgical History:   Procedure Laterality Date    ARTHROPLASTY REVISION HIP Left 07/19/2016    removal of gamma nail hardware & conversion to total hip arthroplasty: Dr. Mercado at [facility] REMOVAL DEEP IMPLANT Left 1/25/2015    Procedure: Gamma Nail Left Hip;  Surgeon: Kirk De La Cruz MD;  Location: [facility];  Service: Orthopedics    HEMORRHOIDECTOMY      ORIF  HIP FRACTURE  2015    ORTHOPEDIC SURGERY      ORIF    OTHER SURGICAL HISTORY      HEMMOIROIDECT    OTHER SURGICAL HISTORY      CVL HEART CATH LEFT    Family History   Problem Relation Age of Onset    Hypertension Mother     Colon Cancer Father     Arthritis Father     Hypertension Father     Heart Disease Mother     Heart Disease Father     Social History     Socioeconomic History    Marital status:      Spouse name: Not on file    Number of children: Not on file    Years of education: Not on file    Highest education level: Not on file   Occupational History    Not on file   Tobacco Use    Smoking status: Former     Current packs/day: 0.00     Types: Cigarettes     Quit date: 2011     Years since quittin.3    Smokeless tobacco: Never   Substance and Sexual Activity    Alcohol use: No    Drug use: No    Sexual activity: Not on file   Other Topics Concern    Parent/sibling w/ CABG, MI or angioplasty before 65F 55M? Not Asked   Social History Narrative    Not on file     Social Determinants of Health     Financial Resource Strain: Not on file   Food Insecurity: Not on file   Transportation Needs: Not on file   Physical Activity: Not on file   Stress: Not on file   Social Connections: Unknown (2023)    Received from [facility], [employer] [facility]    Social Connections     Frequency of Communication with Friends and Family: Not on file   Interpersonal Safety: Not on file   Housing Stability: Not on file          Medications  Allergies   Current Outpatient Medications   Medication Sig Dispense Refill    acetaminophen (TYLENOL) 500 MG tablet Take 1,000 mg by mouth 2 times daily       albuterol (PROAIR HFA/PROVENTIL HFA/VENTOLIN HFA) 108 (90 Base) MCG/ACT inhaler Inhale 2 puffs into the lungs every 6 hours as needed      apixaban ANTICOAGULANT (ELIQUIS) 5 MG tablet Take 5 mg by mouth 2 times daily       aspirin 81 MG EC tablet Take 81 mg by  mouth daily       CALCIUM-VITAMIN D PO Take 2 tablets by mouth daily      colesevelam (WELCHOL) 625 MG tablet Take 3 tablets by mouth At Bedtime      cyanocobalamin (CYANOCOBALAMIN) 1000 MCG/ML injection Inject 1,000 mcg into the muscle every 30 days      estradiol (ESTRACE) 0.1 MG/GM vaginal cream Place vaginally three times a week      fluticasone (FLONASE) 50 MCG/ACT nasal spray Spray 1 spray into both nostrils daily      fluticasone (FLOVENT HFA) 110 MCG/ACT inhaler Inhale 1 puff into the lungs 2 times daily       loratadine (CLARITIN) 10 MG tablet Take 10 mg by mouth daily as needed       Magnesium Oxide 250 MG TABS Take 250 mg by mouth daily       metolazone (ZAROXOLYN) 2.5 MG tablet Take 1 tablet (2.5 mg) by mouth daily as needed (As instructed by your provider for heart failure symptoms) Take 30 minutes prior to AM dose of torsemide 8 tablet 1    multivitamin w/minerals (THERA-VIT-M) tablet Take 1 tablet by mouth daily       nitroGLYcerin (NITROSTAT) 0.4 MG sublingual tablet Place 1 tablet (0.4 mg) under the tongue every 5 minutes as needed for chest pain 25 tablet 1    PARoxetine (PAXIL) 40 MG tablet Take 40 mg by mouth daily       phenytoin (DILANTIN) 100 MG ER capsule Take 5 tablets at bedtime 450 capsule 3    potassium chloride ER (KLOR-CON M) 20 MEQ CR tablet Take 1 tablet (20 mEq) by mouth daily 270 tablet 3    simvastatin (ZOCOR) 40 MG tablet Take 40 mg by mouth At Bedtime       sodium chloride (OCEAN) 0.65 % nasal spray Spray 1 spray in nostril daily as needed      sotalol (BETAPACE) 80 MG tablet Take 1 tablet (80 mg) by mouth 2 times daily 180 tablet 3    spironolactone (ALDACTONE) 50 MG tablet Take 1 tablet (50 mg) by mouth daily 90 tablet 3    STELARA 45 MG/0.5ML SOLN Inject 45 mg Subcutaneous every 30 days      topiramate (TOPAMAX) 100 MG tablet TAKE ONE AND ONE-HALF TABLETS TWICE A  tablet 3    torsemide (DEMADEX) 20 MG tablet Take 2 tablets (40 mg) by mouth 2 times daily 360 tablet 2     Vitamin D3 (CHOLECALCIFEROL) 125 MCG (5000 UT) tablet Take 125 mcg by mouth daily      Allergies   Allergen Reactions    Amoxicillin GI Disturbance and Other (See Comments)     COLITIS    Has tolerated cefazolin  GI bleeding, Colitis, has tolerated cefazolin       Sulfa Antibiotics     Sulfamethoxazole-Trimethoprim Other (See Comments)     Other reaction(s): Vaginal Irritation  VAGINAL BLEEDING  Vaginal irritation, vaginal bleeding            Lab Results    Chemistry/lipid CBC Cardiac Enzymes/BNP/TSH/INR   Lab Results   Component Value Date    CHOL 165 12/13/2023    HDL 48 (L) 12/13/2023    TRIG 306 (H) 12/13/2023    BUN 25.8 (H) 01/29/2024     01/29/2024    CO2 31 (H) 01/29/2024    Lab Results   Component Value Date    WBC 8.1 09/06/2023    HGB 8.7 (L) 09/06/2023    HCT 31.9 (L) 09/06/2023    MCV 88 09/06/2023     09/06/2023    Lab Results   Component Value Date    TROPONINI 0.04 04/25/2020     (H) 09/13/2021    TSH 2.90 07/17/2019        36  minutes spent on the date of encounter doing chart review, review of test results, interpretation with above tests, patient visit, documentation, and discussion with family.        This note has been dictated using voice recognition software. Any grammatical, typographical, or context distortions are unintentional and inherent to the software

## 2024-10-18 NOTE — LETTER
10/18/2024    Hannah Benton MD  [address]  [place] [address]    RE: Tommy Rea       Dear Colleague,     I had the pleasure of seeing Tommy Rea in the [facility].          Assessment/Recommendations   Assessment:      # Chronic heart failure with preserved ejection fraction NYHA class II:  Patient reports occasional shortness of breath.  Patient is well compensated on exam.  Current home weight is 173-177 lbs.  Weight is down 8-10 pounds since October 2023.  Per daughter, some decline in appetite.    On low-sodium diet.  Reports drinking more than 64 ounces of fluid per day     Heart failure regimen:  -Diuretic therapy with Torsemide 40 mg twice a day  -MRA therapy with Spironolactone 50 mg daily    # Hypokalemia: On potassium chloride 20 mEq daily    Hypomagnesemia: On magnesium oxide    # Hypertension: Blood pressure today is 104/48.  She reports home blood pressure running mostly systolic in 90s.  She reports occasional lightheadedness.    # Coronary artery disease: No chest pain.    # Persistent atrial fibrillation: Stable    Plan/Recommendation:  -BMP and NT proBNP pending   -Consider cutting back on spironolactone from 50 mg to 25 mg daily if stable NT proBNP  -Will uptitrate potassium chloride supplement as indicated with close lab monitoring.  -Limit salt intake to < 2000 mg/day, daily weight monitoring, and maintain fluid intake of 50 to 60 ounces per day    Follow up with Dr. Colbert in 4 months. Follow up with me 7 months.  Follow-up in A-fib clinic as scheduled in January     The longitudinal plan of care for chronic heart failure with preserved ejection fraction was addressed during this visit.?Due to the added   complexity in care, I will continue to support Tommy Rea in the subsequent management of this   condition(s) and with the ongoing continuity of care of this condition(s)       History of Present Illness/Subjective    Ms. Tommy Rea  is a 79 year old female with a past medical history of persistent atrial fibrillation, coronary artery disease, COPD, asthma, lymphedema, seizures, hypertension, anemia, hyperparathyroidism, Crohn's disease, orthostatic syncope, hypokalemia, hypomagnesemia, and chronic heart failure with preserved ejection fraction who is seen at [facility] for continued heart failure follow up.    Patient was last seen by my colleague in heart failure clinic in October 2023.  She has been following with her primary cardiology and A-fib clinic.     Today, Merrily accompanied by her daughter.  She reports occasional shortness of breath but denies worsening.  She has not taking metolazone for months.  She reports occasional lightheadedness.  Per daughter, her blood pressure runs systolic in 90s most of the time.  Reports intermittent abdominal bloating.  She denies fatigue, lightheadedness, shortness of breath, orthopnea, PND, palpitations, chest pain, and lower extremity edema.        ECHO June 2019-Reviewed:   Narrative & Impression    When compared to the previous study dated 10/25/2016, no significant change.    Left ventricle ejection fraction is normal. The calculated left ventricular ejection fraction is 64%.    Normal left ventricular size and systolic function.    Normal right ventricular size and systolic function.    No hemodynamically significant valvular heart abnormalities.     Physical Examination Review of Systems   /48 (BP Location: Left arm, Patient Position: Sitting, Cuff Size: Adult Regular)   Pulse 62   Resp 24   Wt 80.7 kg (178 lb)   SpO2 96%   BMI 28.73 kg/m    Body mass index is 28.73 kg/m .  Wt Readings from Last 3 Encounters:   10/18/24 80.7 kg (178 lb)   03/08/24 81.2 kg (179 lb)   01/29/24 80.7 kg (178 lb)     General Appearance:   no distress, normal body habitus   ENT/Mouth: membranes moist, no oral lesions or bleeding gums.      EYES:  no scleral icterus,  normal conjunctivae   Neck: no carotid bruits or thyromegaly   Chest/Lungs:   lungs are clear to auscultation, no rales or wheezing, equal chest wall expansion    Cardiovascular:   Heart rate regular. Normal first and second heart sounds with no murmurs, rubs, or gallops; JVP is difficult to assess due to the patient's obesity and body habitus   , no edema bilaterally    Abdomen:  no organomegaly, masses, bruits, or tenderness; bowel sounds are present   Extremities   no cyanosis or clubbing; CMS intact.   Skin: no xanthelasma, warm.    Neurologic:  no tremors   Psychiatric: alert and oriented x3, calm          Enc Vitals  BP: 104/48  Pulse: 62  Resp: 24  SpO2: 96 %  Weight: 80.7 kg (178 lb) (shoes on)                                        Negative unless noted in HPI     Medical History  Surgical History Family History Social History   Past Medical History:   Diagnosis Date     (HFpEF) heart failure with preserved ejection fraction (H) 1/20/2020     Arthritis      Asthma      Atrial fibrillation (H) 10/24/2016     B12 deficiency      Benign essential hypertension 8/2/2021    Formatting of this note might be different from the original. Created by Conversion     CAD (coronary artery disease)      Congestive heart failure (H)      COPD (chronic obstructive pulmonary disease) (H)      COPD (chronic obstructive pulmonary disease) (H)      Crohn's disease (H)      Crohn's disease (H)      Depression      Depressive disorder      Esophageal reflux      Essential hypertension      Fracture of left hip requiring operative repair (H)      Heart disease     a fib     History of shingles      Hyperlipidemia      Hyperparathyroidism (H)      Hypertension      Osteoporosis      Pulmonary nodule     benign     Regional enteritis (H) 07/19/2016     Seizure (H)      Seizures (H)     Past Surgical History:   Procedure Laterality Date     ARTHROPLASTY REVISION HIP Left 07/19/2016    removal of gamma nail hardware & conversion to  total hip arthroplasty: Dr. Mercado at [facility] REMOVAL DEEP IMPLANT Left 2015    Procedure: Gamma Nail Left Hip;  Surgeon: Kirk De La Cruz MD;  Location: [facility];  Service: Orthopedics     HEMORRHOIDECTOMY       ORIF HIP FRACTURE       ORTHOPEDIC SURGERY      ORIF     OTHER SURGICAL HISTORY      HEMMOIROIDECT     OTHER SURGICAL HISTORY      CVL HEART CATH LEFT    Family History   Problem Relation Age of Onset     Hypertension Mother      Colon Cancer Father      Arthritis Father      Hypertension Father      Heart Disease Mother      Heart Disease Father     Social History     Socioeconomic History     Marital status:      Spouse name: Not on file     Number of children: Not on file     Years of education: Not on file     Highest education level: Not on file   Occupational History     Not on file   Tobacco Use     Smoking status: Former     Current packs/day: 0.00     Types: Cigarettes     Quit date: 2011     Years since quittin.3     Smokeless tobacco: Never   Substance and Sexual Activity     Alcohol use: No     Drug use: No     Sexual activity: Not on file   Other Topics Concern     Parent/sibling w/ CABG, MI or angioplasty before 65F 55M? Not Asked   Social History Narrative     Not on file     Social Determinants of Health     Financial Resource Strain: Not on file   Food Insecurity: Not on file   Transportation Needs: Not on file   Physical Activity: Not on file   Stress: Not on file   Social Connections: Unknown (2023)    [facility]    Social Connections      Frequency of Communication with Friends and Family: Not on file   Interpersonal Safety: Not on file   Housing Stability: Not on file          Medications  Allergies   Current Outpatient Medications   Medication Sig Dispense Refill     acetaminophen (TYLENOL) 500 MG tablet Take 1,000 mg by mouth 2 times daily         albuterol (PROAIR HFA/PROVENTIL HFA/VENTOLIN HFA) 108 (90 Base) MCG/ACT inhaler Inhale 2 puffs into the lungs every 6 hours as needed       apixaban ANTICOAGULANT (ELIQUIS) 5 MG tablet Take 5 mg by mouth 2 times daily        aspirin 81 MG EC tablet Take 81 mg by mouth daily        CALCIUM-VITAMIN D PO Take 2 tablets by mouth daily       colesevelam (WELCHOL) 625 MG tablet Take 3 tablets by mouth At Bedtime       cyanocobalamin (CYANOCOBALAMIN) 1000 MCG/ML injection Inject 1,000 mcg into the muscle every 30 days       estradiol (ESTRACE) 0.1 MG/GM vaginal cream Place vaginally three times a week       fluticasone (FLONASE) 50 MCG/ACT nasal spray Spray 1 spray into both nostrils daily       fluticasone (FLOVENT HFA) 110 MCG/ACT inhaler Inhale 1 puff into the lungs 2 times daily        loratadine (CLARITIN) 10 MG tablet Take 10 mg by mouth daily as needed        Magnesium Oxide 250 MG TABS Take 250 mg by mouth daily        metolazone (ZAROXOLYN) 2.5 MG tablet Take 1 tablet (2.5 mg) by mouth daily as needed (As instructed by your provider for heart failure symptoms) Take 30 minutes prior to AM dose of torsemide 8 tablet 1     multivitamin w/minerals (THERA-VIT-M) tablet Take 1 tablet by mouth daily        nitroGLYcerin (NITROSTAT) 0.4 MG sublingual tablet Place 1 tablet (0.4 mg) under the tongue every 5 minutes as needed for chest pain 25 tablet 1     PARoxetine (PAXIL) 40 MG tablet Take 40 mg by mouth daily        phenytoin (DILANTIN) 100 MG ER capsule Take 5 tablets at bedtime 450 capsule 3     potassium chloride ER (KLOR-CON M) 20 MEQ CR tablet Take 1 tablet (20 mEq) by mouth daily 270 tablet 3     simvastatin (ZOCOR) 40 MG tablet Take 40 mg by mouth At Bedtime        sodium chloride (OCEAN) 0.65 % nasal spray Spray 1 spray in nostril daily as needed       sotalol (BETAPACE) 80 MG tablet Take 1 tablet (80 mg) by mouth 2 times daily 180 tablet 3     spironolactone (ALDACTONE) 50 MG tablet Take 1 tablet (50 mg) by  mouth daily 90 tablet 3     STELARA 45 MG/0.5ML SOLN Inject 45 mg Subcutaneous every 30 days       topiramate (TOPAMAX) 100 MG tablet TAKE ONE AND ONE-HALF TABLETS TWICE A  tablet 3     torsemide (DEMADEX) 20 MG tablet Take 2 tablets (40 mg) by mouth 2 times daily 360 tablet 2     Vitamin D3 (CHOLECALCIFEROL) 125 MCG (5000 UT) tablet Take 125 mcg by mouth daily      Allergies   Allergen Reactions     Amoxicillin GI Disturbance and Other (See Comments)     COLITIS    Has tolerated cefazolin  GI bleeding, Colitis, has tolerated cefazolin        Sulfa Antibiotics      Sulfamethoxazole-Trimethoprim Other (See Comments)     Other reaction(s): Vaginal Irritation  VAGINAL BLEEDING  Vaginal irritation, vaginal bleeding            Lab Results    Chemistry/lipid CBC Cardiac Enzymes/BNP/TSH/INR   Lab Results   Component Value Date    CHOL 165 12/13/2023    HDL 48 (L) 12/13/2023    TRIG 306 (H) 12/13/2023    BUN 25.8 (H) 01/29/2024     01/29/2024    CO2 31 (H) 01/29/2024    Lab Results   Component Value Date    WBC 8.1 09/06/2023    HGB 8.7 (L) 09/06/2023    HCT 31.9 (L) 09/06/2023    MCV 88 09/06/2023     09/06/2023    Lab Results   Component Value Date    TROPONINI 0.04 04/25/2020     (H) 09/13/2021    TSH 2.90 07/17/2019        36  minutes spent on the date of encounter doing chart review, review of test results, interpretation with above tests, patient visit, documentation, and discussion with family.        This note has been dictated using voice recognition software. Any grammatical, typographical, or context distortions are unintentional and inherent to the software           Thank you for allowing me to participate in the care of your patient.      Sincerely,     SERENE Butts CNP     [facility]  cc:   SERENE Reynoso CNP  [address], [address]  [place] [address]

## 2024-10-18 NOTE — PATIENT INSTRUCTIONS
Tommy Rea,    It was a pleasure to see you today at the [facility].     My recommendations after this visit include:    - No medications changes made today    - We will follow up with your lab result    - Low sodium diet < 2000 mg/day, daily weight monitoring, and maintain adequate fluid intake at 50 to 60 ounces per day    -Please call if you experience persistent weight gain 2 to 3 pounds 2 days in a row or 5 pounds in a week with shortness of breath, abdominal bloating and leg swelling    - Follow up Dr. Colbert in 4 months     - Follow up with Tran Boyle NP in 7 months     - Please call Heart Failure Nurse Line at [phone], if you have any questions or concerns

## 2024-10-23 ENCOUNTER — TELEPHONE (OUTPATIENT)
Dept: CARDIOLOGY | Facility: CLINIC | Age: 79
End: 2024-10-23
Payer: COMMERCIAL

## 2024-10-23 DIAGNOSIS — I25.10 CORONARY ARTERY DISEASE INVOLVING NATIVE CORONARY ARTERY OF NATIVE HEART, UNSPECIFIED WHETHER ANGINA PRESENT: ICD-10-CM

## 2024-10-23 DIAGNOSIS — I50.32 CHRONIC HEART FAILURE WITH PRESERVED EJECTION FRACTION (H): Primary | ICD-10-CM

## 2024-10-23 RX ORDER — SPIRONOLACTONE 25 MG/1
25 TABLET ORAL DAILY
Qty: 90 TABLET | Refills: 2 | Status: SHIPPED | OUTPATIENT
Start: 2024-10-23

## 2024-10-23 NOTE — TELEPHONE ENCOUNTER
Spoke w/ pt and her dtr Kierra and updated them w/ lab results and recommendations from Tran. They verbalized understanding. Kierra assists w/ med management and rides for pt. They will decrease spironolactone to 25mg daily and are requesting to have potassium lab drawn at local [facility]. Advised that I will fax order, but that they will need to call and make the lab appt for 2 weeks, Kierra verbalized understanding. Provided HF nurse line to call if any questions or concerns, or if pt notices wt gain or worsening HF symptoms. Orders updated.     MIKAYLA Boudreaux RN

## 2024-10-23 NOTE — TELEPHONE ENCOUNTER
----- Message from Tran Boyle sent at 10/23/2024  3:35 PM CDT -----  Looks like pt has not read [employer] message sent by Natalie 5 days.  Stable lab.  Pt reported occasionally lightheadedness with systolic blood pressure in 90s.  May consider reducing spironolactone from 50 mg to 25 mg daily.  Repeat potassium level in 2 weeks.  Thank you!  CY

## 2024-10-29 ENCOUNTER — TELEPHONE (OUTPATIENT)
Dept: CARDIOLOGY | Facility: CLINIC | Age: 79
End: 2024-10-29
Payer: COMMERCIAL

## 2024-10-29 DIAGNOSIS — I50.32 CHRONIC HEART FAILURE WITH PRESERVED EJECTION FRACTION (H): Primary | ICD-10-CM

## 2024-10-29 NOTE — TELEPHONE ENCOUNTER
Noted. Spoke w/ Tran and she also advised pt can have Mg rechecked when K is checked next week at [facility] lab appt.    Called Kierra, no answer. LM to call back to review recommendations from Tran.    MIKAYLA Boudreaux RN

## 2024-10-29 NOTE — TELEPHONE ENCOUNTER
Tran-    Pt's dtr Kierra called and LM on HF nurse line w/ questions about pt's magnesium. When you shin labs on 10/18 after seeing her in clinic, it was slightly elevated to 2.5. Pt takes magnesium oxide 250mg daily.     Pt's dtr is wondering if she should still continue this med/dose or decrease given Mg level. What are your recommendations? Or would you like to defer to PCP? Thank you.     MIKAYLA Boudreaux RN

## 2024-10-29 NOTE — TELEPHONE ENCOUNTER
Tran Boyle, Camila Brown CNP, RN; P [facility]  Caller: Unspecified (Today,  9:03 AM)  Magnesium slightly elevated.  Not high enough to cause toxicity but could certainly reduce to 200 mg daily or 250 mg every other day.  Thanks  CY

## 2024-10-30 NOTE — TELEPHONE ENCOUNTER
Spoke w/ Kierra and updated her w/ Tran's recommendations. She verbalized understanding. She will have pt decrease Mag ox to 250mg every other day since that is what they have on hand. Will send Mg lab order to be collected w/ K on 11/8 at [facility], PCP office.    MIKAYLA Boudreaux RN

## 2024-11-12 DIAGNOSIS — I48.19 PERSISTENT ATRIAL FIBRILLATION (H): ICD-10-CM

## 2024-11-12 RX ORDER — SOTALOL HYDROCHLORIDE 80 MG/1
80 TABLET ORAL 2 TIMES DAILY
Qty: 180 TABLET | Refills: 3 | Status: SHIPPED | OUTPATIENT
Start: 2024-11-12

## 2024-11-13 ENCOUNTER — LAB REQUISITION (OUTPATIENT)
Dept: LAB | Facility: CLINIC | Age: 79
End: 2024-11-13
Payer: COMMERCIAL

## 2024-11-13 DIAGNOSIS — S32.401A UNSPECIFIED FRACTURE OF RIGHT ACETABULUM, INITIAL ENCOUNTER FOR CLOSED FRACTURE (H): ICD-10-CM

## 2024-11-14 LAB
ANION GAP SERPL CALCULATED.3IONS-SCNC: 11 MMOL/L (ref 7–15)
BUN SERPL-MCNC: 17.7 MG/DL (ref 8–23)
CALCIUM SERPL-MCNC: 8.6 MG/DL (ref 8.8–10.4)
CHLORIDE SERPL-SCNC: 99 MMOL/L (ref 98–107)
CREAT SERPL-MCNC: 0.68 MG/DL (ref 0.51–0.95)
EGFRCR SERPLBLD CKD-EPI 2021: 88 ML/MIN/1.73M2
GLUCOSE SERPL-MCNC: 101 MG/DL (ref 70–99)
HCO3 SERPL-SCNC: 31 MMOL/L (ref 22–29)
POTASSIUM SERPL-SCNC: 2.9 MMOL/L (ref 3.4–5.3)
SODIUM SERPL-SCNC: 141 MMOL/L (ref 135–145)

## 2024-11-14 PROCEDURE — 36415 COLL VENOUS BLD VENIPUNCTURE: CPT | Mod: ORL | Performed by: FAMILY MEDICINE

## 2024-11-14 PROCEDURE — P9604 ONE-WAY ALLOW PRORATED TRIP: HCPCS | Mod: ORL | Performed by: FAMILY MEDICINE

## 2024-11-14 PROCEDURE — 80048 BASIC METABOLIC PNL TOTAL CA: CPT | Mod: ORL | Performed by: FAMILY MEDICINE

## 2024-11-25 ENCOUNTER — LAB REQUISITION (OUTPATIENT)
Dept: LAB | Facility: CLINIC | Age: 79
End: 2024-11-25
Payer: COMMERCIAL

## 2024-11-25 DIAGNOSIS — I10 ESSENTIAL (PRIMARY) HYPERTENSION: ICD-10-CM

## 2024-11-26 LAB
ERYTHROCYTE [DISTWIDTH] IN BLOOD BY AUTOMATED COUNT: ABNORMAL %
HCT VFR BLD AUTO: 39.6 % (ref 35–47)
HGB BLD-MCNC: 10.9 G/DL (ref 11.7–15.7)
MCH RBC QN AUTO: 26.8 PG (ref 26.5–33)
MCHC RBC AUTO-ENTMCNC: 27.5 G/DL (ref 31.5–36.5)
MCV RBC AUTO: 97 FL (ref 78–100)
PLAT MORPH BLD: ABNORMAL
PLATELET # BLD AUTO: 448 10E3/UL (ref 150–450)
POLYCHROMASIA BLD QL SMEAR: SLIGHT
RBC # BLD AUTO: 4.07 10E6/UL (ref 3.8–5.2)
RBC MORPH BLD: ABNORMAL
WBC # BLD AUTO: 7.1 10E3/UL (ref 4–11)

## 2024-12-30 ENCOUNTER — LAB REQUISITION (OUTPATIENT)
Dept: LAB | Facility: CLINIC | Age: 79
End: 2024-12-30
Payer: COMMERCIAL

## 2024-12-30 DIAGNOSIS — E21.3 HYPERPARATHYROIDISM, UNSPECIFIED: ICD-10-CM

## 2024-12-30 DIAGNOSIS — Z87.448 PERSONAL HISTORY OF OTHER DISEASES OF URINARY SYSTEM: ICD-10-CM

## 2024-12-30 DIAGNOSIS — E78.2 MIXED HYPERLIPIDEMIA: ICD-10-CM

## 2024-12-30 DIAGNOSIS — I25.10 ATHEROSCLEROTIC HEART DISEASE OF NATIVE CORONARY ARTERY WITHOUT ANGINA PECTORIS: ICD-10-CM

## 2024-12-30 LAB
ALBUMIN SERPL BCG-MCNC: 3.9 G/DL (ref 3.5–5.2)
ALP SERPL-CCNC: 130 U/L (ref 40–150)
ALT SERPL W P-5'-P-CCNC: 15 U/L (ref 0–50)
ANION GAP SERPL CALCULATED.3IONS-SCNC: 12 MMOL/L (ref 7–15)
AST SERPL W P-5'-P-CCNC: 20 U/L (ref 0–45)
BILIRUB SERPL-MCNC: 0.2 MG/DL
BUN SERPL-MCNC: 19.7 MG/DL (ref 8–23)
CALCIUM SERPL-MCNC: 9.7 MG/DL (ref 8.8–10.4)
CHLORIDE SERPL-SCNC: 102 MMOL/L (ref 98–107)
CHOLEST SERPL-MCNC: 181 MG/DL
CREAT SERPL-MCNC: 0.72 MG/DL (ref 0.51–0.95)
EGFRCR SERPLBLD CKD-EPI 2021: 85 ML/MIN/1.73M2
FASTING STATUS PATIENT QL REPORTED: ABNORMAL
FASTING STATUS PATIENT QL REPORTED: ABNORMAL
GLUCOSE SERPL-MCNC: 102 MG/DL (ref 70–99)
HCO3 SERPL-SCNC: 26 MMOL/L (ref 22–29)
HDLC SERPL-MCNC: 56 MG/DL
LDLC SERPL CALC-MCNC: 84 MG/DL
NONHDLC SERPL-MCNC: 125 MG/DL
POTASSIUM SERPL-SCNC: 3.8 MMOL/L (ref 3.4–5.3)
PROT SERPL-MCNC: 7.6 G/DL (ref 6.4–8.3)
PTH-INTACT SERPL-MCNC: 44 PG/ML (ref 15–65)
SODIUM SERPL-SCNC: 140 MMOL/L (ref 135–145)
TRIGL SERPL-MCNC: 204 MG/DL
VIT D+METAB SERPL-MCNC: 42 NG/ML (ref 20–50)

## 2024-12-30 PROCEDURE — 83970 ASSAY OF PARATHORMONE: CPT | Mod: ORL | Performed by: FAMILY MEDICINE

## 2024-12-30 PROCEDURE — 87086 URINE CULTURE/COLONY COUNT: CPT | Mod: ORL | Performed by: FAMILY MEDICINE

## 2024-12-30 PROCEDURE — 80061 LIPID PANEL: CPT | Mod: ORL | Performed by: FAMILY MEDICINE

## 2024-12-30 PROCEDURE — 82306 VITAMIN D 25 HYDROXY: CPT | Mod: ORL | Performed by: FAMILY MEDICINE

## 2024-12-30 PROCEDURE — 80053 COMPREHEN METABOLIC PANEL: CPT | Mod: ORL | Performed by: FAMILY MEDICINE

## 2024-12-31 LAB — BACTERIA UR CULT: NORMAL

## 2025-01-02 DIAGNOSIS — I50.32 CHRONIC HEART FAILURE WITH PRESERVED EJECTION FRACTION (H): ICD-10-CM

## 2025-01-02 DIAGNOSIS — E78.2 MIXED HYPERLIPIDEMIA: ICD-10-CM

## 2025-01-02 DIAGNOSIS — I25.10 CORONARY ARTERY DISEASE INVOLVING NATIVE CORONARY ARTERY OF NATIVE HEART, UNSPECIFIED WHETHER ANGINA PRESENT: Primary | ICD-10-CM

## 2025-01-02 DIAGNOSIS — I10 BENIGN ESSENTIAL HYPERTENSION: ICD-10-CM

## 2025-01-06 RX ORDER — TORSEMIDE 20 MG/1
40 TABLET ORAL 2 TIMES DAILY
Qty: 360 TABLET | Refills: 0 | Status: SHIPPED | OUTPATIENT
Start: 2025-01-06

## 2025-01-06 NOTE — TELEPHONE ENCOUNTER
Last seen by KML 1/24/24 with recommendations to follow-up in 1 year. Yearly follow-up placed per protocol, message sent to scheduling to arrange. 90 day supply sent with request to arrange follow-up for further refills. LMS

## 2025-01-07 ENCOUNTER — OFFICE VISIT (OUTPATIENT)
Dept: CARDIOLOGY | Facility: CLINIC | Age: 80
End: 2025-01-07
Attending: NURSE PRACTITIONER
Payer: COMMERCIAL

## 2025-01-07 VITALS
HEIGHT: 66 IN | HEART RATE: 58 BPM | SYSTOLIC BLOOD PRESSURE: 102 MMHG | RESPIRATION RATE: 16 BRPM | WEIGHT: 176 LBS | DIASTOLIC BLOOD PRESSURE: 56 MMHG | BODY MASS INDEX: 28.28 KG/M2

## 2025-01-07 DIAGNOSIS — I50.32 CHRONIC HEART FAILURE WITH PRESERVED EJECTION FRACTION (H): ICD-10-CM

## 2025-01-07 DIAGNOSIS — I48.19 PERSISTENT ATRIAL FIBRILLATION (H): Primary | ICD-10-CM

## 2025-01-07 DIAGNOSIS — I25.10 ATHEROSCLEROSIS OF CORONARY ARTERY OF NATIVE HEART WITHOUT ANGINA PECTORIS, UNSPECIFIED VESSEL OR LESION TYPE: ICD-10-CM

## 2025-01-07 DIAGNOSIS — I10 BENIGN ESSENTIAL HYPERTENSION: ICD-10-CM

## 2025-01-07 RX ORDER — IPRATROPIUM BROMIDE AND ALBUTEROL SULFATE 2.5; .5 MG/3ML; MG/3ML
SOLUTION RESPIRATORY (INHALATION)
COMMUNITY

## 2025-01-07 NOTE — PROGRESS NOTES
HEART CARE ELECTROPHYSIOLOGY NOTE      [facility]  [phone]      Assessment/Recommendations   Assessment/Plan:  1.  Persistent Atrial Fibrillation:  No symptomatology or evidence of AF recurrence.  Briefly discussed treatment options of medications versus catheter ablation.  May consider catheter ablation in the future, but defer further discussion until after she has fully recovered from her hip surgery.  -- Continue sotalol 80 mg twice daily     She was reassured that atrial fibrillation is not life-threatening, but carries an increased risk for stroke.  She has a [id number] score of 5 for age >75, CAD, CHF, HTN.  Cardiac CT confirmed the absence of FLORENCE thrombus.  Increased risk for bleed due to recurrent falls. HAS-BLED score of 2 for age and bleeding predisposition.  Discussed alternative of left atrial appendage closure with a Watchman device including procedure, risks, and medication transition.  -- Continue Eliquis 5 mg twice daily for stroke prophylaxis.   -- Consideration for LAAC with Watchman     2.  Chronic heart failure with preserved ejection fraction: NYHA class II.  Compensated.  On torsemide and spironolactone. PRN metolazone.  Managed by heart failure clinic.     3.  Hypertension: Controlled with torsemide, spironolactone, sotalol as above.      4.  Coronary artery disease: Denies anginal symptoms.  Continue statin.    Follow up with Dr. Colbert 3/27/2025  Follow up with Tran Boyle CNP ~ June  Follow up with me in 6 months       History of Present Illness/Subjective    HPI: Tommy Rea is a 79 year old female who comes in today accompanied by her daughter for EP follow-up of atrial fibrillation.  She has a history of atrial fibrillation, coronary artery disease, chronic heart failure with preserved ejection fraction, seizures, COPD, asthma, lymphedema, hypertension, anemia, hyperparathyroidism, Crohn's disease, remote history of orthostatic syncope.  She had a  mechanical fall 8/28/2023 resulting in right fibular fracture (nonsurgical) for which she was hospitalized, then discharged to TCU for rehab.  She was noted to have gradually progressive shortness of breath, 20 pound weight gain, and elevated respiratory rates.  She was hospitalized at [facility] 9/8/2023 for acute HFpEF, AF-RVR, and acute PE.  It is thought that PE was secondary to interruption of Eliquis during hospitalization for fibular fracture.  She was hospitalized at [facility] 11/1/2024 for hip fracture after mechanical fall s/p right hip hemiarthroplasty 11/2/2024.     Arrhythmia history  Dx/date: PAF 11/2016.  No known recurrence since until she was hospitalized 9/8/2023 for AF-RVR and acute heart failure.  Echo showed severe LAE.  Cardioversion was planned but canceled due to acute PE.  She was started on sotalol and planned for cardioversion on 12/6/2023, but arrived in SR.  Sx: Mental fogginess, dizziness  [id number] score: 5 for age >75, CAD, CHF, HTN  Oral anticoagulation: Eliquis 5 mg twice daily  Antiarrhythmic medications, AV roxane blocking agents: Metoprolol XL 50 mg daily (previous).  Sotalol 80 mg twice daily  Procedures  DCCV: N/A  Ablation: N/A     Tommy states that she feels well and is making good progress in her recovery.  She had another mechanical fall at TCU when reaching for something, but without injury.  She has not had any A-fib.   She denies chest discomfort, palpitations, abdominal fullness/bloating or peripheral edema, shortness of breath at rest or with light activity, paroxysmal nocturnal dyspnea, orthopnea, lightheadedness, dizziness, pre-syncope, or syncope.  She will be starting home PT and OT.     Cardiographics (EKG personally reviewed):  EKG done 12/6/2023 shows sinus rhythm at 57 bpm, QRS 76 ms, QT/QTc 508/494 ms  EKG done 9/8/2023 shows atrial fibrillation with rapid ventricular response at 118 bpm  EKG done 7/11/2022 shows sinus rhythm at 67 bpm, QRS 74 ms, QT/QTc 424/448  "ms     Cardiac telemetry strips from 11/2024 at [facility] shows sinus rhythm    ECHO done 9/10/2023:   1. Normal left ventricular systolic function. Normal left ventricular systolic function.   Estimated left ventricular ejection fraction is 65%.    No regional wall motion abnormalities.    2. Normal right ventricular chamber size. Normal right ventricular systolic function.    3. No significant valvular heart disease.    4. Aortic sinus of Valsalva is normal in size (3.4 cm). Normal indexed ascending aorta   dimension (3.6 cm, 1.9 cm/m ).    5. Moderate-severe left atrial enlargement. Left atrial volume index is 47 ml/m .    6. Normal inferior vena cava with normal inspiratory collapse.    Estimated EF: 65%      Cardiac CT done 9/11/2023:  No left atrial appendage thrombus.   Severe left atrial enlargement.   Severe coronary artery atherosclerosis involving right coronary artery and   left anterior descending artery. The study is not geared towards   assessment of luminal stenosis.   LIMITED CHEST: No significant findings within the lungs. Short segment of   subacute appearing pulmonary emboli left lower lobe.      NM stress test done 6/8/2020:  Narrative & Impression     The nuclear stress test is negative for inducible myocardial ischemia or infarction.    The left ventricular ejection fraction at stress is 46% with mild global hypokinesis.    Left ventricular function is mildly reduced.    Only upright images could be acquired.    There is no prior study for comparison.     I have reviewed and updated the patient's Past Medical History, Social History, Family History and Medication List.  Outside records personally reviewed.     Physical Examination  Review of Systems   Vitals: /56 (BP Location: Left arm, Patient Position: Sitting, Cuff Size: Adult Regular)   Pulse 58   Resp 16   Ht 1.676 m (5' 6\")   Wt 79.8 kg (176 lb)   BMI 28.41 kg/m    BMI= Body mass index is 28.41 kg/m .  Wt Readings from Last 3 " Encounters:   01/07/25 79.8 kg (176 lb)   10/18/24 80.7 kg (178 lb)   03/08/24 81.2 kg (179 lb)       General Appearance:   Alert, well-appearing and in no acute distress.   HEENT: Atraumatic, normocephalic.  No scleral icterus, normal conjunctivae, EOMs intact, PERRL.  Mucous membranes pink and moist.     Chest/Lungs:   Chest symmetric, spine straight.  Respirations unlabored.  Lungs are clear to auscultation.   Cardiovascular:   Regular rate and rhythm.  Normal first and second heart sounds with no murmurs, rubs, or gallops; radial pulses are intact, No edema.   Abdomen:  Soft, nondistended   Extremities: No cyanosis or clubbing.   Musculoskeletal: Moves all extremities.     Skin: Warm, dry, intact.    Neurologic: Mood and affect are appropriate.  Alert and oriented to person, place, time, and situation.     ROS: 10 point ROS neg other than the symptoms noted above in the HPI.         Medical History  Surgical History Family History Social History   Past Medical History:   Diagnosis Date    (HFpEF) heart failure with preserved ejection fraction (H) 1/20/2020    Arthritis     Asthma     Atrial fibrillation (H) 10/24/2016    B12 deficiency     Benign essential hypertension 8/2/2021    Formatting of this note might be different from the original. Created by Conversion    CAD (coronary artery disease)     Congestive heart failure (H)     COPD (chronic obstructive pulmonary disease) (H)     COPD (chronic obstructive pulmonary disease) (H)     Crohn's disease (H)     Crohn's disease (H)     Depression     Depressive disorder     Esophageal reflux     Essential hypertension     Fracture of left hip requiring operative repair (H)     Heart disease     a fib    History of shingles     Hyperlipidemia     Hyperparathyroidism (H)     Hypertension     Osteoporosis     Pulmonary nodule     benign    Regional enteritis (H) 07/19/2016    Seizure (H)     Seizures (H)      Past Surgical History:   Procedure Laterality Date     ARTHROPLASTY REVISION HIP Left 2016    removal of gamma nail hardware & conversion to total hip arthroplasty: Dr. Mercado at [facility] REMOVAL DEEP IMPLANT Left 2015    Procedure: Gamma Nail Left Hip;  Surgeon: Kirk De La Cruz MD;  Location: [facility] OR;  Service: Orthopedics    HEMORRHOIDECTOMY      ORIF HIP FRACTURE      ORTHOPEDIC SURGERY      ORIF    OTHER SURGICAL HISTORY      HEMMOIROIDECT    OTHER SURGICAL HISTORY      CVL HEART CATH LEFT     Family History   Problem Relation Age of Onset    Hypertension Mother     Colon Cancer Father     Arthritis Father     Hypertension Father     Heart Disease Mother     Heart Disease Father         Social History     Socioeconomic History    Marital status:      Spouse name: Not on file    Number of children: Not on file    Years of education: Not on file    Highest education level: Not on file   Occupational History    Not on file   Tobacco Use    Smoking status: Former     Current packs/day: 0.00     Types: Cigarettes     Quit date: 2011     Years since quittin.5    Smokeless tobacco: Never   Substance and Sexual Activity    Alcohol use: No    Drug use: No    Sexual activity: Not on file   Other Topics Concern    Parent/sibling w/ CABG, MI or angioplasty before 65F 55M? Not Asked   Social History Narrative    Not on file     Social Drivers of Health     Financial Resource Strain: Not on file   Food Insecurity: Not on file   Transportation Needs: Not on file   Physical Activity: Not on file   Stress: Not on file   Social Connections: Unknown (2023)    Received from [employer] [employer]    Social Connections     Frequency of Communication with Friends and Family: Not on file   Interpersonal Safety: Not on file   Housing Stability: Not on file           Medications  Allergies   Current Outpatient Medications   Medication Sig Dispense Refill     acetaminophen (TYLENOL) 500 MG tablet Take 1,000 mg by mouth 2 times daily       albuterol (PROAIR HFA/PROVENTIL HFA/VENTOLIN HFA) 108 (90 Base) MCG/ACT inhaler Inhale 2 puffs into the lungs every 6 hours as needed      apixaban ANTICOAGULANT (ELIQUIS) 5 MG tablet Take 1 tablet (5 mg) by mouth 2 times daily. 180 tablet 3    aspirin 81 MG EC tablet Take 81 mg by mouth daily       CALCIUM-VITAMIN D PO Take 2 tablets by mouth daily      colesevelam (WELCHOL) 625 MG tablet Take 3 tablets by mouth At Bedtime      cyanocobalamin (CYANOCOBALAMIN) 1000 MCG/ML injection Inject 1,000 mcg into the muscle every 30 days      estradiol (ESTRACE) 0.1 MG/GM vaginal cream Place vaginally three times a week      fluticasone (FLONASE) 50 MCG/ACT nasal spray Spray 1 spray into both nostrils daily      fluticasone (FLOVENT HFA) 110 MCG/ACT inhaler Inhale 1 puff into the lungs 2 times daily       loratadine (CLARITIN) 10 MG tablet Take 10 mg by mouth daily as needed       Magnesium Oxide 250 MG TABS Take 250 mg by mouth every other day.      metolazone (ZAROXOLYN) 2.5 MG tablet Take 1 tablet (2.5 mg) by mouth daily as needed (As instructed by your provider for heart failure symptoms) Take 30 minutes prior to AM dose of torsemide 8 tablet 1    multivitamin w/minerals (THERA-VIT-M) tablet Take 1 tablet by mouth daily       nitroGLYcerin (NITROSTAT) 0.4 MG sublingual tablet Place 1 tablet (0.4 mg) under the tongue every 5 minutes as needed for chest pain 25 tablet 1    PARoxetine (PAXIL) 40 MG tablet Take 40 mg by mouth daily       phenytoin (DILANTIN) 100 MG ER capsule Take 5 tablets at bedtime 450 capsule 3    potassium chloride ER (KLOR-CON M) 20 MEQ CR tablet Take 1 tablet (20 mEq) by mouth daily 270 tablet 3    simvastatin (ZOCOR) 40 MG tablet Take 40 mg by mouth At Bedtime       sodium chloride (OCEAN) 0.65 % nasal spray Spray 1 spray in nostril daily as needed      sotalol (BETAPACE) 80 MG tablet TAKE 1 TABLET(80 MG) BY MOUTH TWICE  "DAILY 180 tablet 3    spironolactone (ALDACTONE) 25 MG tablet Take 1 tablet (25 mg) by mouth daily. 90 tablet 2    STELARA 45 MG/0.5ML SOLN Inject 45 mg Subcutaneous every 30 days      topiramate (TOPAMAX) 100 MG tablet TAKE ONE AND ONE-HALF TABLETS TWICE A  tablet 3    torsemide (DEMADEX) 20 MG tablet TAKE 2 TABLETS TWICE A  tablet 0    Vitamin D3 (CHOLECALCIFEROL) 125 MCG (5000 UT) tablet Take 125 mcg by mouth daily      ipratropium - albuterol 0.5 mg/2.5 mg/3 mL (DUONEB) 0.5-2.5 (3) MG/3ML neb solution USE 1 VIAL VIA NEBULIZER TWICE DAILY         Allergies   Allergen Reactions    Amoxicillin GI Disturbance and Other (See Comments)     COLITIS    Has tolerated cefazolin  GI bleeding, Colitis, has tolerated cefazolin       Sulfa Antibiotics     Sulfamethoxazole-Trimethoprim Other (See Comments)     Other reaction(s): Vaginal Irritation  VAGINAL BLEEDING  Vaginal irritation, vaginal bleeding             Lab Results    Chemistry/lipid CBC Cardiac Enzymes/BNP/TSH/INR   Recent Labs   Lab Test 12/30/24  0830   CHOL 181   HDL 56   LDL 84   TRIG 204*     Recent Labs   Lab Test 12/30/24  0830 12/13/23  1617 12/07/22  1630   LDL 84 56 71     Recent Labs   Lab Test 12/30/24  0830      POTASSIUM 3.8   CHLORIDE 102   CO2 26   *   BUN 19.7   CR 0.72   GFRESTIMATED 85   DIONNA 9.7     Recent Labs   Lab Test 12/30/24  0830 11/14/24  0719 10/18/24  1648   CR 0.72 0.68 0.76        Recent Labs   Lab Test 11/26/24  0720   WBC 7.1   HGB 10.9*   HCT 39.6   MCV 97        Recent Labs   Lab Test 11/26/24  0720 09/06/23  0533 04/24/23  1052   HGB 10.9* 8.7* 11.6*    Recent Labs   Lab Test 04/25/20  2300 07/16/19  1934 06/10/19  1618   TROPONINI 0.04 0.04 0.06     Recent Labs   Lab Test 10/18/24  1648 09/13/21  0925 11/09/20  0907 10/02/20  0718   BNP  --  385* 190* 564*   NTBNP 576  --   --   --      Recent Labs   Lab Test 07/17/19  0620   TSH 2.90     No results for input(s): \"INR\" in the last 11373 hours. "       The longitudinal plan of care for the diagnosis(es)/condition(s) as documented were addressed during this visit. Due to the added complexity in care, I will continue to support Tommy in the subsequent management and with ongoing continuity of care.

## 2025-01-07 NOTE — LETTER
1/7/2025    Hannah Benton MD  [address]  [place] [address]    RE: Tommy Rea       Dear Colleague,     I had the pleasure of seeing Tommy Rea in the [facility]alth [facility].    HEART CARE ELECTROPHYSIOLOGY NOTE      [facility]  [phone]      Assessment/Recommendations   Assessment/Plan:  1.  Persistent Atrial Fibrillation:  No symptomatology or evidence of AF recurrence.  Briefly discussed treatment options of medications versus catheter ablation.  May consider catheter ablation in the future, but defer further discussion until after she has fully recovered from her hip surgery.  -- Continue sotalol 80 mg twice daily     She was reassured that atrial fibrillation is not life-threatening, but carries an increased risk for stroke.  She has a [id number]-TDHz score of 5 for age >75, CAD, CHF, HTN.  Cardiac CT confirmed the absence of FLORENCE thrombus.  Increased risk for bleed due to recurrent falls. HAS-BLED score of 2 for age and bleeding predisposition.  Discussed alternative of left atrial appendage closure with a Watchman device including procedure, risks, and medication transition.  -- Continue Eliquis 5 mg twice daily for stroke prophylaxis.   -- Consideration for LAAC with Watchman     2.  Chronic heart failure with preserved ejection fraction: NYHA class II.  Compensated.  On torsemide and spironolactone. PRN metolazone.  Managed by heart failure clinic.     3.  Hypertension: Controlled with torsemide, spironolactone, sotalol as above.      4.  Coronary artery disease: Denies anginal symptoms.  Continue statin.    Follow up with Dr. Colbert 3/27/2025  Follow up with Tran Boyle CNP ~ June  Follow up with me in 6 months       History of Present Illness/Subjective    HPI: Tommy Rea is a 79 year old female who comes in today accompanied by her daughter for EP follow-up of atrial fibrillation.  She has a history of atrial fibrillation, coronary artery  disease, chronic heart failure with preserved ejection fraction, seizures, COPD, asthma, lymphedema, hypertension, anemia, hyperparathyroidism, Crohn's disease, remote history of orthostatic syncope.  She had a mechanical fall 8/28/2023 resulting in right fibular fracture (nonsurgical) for which she was hospitalized, then discharged to TCU for rehab.  She was noted to have gradually progressive shortness of breath, 20 pound weight gain, and elevated respiratory rates.  She was hospitalized at [facility] 9/8/2023 for acute HFpEF, AF-RVR, and acute PE.  It is thought that PE was secondary to interruption of Eliquis during hospitalization for fibular fracture.  She was hospitalized at [facility] 11/1/2024 for hip fracture after mechanical fall s/p right hip hemiarthroplasty 11/2/2024.     Arrhythmia history  Dx/date: PAF 11/2016.  No known recurrence since until she was hospitalized 9/8/2023 for AF-RVR and acute heart failure.  Echo showed severe LAE.  Cardioversion was planned but canceled due to acute PE.  She was started on sotalol and planned for cardioversion on 12/6/2023, but arrived in SR.  Sx: Mental fogginess, dizziness  STH1RT0-GYRp score: 5 for age >75, CAD, CHF, HTN  Oral anticoagulation: Eliquis 5 mg twice daily  Antiarrhythmic medications, AV roxane blocking agents: Metoprolol XL 50 mg daily (previous).  Sotalol 80 mg twice daily  Procedures  DCCV: N/A  Ablation: N/A     Tommy states that she feels well and is making good progress in her recovery.  She had another mechanical fall at TCU when reaching for something, but without injury.  She has not had any A-fib.   She denies chest discomfort, palpitations, abdominal fullness/bloating or peripheral edema, shortness of breath at rest or with light activity, paroxysmal nocturnal dyspnea, orthopnea, lightheadedness, dizziness, pre-syncope, or syncope.  She will be starting home PT and OT.     Cardiographics (EKG personally reviewed):  EKG done 12/6/2023 shows sinus  rhythm at 57 bpm, QRS 76 ms, QT/QTc 508/494 ms  EKG done 9/8/2023 shows atrial fibrillation with rapid ventricular response at 118 bpm  EKG done 7/11/2022 shows sinus rhythm at 67 bpm, QRS 74 ms, QT/QTc 424/448 ms     Cardiac telemetry strips from 11/2024 at [facility] shows sinus rhythm    ECHO done 9/10/2023:   1. Normal left ventricular systolic function. Normal left ventricular systolic function.   Estimated left ventricular ejection fraction is 65%.    No regional wall motion abnormalities.    2. Normal right ventricular chamber size. Normal right ventricular systolic function.    3. No significant valvular heart disease.    4. Aortic sinus of Valsalva is normal in size (3.4 cm). Normal indexed ascending aorta   dimension (3.6 cm, 1.9 cm/m ).    5. Moderate-severe left atrial enlargement. Left atrial volume index is 47 ml/m .    6. Normal inferior vena cava with normal inspiratory collapse.    Estimated EF: 65%      Cardiac CT done 9/11/2023:  No left atrial appendage thrombus.   Severe left atrial enlargement.   Severe coronary artery atherosclerosis involving right coronary artery and   left anterior descending artery. The study is not geared towards   assessment of luminal stenosis.   LIMITED CHEST: No significant findings within the lungs. Short segment of   subacute appearing pulmonary emboli left lower lobe.      NM stress test done 6/8/2020:  Narrative & Impression      The nuclear stress test is negative for inducible myocardial ischemia or infarction.     The left ventricular ejection fraction at stress is 46% with mild global hypokinesis.     Left ventricular function is mildly reduced.     Only upright images could be acquired.     There is no prior study for comparison.     I have reviewed and updated the patient's Past Medical History, Social History, Family History and Medication List.  Outside records personally reviewed.     Physical Examination  Review of Systems   Vitals: /56 (BP Location:  "Left arm, Patient Position: Sitting, Cuff Size: Adult Regular)   Pulse 58   Resp 16   Ht 1.676 m (5' 6\")   Wt 79.8 kg (176 lb)   BMI 28.41 kg/m    BMI= Body mass index is 28.41 kg/m .  Wt Readings from Last 3 Encounters:   01/07/25 79.8 kg (176 lb)   10/18/24 80.7 kg (178 lb)   03/08/24 81.2 kg (179 lb)       General Appearance:   Alert, well-appearing and in no acute distress.   HEENT: Atraumatic, normocephalic.  No scleral icterus, normal conjunctivae, EOMs intact, PERRL.  Mucous membranes pink and moist.     Chest/Lungs:   Chest symmetric, spine straight.  Respirations unlabored.  Lungs are clear to auscultation.   Cardiovascular:   Regular rate and rhythm.  Normal first and second heart sounds with no murmurs, rubs, or gallops; radial pulses are intact, No edema.   Abdomen:  Soft, nondistended   Extremities: No cyanosis or clubbing.   Musculoskeletal: Moves all extremities.     Skin: Warm, dry, intact.    Neurologic: Mood and affect are appropriate.  Alert and oriented to person, place, time, and situation.     ROS: 10 point ROS neg other than the symptoms noted above in the HPI.         Medical History  Surgical History Family History Social History   Past Medical History:   Diagnosis Date     (HFpEF) heart failure with preserved ejection fraction (H) 1/20/2020     Arthritis      Asthma      Atrial fibrillation (H) 10/24/2016     B12 deficiency      Benign essential hypertension 8/2/2021    Formatting of this note might be different from the original. Created by Conversion     CAD (coronary artery disease)      Congestive heart failure (H)      COPD (chronic obstructive pulmonary disease) (H)      COPD (chronic obstructive pulmonary disease) (H)      Crohn's disease (H)      Crohn's disease (H)      Depression      Depressive disorder      Esophageal reflux      Essential hypertension      Fracture of left hip requiring operative repair (H)      Heart disease     a fib     History of shingles      " Hyperlipidemia      Hyperparathyroidism (H)      Hypertension      Osteoporosis      Pulmonary nodule     benign     Regional enteritis (H) 2016     Seizure (H)      Seizures (H)      Past Surgical History:   Procedure Laterality Date     ARTHROPLASTY REVISION HIP Left 2016    removal of gamma nail hardware & conversion to total hip arthroplasty: Dr. Mercado at [facility] REMOVAL DEEP IMPLANT Left 2015    Procedure: Gamma Nail Left Hip;  Surgeon: Kirk De La Cruz MD;  Location: [facility];  Service: Orthopedics     HEMORRHOIDECTOMY       ORIF HIP FRACTURE       ORTHOPEDIC SURGERY      ORIF     OTHER SURGICAL HISTORY      HEMMOIROIDECT     OTHER SURGICAL HISTORY      CVL HEART CATH LEFT     Family History   Problem Relation Age of Onset     Hypertension Mother      Colon Cancer Father      Arthritis Father      Hypertension Father      Heart Disease Mother      Heart Disease Father         Social History     Socioeconomic History     Marital status:      Spouse name: Not on file     Number of children: Not on file     Years of education: Not on file     Highest education level: Not on file   Occupational History     Not on file   Tobacco Use     Smoking status: Former     Current packs/day: 0.00     Types: Cigarettes     Quit date: 2011     Years since quittin.5     Smokeless tobacco: Never   Substance and Sexual Activity     Alcohol use: No     Drug use: No     Sexual activity: Not on file   Other Topics Concern     Parent/sibling w/ CABG, MI or angioplasty before 65F 55M? Not Asked   Social History Narrative     Not on file     Social Drivers of Health     Financial Resource Strain: Not on file   Food Insecurity: Not on file   Transportation Needs: Not on file   Physical Activity: Not on file   Stress: Not on file   Social Connections: Unknown (2023)    [facility]     Social Connections      Frequency of Communication with Friends and Family: Not on file   Interpersonal Safety: Not on file   Housing Stability: Not on file           Medications  Allergies   Current Outpatient Medications   Medication Sig Dispense Refill     acetaminophen (TYLENOL) 500 MG tablet Take 1,000 mg by mouth 2 times daily        albuterol (PROAIR HFA/PROVENTIL HFA/VENTOLIN HFA) 108 (90 Base) MCG/ACT inhaler Inhale 2 puffs into the lungs every 6 hours as needed       apixaban ANTICOAGULANT (ELIQUIS) 5 MG tablet Take 1 tablet (5 mg) by mouth 2 times daily. 180 tablet 3     aspirin 81 MG EC tablet Take 81 mg by mouth daily        CALCIUM-VITAMIN D PO Take 2 tablets by mouth daily       colesevelam (WELCHOL) 625 MG tablet Take 3 tablets by mouth At Bedtime       cyanocobalamin (CYANOCOBALAMIN) 1000 MCG/ML injection Inject 1,000 mcg into the muscle every 30 days       estradiol (ESTRACE) 0.1 MG/GM vaginal cream Place vaginally three times a week       fluticasone (FLONASE) 50 MCG/ACT nasal spray Spray 1 spray into both nostrils daily       fluticasone (FLOVENT HFA) 110 MCG/ACT inhaler Inhale 1 puff into the lungs 2 times daily        loratadine (CLARITIN) 10 MG tablet Take 10 mg by mouth daily as needed        Magnesium Oxide 250 MG TABS Take 250 mg by mouth every other day.       metolazone (ZAROXOLYN) 2.5 MG tablet Take 1 tablet (2.5 mg) by mouth daily as needed (As instructed by your provider for heart failure symptoms) Take 30 minutes prior to AM dose of torsemide 8 tablet 1     multivitamin w/minerals (THERA-VIT-M) tablet Take 1 tablet by mouth daily        nitroGLYcerin (NITROSTAT) 0.4 MG sublingual tablet Place 1 tablet (0.4 mg) under the tongue every 5 minutes as needed for chest pain 25 tablet 1     PARoxetine (PAXIL) 40 MG tablet Take 40 mg by mouth daily        phenytoin (DILANTIN) 100 MG ER capsule Take 5 tablets at bedtime 450 capsule 3     potassium chloride ER (KLOR-CON M) 20 MEQ CR tablet Take  1 tablet (20 mEq) by mouth daily 270 tablet 3     simvastatin (ZOCOR) 40 MG tablet Take 40 mg by mouth At Bedtime        sodium chloride (OCEAN) 0.65 % nasal spray Spray 1 spray in nostril daily as needed       sotalol (BETAPACE) 80 MG tablet TAKE 1 TABLET(80 MG) BY MOUTH TWICE DAILY 180 tablet 3     spironolactone (ALDACTONE) 25 MG tablet Take 1 tablet (25 mg) by mouth daily. 90 tablet 2     STELARA 45 MG/0.5ML SOLN Inject 45 mg Subcutaneous every 30 days       topiramate (TOPAMAX) 100 MG tablet TAKE ONE AND ONE-HALF TABLETS TWICE A  tablet 3     torsemide (DEMADEX) 20 MG tablet TAKE 2 TABLETS TWICE A  tablet 0     Vitamin D3 (CHOLECALCIFEROL) 125 MCG (5000 UT) tablet Take 125 mcg by mouth daily       ipratropium - albuterol 0.5 mg/2.5 mg/3 mL (DUONEB) 0.5-2.5 (3) MG/3ML neb solution USE 1 VIAL VIA NEBULIZER TWICE DAILY         Allergies   Allergen Reactions     Amoxicillin GI Disturbance and Other (See Comments)     COLITIS    Has tolerated cefazolin  GI bleeding, Colitis, has tolerated cefazolin        Sulfa Antibiotics      Sulfamethoxazole-Trimethoprim Other (See Comments)     Other reaction(s): Vaginal Irritation  VAGINAL BLEEDING  Vaginal irritation, vaginal bleeding             Lab Results    Chemistry/lipid CBC Cardiac Enzymes/BNP/TSH/INR   Recent Labs   Lab Test 12/30/24  0830   CHOL 181   HDL 56   LDL 84   TRIG 204*     Recent Labs   Lab Test 12/30/24  0830 12/13/23  1617 12/07/22  1630   LDL 84 56 71     Recent Labs   Lab Test 12/30/24  0830      POTASSIUM 3.8   CHLORIDE 102   CO2 26   *   BUN 19.7   CR 0.72   GFRESTIMATED 85   DIONNA 9.7     Recent Labs   Lab Test 12/30/24  0830 11/14/24  0719 10/18/24  1648   CR 0.72 0.68 0.76        Recent Labs   Lab Test 11/26/24  0720   WBC 7.1   HGB 10.9*   HCT 39.6   MCV 97        Recent Labs   Lab Test 11/26/24  0720 09/06/23  0533 04/24/23  1052   HGB 10.9* 8.7* 11.6*    Recent Labs   Lab Test 04/25/20  2300 07/16/19  1934  "06/10/19  1618   TROPONINI 0.04 0.04 0.06     Recent Labs   Lab Test 10/18/24  1648 09/13/21  0925 11/09/20  0907 10/02/20  0718   BNP  --  385* 190* 564*   NTBNP 576  --   --   --      Recent Labs   Lab Test 07/17/19  0620   TSH 2.90     No results for input(s): \"INR\" in the last 29423 hours.       The longitudinal plan of care for the diagnosis(es)/condition(s) as documented were addressed during this visit. Due to the added complexity in care, I will continue to support Merrily in the subsequent management and with ongoing continuity of care.                                          Thank you for allowing me to participate in the care of your patient.      Sincerely,     SERENE Reynoso CNP     [facility]  cc:   SERENE Reynoso CNP  [address], [address]  [place] [address]      "

## 2025-01-07 NOTE — PATIENT INSTRUCTIONS
Tommy Rea,    It was a pleasure to see you today at the [facility].     My recommendations after this visit include:    No medications changes recommended at this time  Consider left atrial appendage closure with the Watchman device as an alternative to Eliquis ([email or web address])    Consider ablation to treat A fib as an alternative to sotalol    Follow up in 6 months, or sooner if needed    An Keith, CNP  [facility], Electrophysiology  [phone]  EP nurses [phone]     Information on Atrial Fibrillation Ablations    What is Catheter Ablation?  A Catheter Ablation is a procedure that treats certain types of abnormal heart rhythms (arrhythmia). There are several components to the procedure, but the final purpose is target and destroy (ablate) small areas of your heart muscle that are causing the arrhythmia.     Why is an Ablations Done?  A catheter ablation is an effective way to treat some types of abnormal heart rhythms. An ablation is a relatively low risk procedure that may permanently cure your abnormal heart rhythm.  The ablation process damages the heart cells which results in scarring of that area. The scar is electrically inactive and can produce a permanent cure for the abnormal rhythm.  Ablation procedures can help avoid the need for rhythm medications and give patients the ability to return to their normal activity and live an active life. In patients that do not have symptoms, ablations are not typically done as there may still be an increased risk of stroke.    Why is Catheter Ablation Done?  Sometimes, the heart s electrical system does not work properly.  This can cause abnormal heart rhythms, called arrhythmias.  During an arrhythmia, the heart may beat too fast, too slowly, or irregularly.  Your doctor has recommended catheter ablation to treat a rapid (fast) heart rhythm, or tachycardia.      How Catheter Ablation Is Done  Catheter ablation  uses thin, flexible wires called electrode catheters to find and destroy (ablate) problem cells.     Here is how the procedure is done:  The pulmonary veins will be treated first. There are currently two tools used to ablate around the pulmonary veins.  Radiofrequency catheter will heat the tissue.  Cryo-balloon catheter will freeze the tissue.   Testing will be done to confirm that effective treatment has been delivered.   Further testing may be performed to see if a fib is still present or if some other rhythm problem such as atrial flutter is present. If an ongoing rhythm problem is discovered then further ablation can be done to isolate and destroy those areas responsible for the arrhythmia.     Your  Experience during Catheter Ablation    In most cases, catheter ablations are done in an electrophysiology (EP) lab.  The procedural area: You will be transferred back to the procedure room once you have been appropriately prepped by the nursing staff and you are ready for your ablation.   Sedation: You to be put completely asleep for your ablation using general anesthesia.  Inserting the catheters: You will have 3-4 catheters inserted into the veins. Catheter locations can include the shoulder, neck, and groins. Catheters are guided to the heart with the help of ultrasound and x-ray monitors.  Finishing up: When the procedure is finished, the catheters are taken out of your body. A special closure device or suture may be used to help seal these puncture sites. You re then taken to your room to rest, and will be cared for by a nurse during your recovery.    Risks and Complications  The risks of catheter ablation are fairly low compared to the benefits you receive. Possible risks and complications include:  Common (up to 10%)  Bleeding or bruising  Shortness of breath  Heartburn  Uncommon (< 1%)  Blood clots  A slow heart rhythm (requiring a permanent pacemaker)  Perforation of the heart muscle, blood vessel, or lung  (may require an emergency procedure)  Stroke  Damage to a heart valve   Heart attack, also known as acute myocardial infarction, or AMI   Death (extremely rare)    Before your Catheter Ablation  Before your catheter ablation, you will meet with the Electrophysiologist (specially trained heart doctor) who will do the procedure. The provider or a registered nurse will provide you with detailed instructions on how to prepare for this procedure, some of these instructions are listed below.  You will likely be told to stop or change your heart rhythm medications for a period of time before your ablation.   You may have testing done several days prior to your ablation or the morning of your ablation, such as an ECG, x-ray, blood tests, or echocardiogram.   You will not be allowed to eat or drink 8 hours before your ablation. You will be given further instructions by your physician or a registered nurse regarding the medication you will take the morning of your procedure.  You will need to arrange to have a  home from the hospital; you will not be permitted to drive after your procedure due to the sedation that you receive.   You are allowed to bring personal items and clothing to the hospital, but please refrain from bringing any valuables as the hospital is not responsible for any lost or stolen items.  You will need to bring a list of the names and dosages of the medication you are taking to the hospital.  It is important to mention to your doctor or registered nurse if you have any allergies, reactions to anesthesia, or have had history of bleeding problems.    Arriving at the Hospital the morning of your Catheter Ablation  Please check in at the time that was given to you by the , who scheduled your procedure. You do not need to arrive any earlier than the time that you were given.    When you arrive, you will be directed to the area where they will be performing your procedure. The doctor or registered  nurse will meet with you prior to your ablation, this is a good time to ask questions and address any concerns you may have. You will then be asked to sign the consent form for your ablation, if this has not already been done.    The nursing staff will begin to prepare you for your procedure:  A nurse will shave and cleanse the area where the ablation catheters will be placed. These areas are most commonly the left and right groin sites (the fold between your thigh and abdomen), and in some cases the chest, arm, and neck. This is done to reduce the risk of infection.    The nursing staff will start an intravenous (IV) line into a vein in your arm, which allows the staff to give you medication and sedatives to help you relax prior and during your ablation.  In some cases, the nursing staff will need to place a catheter that will drain urine from your bladder (Sullivan Catheter), which is required due to the length and complexity of the ablation you are having.    After Catheter Ablation  Recovery immediately after your ablation in the hospital  After your catheter ablation procedure, you will be taken to a recovery room. After your ablation, you may be required to lay flat or be on bed rest. During this time, a nurse will monitor you, and you will be given medication to make you comfortable.     Going Home  When it is time to go home, your will need to have an adult family member or friend drive you. Most people can walk, climb stairs, and perform light activity soon after catheter ablation. You can most likely return to your full routine within a few days. However, you may be told to avoid running, heavy lifting, and other strenuous activities for a short time. Please make sure to follow any specific activity restrictions provided by the medical staff at the time of your discharge from the hospital.  Doctor's typically advise that you not drive until your post procedure assessment visit.   Avoid heavy physical activity  and heavy lifting for several days after the procedure to allow your body to heal.  Ask your doctor when you can expect to return to work.  Take your temperature and check your incision for signs of infection (redness, swelling, drainage, or warmth) every day for a week. It is normal to have a small bruise or lump where the catheter was inserted.  Take your medications exactly as directed. Do not skip doses or stop medication without consulting your physician prior.  Learn to take your own pulse and keep a record of your results.    Follow-Up  After your ablations you will have a follow up visit to see how you are doing, to assess your rhythm after your ablation, and to address any medication changes if necessary. In many cases, one ablation is enough to treat an arrhythmia. However, sometimes the problem returns or another is found. If this happens, you may need a second catheter ablation. Tell your healthcare provider if you have any new or returning symptoms.    Common Symptoms after Catheter Ablation  In the first few weeks after catheter ablation, you may feel mild chest fullness or aching. You may also feel as if your heart is skipping beats or your heartbeat may feel faster than normal. You may think that your heart rhythm problem is about to return. These sensations are normal and usually go away with time. Talk to your healthcare provider if you are concerned.      When to Call Your Doctor  Increased bleeding, bruising, or pain at the insertion site  Episodes of atrial fibrillation are common post procedure, call the clinic if episodes are lasting longer than 4-6 hours  Difficulty with your speech or walking, or any visual disturbance  Lightheaded, dizziness, or feeling faint  Shortness of breath or chest pain  Coldness, swelling, or numbness of the arm or leg near the insertion site  A bruise or lump at the insertion site that is larger than a walnut  A fever over 100 F

## 2025-01-17 ENCOUNTER — TRANSFERRED RECORDS (OUTPATIENT)
Dept: HEALTH INFORMATION MANAGEMENT | Facility: CLINIC | Age: 80
End: 2025-01-17

## 2025-02-08 ENCOUNTER — HEALTH MAINTENANCE LETTER (OUTPATIENT)
Age: 80
End: 2025-02-08

## 2025-02-11 DIAGNOSIS — G40.209 NONINTRACTABLE EPILEPSY WITH COMPLEX PARTIAL SEIZURES (H): ICD-10-CM

## 2025-02-12 RX ORDER — TOPIRAMATE 100 MG/1
TABLET, FILM COATED ORAL
Qty: 270 TABLET | Refills: 0 | Status: SHIPPED | OUTPATIENT
Start: 2025-02-12

## 2025-02-12 NOTE — TELEPHONE ENCOUNTER
Refill request for: topiramate (TOPAMAX) 100 MG tablet    Directions: TAKE ONE AND ONE-HALF TABLETS TWICE A DAY     LOV: 1/29/24  NOV: 5/30/25    90 day supply with 0 refills Medication T'd for review and signature    Ginny Chakraborty MA

## 2025-03-27 PROBLEM — D12.6 COLON ADENOMA: Status: ACTIVE | Noted: 2025-03-27

## 2025-04-06 DIAGNOSIS — I50.32 CHRONIC HEART FAILURE WITH PRESERVED EJECTION FRACTION (H): ICD-10-CM

## 2025-04-07 RX ORDER — TORSEMIDE 20 MG/1
40 TABLET ORAL 2 TIMES DAILY
Qty: 360 TABLET | Refills: 0 | Status: SHIPPED | OUTPATIENT
Start: 2025-04-07

## 2025-05-16 NOTE — PROGRESS NOTES
Assessment/Recommendations   Assessment:      # Chronic heart failure with preserved ejection fraction NYHA class II:    Patient's mild hypervolemic on exam.  Reports salt indiscretion in her diet yesterday.    Current home weight is 170-175 lbs.     On low-sodium diet.  Reports adequate fluid intake     Heart failure regimen includes:  -Diuretic therapy with Torsemide 40 mg twice a day. Occasionally takes extra pill.   -MRA therapy with Spironolactone 25 mg daily  -Not on SGLT2 inhibitor- low BP tendency.  - Not on ACEI/ARB/ARNI therapy.-low BP tendency    # Hypokalemia: On potassium chloride 20 mEq daily    # Hypertension: BP today is 124/68.  Lightheadedness and dizziness improved after cutting back on spironolactone.    # Coronary artery disease: No chest pain.    # Persistent atrial fibrillation: Stable.    Plan/Recommendation:  -BMP pending  -If stable renal function, will consider patient to take extra torsemide.  -Going forward, could consider patient to remain on torsemide 40 mg twice a day and have extra torsemide 20 mg as needed for weight gain more than 3 pounds with shortness of breath, bloating and swelling.  - Continue on low salt diet <2000 mg per day, daily weight monitoring, and maintain adequate fluid intake with 50-60 ounces per day  -Consider SGLT2 inhibitor in the near future    Follow up with Afib clinic as recommended    Follow up with Dr. Colbert in 3 months per pt's preference. Follow up with me 6 months.       The longitudinal plan of care for chronic heart failure with preserved ejection fraction was addressed during this visit.?Due to the added   complexity in care, I will continue to support Tommy Rea in the subsequent management of this   condition(s) and with the ongoing continuity of care of this condition(s)       History of Present Illness/Subjective    Ms. Tommy Rea is a 80 year old female with a past medical history of persistent atrial fibrillation,  "coronary artery disease, COPD, asthma, lymphedema, seizures, hypertension, anemia, hyperparathyroidism, Crohn's disease, orthostatic syncope, hypokalemia, hypomagnesemia, and chronic heart failure with preserved ejection fraction who is seen at [facility] for continued heart failure follow up.    During last heart failure clinic visit, patient reported occasional shortness of breath on exertion but denies worsening.  She has not taken metolazone for months.  She reported occasional lightheadedness.  Blood pressure was running on the lower side.  Her spironolactone dose was reduced.    Today, Tommy is accompanied by her daughter, Kierra.  She reports occasional shortness of breath with exertion and occasional mild lower extremity edema.    She denies fatigue, lightheadedness, shortness of breath, orthopnea, PND, palpitations, chest pain, and abdominal fullness/bloating.      ECHO June 2019-Reviewed:   Narrative & Impression    When compared to the previous study dated 10/25/2016, no significant change.    Left ventricle ejection fraction is normal. The calculated left ventricular ejection fraction is 64%.    Normal left ventricular size and systolic function.    Normal right ventricular size and systolic function.    No hemodynamically significant valvular heart abnormalities.     Physical Examination Review of Systems   /68 (BP Location: Left arm, Patient Position: Sitting, Cuff Size: Adult Regular)   Pulse 58   Resp 16   Ht 1.676 m (5' 6\")   Wt 80.7 kg (178 lb)   BMI 28.73 kg/m    Body mass index is 28.73 kg/m .  Wt Readings from Last 3 Encounters:   05/19/25 80.7 kg (178 lb)   01/07/25 79.8 kg (176 lb)   10/18/24 80.7 kg (178 lb)     General Appearance:   no distress, normal body habitus   ENT/Mouth: membranes moist, no oral lesions or bleeding gums.      EYES:  no scleral icterus, normal conjunctivae   Neck: no carotid bruits or thyromegaly   Chest/Lungs:   lungs " are clear to auscultation, no rales or wheezing, equal chest wall expansion except noted mild crackles left lower lobe   Cardiovascular:   Heart rate regular. Normal first and second heart sounds with no murmurs, rubs, or gallops; JVP is difficult to assess due to the patient's obesity and body habitus   , no edema bilaterally    Abdomen:  no organomegaly, masses, bruits, or tenderness; bowel sounds are present   Extremities   no cyanosis or clubbing; CMS intact.   Skin: no xanthelasma, warm.    Neurologic:  no tremors   Psychiatric: alert and oriented x3, calm          Enc Vitals  BP: 104/48  Pulse: 62  Resp: 24  SpO2: 96 %  Weight: 80.7 kg (178 lb) (shoes on)                                        Negative unless noted in HPI     Medical History  Surgical History Family History Social History   Past Medical History:   Diagnosis Date    (HFpEF) heart failure with preserved ejection fraction (H) 1/20/2020    Arthritis     Asthma     Atrial fibrillation (H) 10/24/2016    B12 deficiency     Benign essential hypertension 8/2/2021    Formatting of this note might be different from the original. Created by Conversion    CAD (coronary artery disease)     Congestive heart failure (H)     COPD (chronic obstructive pulmonary disease) (H)     COPD (chronic obstructive pulmonary disease) (H)     Crohn's disease (H)     Crohn's disease (H)     Depression     Depressive disorder     Esophageal reflux     Essential hypertension     Fracture of left hip requiring operative repair (H)     Heart disease     a fib    History of shingles     Hyperlipidemia     Hyperparathyroidism     Hypertension     Osteoporosis     Pulmonary nodule     benign    Regional enteritis (H) 07/19/2016    Seizure (H)     Seizures (H)     Past Surgical History:   Procedure Laterality Date    ARTHROPLASTY REVISION HIP Left 07/19/2016    removal of gamma nail hardware & conversion to total hip arthroplasty: Dr. Mercado at [facility] REMOVAL DEEP IMPLANT  Left 2015    Procedure: Gamma Nail Left Hip;  Surgeon: Kirk De La Cruz MD;  Location: [facility] OR;  Service: Orthopedics    HEMORRHOIDECTOMY      ORIF HIP FRACTURE      ORTHOPEDIC SURGERY      ORIF    OTHER SURGICAL HISTORY      HEMMOIROIDECT    OTHER SURGICAL HISTORY      CVL HEART CATH LEFT    Family History   Problem Relation Age of Onset    Hypertension Mother     Colon Cancer Father     Arthritis Father     Hypertension Father     Heart Disease Mother     Heart Disease Father     Social History     Socioeconomic History    Marital status:      Spouse name: Not on file    Number of children: Not on file    Years of education: Not on file    Highest education level: Not on file   Occupational History    Not on file   Tobacco Use    Smoking status: Former     Current packs/day: 0.00     Types: Cigarettes     Quit date: 2011     Years since quittin.9    Smokeless tobacco: Never   Substance and Sexual Activity    Alcohol use: No    Drug use: No    Sexual activity: Not on file   Other Topics Concern    Parent/sibling w/ CABG, MI or angioplasty before 65F 55M? Not Asked   Social History Narrative    Not on file     Social Drivers of Health     Financial Resource Strain: Not on file   Food Insecurity: Not on file   Transportation Needs: Not on file   Physical Activity: Not on file   Stress: Not on file   Social Connections: Unknown (2023)    [facility]    Social Connections     Frequency of Communication with Friends and Family: Not on file   Interpersonal Safety: Not on file   Housing Stability: Not on file          Medications  Allergies   Current Outpatient Medications   Medication Sig Dispense Refill    acetaminophen (TYLENOL) 500 MG tablet Take 1,000 mg by mouth 2 times daily       albuterol (PROAIR HFA/PROVENTIL HFA/VENTOLIN HFA) 108 (90 Base) MCG/ACT inhaler Inhale 2 puffs into the lungs every 6 hours as needed      apixaban  ANTICOAGULANT (ELIQUIS) 5 MG tablet Take 1 tablet (5 mg) by mouth 2 times daily. 180 tablet 3    aspirin 81 MG EC tablet Take 81 mg by mouth daily       CALCIUM-VITAMIN D PO Take 2 tablets by mouth daily      colesevelam (WELCHOL) 625 MG tablet Take 3 tablets by mouth At Bedtime      cyanocobalamin (CYANOCOBALAMIN) 1000 MCG/ML injection Inject 1,000 mcg into the muscle every 30 days      estradiol (ESTRACE) 0.1 MG/GM vaginal cream Place vaginally three times a week      fluticasone (FLONASE) 50 MCG/ACT nasal spray Spray 1 spray into both nostrils daily      fluticasone (FLOVENT HFA) 110 MCG/ACT inhaler Inhale 1 puff into the lungs 2 times daily       ipratropium - albuterol 0.5 mg/2.5 mg/3 mL (DUONEB) 0.5-2.5 (3) MG/3ML neb solution USE 1 VIAL VIA NEBULIZER TWICE DAILY      loratadine (CLARITIN) 10 MG tablet Take 10 mg by mouth daily as needed       Magnesium Oxide 250 MG TABS Take 250 mg by mouth daily.      multivitamin w/minerals (THERA-VIT-M) tablet Take 1 tablet by mouth daily       nitroGLYcerin (NITROSTAT) 0.4 MG sublingual tablet Place 1 tablet (0.4 mg) under the tongue every 5 minutes as needed for chest pain 25 tablet 1    PARoxetine (PAXIL) 40 MG tablet Take 40 mg by mouth daily       phenytoin (DILANTIN) 100 MG ER capsule Take 5 tablets at bedtime 450 capsule 3    potassium chloride ER (KLOR-CON M) 20 MEQ CR tablet Take 1 tablet (20 mEq) by mouth daily 270 tablet 3    simvastatin (ZOCOR) 40 MG tablet Take 40 mg by mouth At Bedtime       sodium chloride (OCEAN) 0.65 % nasal spray Spray 1 spray in nostril daily as needed      sotalol (BETAPACE) 80 MG tablet TAKE 1 TABLET(80 MG) BY MOUTH TWICE DAILY 180 tablet 3    spironolactone (ALDACTONE) 25 MG tablet Take 1 tablet (25 mg) by mouth daily. 90 tablet 2    STELARA 45 MG/0.5ML SOLN Inject 45 mg Subcutaneous every 30 days      torsemide (DEMADEX) 20 MG tablet Take 2 tablets (40 mg) by mouth 2 times daily. 360 tablet 0    Vitamin D3 (CHOLECALCIFEROL) 125 MCG  (5000 UT) tablet Take 125 mcg by mouth daily      topiramate (TOPAMAX) 100 MG tablet TAKE ONE AND ONE-HALF TABLETS TWICE A DAY 42 tablet 0    Allergies   Allergen Reactions    Amoxicillin GI Disturbance and Other (See Comments)     COLITIS    Has tolerated cefazolin  GI bleeding, Colitis, has tolerated cefazolin       Sulfa Antibiotics     Sulfamethoxazole-Trimethoprim Other (See Comments)     Other reaction(s): Vaginal Irritation  VAGINAL BLEEDING  Vaginal irritation, vaginal bleeding            Lab Results    Chemistry/lipid CBC Cardiac Enzymes/BNP/TSH/INR   Lab Results   Component Value Date    CHOL 181 12/30/2024    HDL 56 12/30/2024    TRIG 204 (H) 12/30/2024    BUN 17.7 05/19/2025     05/19/2025    CO2 27 05/19/2025    Lab Results   Component Value Date    WBC 7.1 11/26/2024    HGB 10.9 (L) 11/26/2024    HCT 39.6 11/26/2024    MCV 97 11/26/2024     11/26/2024    Lab Results   Component Value Date    TROPONINI 0.04 04/25/2020     (H) 09/13/2021    TSH 2.90 07/17/2019        30  minutes spent on the date of encounter doing chart review, review of test results, interpretation with above tests, patient visit, documentation, and discussion with family.        This note has been dictated using voice recognition software. Any grammatical, typographical, or context distortions are unintentional and inherent to the software

## 2025-05-19 ENCOUNTER — OFFICE VISIT (OUTPATIENT)
Dept: CARDIOLOGY | Facility: CLINIC | Age: 80
End: 2025-05-19
Attending: NURSE PRACTITIONER
Payer: COMMERCIAL

## 2025-05-19 ENCOUNTER — RESULTS FOLLOW-UP (OUTPATIENT)
Dept: CARDIOLOGY | Facility: CLINIC | Age: 80
End: 2025-05-19

## 2025-05-19 VITALS
BODY MASS INDEX: 28.61 KG/M2 | WEIGHT: 178 LBS | HEIGHT: 66 IN | HEART RATE: 58 BPM | RESPIRATION RATE: 16 BRPM | SYSTOLIC BLOOD PRESSURE: 124 MMHG | DIASTOLIC BLOOD PRESSURE: 68 MMHG

## 2025-05-19 DIAGNOSIS — I25.10 ATHEROSCLEROSIS OF CORONARY ARTERY OF NATIVE HEART WITHOUT ANGINA PECTORIS, UNSPECIFIED VESSEL OR LESION TYPE: ICD-10-CM

## 2025-05-19 DIAGNOSIS — I50.32 CHRONIC HEART FAILURE WITH PRESERVED EJECTION FRACTION (H): ICD-10-CM

## 2025-05-19 DIAGNOSIS — I48.19 PERSISTENT ATRIAL FIBRILLATION (H): ICD-10-CM

## 2025-05-19 DIAGNOSIS — I10 BENIGN ESSENTIAL HYPERTENSION: ICD-10-CM

## 2025-05-19 DIAGNOSIS — E87.6 HYPOKALEMIA: Primary | ICD-10-CM

## 2025-05-19 LAB
ANION GAP SERPL CALCULATED.3IONS-SCNC: 11 MMOL/L (ref 7–15)
BUN SERPL-MCNC: 17.7 MG/DL (ref 8–23)
CALCIUM SERPL-MCNC: 9.3 MG/DL (ref 8.8–10.4)
CHLORIDE SERPL-SCNC: 102 MMOL/L (ref 98–107)
CREAT SERPL-MCNC: 0.71 MG/DL (ref 0.51–0.95)
EGFRCR SERPLBLD CKD-EPI 2021: 85 ML/MIN/1.73M2
GLUCOSE SERPL-MCNC: 93 MG/DL (ref 70–99)
HCO3 SERPL-SCNC: 27 MMOL/L (ref 22–29)
POTASSIUM SERPL-SCNC: 3.9 MMOL/L (ref 3.4–5.3)
SODIUM SERPL-SCNC: 140 MMOL/L (ref 135–145)

## 2025-05-19 PROCEDURE — 3078F DIAST BP <80 MM HG: CPT | Performed by: NURSE PRACTITIONER

## 2025-05-19 PROCEDURE — 36415 COLL VENOUS BLD VENIPUNCTURE: CPT | Performed by: NURSE PRACTITIONER

## 2025-05-19 PROCEDURE — 80048 BASIC METABOLIC PNL TOTAL CA: CPT | Performed by: NURSE PRACTITIONER

## 2025-05-19 PROCEDURE — G2211 COMPLEX E/M VISIT ADD ON: HCPCS | Performed by: NURSE PRACTITIONER

## 2025-05-19 PROCEDURE — 3074F SYST BP LT 130 MM HG: CPT | Performed by: NURSE PRACTITIONER

## 2025-05-19 PROCEDURE — 99214 OFFICE O/P EST MOD 30 MIN: CPT | Performed by: NURSE PRACTITIONER

## 2025-05-19 RX ORDER — TOPIRAMATE 100 MG/1
150 TABLET, FILM COATED ORAL 2 TIMES DAILY
Qty: 42 TABLET | Refills: 0 | Status: CANCELLED | OUTPATIENT
Start: 2025-05-19

## 2025-05-19 RX ORDER — PHENYTOIN SODIUM 100 MG/1
CAPSULE, EXTENDED RELEASE ORAL
Qty: 450 CAPSULE | Refills: 3 | Status: CANCELLED | OUTPATIENT
Start: 2025-05-19

## 2025-05-19 NOTE — LETTER
5/19/2025    Hannah Benton MD  [address]  [place] [address]    RE: Tommy Rea       Dear Colleague,     I had the pleasure of seeing Tommy Rea in the [facility].          Assessment/Recommendations   Assessment:      # Chronic heart failure with preserved ejection fraction NYHA class II:    Patient's mild hypervolemic on exam.  Reports salt indiscretion in her diet yesterday.    Current home weight is 170-175 lbs.     On low-sodium diet.  Reports adequate fluid intake     Heart failure regimen includes:  -Diuretic therapy with Torsemide 40 mg twice a day. Occasionally takes extra pill.   -MRA therapy with Spironolactone 25 mg daily  -Not on SGLT2 inhibitor- low BP tendency.  - Not on ACEI/ARB/ARNI therapy.-low BP tendency    # Hypokalemia: On potassium chloride 20 mEq daily    # Hypertension: BP today is 124/68.  Lightheadedness and dizziness improved after cutting back on spironolactone.    # Coronary artery disease: No chest pain.    # Persistent atrial fibrillation: Stable.    Plan/Recommendation:  -BMP pending  -If stable renal function, will consider patient to take extra torsemide.  -Going forward, could consider patient to remain on torsemide 40 mg twice a day and have extra torsemide 20 mg as needed for weight gain more than 3 pounds with shortness of breath, bloating and swelling.  - Continue on low salt diet <2000 mg per day, daily weight monitoring, and maintain adequate fluid intake with 50-60 ounces per day  -Consider SGLT2 inhibitor in the near future    Follow up with Afib clinic as recommended    Follow up with Dr. Colbert in 3 months per pt's preference. Follow up with me 6 months.       The longitudinal plan of care for chronic heart failure with preserved ejection fraction was addressed during this visit.?Due to the added   complexity in care, I will continue to support Tommy Rea in the subsequent management of this   condition(s) and with the  "ongoing continuity of care of this condition(s)       History of Present Illness/Subjective    Ms. Tommy Rea is a 80 year old female with a past medical history of persistent atrial fibrillation, coronary artery disease, COPD, asthma, lymphedema, seizures, hypertension, anemia, hyperparathyroidism, Crohn's disease, orthostatic syncope, hypokalemia, hypomagnesemia, and chronic heart failure with preserved ejection fraction who is seen at [facility] for continued heart failure follow up.    During last heart failure clinic visit, patient reported occasional shortness of breath on exertion but denies worsening.  She has not taken metolazone for months.  She reported occasional lightheadedness.  Blood pressure was running on the lower side.  Her spironolactone dose was reduced.    Today, Tommy is accompanied by her daughter, Kierra.  She reports occasional shortness of breath with exertion and occasional mild lower extremity edema.    She denies fatigue, lightheadedness, shortness of breath, orthopnea, PND, palpitations, chest pain, and abdominal fullness/bloating.      ECHO June 2019-Reviewed:   Narrative & Impression    When compared to the previous study dated 10/25/2016, no significant change.    Left ventricle ejection fraction is normal. The calculated left ventricular ejection fraction is 64%.    Normal left ventricular size and systolic function.    Normal right ventricular size and systolic function.    No hemodynamically significant valvular heart abnormalities.     Physical Examination Review of Systems   /68 (BP Location: Left arm, Patient Position: Sitting, Cuff Size: Adult Regular)   Pulse 58   Resp 16   Ht 1.676 m (5' 6\")   Wt 80.7 kg (178 lb)   BMI 28.73 kg/m    Body mass index is 28.73 kg/m .  Wt Readings from Last 3 Encounters:   05/19/25 80.7 kg (178 lb)   01/07/25 79.8 kg (176 lb)   10/18/24 80.7 kg (178 lb)     General Appearance:   no " distress, normal body habitus   ENT/Mouth: membranes moist, no oral lesions or bleeding gums.      EYES:  no scleral icterus, normal conjunctivae   Neck: no carotid bruits or thyromegaly   Chest/Lungs:   lungs are clear to auscultation, no rales or wheezing, equal chest wall expansion except noted mild crackles left lower lobe   Cardiovascular:   Heart rate regular. Normal first and second heart sounds with no murmurs, rubs, or gallops; JVP is difficult to assess due to the patient's obesity and body habitus   , no edema bilaterally    Abdomen:  no organomegaly, masses, bruits, or tenderness; bowel sounds are present   Extremities   no cyanosis or clubbing; CMS intact.   Skin: no xanthelasma, warm.    Neurologic:  no tremors   Psychiatric: alert and oriented x3, calm          Enc Vitals  BP: 104/48  Pulse: 62  Resp: 24  SpO2: 96 %  Weight: 80.7 kg (178 lb) (shoes on)                                        Negative unless noted in HPI     Medical History  Surgical History Family History Social History   Past Medical History:   Diagnosis Date     (HFpEF) heart failure with preserved ejection fraction (H) 1/20/2020     Arthritis      Asthma      Atrial fibrillation (H) 10/24/2016     B12 deficiency      Benign essential hypertension 8/2/2021    Formatting of this note might be different from the original. Created by Conversion     CAD (coronary artery disease)      Congestive heart failure (H)      COPD (chronic obstructive pulmonary disease) (H)      COPD (chronic obstructive pulmonary disease) (H)      Crohn's disease (H)      Crohn's disease (H)      Depression      Depressive disorder      Esophageal reflux      Essential hypertension      Fracture of left hip requiring operative repair (H)      Heart disease     a fib     History of shingles      Hyperlipidemia      Hyperparathyroidism      Hypertension      Osteoporosis      Pulmonary nodule     benign     Regional enteritis (H) 07/19/2016     Seizure (H)       Seizures (H)     Past Surgical History:   Procedure Laterality Date     ARTHROPLASTY REVISION HIP Left 2016    removal of gamma nail hardware & conversion to total hip arthroplasty: Dr. Mercado at [facility] REMOVAL DEEP IMPLANT Left 2015    Procedure: Gamma Nail Left Hip;  Surgeon: Kirk De La Cruz MD;  Location: [facility];  Service: Orthopedics     HEMORRHOIDECTOMY       ORIF HIP FRACTURE       ORTHOPEDIC SURGERY      ORIF     OTHER SURGICAL HISTORY      HEMMOIROIDECT     OTHER SURGICAL HISTORY      CVL HEART CATH LEFT    Family History   Problem Relation Age of Onset     Hypertension Mother      Colon Cancer Father      Arthritis Father      Hypertension Father      Heart Disease Mother      Heart Disease Father     Social History     Socioeconomic History     Marital status:      Spouse name: Not on file     Number of children: Not on file     Years of education: Not on file     Highest education level: Not on file   Occupational History     Not on file   Tobacco Use     Smoking status: Former     Current packs/day: 0.00     Types: Cigarettes     Quit date: 2011     Years since quittin.9     Smokeless tobacco: Never   Substance and Sexual Activity     Alcohol use: No     Drug use: No     Sexual activity: Not on file   Other Topics Concern     Parent/sibling w/ CABG, MI or angioplasty before 65F 55M? Not Asked   Social History Narrative     Not on file     Social Drivers of Health     Financial Resource Strain: Not on file   Food Insecurity: Not on file   Transportation Needs: Not on file   Physical Activity: Not on file   Stress: Not on file   Social Connections: Unknown (2023)    [facility] [facility]    Social Connections      Frequency of Communication with Friends and Family: Not on file   Interpersonal Safety: Not on file   Housing Stability: Not on file          Medications  Allergies   Current Outpatient Medications    Medication Sig Dispense Refill     acetaminophen (TYLENOL) 500 MG tablet Take 1,000 mg by mouth 2 times daily        albuterol (PROAIR HFA/PROVENTIL HFA/VENTOLIN HFA) 108 (90 Base) MCG/ACT inhaler Inhale 2 puffs into the lungs every 6 hours as needed       apixaban ANTICOAGULANT (ELIQUIS) 5 MG tablet Take 1 tablet (5 mg) by mouth 2 times daily. 180 tablet 3     aspirin 81 MG EC tablet Take 81 mg by mouth daily        CALCIUM-VITAMIN D PO Take 2 tablets by mouth daily       colesevelam (WELCHOL) 625 MG tablet Take 3 tablets by mouth At Bedtime       cyanocobalamin (CYANOCOBALAMIN) 1000 MCG/ML injection Inject 1,000 mcg into the muscle every 30 days       estradiol (ESTRACE) 0.1 MG/GM vaginal cream Place vaginally three times a week       fluticasone (FLONASE) 50 MCG/ACT nasal spray Spray 1 spray into both nostrils daily       fluticasone (FLOVENT HFA) 110 MCG/ACT inhaler Inhale 1 puff into the lungs 2 times daily        ipratropium - albuterol 0.5 mg/2.5 mg/3 mL (DUONEB) 0.5-2.5 (3) MG/3ML neb solution USE 1 VIAL VIA NEBULIZER TWICE DAILY       loratadine (CLARITIN) 10 MG tablet Take 10 mg by mouth daily as needed        Magnesium Oxide 250 MG TABS Take 250 mg by mouth daily.       multivitamin w/minerals (THERA-VIT-M) tablet Take 1 tablet by mouth daily        nitroGLYcerin (NITROSTAT) 0.4 MG sublingual tablet Place 1 tablet (0.4 mg) under the tongue every 5 minutes as needed for chest pain 25 tablet 1     PARoxetine (PAXIL) 40 MG tablet Take 40 mg by mouth daily        phenytoin (DILANTIN) 100 MG ER capsule Take 5 tablets at bedtime 450 capsule 3     potassium chloride ER (KLOR-CON M) 20 MEQ CR tablet Take 1 tablet (20 mEq) by mouth daily 270 tablet 3     simvastatin (ZOCOR) 40 MG tablet Take 40 mg by mouth At Bedtime        sodium chloride (OCEAN) 0.65 % nasal spray Spray 1 spray in nostril daily as needed       sotalol (BETAPACE) 80 MG tablet TAKE 1 TABLET(80 MG) BY MOUTH TWICE DAILY 180 tablet 3      spironolactone (ALDACTONE) 25 MG tablet Take 1 tablet (25 mg) by mouth daily. 90 tablet 2     STELARA 45 MG/0.5ML SOLN Inject 45 mg Subcutaneous every 30 days       torsemide (DEMADEX) 20 MG tablet Take 2 tablets (40 mg) by mouth 2 times daily. 360 tablet 0     Vitamin D3 (CHOLECALCIFEROL) 125 MCG (5000 UT) tablet Take 125 mcg by mouth daily       topiramate (TOPAMAX) 100 MG tablet TAKE ONE AND ONE-HALF TABLETS TWICE A DAY 42 tablet 0    Allergies   Allergen Reactions     Amoxicillin GI Disturbance and Other (See Comments)     COLITIS    Has tolerated cefazolin  GI bleeding, Colitis, has tolerated cefazolin        Sulfa Antibiotics      Sulfamethoxazole-Trimethoprim Other (See Comments)     Other reaction(s): Vaginal Irritation  VAGINAL BLEEDING  Vaginal irritation, vaginal bleeding            Lab Results    Chemistry/lipid CBC Cardiac Enzymes/BNP/TSH/INR   Lab Results   Component Value Date    CHOL 181 12/30/2024    HDL 56 12/30/2024    TRIG 204 (H) 12/30/2024    BUN 17.7 05/19/2025     05/19/2025    CO2 27 05/19/2025    Lab Results   Component Value Date    WBC 7.1 11/26/2024    HGB 10.9 (L) 11/26/2024    HCT 39.6 11/26/2024    MCV 97 11/26/2024     11/26/2024    Lab Results   Component Value Date    TROPONINI 0.04 04/25/2020     (H) 09/13/2021    TSH 2.90 07/17/2019        30  minutes spent on the date of encounter doing chart review, review of test results, interpretation with above tests, patient visit, documentation, and discussion with family.        This note has been dictated using voice recognition software. Any grammatical, typographical, or context distortions are unintentional and inherent to the software         Thank you for allowing me to participate in the care of your patient.      Sincerely,     SERENE Butts CNP     [facility]  cc:   SERENE Butts CNP  [address]  [place] [address]

## 2025-05-27 DIAGNOSIS — G40.209 NONINTRACTABLE EPILEPSY WITH COMPLEX PARTIAL SEIZURES (H): ICD-10-CM

## 2025-05-27 RX ORDER — PHENYTOIN SODIUM 100 MG/1
CAPSULE, EXTENDED RELEASE ORAL
Qty: 450 CAPSULE | Refills: 3 | Status: SHIPPED | OUTPATIENT
Start: 2025-05-27

## 2025-05-27 NOTE — TELEPHONE ENCOUNTER
Refill request for the following medication (s) listed below.    Pending Prescriptions:                       Disp   Refills    phenytoin (DILANTIN) 100 MG ER capsule [P*450 ca*3            Sig: TAKE 5 CAPSULES AT BEDTIME      Last office visit provider:  01/29/24  Next appointment scheduled: 05/30/25      Medication T'd for review and signature    Viridiana Gamino MA on 5/27/2025 at 11:19 AM

## 2025-06-25 ENCOUNTER — TRANSFERRED RECORDS (OUTPATIENT)
Dept: HEALTH INFORMATION MANAGEMENT | Facility: CLINIC | Age: 80
End: 2025-06-25

## 2025-06-25 ENCOUNTER — LAB REQUISITION (OUTPATIENT)
Dept: LAB | Facility: CLINIC | Age: 80
End: 2025-06-25
Payer: COMMERCIAL

## 2025-06-25 DIAGNOSIS — H61.23 IMPACTED CERUMEN, BILATERAL: ICD-10-CM

## 2025-06-25 LAB
CA-I BLD-MCNC: 4.7 MG/DL (ref 4.4–5.2)
PHOSPHATE SERPL-MCNC: 3.5 MG/DL (ref 2.5–4.5)
VIT D+METAB SERPL-MCNC: 46 NG/ML (ref 20–50)

## 2025-06-25 PROCEDURE — 84100 ASSAY OF PHOSPHORUS: CPT | Mod: ORL | Performed by: FAMILY MEDICINE

## 2025-06-25 PROCEDURE — 82306 VITAMIN D 25 HYDROXY: CPT | Mod: ORL | Performed by: FAMILY MEDICINE

## 2025-06-25 PROCEDURE — 84075 ASSAY ALKALINE PHOSPHATASE: CPT | Mod: ORL | Performed by: FAMILY MEDICINE

## 2025-06-25 PROCEDURE — 82330 ASSAY OF CALCIUM: CPT | Mod: ORL | Performed by: FAMILY MEDICINE

## 2025-06-27 LAB
ALP BONE SERPL-CCNC: 49 U/L
ALP LIVER SERPL-CCNC: 39 U/L
ALP OTHER SERPL-CCNC: 0 U/L
ALP SERPL-CCNC: 88 U/L

## 2025-07-01 DIAGNOSIS — I25.10 CORONARY ARTERY DISEASE INVOLVING NATIVE CORONARY ARTERY OF NATIVE HEART, UNSPECIFIED WHETHER ANGINA PRESENT: ICD-10-CM

## 2025-07-01 DIAGNOSIS — I50.32 CHRONIC HEART FAILURE WITH PRESERVED EJECTION FRACTION (H): ICD-10-CM

## 2025-07-02 RX ORDER — SPIRONOLACTONE 25 MG/1
TABLET ORAL
Qty: 90 TABLET | Refills: 2 | Status: SHIPPED | OUTPATIENT
Start: 2025-07-02

## 2025-07-06 DIAGNOSIS — I50.32 CHRONIC HEART FAILURE WITH PRESERVED EJECTION FRACTION (H): ICD-10-CM

## 2025-07-07 RX ORDER — TORSEMIDE 20 MG/1
TABLET ORAL
Qty: 360 TABLET | Refills: 3 | Status: SHIPPED | OUTPATIENT
Start: 2025-07-07

## 2025-07-09 ENCOUNTER — TELEPHONE (OUTPATIENT)
Dept: CARDIOLOGY | Facility: CLINIC | Age: 80
End: 2025-07-09
Payer: COMMERCIAL

## 2025-07-09 DIAGNOSIS — I50.32 CHRONIC HEART FAILURE WITH PRESERVED EJECTION FRACTION (H): Primary | ICD-10-CM

## 2025-07-09 NOTE — TELEPHONE ENCOUNTER
Patient's daughter called to report weight increase and worsening symptoms    Wt: 178 lbs today, weight has been between 174.9-177.3 for the last week.  It is thought patient's dry weight is 173 lbs    HF S/S: congested lungs, SOB with activity, some ankle swelling.     Rx Torsemide 40 mg/BID, daughter reports to give 1 extra dose on days with increased weight or worsening symptoms.  Reported to have given an extra dose of torsemide 20 my 3 days in a row without seeing weight reduction or alleviation of symptoms.     It is reported patient's diet can he on the saltier side, it has not been a problem in the past until recently.  It is reported patient has been tired and has not been as active as she has in the past.     Its reported they have a dose of metolazone and it has been effective in the past.    Michelee, should we recommend patient take a metolazone 30 mins before her diuretic with labs to follow the next day, or do you want to see labs first.  It was reported metolazone has been effective in the past.     Thank you    Oumar Will

## 2025-07-10 RX ORDER — METOLAZONE 2.5 MG/1
TABLET ORAL
Qty: 3 TABLET | Refills: 0 | Status: SHIPPED | OUTPATIENT
Start: 2025-07-10

## 2025-07-10 NOTE — TELEPHONE ENCOUNTER
Noted, called patient's daughter and relayed updates.  She is understanding.  It was reported patient's available metolazone has , new prescription sent.  BMP ordered and anticipated to be done at [facility] .  Patient has an appointment with An  and we will give them our low sodium education material while in clinic.    Oumar Will RN    ---------------------------------------------------------    Tran Boyle, SERENE CNP  You15 hours ago (4:59 PM)       May take Metolazone 2.5 mg X 1 and recheck BMP the next day.  Encourage high K+ diet.  Thanks Oumar!  CY

## 2025-07-14 ENCOUNTER — TELEPHONE (OUTPATIENT)
Dept: CARDIOLOGY | Facility: CLINIC | Age: 80
End: 2025-07-14
Payer: COMMERCIAL

## 2025-07-14 NOTE — TELEPHONE ENCOUNTER
"  Patient's daughter called the clinic to report patient is having weight gain.    Wt: 173.8 lbs today (it was 170 lbs after metolazone and has gained approximately 1 lbs a day).  Reported in encounter 7/9/25 patient's \"dry weight\" is close to 173 lbs    Patient had a metolazone 2.5 mg on 7/10.  Her weight at that time was 178 lbs    HF S/S: \"swelling seems fine\", \"I think her breathing better\".      We called patient's daughter back to review.  Since patient has improving symptoms and her weight is currently at an optimal range we discussed on having her call the clinic back Wed 7/16 with weight and status updates.  She reported patient maybe drinking too much fluids and we discussed ways to help alleviate this.  At this time she will continue to monitor, she is encouraged to call the clinic sooner with worsening symptoms and fast increasing weights.  She is agreeable.    Oumar Will RN  "

## 2025-07-17 ENCOUNTER — TELEPHONE (OUTPATIENT)
Dept: CARDIOLOGY | Facility: CLINIC | Age: 80
End: 2025-07-17
Payer: COMMERCIAL

## 2025-07-17 DIAGNOSIS — I50.32 CHRONIC HEART FAILURE WITH PRESERVED EJECTION FRACTION (H): Primary | ICD-10-CM

## 2025-07-18 ENCOUNTER — LAB REQUISITION (OUTPATIENT)
Dept: LAB | Facility: CLINIC | Age: 80
End: 2025-07-18
Payer: COMMERCIAL

## 2025-07-18 DIAGNOSIS — I11.0 HYPERTENSIVE HEART DISEASE WITH HEART FAILURE (H): ICD-10-CM

## 2025-07-18 LAB
ANION GAP SERPL CALCULATED.3IONS-SCNC: 14 MMOL/L (ref 7–15)
BUN SERPL-MCNC: 26.3 MG/DL (ref 8–23)
CALCIUM SERPL-MCNC: 9.4 MG/DL (ref 8.8–10.4)
CHLORIDE SERPL-SCNC: 100 MMOL/L (ref 98–107)
CREAT SERPL-MCNC: 0.76 MG/DL (ref 0.51–0.95)
EGFRCR SERPLBLD CKD-EPI 2021: 79 ML/MIN/1.73M2
GLUCOSE SERPL-MCNC: 101 MG/DL (ref 70–99)
HCO3 SERPL-SCNC: 26 MMOL/L (ref 22–29)
POTASSIUM SERPL-SCNC: 4.6 MMOL/L (ref 3.4–5.3)
SODIUM SERPL-SCNC: 140 MMOL/L (ref 135–145)

## 2025-07-18 PROCEDURE — 80048 BASIC METABOLIC PNL TOTAL CA: CPT | Mod: ORL | Performed by: FAMILY MEDICINE

## 2025-07-22 ENCOUNTER — TELEPHONE (OUTPATIENT)
Dept: CARDIOLOGY | Facility: CLINIC | Age: 80
End: 2025-07-22
Payer: COMMERCIAL

## 2025-07-22 DIAGNOSIS — I50.32 CHRONIC HEART FAILURE WITH PRESERVED EJECTION FRACTION (H): Primary | ICD-10-CM

## 2025-07-22 PROBLEM — E78.00 PURE HYPERCHOLESTEROLEMIA: Status: RESOLVED | Noted: 2023-08-30 | Resolved: 2025-07-22

## 2025-07-22 PROBLEM — E83.42 HYPOMAGNESEMIA: Status: RESOLVED | Noted: 2024-10-18 | Resolved: 2025-07-22

## 2025-07-22 PROBLEM — E78.00 PURE HYPERCHOLESTEROLEMIA: Status: ACTIVE | Noted: 2023-08-30

## 2025-07-22 PROBLEM — E87.6 HYPOKALEMIA: Status: RESOLVED | Noted: 2024-10-18 | Resolved: 2025-07-22

## 2025-07-22 PROBLEM — S82.424D: Status: RESOLVED | Noted: 2023-09-14 | Resolved: 2025-07-22

## 2025-07-22 RX ORDER — TORSEMIDE 20 MG/1
60 TABLET ORAL 2 TIMES DAILY
Qty: 360 TABLET | Refills: 3 | Status: SHIPPED | OUTPATIENT
Start: 2025-07-22

## 2025-07-22 NOTE — PROGRESS NOTES
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  Previous workup included EEG that showed left hemispheric sharp activity.  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  Continue Dilantin 500 mg at bedtime and Topamax 150 mg twice daily.  2.  Check phenytoin level, Topamax level.  She recently had a comprehensive metabolic panel that was normal.  3.  She is going to Dr. Hannah Benton office next couple of weeks and prefers to get the lab work done over there  4.  She had a history of peripheral neuropathy and previously was on high dose of gabapentin that was tapered.  She continues to have the symptoms but is not sure if she wants additional workup (e.g., lab work) or add another medicine to treat her neuropathic symptoms.  4.  Follow-up will be in 1 year    Thank you again for this referral, please feel free to contact me if you have any questions.    Guy Marie MD  [facility] NEUROLOGY, [place]     HISTORY OF PRESENT ILLNESS     Patient is a 80-year-old female with CHF, depression and anxiety, paroxysmal A-fib who returns for follow-up for complex partial seizures.  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  She was continued on Dilantin and Topamax and since her last visit reports denies any seizures.  She  occasionally gets an aura but never had seizures.  She has long history of peripheral neuropathy and in the past was on high dose of gabapentin that was tapered off she does occasionally get numbness tingling and is wondering if there is anything else that can be done.  She does not want to try another medication and will think about it if she is interested in doing further workup including EEG and lab work.    Briefly patient is a female with CHF, depression and anxiety, paroxysmal A. fib and complex partial seizure who was  initially evaluated for seizure when she presented with a generalized seizure and EEG showed left hemispheric sharp activity. MRI was normal. 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 Patient reports that her seizures are triggered by stress and usually she gets an aura. 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)    Hyperlipidemia    Depressive disorder    Coronary atherosclerosis    B12 deficiency    Persistent atrial fibrillation (H)    (HFpEF) heart failure with preserved ejection fraction (H)    COPD (chronic obstructive pulmonary disease) (H)    Benign essential hypertension    Hyperparathyroidism    Colon adenoma         PAST MEDICAL & SURGICAL HISTORY     Past Medical History:   Patient  has a past medical history of (HFpEF) heart failure with preserved ejection fraction (H) (1/20/2020), Arthritis, Asthma, Atrial fibrillation (H) (10/24/2016), B12 deficiency, Benign essential hypertension (8/2/2021), CAD (coronary artery disease), Congestive heart failure (H), COPD (chronic obstructive pulmonary disease) (H), COPD (chronic obstructive pulmonary disease) (H), Crohn's disease (H), Crohn's disease (H), Depression, Depressive disorder, Esophageal reflux,  Essential hypertension, Fracture of left hip requiring operative repair (H), Heart disease, History of shingles, Hyperlipidemia, Hyperparathyroidism, Hypertension, Osteoporosis, Pulmonary nodule, Regional enteritis (H) (07/19/2016), Seizure (H), and Seizures (H).    Surgical History:  She  has a past surgical history that includes orthopedic surgery; hemorrhoidectomy; REMOVAL DEEP IMPLANT (Left, 1/25/2015); Orif Hip Fracture (2015); Arthroplasty revision hip (Left, 07/19/2016); other surgical history; and other surgical history.     SOCIAL HISTORY     Reviewed, and she  reports that she quit smoking about 14 years ago. Her smoking use included cigarettes. She has never used smokeless tobacco. She reports that she does not drink alcohol and does not use drugs.     FAMILY HISTORY     Reviewed, and family history includes Arthritis in her father; Colon Cancer in her father; Heart Disease in her father and mother; Hypertension in her father and mother.     ALLERGIES     Allergies   Allergen Reactions    Amoxicillin GI Disturbance and Other (See Comments)     COLITIS    Has tolerated cefazolin  GI bleeding, Colitis, has tolerated cefazolin       Sulfa Antibiotics     Sulfamethoxazole-Trimethoprim Other (See Comments)     Other reaction(s): Vaginal Irritation  VAGINAL BLEEDING  Vaginal irritation, vaginal bleeding            REVIEW OF SYSTEMS     A 12 point review of system was performed and was negative except as outlined in the history of present illness.     HOME MEDICATIONS     Current Outpatient Rx   Medication Sig Dispense Refill    phenytoin (DILANTIN) 100 MG ER capsule Take 5 capsules (500 mg) by mouth at bedtime. 450 capsule 3    topiramate (TOPAMAX) 100 MG tablet Take 1.5 tablets (150 mg) by mouth 2 times daily. 270 tablet 3    acetaminophen (TYLENOL) 500 MG tablet Take 1,000 mg by mouth 2 times daily       albuterol (PROAIR HFA/PROVENTIL HFA/VENTOLIN HFA) 108 (90 Base) MCG/ACT inhaler Inhale 2 puffs into the  lungs every 6 hours as needed      apixaban ANTICOAGULANT (ELIQUIS) 5 MG tablet Take 1 tablet (5 mg) by mouth 2 times daily. 180 tablet 3    aspirin 81 MG EC tablet Take 81 mg by mouth daily       CALCIUM-VITAMIN D PO Take 2 tablets by mouth daily      colesevelam (WELCHOL) 625 MG tablet Take 3 tablets by mouth At Bedtime      cyanocobalamin (CYANOCOBALAMIN) 1000 MCG/ML injection Inject 1,000 mcg into the muscle every 30 days      estradiol (ESTRACE) 0.1 MG/GM vaginal cream Place vaginally three times a week      fluticasone (FLONASE) 50 MCG/ACT nasal spray Spray 1 spray into both nostrils daily      fluticasone (FLOVENT HFA) 110 MCG/ACT inhaler Inhale 1 puff into the lungs 2 times daily       ipratropium - albuterol 0.5 mg/2.5 mg/3 mL (DUONEB) 0.5-2.5 (3) MG/3ML neb solution USE 1 VIAL VIA NEBULIZER TWICE DAILY (Patient taking differently: 3 times daily.)      loratadine (CLARITIN) 10 MG tablet Take 10 mg by mouth daily as needed       Magnesium Oxide 250 MG TABS Take 250 mg by mouth daily.      metolazone (ZAROXOLYN) 2.5 MG tablet Take 1 tablet 30 minutes before daily diuretic at the direction of cardiology. 3 tablet 0    multivitamin w/minerals (THERA-VIT-M) tablet Take 1 tablet by mouth daily       nitroGLYcerin (NITROSTAT) 0.4 MG sublingual tablet Place 1 tablet (0.4 mg) under the tongue every 5 minutes as needed for chest pain 25 tablet 1    PARoxetine (PAXIL) 40 MG tablet Take 40 mg by mouth daily       potassium chloride ER (KLOR-CON M) 20 MEQ CR tablet Take 1 tablet (20 mEq) by mouth daily 270 tablet 3    simvastatin (ZOCOR) 40 MG tablet Take 40 mg by mouth At Bedtime       sodium chloride (OCEAN) 0.65 % nasal spray Spray 1 spray in nostril daily as needed      sotalol (BETAPACE) 80 MG tablet TAKE 1 TABLET(80 MG) BY MOUTH TWICE DAILY 180 tablet 3    spironolactone (ALDACTONE) 25 MG tablet TAKE 1 TABLET DAILY (DOSE DECREASE) 90 tablet 2    STELARA 45 MG/0.5ML SOLN Inject 45 mg Subcutaneous every 30 days       "torsemide (DEMADEX) 20 MG tablet Take 3 tablets (60 mg) by mouth 2 times daily. 360 tablet 3    Vitamin D3 (CHOLECALCIFEROL) 125 MCG (5000 UT) tablet Take 125 mcg by mouth daily           PHYSICAL EXAM     Vital signs  Ht 1.676 m (5' 5.98\")   Wt 79.4 kg (175 lb)   BMI 28.26 kg/m      Weight:   175 lbs 0 oz    Patient is alert and oriented x 3.  Speech is normal, fluent with intact comprehension and repetition no aphasia.  Cognition is intact.  Face symmetrical extraocular movements are intact.  Gross motor strength appears symmetric.  Patient reports normal sensation.  Normal finger-nose testing.    The virtual neurological exam was within normal limits for mental status, cranial nerves, motor function, sensory function, coordination and gait.  No acute neurological deficits were identified during this video visit.  However limitations of the virtual exam including ability to assess reflexes, funduscopic exam, detailed sensory testing and fine motor strength.     PERTINENT DIAGNOSTIC STUDIES     Following studies were reviewed:     CT HEAD 4/26/20  1.  No CT evidence for acute intracranial process.  2.  Brain atrophy and presumed chronic microvascular ischemic changes as above.  3.  Mucosal thickening is noted involving the left sphenoid sinus. Correlation for sinusitis is recommended.     CT HEAD 7/17/19  1. No intracranial mass. No hemorrhage or stroke.  2. Mild presumed chronic small vessel ischemic change and age-related change again visualized.  3. Interval development of polypoid soft tissue in the external auditory canals.     MRI 4/27/20  HEAD MRI:  1.  No acute intracranial finding. No evidence for recent ischemia, intracranial hemorrhage, or mass.    HEAD MRA:  1.  Negative MR angiogram of the brain. No evidence for aneurysm, proximal vessel occlusion, high-grade intracranial stenosis or high flow vascular lesion.    NECK MRA:  1.  Atherosclerotic narrowing of the proximal left internal carotid artery is " not well-characterized due to motion artifact but suspected to be in the 50% range based on NASCET criteria.  2.  No hemodynamically significant narrowing within the right carotid artery.   3.  Vertebral arteries are patent. No dissection.     EEG 3/31/2022  This is an abnormal EEG due to paroxysmal slowing of the background and theta range that suggests nonspecific generalized cerebral dysfunction. Such slowing can be seen in metabolic, toxic abnormalities and due to medication effect. Clinical correlation is recommended. No sharp discharges or rhythmic activity was seen to suggest seizures.      EEG 8/3/2021  Nonspecific slowing in theta range.  No sharp activity seen to suggest seizures     EEG 4/27/20  This is an abnormal EEG due to diffusely slow background in   theta and delta range that suggests nonspecific generalized cerebral   dysfunction.  EEG background activity is contaminated with muscle   artifact.  Such nonspecific slowing suggests diffuse cerebral dysfunction   but no clear epileptiform activity was noted     EEG 2015 was abnormal suggesting low threshold for seizure     PERTINENT LABS  Following labs were reviewed:  Lab Requisition on 07/18/2025   Component Date Value Ref Range Status    Sodium 07/18/2025 140  135 - 145 mmol/L Final    Potassium 07/18/2025 4.6  3.4 - 5.3 mmol/L Final    Chloride 07/18/2025 100  98 - 107 mmol/L Final    Carbon Dioxide (CO2) 07/18/2025 26  22 - 29 mmol/L Final    Anion Gap 07/18/2025 14  7 - 15 mmol/L Final    Urea Nitrogen 07/18/2025 26.3 (H)  8.0 - 23.0 mg/dL Final    Creatinine 07/18/2025 0.76  0.51 - 0.95 mg/dL Final    GFR Estimate 07/18/2025 79  >60 mL/min/1.73m2 Final    Calcium 07/18/2025 9.4  8.8 - 10.4 mg/dL Final    Glucose 07/18/2025 101 (H)  70 - 99 mg/dL Final   Office Visit on 07/11/2025   Component Date Value Ref Range Status    Ventricular Rate 07/11/2025 71  BPM Final    Atrial Rate 07/11/2025 71  BPM Final    GA Interval 07/11/2025 154  ms Final     QRS Duration 07/11/2025 76  ms Final    QT 07/11/2025 444  ms Final    QTc 07/11/2025 482  ms Final    P Axis 07/11/2025 11  degrees Final    R AXIS 07/11/2025 -21  degrees Final    T Axis 07/11/2025 -7  degrees Final    Interpretation ECG 07/11/2025    Final                    Value:Sinus rhythm with occasional Premature ventricular complexes  Left ventricular hypertrophy with repolarization abnormality  Prolonged QT  Abnormal ECG  When compared with ECG of 06-Dec-2023 09:42,  Left ventricular hypertrophy is now Present  Premature ventricular complexes are now Present  Confirmed by HALEY PARHAM MD LOC: ([id number]) on 7/14/2025 12:35:53 PM      Sodium 07/11/2025 136  135 - 145 mmol/L Final    Potassium 07/11/2025 2.9 (L)  3.4 - 5.3 mmol/L Final    Chloride 07/11/2025 90 (L)  98 - 107 mmol/L Final    Carbon Dioxide (CO2) 07/11/2025 31 (H)  22 - 29 mmol/L Final    Anion Gap 07/11/2025 15  7 - 15 mmol/L Final    Urea Nitrogen 07/11/2025 26.3 (H)  8.0 - 23.0 mg/dL Final    Creatinine 07/11/2025 0.86  0.51 - 0.95 mg/dL Final    GFR Estimate 07/11/2025 68  >60 mL/min/1.73m2 Final    Calcium 07/11/2025 10.0  8.8 - 10.4 mg/dL Final    Glucose 07/11/2025 113 (H)  70 - 99 mg/dL Final   Lab Requisition on 06/25/2025   Component Date Value Ref Range Status    Phosphorus 06/25/2025 3.5  2.5 - 4.5 mg/dL Final    Alkptase Isoenzymes 06/25/2025 88  40 - 120 U/L Final    Alkptase % Liver 06/25/2025 39  0 - 94 U/L Final    Alkptase % Bone 06/25/2025 49  0 - 55 U/L Final    Alkptase % Other 06/25/2025 0  U/L Final    Calcium Ionized Whole Blood 06/25/2025 4.7  4.4 - 5.2 mg/dL Final    Vitamin D, Total (25-Hydroxy) 06/25/2025 46  20 - 50 ng/mL Final   Office Visit on 05/19/2025   Component Date Value Ref Range Status    Sodium 05/19/2025 140  135 - 145 mmol/L Final    Potassium 05/19/2025 3.9  3.4 - 5.3 mmol/L Final    Chloride 05/19/2025 102  98 - 107 mmol/L Final    Carbon Dioxide (CO2) 05/19/2025 27  22 - 29 mmol/L Final    Anion Gap  05/19/2025 11  7 - 15 mmol/L Final    Urea Nitrogen 05/19/2025 17.7  8.0 - 23.0 mg/dL Final    Creatinine 05/19/2025 0.71  0.51 - 0.95 mg/dL Final    GFR Estimate 05/19/2025 85  >60 mL/min/1.73m2 Final    Calcium 05/19/2025 9.3  8.8 - 10.4 mg/dL Final    Glucose 05/19/2025 93  70 - 99 mg/dL Final         Total time spent for face to face visit, reviewing labs/imaging studies, counseling and coordination of care was: 30 Minutes spent on the date of the encounter doing chart review, review of outside records, review of test results, interpretation of tests, patient visit, documentation, and discussion with family     The longitudinal plan of care for the diagnosis(es)/condition(s) as documented were addressed during this visit. Due to the added complexity in care, I will continue to support Merrily in the subsequent management and with ongoing continuity of care.    This note was dictated using voice recognition software.  Any grammatical or context distortions are unintentional and inherent to the software.    Orders Placed This Encounter   Procedures    Phenytoin level    Topiramate Level      New Prescriptions    No medications on file     Modified Medications    Modified Medication Previous Medication    PHENYTOIN (DILANTIN) 100 MG ER CAPSULE phenytoin (DILANTIN) 100 MG ER capsule       Take 5 capsules (500 mg) by mouth at bedtime.    TAKE 5 CAPSULES AT BEDTIME    TOPIRAMATE (TOPAMAX) 100 MG TABLET topiramate (TOPAMAX) 100 MG tablet       Take 1.5 tablets (150 mg) by mouth 2 times daily.    Take 1.5 tablets (150 mg) by mouth 2 times daily.

## 2025-07-22 NOTE — TELEPHONE ENCOUNTER
"Kierra is reached today to discuss lab results for Merrily and status updates on her HF symptoms.   She reports her weight today is up to 176.9# she has LE edema (socklines)  Now trying compression stockings. Short of breath at baseline, fatigues easily with showering /dressing. Kierra shared that she gave additional Torsemide 20mg dose x3 on her own over the weekend Sat,Sun,Mon. \" I just don't think the Torsemide is working anymore\" she stated. Has used Furosemide and Torsemide in the past.   Current dose is Torsemide 40mg twice a day.   Asking for a consideration to change to alternate loop diuretic, Bumex?    Tran Boyle CNP what are your current recommendations?    Breanne SMITH RN BSN, CHFN, PCCN-K    "

## 2025-07-22 NOTE — TELEPHONE ENCOUNTER
View All Conversations on this Encounter  Tran Boyle APRN CNP to Me  (Selected Message)        7/22/25 12:51 PM  Looks like her wt is up 3 lbs from baseline.  We could go up on Torsemide 60 mg bid and continue to uptitrate as needed  If issue with tolerance, we can certainly consider to switch to Bumex.  Encourage high K+ diet.  Repeat BMP and NTproBNP level in 1 week.  Thank you Breanne!  TOMMY   ------------------------------------------------  Kierra is reached with recommendations to increase Torsemide dose to 60mg bid. Labs in a week, increase potassium rich foods, hydrate w/ 50-60 oz per day. Breanne SMITH RN BSN, CHFN, PCCN-K

## 2025-07-28 ENCOUNTER — VIRTUAL VISIT (OUTPATIENT)
Dept: NEUROLOGY | Facility: CLINIC | Age: 80
End: 2025-07-28
Payer: COMMERCIAL

## 2025-07-28 VITALS — WEIGHT: 175 LBS | HEIGHT: 66 IN | BODY MASS INDEX: 28.12 KG/M2

## 2025-07-28 DIAGNOSIS — G40.209 NONINTRACTABLE EPILEPSY WITH COMPLEX PARTIAL SEIZURES (H): Primary | ICD-10-CM

## 2025-07-28 PROCEDURE — 98006 SYNCH AUDIO-VIDEO EST MOD 30: CPT | Performed by: PSYCHIATRY & NEUROLOGY

## 2025-07-28 PROCEDURE — 1126F AMNT PAIN NOTED NONE PRSNT: CPT | Performed by: PSYCHIATRY & NEUROLOGY

## 2025-07-28 RX ORDER — PHENYTOIN SODIUM 100 MG/1
500 CAPSULE, EXTENDED RELEASE ORAL AT BEDTIME
Qty: 450 CAPSULE | Refills: 3 | Status: SHIPPED | OUTPATIENT
Start: 2025-07-28

## 2025-07-28 RX ORDER — TOPIRAMATE 100 MG/1
150 TABLET, FILM COATED ORAL 2 TIMES DAILY
Qty: 270 TABLET | Refills: 3 | Status: SHIPPED | OUTPATIENT
Start: 2025-07-28

## 2025-07-28 ASSESSMENT — PAIN SCALES - GENERAL: PAINLEVEL_OUTOF10: NO PAIN (0)

## 2025-07-28 NOTE — NURSING NOTE
Current patient location: [address]  [place] [address]    Is the patient currently in the state of [place]? YES    Visit mode: VIDEO    If the visit is dropped, the patient can be reconnected by:VIDEO VISIT: Text to cell phone:   Telephone Information:   Mobile [phone]   Mobile [phone]       Will anyone else be joining the visit? NO  (If patient encounters technical issues they should call [phone]    Are changes needed to the allergy or medication list? No    Are refills needed on medications prescribed by this physician? NO    Rooming Documentation:  Questionnaire(s) not pre-assigned    Reason for visit: Follow Up    Steph MATTHEW

## 2025-07-28 NOTE — PROGRESS NOTES
Virtual Visit Details    Type of service:  Video Visit   Video Start Time: 10:49 AM  Video End Time:11:05 AM    Originating Location (pt. Location): Home    Distant Location (provider location):  On-site  Platform used for Video Visit: [employer]

## 2025-07-28 NOTE — LETTER
2025      Tommy Rea  [address]  [place] [address]      Dear Colleague,    Thank you for referring your patient, Tommy Rea, to the [facility] NEUROLOG[facility]INIC [place]. Please see a copy of my visit note below.    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  Previous workup included EEG that showed left hemispheric sharp activity.  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  Continue Dilantin 500 mg at bedtime and Topamax 150 mg twice daily.  2.  Check phenytoin level, Topamax level.  She recently had a comprehensive metabolic panel that was normal.  3.  She is going to Dr. Hannah Benton office next couple of weeks and prefers to get the lab work done over there  4.  She had a history of peripheral neuropathy and previously was on high dose of gabapentin that was tapered.  She continues to have the symptoms but is not sure if she wants additional workup (e.g., lab work) or add another medicine to treat her neuropathic symptoms.  4.  Follow-up will be in 1 year    Thank you again for this referral, please feel free to contact me if you have any questions.    Guy Marie MD  [facility]     HISTORY OF PRESENT ILLNESS     Patient is a 80-year-old female with CHF, depression and anxiety, paroxysmal A-fib who returns for follow-up for complex partial seizures.  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  She was continued on Dilantin and Topamax and since her last visit reports denies any seizures.  She occasionally gets an aura but never had seizures.  She has long history of peripheral neuropathy and in the past was on high dose of gabapentin that was tapered off she does occasionally get numbness tingling and is wondering if there is anything else that can be done.  She does not want to try another medication and will think about it if she is interested in doing further workup including EEG and lab work.    Briefly patient is a female with CHF, depression and anxiety, paroxysmal A. fib and complex partial seizure who was  initially evaluated for seizure when she presented with a generalized seizure and EEG showed left hemispheric sharp activity. MRI was normal. 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 Patient reports that her seizures are triggered by stress and usually she gets an aura. 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)     Hyperlipidemia     Depressive disorder     Coronary atherosclerosis     B12 deficiency     Persistent atrial fibrillation (H)     (HFpEF) heart failure with preserved ejection fraction (H)     COPD (chronic obstructive pulmonary disease) (H)     Benign essential hypertension     Hyperparathyroidism     Colon adenoma         PAST MEDICAL & SURGICAL HISTORY     Past Medical History:   Patient  has a past medical history of (HFpEF) heart failure with preserved ejection fraction (H) (1/20/2020), Arthritis, Asthma, Atrial fibrillation (H) (10/24/2016), B12 deficiency, Benign essential hypertension  (8/2/2021), CAD (coronary artery disease), Congestive heart failure (H), COPD (chronic obstructive pulmonary disease) (H), COPD (chronic obstructive pulmonary disease) (H), Crohn's disease (H), Crohn's disease (H), Depression, Depressive disorder, Esophageal reflux, Essential hypertension, Fracture of left hip requiring operative repair (H), Heart disease, History of shingles, Hyperlipidemia, Hyperparathyroidism, Hypertension, Osteoporosis, Pulmonary nodule, Regional enteritis (H) (07/19/2016), Seizure (H), and Seizures (H).    Surgical History:  She  has a past surgical history that includes orthopedic surgery; hemorrhoidectomy; REMOVAL DEEP IMPLANT (Left, 1/25/2015); Orif Hip Fracture (2015); Arthroplasty revision hip (Left, 07/19/2016); other surgical history; and other surgical history.     SOCIAL HISTORY     Reviewed, and she  reports that she quit smoking about 14 years ago. Her smoking use included cigarettes. She has never used smokeless tobacco. She reports that she does not drink alcohol and does not use drugs.     FAMILY HISTORY     Reviewed, and family history includes Arthritis in her father; Colon Cancer in her father; Heart Disease in her father and mother; Hypertension in her father and mother.     ALLERGIES     Allergies   Allergen Reactions     Amoxicillin GI Disturbance and Other (See Comments)     COLITIS    Has tolerated cefazolin  GI bleeding, Colitis, has tolerated cefazolin        Sulfa Antibiotics      Sulfamethoxazole-Trimethoprim Other (See Comments)     Other reaction(s): Vaginal Irritation  VAGINAL BLEEDING  Vaginal irritation, vaginal bleeding            REVIEW OF SYSTEMS     A 12 point review of system was performed and was negative except as outlined in the history of present illness.     HOME MEDICATIONS     Current Outpatient Rx   Medication Sig Dispense Refill     phenytoin (DILANTIN) 100 MG ER capsule Take 5 capsules (500 mg) by mouth at bedtime. 450 capsule 3     topiramate  (TOPAMAX) 100 MG tablet Take 1.5 tablets (150 mg) by mouth 2 times daily. 270 tablet 3     acetaminophen (TYLENOL) 500 MG tablet Take 1,000 mg by mouth 2 times daily        albuterol (PROAIR HFA/PROVENTIL HFA/VENTOLIN HFA) 108 (90 Base) MCG/ACT inhaler Inhale 2 puffs into the lungs every 6 hours as needed       apixaban ANTICOAGULANT (ELIQUIS) 5 MG tablet Take 1 tablet (5 mg) by mouth 2 times daily. 180 tablet 3     aspirin 81 MG EC tablet Take 81 mg by mouth daily        CALCIUM-VITAMIN D PO Take 2 tablets by mouth daily       colesevelam (WELCHOL) 625 MG tablet Take 3 tablets by mouth At Bedtime       cyanocobalamin (CYANOCOBALAMIN) 1000 MCG/ML injection Inject 1,000 mcg into the muscle every 30 days       estradiol (ESTRACE) 0.1 MG/GM vaginal cream Place vaginally three times a week       fluticasone (FLONASE) 50 MCG/ACT nasal spray Spray 1 spray into both nostrils daily       fluticasone (FLOVENT HFA) 110 MCG/ACT inhaler Inhale 1 puff into the lungs 2 times daily        ipratropium - albuterol 0.5 mg/2.5 mg/3 mL (DUONEB) 0.5-2.5 (3) MG/3ML neb solution USE 1 VIAL VIA NEBULIZER TWICE DAILY (Patient taking differently: 3 times daily.)       loratadine (CLARITIN) 10 MG tablet Take 10 mg by mouth daily as needed        Magnesium Oxide 250 MG TABS Take 250 mg by mouth daily.       metolazone (ZAROXOLYN) 2.5 MG tablet Take 1 tablet 30 minutes before daily diuretic at the direction of cardiology. 3 tablet 0     multivitamin w/minerals (THERA-VIT-M) tablet Take 1 tablet by mouth daily        nitroGLYcerin (NITROSTAT) 0.4 MG sublingual tablet Place 1 tablet (0.4 mg) under the tongue every 5 minutes as needed for chest pain 25 tablet 1     PARoxetine (PAXIL) 40 MG tablet Take 40 mg by mouth daily        potassium chloride ER (KLOR-CON M) 20 MEQ CR tablet Take 1 tablet (20 mEq) by mouth daily 270 tablet 3     simvastatin (ZOCOR) 40 MG tablet Take 40 mg by mouth At Bedtime        sodium chloride (OCEAN) 0.65 % nasal spray  "Spray 1 spray in nostril daily as needed       sotalol (BETAPACE) 80 MG tablet TAKE 1 TABLET(80 MG) BY MOUTH TWICE DAILY 180 tablet 3     spironolactone (ALDACTONE) 25 MG tablet TAKE 1 TABLET DAILY (DOSE DECREASE) 90 tablet 2     STELARA 45 MG/0.5ML SOLN Inject 45 mg Subcutaneous every 30 days       torsemide (DEMADEX) 20 MG tablet Take 3 tablets (60 mg) by mouth 2 times daily. 360 tablet 3     Vitamin D3 (CHOLECALCIFEROL) 125 MCG (5000 UT) tablet Take 125 mcg by mouth daily           PHYSICAL EXAM     Vital signs  Ht 1.676 m (5' 5.98\")   Wt 79.4 kg (175 lb)   BMI 28.26 kg/m      Weight:   175 lbs 0 oz    Patient is alert and oriented x 3.  Speech is normal, fluent with intact comprehension and repetition no aphasia.  Cognition is intact.  Face symmetrical extraocular movements are intact.  Gross motor strength appears symmetric.  Patient reports normal sensation.  Normal finger-nose testing.    The virtual neurological exam was within normal limits for mental status, cranial nerves, motor function, sensory function, coordination and gait.  No acute neurological deficits were identified during this video visit.  However limitations of the virtual exam including ability to assess reflexes, funduscopic exam, detailed sensory testing and fine motor strength.     PERTINENT DIAGNOSTIC STUDIES     Following studies were reviewed:     CT HEAD 4/26/20  1.  No CT evidence for acute intracranial process.  2.  Brain atrophy and presumed chronic microvascular ischemic changes as above.  3.  Mucosal thickening is noted involving the left sphenoid sinus. Correlation for sinusitis is recommended.     CT HEAD 7/17/19  1. No intracranial mass. No hemorrhage or stroke.  2. Mild presumed chronic small vessel ischemic change and age-related change again visualized.  3. Interval development of polypoid soft tissue in the external auditory canals.     MRI 4/27/20  HEAD MRI:  1.  No acute intracranial finding. No evidence for recent " ischemia, intracranial hemorrhage, or mass.    HEAD MRA:  1.  Negative MR angiogram of the brain. No evidence for aneurysm, proximal vessel occlusion, high-grade intracranial stenosis or high flow vascular lesion.    NECK MRA:  1.  Atherosclerotic narrowing of the proximal left internal carotid artery is not well-characterized due to motion artifact but suspected to be in the 50% range based on NASCET criteria.  2.  No hemodynamically significant narrowing within the right carotid artery.   3.  Vertebral arteries are patent. No dissection.     EEG 3/31/2022  This is an abnormal EEG due to paroxysmal slowing of the background and theta range that suggests nonspecific generalized cerebral dysfunction. Such slowing can be seen in metabolic, toxic abnormalities and due to medication effect. Clinical correlation is recommended. No sharp discharges or rhythmic activity was seen to suggest seizures.      EEG 8/3/2021  Nonspecific slowing in theta range.  No sharp activity seen to suggest seizures     EEG 4/27/20  This is an abnormal EEG due to diffusely slow background in   theta and delta range that suggests nonspecific generalized cerebral   dysfunction.  EEG background activity is contaminated with muscle   artifact.  Such nonspecific slowing suggests diffuse cerebral dysfunction   but no clear epileptiform activity was noted     EEG 2015 was abnormal suggesting low threshold for seizure     PERTINENT LABS  Following labs were reviewed:  Lab Requisition on 07/18/2025   Component Date Value Ref Range Status     Sodium 07/18/2025 140  135 - 145 mmol/L Final     Potassium 07/18/2025 4.6  3.4 - 5.3 mmol/L Final     Chloride 07/18/2025 100  98 - 107 mmol/L Final     Carbon Dioxide (CO2) 07/18/2025 26  22 - 29 mmol/L Final     Anion Gap 07/18/2025 14  7 - 15 mmol/L Final     Urea Nitrogen 07/18/2025 26.3 (H)  8.0 - 23.0 mg/dL Final     Creatinine 07/18/2025 0.76  0.51 - 0.95 mg/dL Final     GFR Estimate 07/18/2025 79  >60  mL/min/1.73m2 Final     Calcium 07/18/2025 9.4  8.8 - 10.4 mg/dL Final     Glucose 07/18/2025 101 (H)  70 - 99 mg/dL Final   Office Visit on 07/11/2025   Component Date Value Ref Range Status     Ventricular Rate 07/11/2025 71  BPM Final     Atrial Rate 07/11/2025 71  BPM Final     NE Interval 07/11/2025 154  ms Final     QRS Duration 07/11/2025 76  ms Final     QT 07/11/2025 444  ms Final     QTc 07/11/2025 482  ms Final     P Axis 07/11/2025 11  degrees Final     R AXIS 07/11/2025 -21  degrees Final     T Axis 07/11/2025 -7  degrees Final     Interpretation ECG 07/11/2025    Final                    Value:Sinus rhythm with occasional Premature ventricular complexes  Left ventricular hypertrophy with repolarization abnormality  Prolonged QT  Abnormal ECG  When compared with ECG of 06-Dec-2023 09:42,  Left ventricular hypertrophy is now Present  Premature ventricular complexes are now Present  Confirmed by HALEY PARHAM MD LOC:JN ([id number]) on 7/14/2025 12:35:53 PM       Sodium 07/11/2025 136  135 - 145 mmol/L Final     Potassium 07/11/2025 2.9 (L)  3.4 - 5.3 mmol/L Final     Chloride 07/11/2025 90 (L)  98 - 107 mmol/L Final     Carbon Dioxide (CO2) 07/11/2025 31 (H)  22 - 29 mmol/L Final     Anion Gap 07/11/2025 15  7 - 15 mmol/L Final     Urea Nitrogen 07/11/2025 26.3 (H)  8.0 - 23.0 mg/dL Final     Creatinine 07/11/2025 0.86  0.51 - 0.95 mg/dL Final     GFR Estimate 07/11/2025 68  >60 mL/min/1.73m2 Final     Calcium 07/11/2025 10.0  8.8 - 10.4 mg/dL Final     Glucose 07/11/2025 113 (H)  70 - 99 mg/dL Final   Lab Requisition on 06/25/2025   Component Date Value Ref Range Status     Phosphorus 06/25/2025 3.5  2.5 - 4.5 mg/dL Final     Alkptase Isoenzymes 06/25/2025 88  40 - 120 U/L Final     Alkptase % Liver 06/25/2025 39  0 - 94 U/L Final     Alkptase % Bone 06/25/2025 49  0 - 55 U/L Final     Alkptase % Other 06/25/2025 0  U/L Final     Calcium Ionized Whole Blood 06/25/2025 4.7  4.4 - 5.2 mg/dL Final     Vitamin D,  Total (25-Hydroxy) 06/25/2025 46  20 - 50 ng/mL Final   Office Visit on 05/19/2025   Component Date Value Ref Range Status     Sodium 05/19/2025 140  135 - 145 mmol/L Final     Potassium 05/19/2025 3.9  3.4 - 5.3 mmol/L Final     Chloride 05/19/2025 102  98 - 107 mmol/L Final     Carbon Dioxide (CO2) 05/19/2025 27  22 - 29 mmol/L Final     Anion Gap 05/19/2025 11  7 - 15 mmol/L Final     Urea Nitrogen 05/19/2025 17.7  8.0 - 23.0 mg/dL Final     Creatinine 05/19/2025 0.71  0.51 - 0.95 mg/dL Final     GFR Estimate 05/19/2025 85  >60 mL/min/1.73m2 Final     Calcium 05/19/2025 9.3  8.8 - 10.4 mg/dL Final     Glucose 05/19/2025 93  70 - 99 mg/dL Final         Total time spent for face to face visit, reviewing labs/imaging studies, counseling and coordination of care was: 30 Minutes spent on the date of the encounter doing chart review, review of outside records, review of test results, interpretation of tests, patient visit, documentation, and discussion with family     The longitudinal plan of care for the diagnosis(es)/condition(s) as documented were addressed during this visit. Due to the added complexity in care, I will continue to support Merrily in the subsequent management and with ongoing continuity of care.    This note was dictated using voice recognition software.  Any grammatical or context distortions are unintentional and inherent to the software.    Orders Placed This Encounter   Procedures     Phenytoin level     Topiramate Level      New Prescriptions    No medications on file     Modified Medications    Modified Medication Previous Medication    PHENYTOIN (DILANTIN) 100 MG ER CAPSULE phenytoin (DILANTIN) 100 MG ER capsule       Take 5 capsules (500 mg) by mouth at bedtime.    TAKE 5 CAPSULES AT BEDTIME    TOPIRAMATE (TOPAMAX) 100 MG TABLET topiramate (TOPAMAX) 100 MG tablet       Take 1.5 tablets (150 mg) by mouth 2 times daily.    Take 1.5 tablets (150 mg) by mouth 2 times daily.                  Virtual Visit Details    Type of service:  Video Visit   Video Start Time: 10:49 AM  Video End Time:11:05 AM    Originating Location (pt. Location): Home    Distant Location (provider location):  On-site  Platform used for Video Visit: [employer]      Again, thank you for allowing me to participate in the care of your patient.        Sincerely,        Guy Marie MD    Electronically signed

## 2025-07-30 ENCOUNTER — LAB REQUISITION (OUTPATIENT)
Dept: LAB | Facility: CLINIC | Age: 80
End: 2025-07-30
Payer: COMMERCIAL

## 2025-07-30 ENCOUNTER — TRANSFERRED RECORDS (OUTPATIENT)
Dept: HEALTH INFORMATION MANAGEMENT | Facility: CLINIC | Age: 80
End: 2025-07-30

## 2025-07-30 DIAGNOSIS — E21.3 HYPERPARATHYROIDISM, UNSPECIFIED: ICD-10-CM

## 2025-07-30 LAB
ANION GAP SERPL CALCULATED.3IONS-SCNC: 13 MMOL/L (ref 7–15)
BUN SERPL-MCNC: 21.3 MG/DL (ref 8–23)
CALCIUM SERPL-MCNC: 9.5 MG/DL (ref 8.8–10.4)
CHLORIDE SERPL-SCNC: 99 MMOL/L (ref 98–107)
CREAT SERPL-MCNC: 0.78 MG/DL (ref 0.51–0.95)
CRP SERPL-MCNC: <3 MG/L
EGFRCR SERPLBLD CKD-EPI 2021: 76 ML/MIN/1.73M2
FOLATE SERPL-MCNC: >40 NG/ML (ref 4.6–34.8)
GLUCOSE SERPL-MCNC: 119 MG/DL (ref 70–99)
HCO3 SERPL-SCNC: 27 MMOL/L (ref 22–29)
NT-PROBNP SERPL-MCNC: 439 PG/ML (ref 0–624)
PHENYTOIN SERPL-MCNC: 28.9 UG/ML (ref ?–30)
PHQ9 SCORE: 3
POTASSIUM SERPL-SCNC: 3.8 MMOL/L (ref 3.4–5.3)
SODIUM SERPL-SCNC: 139 MMOL/L (ref 135–145)
VIT B12 SERPL-MCNC: 648 PG/ML (ref 232–1245)

## 2025-07-30 PROCEDURE — 83880 ASSAY OF NATRIURETIC PEPTIDE: CPT | Mod: ORL | Performed by: FAMILY MEDICINE

## 2025-07-30 PROCEDURE — 80186 ASSAY OF PHENYTOIN FREE: CPT | Mod: ORL | Performed by: FAMILY MEDICINE

## 2025-07-30 PROCEDURE — 80048 BASIC METABOLIC PNL TOTAL CA: CPT | Mod: ORL | Performed by: FAMILY MEDICINE

## 2025-07-30 PROCEDURE — 82746 ASSAY OF FOLIC ACID SERUM: CPT | Mod: ORL | Performed by: FAMILY MEDICINE

## 2025-07-30 PROCEDURE — 86140 C-REACTIVE PROTEIN: CPT | Mod: ORL | Performed by: FAMILY MEDICINE

## 2025-07-30 PROCEDURE — 80201 ASSAY OF TOPIRAMATE: CPT | Mod: ORL | Performed by: FAMILY MEDICINE

## 2025-07-30 PROCEDURE — 82607 VITAMIN B-12: CPT | Mod: ORL | Performed by: FAMILY MEDICINE

## 2025-07-30 PROCEDURE — 80185 ASSAY OF PHENYTOIN TOTAL: CPT | Mod: ORL | Performed by: FAMILY MEDICINE

## 2025-07-31 LAB — TOPIRAMATE SERPL-MCNC: 6.2 UG/ML

## 2025-08-01 LAB — PHENYTOIN FREE SERPL-MCNC: 2.8 UG/ML

## 2025-08-12 ENCOUNTER — TELEPHONE (OUTPATIENT)
Dept: CARDIOLOGY | Facility: CLINIC | Age: 80
End: 2025-08-12
Payer: COMMERCIAL

## 2025-08-12 DIAGNOSIS — I50.32 CHRONIC HEART FAILURE WITH PRESERVED EJECTION FRACTION (H): ICD-10-CM

## 2025-08-12 RX ORDER — POTASSIUM CHLORIDE 1500 MG/1
20 TABLET, EXTENDED RELEASE ORAL DAILY
Qty: 270 TABLET | Refills: 3 | Status: SHIPPED | OUTPATIENT
Start: 2025-08-12

## (undated) RX ORDER — METHOHEXITAL IN WATER/PF 100MG/10ML
SYRINGE (ML) INTRAVENOUS
Status: DISPENSED
Start: 2023-12-06